# Patient Record
Sex: MALE | Race: BLACK OR AFRICAN AMERICAN | Employment: UNEMPLOYED | URBAN - METROPOLITAN AREA
[De-identification: names, ages, dates, MRNs, and addresses within clinical notes are randomized per-mention and may not be internally consistent; named-entity substitution may affect disease eponyms.]

---

## 2017-01-03 ENCOUNTER — HOSPITAL ENCOUNTER (OUTPATIENT)
Dept: DIABETES SERVICES | Age: 47
Discharge: HOME | End: 2017-01-03
Payer: SUBSIDIZED

## 2017-01-03 PROCEDURE — G0108 DIAB MANAGE TRN  PER INDIV: HCPCS | Performed by: DIETITIAN, REGISTERED

## 2017-01-06 ENCOUNTER — OFFICE VISIT (OUTPATIENT)
Dept: CARDIOLOGY CLINIC | Age: 47
End: 2017-01-06

## 2017-01-06 VITALS
OXYGEN SATURATION: 98 % | DIASTOLIC BLOOD PRESSURE: 76 MMHG | WEIGHT: 153 LBS | RESPIRATION RATE: 18 BRPM | HEIGHT: 69 IN | BODY MASS INDEX: 22.66 KG/M2 | SYSTOLIC BLOOD PRESSURE: 126 MMHG | HEART RATE: 96 BPM

## 2017-01-06 DIAGNOSIS — E10.8 TYPE 1 DIABETES MELLITUS WITH COMPLICATION (HCC): ICD-10-CM

## 2017-01-06 DIAGNOSIS — E10.42 DIABETIC POLYNEUROPATHY ASSOCIATED WITH TYPE 1 DIABETES MELLITUS (HCC): ICD-10-CM

## 2017-01-06 DIAGNOSIS — R60.9 EDEMA, UNSPECIFIED TYPE: Primary | ICD-10-CM

## 2017-01-06 PROBLEM — I25.10 ASHD (ARTERIOSCLEROTIC HEART DISEASE): Status: ACTIVE | Noted: 2017-01-06

## 2017-01-06 PROBLEM — I25.10 ASHD (ARTERIOSCLEROTIC HEART DISEASE): Status: RESOLVED | Noted: 2017-01-06 | Resolved: 2017-01-06

## 2017-01-06 NOTE — PROGRESS NOTES
Monae Hamilton NP  Subjective/HPI:     Liisa Paget is a 55 y.o. male is here for transitional care from admission for chest discomfort and edema. Hospitalization included diagnostic cardiac cath normal coronary arteries echocardiogram with normal valvular structure and normal ejection fraction. Patient now has access to insulin and reports now that his blood sugars are coming under better control his edema and overall general state of health he reports is improving. The patient denies exertional chest pain/resting shortness of breath, orthopnea, PND, persistent LE edema, palpitations, syncope, presyncope or fatigue. PCP Provider  Natacha Manzanares MD  Past Medical History   Diagnosis Date    Asthma     Type 2 diabetes mellitus with peripheral neuropathy ([facility] [address]       Past Surgical History   Procedure Laterality Date    Hx heent       ear surgery(both)     No Known Allergies   Family History   Problem Relation Age of Onset    Diabetes Father     Cancer Father      prostate    Hypertension Mother     Other Mother      Crohn's disease    Hypertension Maternal Grandmother       Current Outpatient Prescriptions   Medication Sig    pantoprazole (PROTONIX) 40 mg tablet Take 1 Tab by mouth Daily (before breakfast).  insulin detemir (LEVEMIR FLEXTOUCH) 100 unit/mL (3 mL) inpn Inject 30 units twice daily--Dose change 12/22/16--updated med list--did not send prescription to the pharmacy    amitriptyline (ELAVIL) 10 mg tablet Take 1 Tab by mouth nightly. To help diarrhea.  insulin lispro (HUMALOG) 100 unit/mL injection Take 14 units after each meal.  If you skip a meal, do not take.  gabapentin (NEURONTIN) 600 mg tablet Take  by mouth four (4) times daily.  triamcinolone acetonide (KENALOG) 0.1 % topical cream Apply  to affected area two (2) times a day. Use thin layer on neck rash. No current facility-administered medications for this visit.        Vitals:    01/06/17 1011 01/06/17 1017   BP: 130/80 126/76   Pulse: 96    Resp: 18    SpO2: 98%    Weight: 153 lb (69.4 kg)    Height: 5' 9\" (1.753 m)      Social History     Social History    Marital status:      Spouse name: N/A    Number of children: N/A    Years of education: N/A     Occupational History    Not on file. Social History Main Topics    Smoking status: Never Smoker    Smokeless tobacco: Never Used    Alcohol use No    Drug use: No    Sexual activity: Not on file     Other Topics Concern    Not on file     Social History Narrative       I have reviewed the nurses notes, vitals, problem list, allergy list, medical history, family, social history and medications. Review of Symptoms:    General: Pt denies excessive weight gain or loss. Pt is able to conduct ADL's  HEENT: Denies blurred vision, headaches, epistaxis and difficulty swallowing. Respiratory: Denies shortness of breath, PHILLIPS, wheezing or stridor. Cardiovascular: Denies precordial pain, palpitations, edema or PND  Gastrointestinal: Denies poor appetite, indigestion, abdominal pain or blood in stool  Musculoskeletal: Denies pain or swelling from muscles or joints  Neurologic: Denies tremor, paresthesias, or sensory motor disturbance  Skin: Denies rash, itching or texture change. Physical Exam:      General: Well developed, in no acute distress, cooperative and alert  HEENT: No carotid bruits, no JVD, trach is midline. Neck Supple, PEERL, EOM intact. Heart:  Normal S1/S2 negative S3 or S4. Regular, no murmur, gallop or rub.   Respiratory: Clear bilaterally x 4, no wheezing or rales  Abdomen:   Soft, non-tender, no masses, bowel sounds are active.   Extremities:  No edema, normal cap refill, no cyanosis, atraumatic. Neuro: A&Ox3, speech clear, gait stable. Skin: Skin color is normal. No rashes or lesions.  Non diaphoretic  Vascular: 2+ pulses symmetric in all extremities    Cardiographics    ECG: sinus rythm  Results for orders placed or performed during the hospital encounter of 12/20/16   EKG, 12 LEAD, INITIAL   Result Value Ref Range    Ventricular Rate 84 BPM    Atrial Rate 84 BPM    P-R Interval 160 ms    QRS Duration 66 ms    Q-T Interval 358 ms    QTC Calculation (Bezet) 423 ms    Calculated P Axis 76 degrees    Calculated R Axis 70 degrees    Calculated T Axis 74 degrees    Diagnosis       Normal sinus rhythm  Low voltage in the standard leads  Loss of anteroseptal forces  When compared with ECG of 21-DEC-2016 05:07,  No significant change was found  Confirmed by Kristie Dow ([id number]) on 12/22/2016 8:47:03 AM           Cardiology Labs:  No results found for: CHOL, CHOLX, CHLST, CHOLV, [id number], HDL, LDL, DLDL, LDLC, DLDLP, TGL, Aneita Leap, [id number], TRIGP, CHHD, CHHDX    Lab Results   Component Value Date/Time    Sodium 146 12/21/2016 05:01 AM    Potassium 3.3 12/21/2016 05:01 AM    Chloride 106 12/21/2016 05:01 AM    CO2 34 12/21/2016 05:01 AM    Anion gap 6 12/21/2016 05:01 AM    Glucose 248 12/21/2016 05:01 AM    BUN 7 12/21/2016 05:01 AM    Creatinine 0.61 12/21/2016 05:01 AM    BUN/Creatinine ratio 11 12/21/2016 05:01 AM    GFR est AA >60 12/21/2016 05:01 AM    GFR est non-AA >60 12/21/2016 05:01 AM    Calcium 8.2 12/21/2016 05:01 AM    Bilirubin, total 0.4 12/20/2016 11:18 AM     12/20/2016 11:18 AM     12/20/2016 11:18 AM    Alk. phosphatase 102 12/20/2016 11:18 AM    Protein, total 6.6 12/20/2016 11:18 AM    Albumin 3.0 12/20/2016 11:18 AM    Globulin 3.6 12/20/2016 11:18 AM    A-G Ratio 0.8 12/20/2016 11:18 AM           Assessment:     Assessment:     Jenna was seen today for heart problem.     Diagnoses and all orders for this visit:    Edema, unspecified type  -     AMB POC EKG ROUTINE W/ 12 LEADS, INTER & REP    Type 1 diabetes mellitus with complication ([address]    Diabetic polyneuropathy associated with type 1 diabetes mellitus ([facility] [address]    Normal coronary arteries        ICD-10-CM ICD-9-CM    1. Edema, unspecified type R60.9 782.3 AMB POC EKG ROUTINE W/ 12 LEADS, INTER & REP   2. Type 1 diabetes mellitus with complication (HCC) E40.8 250.91    3. Diabetic polyneuropathy associated with type 1 diabetes mellitus (HCC) E10.42 250.61      357.2    4. Normal coronary arteries Z03.89 V71.7      Orders Placed This Encounter    AMB POC EKG ROUTINE W/ 12 LEADS, INTER & REP     Order Specific Question:   Reason for Exam:     Answer:   routine        Plan:     Patient presents with normal coronary arteries, normal ejection fraction, normal 2D echocardiogram.  Blood pressure control main challenge for patient is improving control of type 1 diabetes, has access to insulin now and is taking medications as prescribed. Follow-up with cardiology as needed    Arnol MARIAA Pineda      [facility]    1/6/2017         Agree with note as outlined by  NP. I confirm findings in history and physical exam. No additional findings noted. Agree with plan as outlined above.      Danny Bailon MD

## 2017-01-16 ENCOUNTER — OFFICE VISIT (OUTPATIENT)
Dept: FAMILY MEDICINE CLINIC | Age: 47
End: 2017-01-16

## 2017-01-16 ENCOUNTER — HOSPITAL ENCOUNTER (OUTPATIENT)
Dept: LAB | Age: 47
Discharge: HOME | End: 2017-01-16

## 2017-01-16 VITALS
SYSTOLIC BLOOD PRESSURE: 102 MMHG | WEIGHT: 141.6 LBS | TEMPERATURE: 97.6 F | DIASTOLIC BLOOD PRESSURE: 76 MMHG | BODY MASS INDEX: 20.91 KG/M2 | HEART RATE: 115 BPM

## 2017-01-16 DIAGNOSIS — F32.89 OTHER DEPRESSION: Primary | ICD-10-CM

## 2017-01-16 DIAGNOSIS — Z13.9 SCREENING: ICD-10-CM

## 2017-01-16 LAB — GLUCOSE POC: NORMAL MG/DL

## 2017-01-16 PROCEDURE — 83516 IMMUNOASSAY NONANTIBODY: CPT | Performed by: FAMILY MEDICINE

## 2017-01-16 RX ORDER — INSULIN LISPRO 100 [IU]/ML
INJECTION, SOLUTION INTRAVENOUS; SUBCUTANEOUS
Qty: 1 VIAL | Refills: 0
Start: 2017-01-16 | End: 2019-05-24

## 2017-01-16 RX ORDER — CITALOPRAM 20 MG/1
TABLET, FILM COATED ORAL
Qty: 30 TAB | Refills: 1 | Status: SHIPPED | OUTPATIENT
Start: 2017-01-16 | End: 2017-05-22

## 2017-01-16 NOTE — PROGRESS NOTES
Coordination of Care  1. Have you been to the ER, urgent care clinic since your last visit? Hospitalized since your last visit? Yes Reason for visit: [facility] [facility] ER, 12/2016, Chest pain and elevated glucose    2. Have you seen or consulted any other health care providers outside of the [address] [place] [address] since your last visit? Include any pap smears or colon screening.  No    Medications  Medication Reconciliation Performed: yes  Patient does need refills     Learning Assessment Complete? yes    Results for orders placed or performed in visit on 01/16/17   AMB POC GLUCOSE BLOOD, BY GLUCOSE MONITORING DEVICE   Result Value Ref Range    Glucose  Fasting mg/dL

## 2017-01-16 NOTE — PROGRESS NOTES
Subjective:     Genia Estrada is a 55 y.o. male seen for follow up of diabetes. Feels like he is doing better. Is tapped into the DTT and Dr. Az Salazar now, and has appt with dietitian there tomorrow which he plans to keep. Has been having some lows, usually about 4am, where he feels too shaky to check glucose, but eats applesauce at bedside. Glucoses are also still 200s-300s at other times. Taking levemir 30u 8a and 8p, and humalog 16u with meals. Eating 2-3 times a day, poor appetite. Still having diarrhea but not as often as in the past.  Avoiding milk products and bread. Didn't take insulin this morning. I asked him about sx related to depression today. Along with no appetite, he thinks he is depressed, is isolating himself, says his family has noticed him withdrawing, and says his sex drive is 'almost gone'.     Lab Results  Component Value Date/Time   WBC 7.8 12/21/2016 05:01 AM   Hemoglobin (POC) 15.4 06/22/2016 01:27 PM   Hemoglobin (POC) 16.7 07/19/2010 01:48 PM   HGB 11.4 12/21/2016 05:01 AM   Hematocrit (POC) 49 07/19/2010 01:48 PM   HCT 33.3 12/21/2016 05:01 AM   PLATELET 117 03/20/6100 05:01 AM   MCV 85.8 12/21/2016 05:01 AM       Lab Results  Component Value Date/Time   Hemoglobin A1c 15.5 12/04/2015 10:56 AM   Glucose 248 12/21/2016 05:01 AM   Glucose (POC) 167 12/22/2016 06:46 PM   Glucose (POC) 393 07/19/2010 01:48 PM   Glucose  Fasting 01/16/2017 09:26 AM   Creatinine (POC) 1.0 07/19/2010 01:48 PM   Creatinine 0.61 12/21/2016 05:01 AM      No results found for: CHOL, CHOLX, CHLST, CHOLV, HDL, LDL, DLDL, LDLC, DLDLP, TGL, TGLX, TRIGL, [id number], TRIGP, CHHD, CHHDX    Lab Results  Component Value Date/Time   GFR est AA >60 12/21/2016 05:01 AM   GFR est non-AA >60 12/21/2016 05:01 AM   Creatinine (POC) 1.0 07/19/2010 01:48 PM   Creatinine 0.61 12/21/2016 05:01 AM   BUN 7 12/21/2016 05:01 AM   BUN (POC) 8 07/19/2010 01:48 PM   Sodium (POC) 141 07/19/2010 01:48 PM   Sodium 146 12/21/2016 05:01 AM   Potassium 3.3 12/21/2016 05:01 AM   Potassium (POC) 3.7 07/19/2010 01:48 PM   Chloride (POC) 99 07/19/2010 01:48 PM   Chloride 106 12/21/2016 05:01 AM   CO2 34 12/21/2016 05:01 AM                Vitals 1/16/2017 1/6/2017 1/6/2017 12/29/2016 12/28/2016 12/22/2016 12/22/2016   Blood Pressure 102/76 126/76 130/80 119/81 127/83 117/69 111/75   Pulse 115 - 96 87 83 123 116   Temp 97.6 - - - 97.3 97.5 97.5   Resp - - 18 - - 16 18   Height - - 5' 9\" 5' 9\" 5' 9.016\" - -   Weight 141 lb 9.6 oz - 153 lb 145 lb 9.6 oz 156 lb - -   SpO2 - - 98 - - 99 100   BSA - - 1.84 m2 1.79 m2 1.86 m2 - -   BMI - - 22.59 kg/m2 21.5 kg/m2 23.03 kg/m2 - -     Appearance: although weight has dropped since last visit, he generally looks better. , lungs clear, abd soft with some mild right mid-abd tenderness, no masses. Assessment/Plan:     1. Type 2 diabetes (or type 1.5?). Discussed what a good team Dr. Mikala Barrios has and strongly recommended he follow up with them. His experience so far has been very +. Should discuss the 4am lows with the dietitian-- may not have enough protein in suppers. May also neeed levemir adjusted and he has f/u with Dr. Mikala Barrios in the next few weeks. We are giving him humalog and levemir today and will continue to provide PAP insulin. 2.  Depression. He is interested in counseling and med. To see our LCSW asap. I am starting low dose celexa, discussed that it may exaccerbate GI sx at first.  F/u 3-4 weeks. 3.  Diarrhea-- check celiac panal.  Could be enteropathy, he is on elevil now. Continue to avoid milk products or can use lactaid pills/milk for now. 4.  Noticed anemia after he left, while in the ED with dehydration. Check hb next visit.

## 2017-01-16 NOTE — PROGRESS NOTES
Per provider request, the patient was given the rest of his PAP insulin order, 3 vials of Humalog and 10 vials of Levemir. Dr. Iker Virgen wrote the dose and signed each medication sticker. The patient signed the log book and expressed understanding of the doses. His referral to [facility] forms were completed and faxed to [facility] at [phone]. A copy of the referral and financial screening forms were put in [employer] [facility] and the originals were email scanned to Umm Gan and then placed in the grey folder in the nurse drawer.   Keyona Watt RN

## 2017-01-17 ENCOUNTER — HOSPITAL ENCOUNTER (OUTPATIENT)
Dept: DIABETES SERVICES | Age: 47
Discharge: HOME | End: 2017-01-17
Payer: SUBSIDIZED

## 2017-01-17 PROCEDURE — G0108 DIAB MANAGE TRN  PER INDIV: HCPCS | Performed by: DIETITIAN, REGISTERED

## 2017-01-19 ENCOUNTER — PATIENT OUTREACH (OUTPATIENT)
Dept: FAMILY MEDICINE CLINIC | Age: 47
End: 2017-01-19

## 2017-01-19 LAB
GLIADIN PEPTIDE IGA SER-ACNC: 10 UNITS (ref 0–19)
GLIADIN PEPTIDE IGG SER-ACNC: 5 UNITS (ref 0–19)
IGA SERPL-MCNC: 429 MG/DL (ref 90–386)
TTG IGA SER-ACNC: <2 U/ML (ref 0–3)
TTG IGG SER-ACNC: 3 U/ML (ref 0–5)

## 2017-01-30 ENCOUNTER — OFFICE VISIT (OUTPATIENT)
Dept: ENDOCRINOLOGY | Age: 47
End: 2017-01-30

## 2017-01-30 VITALS
HEIGHT: 69 IN | SYSTOLIC BLOOD PRESSURE: 128 MMHG | HEART RATE: 109 BPM | DIASTOLIC BLOOD PRESSURE: 83 MMHG | WEIGHT: 153.6 LBS | BODY MASS INDEX: 22.75 KG/M2

## 2017-01-30 NOTE — PROGRESS NOTES
Chief Complaint   Patient presents with    Diabetes       HPI  This gentleman returns for follow-up of his type 2 diabetes mellitus. He has unexplained weight loss from a maximum weight of 290 pounds to a current weight of 153 pounds. He remains on Levemir insulin 30 units twice daily and Humalog insulin 14 units with each of his 2 meals. Unfortunately his blood sugars in the morning and at bedtime are still consistently at around 300. He eats 2 meals a day. Breakfast is typically oatmeal and lunch is 2 packs of Ramen noodles. He had an episode of diarrhea yesterday occurring after breakfast but before supper. He recently underwent testing for celiac disease but these tests were negative. He denies any episodes of hypoglycemia with 1 exception. He had one episode where he ate a third meal in the evening and experienced a hypoglycemic episode at midnight. Otherwise he has not had hypoglycemia. But again he only checks in the morning and at bedtime. He is not aware of what his blood sugars during the day. ROS  He denies chest pain, shortness of breath, constipation or diarrhea with the exception of yesterday. He describes that episode as explosive watery diarrhea. Visit Vitals    /83 (BP 1 Location: Right arm, BP Patient Position: Sitting)    Pulse (!) 109    Ht 5' 9\" (1.753 m)    Wt 153 lb 9.6 oz (69.7 kg)    BMI 22.68 kg/m2       Physical Examination: General appearance - alert, well appearing, and in no distress  Mental status - alert, oriented to person, place, and time  Chest - clear to auscultation, no wheezes, rales or rhonchi, symmetric air entry  Heart - normal rate, regular rhythm, normal S1, S2, no murmurs, rubs, clicks or gallops    ASSESSMENT & PLAN  I am concerned that we are not able to bring this gentleman's blood sugars down more than 300.   Admittedly this is better than it was but by no means at goal.  So my plan is to have him check blood sugars in the morning and before his 2:00 meal or before his 2:00 meal and at bedtime to determine whether the 14 units is adequate for his meals. I suspect that we are better off attacking the mealtime insulin in the basal insulin. I did tell him that because of cost, we would try to accomplish much of his management over the phone and if necessary, he could certainly be seen at [address] [place] [address] in the future.

## 2017-01-31 ENCOUNTER — PATIENT OUTREACH (OUTPATIENT)
Dept: ENDOCRINOLOGY | Age: 47
End: 2017-01-31

## 2017-01-31 ENCOUNTER — HOSPITAL ENCOUNTER (OUTPATIENT)
Dept: DIABETES SERVICES | Age: 47
Discharge: HOME | End: 2017-01-31
Payer: SUBSIDIZED

## 2017-01-31 ENCOUNTER — TELEPHONE (OUTPATIENT)
Dept: FAMILY MEDICINE CLINIC | Age: 47
End: 2017-01-31

## 2017-01-31 PROCEDURE — G0108 DIAB MANAGE TRN  PER INDIV: HCPCS | Performed by: DIETITIAN, REGISTERED

## 2017-01-31 NOTE — PROGRESS NOTES
11:18 am  Attempted to contact patient, follow up for office visit yesterday, 1/30/17. No answer, left voicemail message to return telephone call, sending NN letter.

## 2017-01-31 NOTE — TELEPHONE ENCOUNTER
I made two attempts to contact the patient in order to reschedule the patient specialist appointment with Dr Yun Bailey I was  unsuccessful in speaking with the patient directly  however I was able to leave a v/m for a return call as of today date.     Scott Gu

## 2017-02-07 ENCOUNTER — TELEPHONE (OUTPATIENT)
Dept: FAMILY MEDICINE CLINIC | Age: 47
End: 2017-02-07

## 2017-02-07 NOTE — TELEPHONE ENCOUNTER
The patient called regarding overnight hypoglycemia and a possible need for glucagon as recommended by the diabetes treatment Center people. He tells me that he was taking 18 units of NovoLog insulin with each of his meals even though he thought he was taking 14. He has been instructed in proper technique for drawing up his insulin. So now he is taking 14 units of short acting insulin with his meals. He continues to take Levemir 30 units twice daily. His morning blood sugars are running 250-300 but his blood sugars fall throughout the day. Because of the lower blood sugars in the evening, he is now eating a snack at bedtime to prevent nocturnal hypoglycemia. For example yesterday his blood sugar was 260 5 in the morning, 180 at lunch but 70 after supper. I advised him to decrease his short acting insulin to 12 units with each of his meals and continue the Levemir 30 units twice daily. He will call me with the results of ball blood sugars.

## 2017-02-09 ENCOUNTER — TELEPHONE (OUTPATIENT)
Dept: FAMILY MEDICINE CLINIC | Age: 47
End: 2017-02-09

## 2017-02-09 NOTE — TELEPHONE ENCOUNTER
Cancellation notice letter was mailed out to the patient today. Patient was contacted on a few occasions via telephone calls and voice messages left to return call concerning appointment with Dr. Doug Major as of today date patient has not return any of the  Calls made to his home telephone number.      Torin Rey

## 2017-02-17 ENCOUNTER — PATIENT OUTREACH (OUTPATIENT)
Dept: FAMILY MEDICINE CLINIC | Age: 47
End: 2017-02-17

## 2017-02-17 ENCOUNTER — PATIENT OUTREACH (OUTPATIENT)
Dept: ENDOCRINOLOGY | Age: 47
End: 2017-02-17

## 2017-02-17 NOTE — PROGRESS NOTES
[address]    Closing MARY episode of care. Pt is being followed by Craig HOBSON at this time.  Karen Morelos RN

## 2017-02-17 NOTE — PROGRESS NOTES
Attempted to contact patient, follow up for chronic condition of diabetes with blood sugar readings and insulin regimen, no answer, left voicemail message to return telephone call.

## 2017-03-03 ENCOUNTER — TELEPHONE (OUTPATIENT)
Dept: DIABETES SERVICES | Age: 47
End: 2017-03-03

## 2017-03-28 ENCOUNTER — HOSPITAL ENCOUNTER (OUTPATIENT)
Dept: DIABETES SERVICES | Age: 47
Discharge: HOME | End: 2017-03-28
Payer: SUBSIDIZED

## 2017-03-28 PROCEDURE — G0108 DIAB MANAGE TRN  PER INDIV: HCPCS | Performed by: DIETITIAN, REGISTERED

## 2017-03-29 NOTE — DIABETES MGMT
[facility] Outpatient Program Progress Note      Patient has made significant progress since his admission at [facility] in December. His A1C today is 8% down from 15.5% on 12/29/16. He has also gained weight- 166.8# today up from 152.4# on 1/31/17. Patient reports that he has not been checking his blood sugars in the last 2 weeks. Reiterated the importance of checking before giving his insulin as this is a safety issue. Provided patient with a insulin syringe magnifier at last visit. States that this has \"been a lifesaver\" as he is not blindly dosing his insulin. Eyes-patient continues to report his vision is poor- patient to set up appointment ASAP as it has been 2 years since his last exam.    Diet- patient tried The FODMAP diet with no relief of symptoms. States that since he has started taking probiotics, his is having very little diarrhea. Exercise- patient is not exercising. Encouraged 30 minutes daily. States he plans to walk more. Depression- states he is seeing a psychiatrist and is not taking any medications at this time. Voiced that he feels that he will need medication as he has no desire in activities that he once enjoyed. Weight today:166.8#    Next appointment is scheduled for May 10th 2017. Thank you.     Magnolia Nicole, [address], [address]  Office:  [phone]

## 2017-04-04 ENCOUNTER — PATIENT OUTREACH (OUTPATIENT)
Dept: FAMILY MEDICINE CLINIC | Age: 47
End: 2017-04-04

## 2017-04-04 NOTE — PROGRESS NOTES
Disease Specific Case Management Note. NN called Jhonathan Monday as requested by JAZLYN Ying left message.

## 2017-05-02 ENCOUNTER — DOCUMENTATION ONLY (OUTPATIENT)
Dept: FAMILY MEDICINE CLINIC | Age: 47
End: 2017-05-02

## 2017-05-02 NOTE — PROGRESS NOTES
[facility] PAP application for Humalog signed by Dr Glendy Burton then faxed to RACHEAL Mccarthy at [facility]

## 2017-05-10 ENCOUNTER — HOSPITAL ENCOUNTER (OUTPATIENT)
Dept: DIABETES SERVICES | Age: 47
Discharge: HOME | End: 2017-05-10
Payer: SUBSIDIZED

## 2017-05-10 PROCEDURE — G0108 DIAB MANAGE TRN  PER INDIV: HCPCS | Performed by: DIETITIAN, REGISTERED

## 2017-05-16 NOTE — DIABETES MGMT
[facility] Outpatient Visit Note    Patient seen today for follow up. Patient very straight forward with questions. He has not been testing- last time he checked his BG was 1 week ago. Patient takes his insulin but typically only takes his Levemir once daily. States he will fall asleep and frequently miss his evening dose. Patient may benefit from taking his Levemir once daily at an increased dose. Patient continues to have sharp pains come and go in his eye. Encouraged patient to have eye exam ASAP. Patient also is due for a dental exam.  Standards of care and foot care caught today. Encouraged the followin- do not skip insulin  2- test BG daily  3-  Do not skip meals  4- set up eye and dental exam- to contact [facility]    Weight: down 7.1# to 159.7#    A1c: today: 8.2%    Patient states he does best with accountability- he has scheduled an appointment in 2 months. Thank you.   Augusto Ross, [address] [place] [address], [address]  Office:  [phone]

## 2017-05-22 ENCOUNTER — OFFICE VISIT (OUTPATIENT)
Dept: FAMILY MEDICINE CLINIC | Age: 47
End: 2017-05-22

## 2017-05-22 VITALS
SYSTOLIC BLOOD PRESSURE: 141 MMHG | TEMPERATURE: 98.3 F | BODY MASS INDEX: 23.92 KG/M2 | HEART RATE: 90 BPM | DIASTOLIC BLOOD PRESSURE: 84 MMHG | WEIGHT: 162 LBS

## 2017-05-22 DIAGNOSIS — E10.65 HYPERGLYCEMIA DUE TO TYPE 1 DIABETES MELLITUS (HCC): Primary | ICD-10-CM

## 2017-05-22 DIAGNOSIS — E10.42 DIABETIC POLYNEUROPATHY ASSOCIATED WITH TYPE 1 DIABETES MELLITUS (HCC): ICD-10-CM

## 2017-05-22 LAB — GLUCOSE POC: NORMAL MG/DL

## 2017-05-22 RX ORDER — AMITRIPTYLINE HYDROCHLORIDE 25 MG/1
25 TABLET, FILM COATED ORAL
Qty: 30 TAB | Refills: 2 | Status: SHIPPED | OUTPATIENT
Start: 2017-05-22 | End: 2018-03-08 | Stop reason: ALTCHOICE

## 2017-05-22 RX ORDER — GABAPENTIN 600 MG/1
600 TABLET ORAL 4 TIMES DAILY
Qty: 120 TAB | Refills: 1 | Status: SHIPPED | OUTPATIENT
Start: 2017-05-22 | End: 2017-07-07 | Stop reason: SDUPTHER

## 2017-05-22 NOTE — PROGRESS NOTES
Coordination of Care  1. Have you been to the ER, urgent care clinic since your last visit? Hospitalized since your last visit? No    2. Have you seen or consulted any other health care providers outside of the [address] [place] [address] since your last visit? Include any pap smears or colon screening.  No    Medications  Medication Reconciliation Performed: yes  Patient does need refills     Learning Assessment Complete? yes  Results for orders placed or performed in visit on 05/22/17   AMB POC GLUCOSE BLOOD, BY GLUCOSE MONITORING DEVICE   Result Value Ref Range    Glucose  nonfasting mg/dL

## 2017-05-22 NOTE — PROGRESS NOTES
[facility] appt given for July appt. Explained process that pt would need to have a  due to eye dilation, the eye exam fee of $15. Pt given copy of eye appt slip. Patient given pap insulin, just enough for a month's worth of levemir and humalog per Dr Classie Cushing and Dr Juan Mclean as pt tends to be non-compliant with appointments.

## 2017-05-23 NOTE — PROGRESS NOTES
Subjective:     Jose Ross is a 55 y.o. male seen for follow up of diabetes. Diabetic Review of Systems - medication compliance: noncompliant some of the time, diabetic diet compliance: probably noncompliant though I cannot elicit that specific history, last eye exam approximately ? Kitty Bazan Mentioned to the RD at [address] that he has been having some pain in his eyes but didn't mention to me today. C/o pain in extrem, a feeling of electric shock that goes up to his right shoulder and into legs from feet. Reports the gabapentin just 'takes the edge off'. Taking the gabapentin tid-qid and needs refill. Wants to see a neurologist.  Reina Oslaureano to take second dose of levemir sometimes, isn't checking glucoses very often but does have readings in the 100s lately, and A1C poc at [facility] was 8.2. Last week. Had one glucose of 32 when he took humalog and then didn't eat. Other symptoms and concerns: the rash behind his right ear persists.     Patient Active Problem List   Diagnosis Code    Hyperglycemia due to type 1 diabetes mellitus (HCC) E10.65    Diabetic polyneuropathy associated with type 1 diabetes mellitus (HCC) E10.42    Normal coronary arteries Z03.89     Past Medical History:   Diagnosis Date    Asthma     Type 2 diabetes mellitus with peripheral neuropathy ([facility]      Family History   Problem Relation Age of Onset    Diabetes Father     Cancer Father      prostate    Hypertension Mother     Other Mother      Crohn's disease    Hypertension Maternal Grandmother      Social History   Substance Use Topics    Smoking status: Never Smoker    Smokeless tobacco: Never Used    Alcohol use No        No results found for: CHOL, CHOLX, CHLST, CHOLV, HDL, LDL, DLDL, LDLC, DLDLP, TGL, TGLX, TRIGL, [id number], TRIGP, CHHD, CHHDX    Lab Results  Component Value Date/Time   GFR est AA >60 12/21/2016 05:01 AM   GFR est non-AA >60 12/21/2016 05:01 AM   Creatinine (POC) 1.0 07/19/2010 01:48 PM   Creatinine 0.61 12/21/2016 05:01 AM   BUN 7 12/21/2016 05:01 AM   BUN (POC) 8 07/19/2010 01:48 PM   Sodium (POC) 141 07/19/2010 01:48 PM   Sodium 146 12/21/2016 05:01 AM   Potassium 3.3 12/21/2016 05:01 AM   Potassium (POC) 3.7 07/19/2010 01:48 PM   Chloride (POC) 99 07/19/2010 01:48 PM   Chloride 106 12/21/2016 05:01 AM   CO2 34 12/21/2016 05:01 AM         Lab Results   Component Value Date/Time    Hemoglobin A1c 15.5 12/04/2015 10:56 AM         Objective:     Vitals 5/22/2017 1/30/2017 1/16/2017 1/6/2017 1/6/2017 12/29/2016 12/28/2016   Blood Pressure 141/84 128/83 102/76 126/76 130/80 119/81 127/83   Pulse 90 109 115 - 96 87 83   Temp 98.3 - 97.6 - - - 97.3   Resp - - - - 18 - -   Height - 5' 9\" - - 5' 9\" 5' 9\" 5' 9.016\"   Weight 162 lb 153 lb 9.6 oz 141 lb 9.6 oz - 153 lb 145 lb 9.6 oz 156 lb   SpO2 - - - - 98 - -   BSA - 1.84 m2 - - 1.84 m2 1.79 m2 1.86 m2   BMI - 22.68 kg/m2 - - 22.59 kg/m2 21.5 kg/m2 23.03 kg/m2     Appearance: alert, well appearing, and in no distress. At appropriate BMI now and appears much healthier and better hydrated than the last time I saw him. Exam: heart sounds normal rate, regular rhythm, normal S1, S2, no murmurs, rubs, clicks or gallops, chest clear, no hepatosplenomegaly. Has difficulty getting up from lying down off exam table. Skin-- cluster of circinate scaly excoriated lesions at mastoid area with small area on pinna. Lab review:    Assessment/Plan:     Diabetes with peripheral neuropathy, improved. He wants to f/u with Dr. Too Canela and DTT. We have the insulin and are only giving him 1-2 months at a time so that he will f/u. It is fine if he sees Dr. Too Canela and then calls us for the med. Call for appt with him, has f/u with RD 6/23. Add elevil at hs. Gave him phone number for neurology clinic. Has not ever had lipids here, and tends to come in late in the day. If he is not showing for diabetes clinic team, will check NF lipids at next visit.    Probable neuroderm behind right ear,but basal cell/SCC should be ruled out. He didn't show for our volunteer derm, will try AN derm. Eye pain and high risk for significant retinopathy-- Xover for opth. .       ICD-10-CM ICD-9-CM    1.  Hyperglycemia due to type 1 diabetes mellitus (HCC) E10.65 250.01 AMB POC GLUCOSE BLOOD, BY GLUCOSE MONITORING DEVICE

## 2017-06-02 ENCOUNTER — TELEPHONE (OUTPATIENT)
Dept: FAMILY MEDICINE CLINIC | Age: 47
End: 2017-06-02

## 2017-06-02 ENCOUNTER — OFFICE VISIT (OUTPATIENT)
Dept: ENDOCRINOLOGY | Age: 47
End: 2017-06-02

## 2017-06-02 VITALS
HEIGHT: 69 IN | BODY MASS INDEX: 23.31 KG/M2 | SYSTOLIC BLOOD PRESSURE: 137 MMHG | HEART RATE: 103 BPM | WEIGHT: 157.4 LBS | DIASTOLIC BLOOD PRESSURE: 89 MMHG

## 2017-06-02 DIAGNOSIS — E10.65 HYPERGLYCEMIA DUE TO TYPE 1 DIABETES MELLITUS (HCC): Primary | ICD-10-CM

## 2017-06-02 LAB — HBA1C MFR BLD HPLC: 7.9 %

## 2017-06-02 NOTE — PROGRESS NOTES
This gentleman returns for follow-up of what is likely type 1 diabetes mellitus currently on Levemir insulin 30 units twice daily and NovoLog insulin 14 units twice daily. Since I saw him last, he has been seen at [address] [place] [address] and is receiving his insulin there. He also recently got a care card so is able to finally get coverage for his office visits. He tells me that over the last several months he has developed frequent diarrhea following meals. He has not been evaluated for this with the exception of laboratory tests which reportedly ruled out celiac disease. Nevertheless, he continues to complain of postprandial diarrhea which limits his lifestyle significantly. He is trying to gain weight but this is a struggle because of the diarrhea. He is eating 2 meals. The lunchtime meal is typically oatmeal.  He will take 14 units of Humalog for that meal.  The only blood sugar he is able to check because of cost is a prelunch blood sugar. That is generally running between 180 and 185. The lunchtime meal was 2 packs of Mccarty noodles and hot dog. He is able to tolerate that. He takes 14 units of Humalog for that meal.  He then takes Levemir 30 units at bedtime. Notably, his A1c has dropped from 14.2% to 7.9%. Examination  Blood pressure 137/89  Pulse 103  Weight 157 (he has gained 12 pounds)  Lungs clear  Heart reveals a regular rate and rhythm    Impression type 1 diabetes mellitus with significantly improved blood sugar on Levemir plus Humalog insulin. Plan: We will request referral to GI to definitively determine the cause of his postprandial diarrhea. We are not going to get this gentleman to gain weight and continued with better blood sugar control without being able to treat the diarrhea appropriately. We spent more than 25 mintutes face to face, more than 50% was in counseling regarding diet, exercise and carbohydrate intake.

## 2017-06-08 ENCOUNTER — OFFICE VISIT (OUTPATIENT)
Dept: NEUROLOGY | Age: 47
End: 2017-06-08

## 2017-06-08 VITALS
WEIGHT: 168 LBS | HEIGHT: 69 IN | OXYGEN SATURATION: 98 % | DIASTOLIC BLOOD PRESSURE: 100 MMHG | HEART RATE: 97 BPM | SYSTOLIC BLOOD PRESSURE: 140 MMHG | BODY MASS INDEX: 24.88 KG/M2

## 2017-06-08 DIAGNOSIS — G37.9 DEMYELINATING DISEASE (HCC): ICD-10-CM

## 2017-06-08 DIAGNOSIS — G37.9 DEMYELINATING DISEASE (HCC): Primary | ICD-10-CM

## 2017-06-08 RX ORDER — TOPIRAMATE 25 MG/1
TABLET ORAL
Qty: 60 TAB | Refills: 5 | Status: SHIPPED | OUTPATIENT
Start: 2017-06-08 | End: 2019-05-24

## 2017-06-08 NOTE — MR AVS SNAPSHOT
Visit Information Date & Time Provider Department Dept. Phone Encounter #  
 6/8/2017 10:00 AM Joe Perez MD Neurology Clinic at [facility] [phone] [id number] Follow-up Instructions Return in about 1 month (around 7/8/2017). Your Appointments 7/7/2017  9:40 AM  
Follow Up with Joe Perez MD  
Neurology Clinic at [facility]) Appt Note: follow up Test results $ 0 CP jll 6/8/17  
 [address], 
[address], [address] [address]  
[address], [address], [address] [address]  
  
    
 7/7/2017 10:50 AM  
Follow Up with Jacobo Puente MD  
[facility]) Appt Note: 1 month f/u   Diabetes [address] [id number] [address] [place] [address] Upcoming Health Maintenance Date Due  
 LIPID PANEL Q1 1970 MICROALBUMIN Q1 10/15/1980 EYE EXAM RETINAL OR DILATED Q1 10/15/1980 Pneumococcal 19-64 Medium Risk (1 of 1 - PPSV23) 10/15/1989 DTaP/Tdap/Td series (1 - Tdap) 10/15/1991 INFLUENZA AGE 9 TO ADULT 8/1/2017 HEMOGLOBIN A1C Q6M 12/2/2017 FOOT EXAM Q1 6/2/2018 Allergies as of 6/8/2017  Review Complete On: 6/8/2017 By: Joe Perez MD  
 No Known Allergies Current Immunizations  Reviewed on 4/26/2011 No immunizations on file. Not reviewed this visit You Were Diagnosed With   
  
 Codes Comments Demyelinating disease ([facility]    -  Primary ICD-10-CM: G37.9 ICD-9-CM: 341. 9 Vitals BP Pulse Height(growth percentile) Weight(growth percentile) SpO2 BMI  
 (!) 140/100 97 5' 9\" (1.753 m) 168 lb (76.2 kg) 98% 24.81 kg/m2 Smoking Status Never Smoker Vitals History BMI and BSA Data Body Mass Index Body Surface Area 24.81 kg/m 2 1.93 m 2 Preferred Pharmacy Pharmacy Name Phone [facility] [address], [place] - [address] [place] [address] [place] [phone] Your Updated Medication List  
  
   
This list is accurate as of: 6/8/17 11:25 AM.  Always use your most recent med list.  
  
  
  
  
 amitriptyline 25 mg tablet Commonly known as:  ELAVIL Take 1 Tab by mouth nightly. For nerve pain.  
  
 gabapentin 600 mg tablet Commonly known as:  NEURONTIN Take 1 Tab by mouth four (4) times daily. insulin detemir 100 unit/mL (3 mL) Inpn Commonly known as:  Brandon Dy Inject 30 units twice daily--Dose change 12/22/16--updated med list--did not send prescription to the pharmacy  
  
 insulin lispro 100 unit/mL injection Commonly known as:  HUMALOG Take 14 units after each meal.  If you skip a meal, do not take. 1/16/17 pt reports taking 16 units with each meal.  
  
 topiramate 25 mg tablet Commonly known as:  TOPAMAX Take 1 tab Qhs for 2 weeks then 1 tab BID  
  
 triamcinolone acetonide 0.1 % topical cream  
Commonly known as:  KENALOG Apply  to affected area two (2) times a day. Use thin layer on neck rash. Prescriptions Sent to Pharmacy Refills  
 topiramate (TOPAMAX) 25 mg tablet 5 Sig: Take 1 tab Qhs for 2 weeks then 1 tab BID Class: Normal  
 Pharmacy: [id number], [address] [place] [address] Ph #: [phone] Follow-up Instructions Return in about 1 month (around 7/8/2017). To-Do List   
 06/08/2017 Lab:  PROTEIN, CSF   
  
 06/09/2017 Lab:  CELL COUNT, CSF   
  
 06/09/2017 Lab:  GLUCOSE, CSF   
  
 06/09/2017 Imaging:  MRI BRAIN W WO CONT   
  
 06/09/2017 Imaging:  MRI CERV SPINE WO CONT   
  
 06/09/2017 Imaging:  XR SPINAL PUNC LUMB DX   
  
 06/23/2017 1:00 PM  
  Appointment with Waldo Castro RD at [facility] ([phone]) Patient Instructions PRESCRIPTION REFILL POLICY [facility] Neurology [facility] Statement to Patients April 1, 2014 In an effort to ensure the large volume of patient prescription refills is processed in the most efficient and expeditious manner, we are asking our patients to assist us by calling your Pharmacy for all prescription refills, this will include also your  Mail Order Pharmacy. The pharmacy will contact our office electronically to continue the refill process. Please do not wait until the last minute to call your pharmacy. We need at least 48 hours (2days) to fill prescriptions. We also encourage you to call your pharmacy before going to  your prescription to make sure it is ready. With regard to controlled substance prescription refill requests (narcotic refills) that need to be picked up at our office, we ask your cooperation by providing us with at least 72 hours (3days) notice that you will need a refill. We will not refill narcotic prescription refill requests after 4:00pm on any weekday, Monday through Thursday, or after 2:00pm on Fridays, or on the weekends. We encourage everyone to explore another way of getting your prescription refill request processed using SurDoc, our patient web portal through our electronic medical record system. SurDoc is an efficient and effective way to communicate your medication request directly to the office and  downloadable as an karen on your smart phone . SurDoc also features a review functionality that allows you to view your medication list as well as leave messages for your physician. Are you ready to get connected? If so please review the attatched instructions or speak to any of our staff to get you set up right away! Thank you so much for your cooperation. Should you have any questions please contact our Practice Administrator. The Physicians and Staff,  [facility] Neurology [facility]! Dear Leeanne Feeler: Thank you for requesting a KeenSk[employer] account. Our records indicate that you already have an active KeenSkim account. You can access your account anytime at [email or web address] Did you know that you can access your hospital and ER discharge instructions at any time in KeenSkim? You can also review all of your test results from your hospital stay or ER visit. Additional Information If you have questions, please visit the Frequently Asked Questions section of the KeenSkim website at [email or web address] Remember, KeenSkim is NOT to be used for urgent needs. For medical emergencies, dial 911. Now available from your iPhone and Android! Please provide this summary of care documentation to your next provider. Your primary care clinician is listed as Radhika Thomas. If you have any questions after today's visit, please call [phone].

## 2017-06-08 NOTE — PATIENT INSTRUCTIONS
[facility] Neurology Clinic   Statement to Patients  April 1, 2014      In an effort to ensure the large volume of patient prescription refills is processed in the most efficient and expeditious manner, we are asking our patients to assist us by calling your Pharmacy for all prescription refills, this will include also your  Mail Order Pharmacy. The pharmacy will contact our office electronically to continue the refill process. Please do not wait until the last minute to call your pharmacy. We need at least 48 hours (2days) to fill prescriptions. We also encourage you to call your pharmacy before going to  your prescription to make sure it is ready. With regard to controlled substance prescription refill requests (narcotic refills) that need to be picked up at our office, we ask your cooperation by providing us with at least 72 hours (3days) notice that you will need a refill. We will not refill narcotic prescription refill requests after 4:00pm on any weekday, Monday through Thursday, or after 2:00pm on Fridays, or on the weekends. We encourage everyone to explore another way of getting your prescription refill request processed using [employer], our patient web portal through our electronic medical record system. [employer] is an efficient and effective way to communicate your medication request directly to the office and  downloadable as an karen on your smart phone . [employer] also features a review functionality that allows you to view your medication list as well as leave messages for your physician. Are you ready to get connected? If so please review the attatched instructions or speak to any of our staff to get you set up right away! Thank you so much for your cooperation. Should you have any questions please contact our Practice Administrator.     The Physicians and Staff,  [address] [place] [address] Neurology Clinic

## 2017-06-08 NOTE — PROGRESS NOTES
HISTORY OF PRESENT ILLNESS  Shirley Swanson is a 55 y.o. male. Neurologic Problem   The history is provided by the patient. This is a chronic (Patient reports that he had an episode of numbness in his feet about 6 years ago that spread up his legs and was associated with bladder incontinance to the extent that he had to wear depends. ) problem. Affected Side: Recently he developed numbness in his hands associated with painfull tingling with some in his right shoulder. There is a complaint of electric shooting sensations that sound a bit like Lhermite's phenomenon. There has been no change in bladder or bowel function. He has Diabetes Mellitus and has been told he has diabetic neuropathy. He has significant burning pain in his feet, worse at night. Review of Systems   Constitutional: Negative. Eyes: Positive for blurred vision. Respiratory: Negative. Cardiovascular: Negative. Gastrointestinal: Negative. Genitourinary: Negative. Musculoskeletal: Negative. Skin: Negative. Neurological: Negative for dizziness, tremors, speech change, seizures and loss of consciousness. Endo/Heme/Allergies: Negative. Psychiatric/Behavioral: Negative. Current Outpatient Prescriptions on File Prior to Visit   Medication Sig Dispense Refill    amitriptyline (ELAVIL) 25 mg tablet Take 1 Tab by mouth nightly. For nerve pain. 30 Tab 2    insulin lispro (HUMALOG) 100 unit/mL injection Take 14 units after each meal.  If you skip a meal, do not take. 1/16/17 pt reports taking 16 units with each meal. 1 Vial 0    insulin detemir (LEVEMIR FLEXTOUCH) 100 unit/mL (3 mL) inpn Inject 30 units twice daily--Dose change 12/22/16--updated med list--did not send prescription to the pharmacy 1 mL 0    triamcinolone acetonide (KENALOG) 0.1 % topical cream Apply  to affected area two (2) times a day. Use thin layer on neck rash. 15 g 1     No current facility-administered medications on file prior to visit.       Past Medical History:   Diagnosis Date    Asthma     Type 2 diabetes mellitus with peripheral neuropathy ([facility]      Family History   Problem Relation Age of Onset    Diabetes Father     Cancer Father      prostate    Hypertension Mother     Other Mother      Crohn's disease    Hypertension Maternal Grandmother      Social History     Social History    Marital status:      Spouse name: N/A    Number of children: N/A    Years of education: N/A     Social History Main Topics    Smoking status: Never Smoker    Smokeless tobacco: Never Used    Alcohol use No    Drug use: No    Sexual activity: Not Asked     Other Topics Concern    None     Social History Narrative     Visit Vitals    BP (!) 140/100    Pulse 97    Ht 5' 9\" (1.753 m)    Wt 168 lb (76.2 kg)    SpO2 98%    BMI 24.81 kg/m2         Physical Exam   Constitutional: He is oriented to person, place, and time. He appears well-developed and well-nourished. HENT:   Head: Normocephalic and atraumatic. Eyes: Conjunctivae and EOM are normal. Pupils are equal, round, and reactive to light. Neck: Normal range of motion. Neck supple. Cardiovascular: Normal rate, regular rhythm and normal heart sounds. Pulmonary/Chest: Effort normal and breath sounds normal.   Musculoskeletal: Normal range of motion. Neurological: He is alert and oriented to person, place, and time. A sensory deficit is present. No cranial nerve deficit. He displays no Babinski's sign on the right side. He displays no Babinski's sign on the left side. Reflex Scores:       Tricep reflexes are 3+ on the right side and 3+ on the left side. Bicep reflexes are 3+ on the right side and 3+ on the left side. Brachioradialis reflexes are 3+ on the right side and 3+ on the left side. Patellar reflexes are 3+ on the right side and 3+ on the left side. Achilles reflexes are 3+ on the right side and 3+ on the left side.   Vibratory sensation significantly decreased in toes. LE with mild knee flexor weakness  UE normal strength  Fundi normal  Carotids w/o bruit     Skin: Skin is warm and dry. Psychiatric: He has a normal mood and affect. His behavior is normal. Judgment and thought content normal.   Vitals reviewed. ASSESSMENT and PLAN   I suspect this patient has Multiple Sclerosis, there is a smaller chance he has Neuromyelitis Optica . He has, by history, 2 CNS lesions, both probably spinal,   by space and time. Either of these diagnoses will be a significant long term threat to his health. I pagel check a serum Aquaporin Antibody titer, I will start him on Topimax 25mg qhs, increasing to 25 bid after 2 weeks for his neuropayhic pain. I will order an MRI C Spine and Brain with contrast to start as well as arrange LP with Cell Count, differential and Oligoclonal bands. RTC 1 month.

## 2017-06-09 DIAGNOSIS — G37.9 DEMYELINATING DISEASE (HCC): ICD-10-CM

## 2017-06-12 ENCOUNTER — TELEPHONE (OUTPATIENT)
Dept: FAMILY MEDICINE CLINIC | Age: 47
End: 2017-06-12

## 2017-06-12 NOTE — TELEPHONE ENCOUNTER
Spoke to María Elena and scheduled AN screening appointment on 6/20/17 @ 200 pm    [address]

## 2017-06-19 ENCOUNTER — HOSPITAL ENCOUNTER (OUTPATIENT)
Dept: GENERAL RADIOLOGY | Age: 47
Discharge: HOME | End: 2017-06-19
Attending: PSYCHIATRY & NEUROLOGY
Payer: SUBSIDIZED

## 2017-06-19 ENCOUNTER — TELEPHONE (OUTPATIENT)
Dept: NEUROLOGY | Age: 47
End: 2017-06-19

## 2017-06-19 VITALS
OXYGEN SATURATION: 98 % | TEMPERATURE: 97.6 F | RESPIRATION RATE: 16 BRPM | DIASTOLIC BLOOD PRESSURE: 85 MMHG | SYSTOLIC BLOOD PRESSURE: 137 MMHG | HEART RATE: 92 BPM

## 2017-06-19 DIAGNOSIS — G37.9 DEMYELINATING DISEASE (HCC): ICD-10-CM

## 2017-06-19 LAB
CHARACTER SMN: CLEAR
COLOR SPUN CSF: COLORLESS
GLUCOSE CSF-MCNC: 86 MG/DL (ref 40–70)
PROT CSF-MCNC: 113 MG/DL (ref 15–45)
RBC # CSF: 52 /CU MM
TUBE # CSF: 1
TUBE # CSF: 2
TUBE # CSF: 2
WBC # CSF: 3 /CU MM (ref 0–5)

## 2017-06-19 PROCEDURE — 82945 GLUCOSE OTHER FLUID: CPT | Performed by: PSYCHIATRY & NEUROLOGY

## 2017-06-19 PROCEDURE — 89050 BODY FLUID CELL COUNT: CPT | Performed by: PSYCHIATRY & NEUROLOGY

## 2017-06-19 PROCEDURE — 36415 COLL VENOUS BLD VENIPUNCTURE: CPT | Performed by: PSYCHIATRY & NEUROLOGY

## 2017-06-19 PROCEDURE — 82042 OTHER SOURCE ALBUMIN QUAN EA: CPT | Performed by: PSYCHIATRY & NEUROLOGY

## 2017-06-19 PROCEDURE — 62270 DX LMBR SPI PNXR: CPT

## 2017-06-19 PROCEDURE — 84157 ASSAY OF PROTEIN OTHER: CPT | Performed by: PSYCHIATRY & NEUROLOGY

## 2017-06-19 NOTE — IP AVS SNAPSHOT
[address] [facility] 
[phone] Patient: Jovita Estimable MRN: [id number] WEP:[id number] You are allergic to the following No active allergies Recent Documentation Smoking Status Never Smoker Emergency Contacts Name Discharge Info Relation Home Work Mobile Karyn Boone  Other Relative [6] [phone] Ramakrishna Ngo  Other Relative [6] [phone] About your hospitalization You were admitted on:  June 19, 2017 You last received care in the:  [facility] RADIOLOGY You were discharged on:  June 19, 2017 Unit phone number:  [phone] Why you were hospitalized Your primary diagnosis was:  Not on File Providers Seen During Your Hospitalizations Provider Role Specialty Primary office phone Pastor Elizabeth MD Attending Provider Neurology [phone] Your Primary Care Physician (PCP) Primary Care Physician Office Phone Office Fax Gerri Black [phone] Follow-up Information None Your Appointments Tuesday June 20, 2017  2:00 PM EDT  
SOCIAL WORKERS with Molly Romberg [address] ([facility] [address] [phone] Wednesday June 21, 2017  4:00 PM EDT  
MRI BRAIN W WO CONT with [address] MRI [address] MRI [address]) 200 [facility]  
[phone] 1. Please bring a list or a bag of your current medications to your appointment 2. Please be sure to remove ALL hair clips, pins, extensions, etc., prior to arriving for your MRI procedure. 3. Bring any non [facility] films or CDs pertaining to the area being imaged with you on the day of appointment.  4. A written order with a valid diagnosis and [phone]  signature is required for all scheduled tests. 5. Check in at registration 30min before your appointment time unless you were instructed to do otherwise. Patient should report to outpatient registration ([facility]) 30 minutes prior to the appointment time unless instructed otherwise. Wednesday June 21, 2017  5:00 PM EDT  
MRI CERV SPINE WO CONT with ED [facility] MRI 2 [facility] MRI [address]) 200 [facility]  
[phone] 1. Please bring a list or a bag of your current medications to your appointment 2. Please be sure to remove ALL hair clips, pins, extensions, etc., prior to arriving for your MRI procedure. 3. Bring any non [facility] films or CDs pertaining to the area being imaged with you on the day of appointment. 4. A written order with a valid diagnosis and Physicians  signature is required for all scheduled tests. 5. Check in at registration 30min before your appointment time unless you were instructed to do otherwise. Patient should report to outpatient registration ([facility]) 30 minutes prior to the appointment time unless instructed otherwise. Friday June 23, 2017  1:00 PM EDT  
DIABETES INDIVIDUAL 1HR with Monica Thompson RD  
MRM DIABETIC TREATMENT ([address]) [address] [facility] 81. [facility]  
[phone] Friday July 07, 2017  9:40 AM EDT Follow Up with Nelda Bear MD  
Neurology Clinic at [address]) [address] [place] [address], 
[address] [place] [address], [address] [facility]  
[phone] Friday July 07, 2017 10:50 AM EDT Follow Up with Uri Montez MD  
[facility] [address] [place] [address]) [facility] [address] [address] [phone] Current Discharge Medication List  
  
ASK your doctor about these medications Dose & Instructions Dispensing Information Comments Morning Noon Evening Bedtime  
 amitriptyline 25 mg tablet Commonly known as:  ELAVIL Your last dose was: Your next dose is:    
   
   
 Dose:  25 mg Take 1 Tab by mouth nightly. For nerve pain. Quantity:  30 Tab Refills:  2  
     
   
   
   
  
 gabapentin 600 mg tablet Commonly known as:  NEURONTIN Your last dose was: Your next dose is:    
   
   
 Dose:  600 mg Take 1 Tab by mouth four (4) times daily. Quantity:  120 Tab Refills:  1  
     
   
   
   
  
 insulin detemir 100 unit/mL (3 mL) Inpn Commonly known as:  Jaja Iglesias Your last dose was: Your next dose is:    
   
   
 Inject 30 units twice daily--Dose change 12/22/16--updated med list--did not send prescription to the pharmacy Quantity:  1 mL Refills:  0  
     
   
   
   
  
 insulin lispro 100 unit/mL injection Commonly known as:  HUMALOG Your last dose was: Your next dose is: Take 14 units after each meal.  If you skip a meal, do not take. 1/16/17 pt reports taking 16 units with each meal.  
 Quantity:  1 Vial  
Refills:  0  
     
   
   
   
  
 topiramate 25 mg tablet Commonly known as:  TOPAMAX Your last dose was: Your next dose is: Take 1 tab Qhs for 2 weeks then 1 tab BID Quantity:  60 Tab Refills:  5  
     
   
   
   
  
 triamcinolone acetonide 0.1 % topical cream  
Commonly known as:  KENALOG Your last dose was: Your next dose is:    
   
   
 Apply  to affected area two (2) times a day. Use thin layer on neck rash. Quantity:  15 g Refills:  1 Discharge Instructions [facility] Special Procedures/Radiology Department Rj Edwards Date:6/19/17 Lumbar Puncture Discharge Instructions Remain flat in bed or on the sofa for the next 24 hours. Drink plenty of water over the next 24 to 48 hours. Avoid caffeine products. Resume your previous diet and follow the medication reconciliation form. You make take Tylenol, as directed on the label, for pain or headache. Do not take aspirin or ibuprofen (Motrin or Advil) for the next 48 hours as it may cause you to bleed. Restrict your activity for the next 24 to 48 hours. No strenuous work or activities for the next 24 to 48 hours. Follow up with your referring physician for test results. If you have any questions or concerns, call [phone] and ask to speak to the nurse on-call. Other: 
 
 
Discharge Orders None Introducing [facility]! Dear Savanna Larios: 
Thank you for requesting a [facility] account. Our records indicate that you already have an active [facility] account. You can access your account anytime at [email or web address] Did you know that you can access your hospital and ER discharge instructions at any time in [facility]? You can also review all of your test results from your hospital stay or ER visit. Additional Information If you have questions, please visit the Frequently Asked Questions section of the [facility] website at [email or web address] Remember, [facility] is NOT to be used for urgent needs. For medical emergencies, dial 911. Now available from your iPhone and Android! General Information Please provide this summary of care documentation to your next provider. Patient Signature:  ____________________________________________________________ Date:  ____________________________________________________________  
  
Courtney Burn Provider Signature:  ____________________________________________________________ Date:  ____________________________________________________________

## 2017-06-19 NOTE — DISCHARGE INSTRUCTIONS
[address]  Special Procedures/Radiology Department    Radiologist:George Dwyer    Date:6/19/17    Lumbar Puncture Discharge Instructions    Remain flat in bed or on the sofa for the next 24 hours. Drink plenty of water over the next 24 to 48 hours. Avoid caffeine products. Resume your previous diet and follow the medication reconciliation form. You make take Tylenol, as directed on the label, for pain or headache. Do not take aspirin or ibuprofen (Motrin or Advil) for the next 48 hours as it may cause you to bleed. Restrict your activity for the next 24 to 48 hours. No strenuous work or activities for the next 24 to 48 hours. Follow up with your referring physician for test results. If you have any questions or concerns, call [phone] and ask to speak to the nurse on-call.     Other:

## 2017-06-19 NOTE — ROUTINE PROCESS
1015- Pt into recovery post lumbar puncture. Assessment completed. Discharge instructions reviewed with pt. Pt verbalized understanding. Blood drawn and sent to lab for MS panel. 1115- Discharged to home via w/c.

## 2017-06-19 NOTE — IP AVS SNAPSHOT
Current Discharge Medication List  
  
ASK your doctor about these medications Dose & Instructions Dispensing Information Comments Morning Noon Evening Bedtime  
 amitriptyline 25 mg tablet Commonly known as:  ELAVIL Your last dose was: Your next dose is:    
   
   
 Dose:  25 mg Take 1 Tab by mouth nightly. For nerve pain. Quantity:  30 Tab Refills:  2  
     
   
   
   
  
 gabapentin 600 mg tablet Commonly known as:  NEURONTIN Your last dose was: Your next dose is:    
   
   
 Dose:  600 mg Take 1 Tab by mouth four (4) times daily. Quantity:  120 Tab Refills:  1  
     
   
   
   
  
 insulin detemir 100 unit/mL (3 mL) Inpn Commonly known as:  Artelia Cypher Your last dose was: Your next dose is:    
   
   
 Inject 30 units twice daily--Dose change 12/22/16--updated med list--did not send prescription to the pharmacy Quantity:  1 mL Refills:  0  
     
   
   
   
  
 insulin lispro 100 unit/mL injection Commonly known as:  HUMALOG Your last dose was: Your next dose is: Take 14 units after each meal.  If you skip a meal, do not take. 1/16/17 pt reports taking 16 units with each meal.  
 Quantity:  1 Vial  
Refills:  0  
     
   
   
   
  
 topiramate 25 mg tablet Commonly known as:  TOPAMAX Your last dose was: Your next dose is: Take 1 tab Qhs for 2 weeks then 1 tab BID Quantity:  60 Tab Refills:  5  
     
   
   
   
  
 triamcinolone acetonide 0.1 % topical cream  
Commonly known as:  KENALOG Your last dose was: Your next dose is:    
   
   
 Apply  to affected area two (2) times a day. Use thin layer on neck rash. Quantity:  15 g Refills:  1

## 2017-06-21 ENCOUNTER — HOSPITAL ENCOUNTER (OUTPATIENT)
Dept: MRI IMAGING | Age: 47
Discharge: HOME | End: 2017-06-21
Attending: PSYCHIATRY & NEUROLOGY
Payer: SUBSIDIZED

## 2017-06-21 ENCOUNTER — TELEPHONE (OUTPATIENT)
Dept: FAMILY MEDICINE CLINIC | Age: 47
End: 2017-06-21

## 2017-06-21 DIAGNOSIS — G37.9 DEMYELINATING DISEASE (HCC): ICD-10-CM

## 2017-06-21 LAB
ALB CSF/SERPL: 15 {RATIO} (ref 0–8)
ALBUMIN CSF-MCNC: 54 MG/DL (ref 11–48)
ALBUMIN SERPL-MCNC: 3.7 G/DL (ref 3.5–5.5)
CREAT BLD-MCNC: 0.5 MG/DL (ref 0.6–1.3)
IGG CSF-MCNC: 8.7 MG/DL (ref 0–8.6)
IGG SERPL-MCNC: 916 MG/DL (ref 700–1600)
IGG SYNTH RATE SER+CSF CALC-MRATE: 10.9 MG/DAY
IGG/ALB CLEAR SER+CSF-RTO: 0.7 (ref 0–0.7)
IGG/ALB CSF: 0.16 {RATIO} (ref 0–0.25)
OLIGOCLONAL BANDS.IT SER+CSF QL: ABNORMAL

## 2017-06-21 PROCEDURE — 74011250636 HC RX REV CODE- 250/636: Performed by: PSYCHIATRY & NEUROLOGY

## 2017-06-21 PROCEDURE — 82565 ASSAY OF CREATININE: CPT

## 2017-06-21 PROCEDURE — 72141 MRI NECK SPINE W/O DYE: CPT

## 2017-06-21 PROCEDURE — A9577 INJ MULTIHANCE: HCPCS | Performed by: PSYCHIATRY & NEUROLOGY

## 2017-06-21 PROCEDURE — 70553 MRI BRAIN STEM W/O & W/DYE: CPT

## 2017-06-21 RX ADMIN — GADOBENATE DIMEGLUMINE 15 ML: 529 INJECTION, SOLUTION INTRAVENOUS at 17:56

## 2017-06-21 NOTE — TELEPHONE ENCOUNTER
Called listed number and left voice message for patient to call me back. I explained to him that I can only keep this referral open for 30 days.    Jah Hearn

## 2017-06-23 ENCOUNTER — HOSPITAL ENCOUNTER (OUTPATIENT)
Dept: DIABETES SERVICES | Age: 47
Discharge: HOME | End: 2017-06-23
Payer: SUBSIDIZED

## 2017-06-23 DIAGNOSIS — E10.9 TYPE 1 DIABETES MELLITUS WITHOUT COMPLICATION (HCC): ICD-10-CM

## 2017-06-23 PROCEDURE — G0108 DIAB MANAGE TRN  PER INDIV: HCPCS | Performed by: DIETITIAN, REGISTERED

## 2017-06-27 NOTE — DIABETES MGMT
6/23/2017    Dear Leopoldo Hurst MD,    Your patient, Maximiliano Teague, attended an individual meal planning education session at [place] [address] where the following topics were covered today:  *Incorporating nutritional management into lifestyle  *Incorporating physical activity into lifestyle  *Developing personal strategies to address psychosocial issues and concerns  *Developing personal strategies to promote health and behavior change  *Monitoring blood glucose and other parametners and interpreting and using the results   for self-management decision making  *Preventing, dectecting and treating acute complications  *Using medication(s) safely and for maximum therapeutic effectiveness  Comments:   Patient seen today to complete education classes. He reports severe headache 2' spinal tap a few days ago. Difficult to stay on task, closed his eyes the entire time. Concerned about his recent test- suggested patient call his doctor for the results. He states that he continues to not check his sugars regularly. Also states that he frequently misses his last Levemir dose in the evening. Stressed the importance of testing and not skipping his insulin. Denies hypoglycemia. Patient does not have any appointments scheduled at this time. He has completed his education at this time. We look forward to assisting your patient in meeting their self-management goals. If you have any questions, please do not hesitate to call the [facility] at [phone].   Sincerely, Gilson Torres RD, [place] [address]  [address] [facility] [address] [place] [address], [address]  Phone: [phone] Fax: [phone]

## 2017-07-07 ENCOUNTER — OFFICE VISIT (OUTPATIENT)
Dept: ENDOCRINOLOGY | Age: 47
End: 2017-07-07

## 2017-07-07 ENCOUNTER — OFFICE VISIT (OUTPATIENT)
Dept: NEUROLOGY | Age: 47
End: 2017-07-07

## 2017-07-07 VITALS
DIASTOLIC BLOOD PRESSURE: 100 MMHG | BODY MASS INDEX: 24.17 KG/M2 | WEIGHT: 163.2 LBS | HEIGHT: 69 IN | HEART RATE: 97 BPM | SYSTOLIC BLOOD PRESSURE: 137 MMHG

## 2017-07-07 VITALS
HEIGHT: 69 IN | SYSTOLIC BLOOD PRESSURE: 132 MMHG | OXYGEN SATURATION: 98 % | WEIGHT: 163 LBS | HEART RATE: 100 BPM | BODY MASS INDEX: 24.14 KG/M2 | DIASTOLIC BLOOD PRESSURE: 70 MMHG

## 2017-07-07 DIAGNOSIS — M79.2 NEUROPATHIC PAIN: ICD-10-CM

## 2017-07-07 DIAGNOSIS — G37.9 DEMYELINATING DISEASE OF CENTRAL NERVOUS SYSTEM (HCC): Primary | ICD-10-CM

## 2017-07-07 DIAGNOSIS — E11.9 CONTROLLED TYPE 2 DIABETES MELLITUS WITHOUT COMPLICATION, WITH LONG-TERM CURRENT USE OF INSULIN (HCC): ICD-10-CM

## 2017-07-07 DIAGNOSIS — E10.65 HYPERGLYCEMIA DUE TO TYPE 1 DIABETES MELLITUS (HCC): ICD-10-CM

## 2017-07-07 DIAGNOSIS — E10.42 DIABETIC POLYNEUROPATHY ASSOCIATED WITH TYPE 1 DIABETES MELLITUS (HCC): ICD-10-CM

## 2017-07-07 DIAGNOSIS — Z79.4 CONTROLLED TYPE 2 DIABETES MELLITUS WITHOUT COMPLICATION, WITH LONG-TERM CURRENT USE OF INSULIN (HCC): ICD-10-CM

## 2017-07-07 RX ORDER — PREDNISONE 10 MG/1
TABLET ORAL
Qty: 6 TAB | Refills: 0 | Status: SHIPPED | OUTPATIENT
Start: 2017-07-07 | End: 2018-04-03

## 2017-07-07 RX ORDER — GABAPENTIN 600 MG/1
600 TABLET ORAL 4 TIMES DAILY
Qty: 360 TAB | Refills: 3 | Status: SHIPPED | OUTPATIENT
Start: 2017-07-07 | End: 2018-01-24 | Stop reason: SDUPTHER

## 2017-07-07 RX ORDER — BLOOD-GLUCOSE METER
EACH MISCELLANEOUS
Qty: 1 EACH | Refills: 0 | Status: SHIPPED | OUTPATIENT
Start: 2017-07-07 | End: 2019-05-24

## 2017-07-07 NOTE — MR AVS SNAPSHOT
Visit Information Date & Time Provider Department Dept. Phone Encounter #  
 7/7/2017  9:40 AM Amelia Waite MD Neurology Clinic at [facility] [phone] [id number] Your Appointments 7/7/2017 10:50 AM  
Follow Up with Sheyla Monet MD  
[facility] [address] [facility]) Appt Note: 1 month f/u   Diabetes One [facility] [address] [address] [address] [place] [address] Road 8/31/2017  9:40 AM  
Follow Up with Amelia Waite MD  
Neurology Clinic at [facility] [address]) Appt Note: follow up test results $ 0 CP jll 7/7/17  
 [address] [place] [address], 
[address] [place] [address], [address] [address]  
[address] [facility], 300 [facility], [address] [address] Upcoming Health Maintenance Date Due  
 LIPID PANEL Q1 1970 MICROALBUMIN Q1 10/15/1980 EYE EXAM RETINAL OR DILATED Q1 10/15/1980 Pneumococcal 19-64 Medium Risk (1 of 1 - PPSV23) 10/15/1989 DTaP/Tdap/Td series (1 - Tdap) 10/15/1991 INFLUENZA AGE 9 TO ADULT 8/1/2017 HEMOGLOBIN A1C Q6M 12/2/2017 FOOT EXAM Q1 6/2/2018 Allergies as of 7/7/2017  Review Complete On: 7/7/2017 By: Amelia Waite MD  
 No Known Allergies Current Immunizations  Reviewed on 4/26/2011 No immunizations on file. Not reviewed this visit You Were Diagnosed With   
  
 Codes Comments Demyelinating disease of central nervous system ([facility]    -  Primary ICD-10-CM: G37.9 ICD-9-CM: 341. 9 Vitals BP Pulse Height(growth percentile) Weight(growth percentile) SpO2 BMI  
 132/70 100 5' 9\" (1.753 m) 163 lb (73.9 kg) 98% 24.07 kg/m2 Smoking Status Never Smoker Vitals History BMI and BSA Data Body Mass Index Body Surface Area 24.07 kg/m 2 1.9 m 2 Preferred Pharmacy Pharmacy Name Phone [facility] [address] [facility], [address] [facility] - [address] [facility] Spine [phone] Your Updated Medication List  
  
   
This list is accurate as of: 7/7/17 10:43 AM.  Always use your most recent med list.  
  
  
  
  
 amitriptyline 25 mg tablet Commonly known as:  ELAVIL Take 1 Tab by mouth nightly. For nerve pain.  
  
 gabapentin 600 mg tablet Commonly known as:  NEURONTIN Take 1 Tab by mouth four (4) times daily. insulin detemir 100 unit/mL (3 mL) Inpn Commonly known as:  Monserrat Lott Inject 30 units twice daily--Dose change 12/22/16--updated med list--did not send prescription to the pharmacy  
  
 insulin lispro 100 unit/mL injection Commonly known as:  HUMALOG Take 14 units after each meal.  If you skip a meal, do not take. 1/16/17 pt reports taking 16 units with each meal.  
  
 predniSONE 10 mg tablet Commonly known as:  DELTASONE  
3 tabs po night before test and 3 tabs 2 hours before test  Indications: hypersensitivity drug reaction  
  
 topiramate 25 mg tablet Commonly known as:  TOPAMAX Take 1 tab Qhs for 2 weeks then 1 tab BID  
  
 triamcinolone acetonide 0.1 % topical cream  
Commonly known as:  KENALOG Apply  to affected area two (2) times a day. Use thin layer on neck rash. Prescriptions Sent to Pharmacy Refills  
 predniSONE (DELTASONE) 10 mg tablet 0 Sig: 3 tabs po night before test and 3 tabs 2 hours before test  Indications: hypersensitivity drug reaction Class: Normal  
 Pharmacy: [facility] [address] [facility], [address] [place] [address] Ph #: [phone] We Performed the Following AQUAPORIN-4 RECEPTOR AB [id number] CPT(R)] To-Do List   
 07/12/2017 11:30 AM  
  Appointment with [address] at [facility] MRI ([phone]) 1. Please bring a list or a bag of your current medications to your appointment 2.  Please be sure to remove ALL hair clips, pins, extensions, etc., prior to arriving for your MRI procedure. 3. If you have any medical implants or devices, please bring associated medical card with you. 4. Bring any non [facility] films or CDs pertaining to the area being imaged with you on the day of appointment. 5. A written order with a valid diagnosis and Physicians  signature is required for all scheduled tests. 6. Check in at registration 30min before your appointment time unless you were instructed to do otherwise.  
  
 07/14/2017 Imaging:  MRI BRAIN W WO CONT Patient Instructions PRESCRIPTION REFILL POLICY [facility] Statement to Patients April 1, 2014 In an effort to ensure the large volume of patient prescription refills is processed in the most efficient and expeditious manner, we are asking our patients to assist us by calling your Pharmacy for all prescription refills, this will include also your  Mail Order Pharmacy. The pharmacy will contact our office electronically to continue the refill process. Please do not wait until the last minute to call your pharmacy. We need at least 48 hours (2days) to fill prescriptions. We also encourage you to call your pharmacy before going to  your prescription to make sure it is ready. With regard to controlled substance prescription refill requests (narcotic refills) that need to be picked up at our office, we ask your cooperation by providing us with at least 72 hours (3days) notice that you will need a refill. We will not refill narcotic prescription refill requests after 4:00pm on any weekday, Monday through Thursday, or after 2:00pm on Fridays, or on the weekends.   
  
Take 1 benedryl 2 hours before test and a second tablet right before test 
 
 
 
 
 
 
We encourage everyone to explore another way of getting your prescription refill request processed using [employer] our patient web portal through our electronic medical record system. [employer] is an efficient and effective way to communicate your medication request directly to the office and  downloadable as an karen on your smart phone . [employer] also features a review functionality that allows you to view your medication list as well as leave messages for your physician. Are you ready to get connected? If so please review the attatched instructions or speak to any of our staff to get you set up right away! Thank you so much for your cooperation. Should you have any questions please contact our Practice Administrator. The Physicians and Staff,  [facility]! Dear Mohini Thomas: 
Thank you for requesting a [employer] account. Our records indicate that you already have an active [employer] account. You can access your account anytime at [email or web address] Did you know that you can access your hospital and ER discharge instructions at any time in [employer]? You can also review all of your test results from your hospital stay or ER visit. Additional Information If you have questions, please visit the Frequently Asked Questions section of the [employer] website at [email or web address] Remember, [employer] is NOT to be used for urgent needs. For medical emergencies, dial 911. Now available from your iPhone and Android! Please provide this summary of care documentation to your next provider. Your primary care clinician is listed as Milan Booth. If you have any questions after today's visit, please call [phone].
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
## 2017-07-07 NOTE — PROGRESS NOTES
HISTORY OF PRESENT ILLNESS  Oliver Pitt is a 55 y.o. male. HPI Comments: Oliver Pitt is a 77-year-old man who had been caring a diagnosis of diabetic neuropathy. Things have not changed much since I last saw him a month ago. He continues to have pain in his feet. His bowel and bladder function are controlled at this time. He continues to have numbness in both hands. MRI scan of his brain shows 2 small 3-4 mm white matter lesions. MRI scan of his cervical spine reveals a 1.6 cm x 0.6 and lesion at approximately C1. The thoracic MRI was ordered but was not done. CSF results reveal a pleocytosis and elevated protein but no oligoclonal bands. He does state that his vision has slowly gotten worse and now is near vision is getting worse as well, this is over several years. Results     Neurologic Problem   The history is provided by the patient. Primary symptoms include focal weakness. Review of Systems   Constitutional: Positive for malaise/fatigue. Negative for chills, fever and weight loss. HENT: Negative. Eyes: Positive for blurred vision. Negative for double vision, photophobia and pain. Respiratory: Negative. Cardiovascular: Negative. Gastrointestinal: Negative. Genitourinary: Positive for urgency. Musculoskeletal: Negative. Skin: Negative. Neurological: Positive for tingling, sensory change, focal weakness and weakness. Negative for tremors, speech change, seizures and loss of consciousness. Endo/Heme/Allergies: Negative. Psychiatric/Behavioral: Negative for depression, substance abuse and suicidal ideas. Current Outpatient Prescriptions on File Prior to Visit   Medication Sig Dispense Refill    topiramate (TOPAMAX) 25 mg tablet Take 1 tab Qhs for 2 weeks then 1 tab BID 60 Tab 5    amitriptyline (ELAVIL) 25 mg tablet Take 1 Tab by mouth nightly. For nerve pain.  30 Tab 2    insulin lispro (HUMALOG) 100 unit/mL injection Take 14 units after each meal.  If you skip a meal, do not take. 1/16/17 pt reports taking 16 units with each meal. 1 Vial 0    insulin detemir (LEVEMIR FLEXTOUCH) 100 unit/mL (3 mL) inpn Inject 30 units twice daily--Dose change 12/22/16--updated med list--did not send prescription to the pharmacy 1 mL 0    gabapentin (NEURONTIN) 600 mg tablet Take 1 Tab by mouth four (4) times daily. 360 Tab 3    Blood-Glucose Meter (ACCU-CHEK DANISHA PLUS METER) misc Test blood sugar twice daily 1 Each 0    glucose blood VI test strips (ACCU-CHEK DANISHA PLUS TEST STRP) strip Test blood sugar twice daily 200 Strip 3    triamcinolone acetonide (KENALOG) 0.1 % topical cream Apply  to affected area two (2) times a day. Use thin layer on neck rash. 15 g 1     No current facility-administered medications on file prior to visit. Past Medical History:   Diagnosis Date    Asthma     Type 2 diabetes mellitus with peripheral neuropathy ([facility] [address]        MRI BRAIN  Final result (Exam End: 6/21/2017  6:10 PM) Reviewed    Study Result   INDICATION:  demyelinating Dz, ? MS      COMPARISON:  6/9/2015     TECHNIQUE:    MR imaging of the brain was performed including precontrast sagittal FLAIR,  sagittal T1, axial T1,  DWI/ADC, gradient echo; multiplanar T1-weighted imaging  following the IV injection of 15 cc MultiHance, axial flair and T2 weighted  images postcontrast. This study was performed on the 3 T MRI unit.     FINDINGS:     Diffusion: No areas of restricted diffusion. No evidence of acute infarction. Ventricles:  Midline, no hydrocephalus. Brain Parenchyma/Brainstem:  Small white matter lesion in the left inferior  frontal perisylvian white matter, which appears new since the previous study,  best seen on sagittal FLAIR images.  Tiny lesions in the left periatrial white  matter, in the left posterior periventricular white matter and in left  subcortical white matter, also best demonstrated on the sagittal FLAIR images,  probably present on the previous study but more clearly demonstrated on the  current examination because of technical differences. Unchanged right  perisylvian and small subcortical white matter lesions. White matter findings  remain nonspecific for demyelination despite the presence of periventricular  lesions and one new lesion. No enhancing lesions.     Intracranial Hemorrhage:  None. Basal Cisterns:  Patent. Flow Voids:  Normal.  Post Contrast:  No abnormal parenchymal or meningeal enhancement. Paranasal sinuses: Clear  Craniocervical junction: Abnormality in the cervical spinal cord at the level of  C1, reported in the cervical spine MRI which was also obtained today.     Additional Comments:  N/A.     IMPRESSION  IMPRESSION:  Mild white matter abnormalities, with probable development of one new lesion  since 6/9/2015. Multiple sclerosis remains in the differential diagnosis,  although findings are nonspecific in a patient of this age, as noted previously. No acute findings. MRI C SPINE  Final result (Exam End: 6/21/2017  6:10 PM) Reviewed    Scans on Order [id number]   Radiology/ImagingRadiology/Imaging      Study Result   EXAM:  MRI CERV SPINE WO CONT     INDICATION:  demyelinating disease, R/O MS. Demyelinating disease of central  nervous system, unspecified     COMPARISON: None     TECHNIQUE: MR imaging of the cervical spine was performed using the following  sequences: sagittal T1, T2, STIR;  axial T2, T1.      CONTRAST:  None.     FINDINGS:     There is normal alignment of the cervical spine. Vertebral body heights are  maintained. Marrow signal is normal. The craniocervical junction is intact.       The large area of focal T2 hyperintensity in the dorsal aspect of the spinal  cord at the C1 level is again noted. Koki Loose Unfortunately, this area was not included  on the axial images. The lesion measures approximately 1.9 cm craniocaudad and  0.6 cm AP.  There is now slight volume loss along the dorsal aspect of the cord  at the location of this lesion. No new cord lesions are demonstrated.       The paraspinal soft tissues are within normal limits. The spinal canal and  neural foramina are widely patent throughout. There is no localizing disc  herniation or spondylosis.     IMPRESSION  IMPRESSION:  Localized cord lesion at the level of C1 now associated with slight volume loss  in the dorsal aspect of the cord, consistent with a focus of chronic  demyelination. No new cord lesions demonstrated.          Physical Exam   Constitutional: He is oriented to person, place, and time. He appears well-developed and well-nourished. HENT:   Head: Atraumatic. Eyes: Conjunctivae and EOM are normal. Pupils are equal, round, and reactive to light. Right eye exhibits no discharge. Left eye exhibits no discharge. Neck: Normal range of motion. Neck supple. No thyromegaly present. Cardiovascular: Normal rate and regular rhythm. Exam reveals no gallop and no friction rub. No murmur heard. Pulmonary/Chest: Effort normal and breath sounds normal.   Musculoskeletal: Normal range of motion. He exhibits no edema, tenderness or deformity. Lymphadenopathy:     He has no cervical adenopathy. Neurological: He is alert and oriented to person, place, and time. He displays no atrophy and no tremor. No cranial nerve deficit. He displays no Babinski's sign on the right side. He displays no Babinski's sign on the left side. Reflex Scores:       Tricep reflexes are 3+ on the right side and 3+ on the left side. Bicep reflexes are 3+ on the right side and 3+ on the left side. Brachioradialis reflexes are 3+ on the right side and 3+ on the left side. Patellar reflexes are 3+ on the right side and 3+ on the left side. Achilles reflexes are 3+ on the right side and 3+ on the left side. His vision is 20/400 at 10 feet and 20/40 near vision  Fundi normal  Carotids w/o bruit  Speech, language and mentation normal     Skin: Skin is warm and dry. Psychiatric: He has a normal mood and affect. His behavior is normal. Judgment and thought content normal.   Vitals reviewed. 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 I am going to proceed with the MRI scan of his thoracic spine and check a serum Aquaporin 4 antibody. He complained of having a severe headache after his MRI scan last time but not after his lumbar puncture, I suspect he does have intolerance to the gadolinium contrast.  I will pretreat him with prednisone and diphenhydramine. 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 His neuropathic pain in his feet is under control on topiramate gabapentin and amitriptyline, I will not change his doses at this time. after this workup is complete I will probably request a consultation with Dr. Bruno Cabrera or Dr. George Lowry at [facility] neurology to determine whether they believe any immune modifying treatment is worthwhile. I discussed all of this including the prognosis of neuromyelitis optica versus multiple sclerosis . I will see him back in 4 weeks.

## 2017-07-07 NOTE — PATIENT INSTRUCTIONS
[facility]   Statement to Patients  April 1, 2014      In an effort to ensure the large volume of patient prescription refills is processed in the most efficient and expeditious manner, we are asking our patients to assist us by calling your Pharmacy for all prescription refills, this will include also your  Mail Order Pharmacy. The pharmacy will contact our office electronically to continue the refill process. Please do not wait until the last minute to call your pharmacy. We need at least 48 hours (2days) to fill prescriptions. We also encourage you to call your pharmacy before going to  your prescription to make sure it is ready. With regard to controlled substance prescription refill requests (narcotic refills) that need to be picked up at our office, we ask your cooperation by providing us with at least 72 hours (3days) notice that you will need a refill. We will not refill narcotic prescription refill requests after 4:00pm on any weekday, Monday through Thursday, or after 2:00pm on Fridays, or on the weekends. Take 1 benedryl 2 hours before test and a second tablet right before test              We encourage everyone to explore another way of getting your prescription refill request processed using [employer], our patient web portal through our electronic medical record system. [employer] is an efficient and effective way to communicate your medication request directly to the office and  downloadable as an karen on your smart phone . [employer] also features a review functionality that allows you to view your medication list as well as leave messages for your physician. Are you ready to get connected? If so please review the attatched instructions or speak to any of our staff to get you set up right away! Thank you so much for your cooperation. Should you have any questions please contact our Practice Administrator.     The Physicians and Staff,  [facility]

## 2017-07-08 LAB
ALBUMIN/CREAT UR: 27.1 MG/G CREAT (ref 0–30)
CREAT UR-MCNC: 148.5 MG/DL
MICROALBUMIN UR-MCNC: 40.2 UG/ML

## 2017-07-12 ENCOUNTER — HOSPITAL ENCOUNTER (OUTPATIENT)
Dept: MRI IMAGING | Age: 47
Discharge: HOME | End: 2017-07-12
Attending: PSYCHIATRY & NEUROLOGY
Payer: SUBSIDIZED

## 2017-07-12 DIAGNOSIS — G37.9 DEMYELINATING DISEASE (HCC): ICD-10-CM

## 2017-07-12 PROCEDURE — 72146 MRI CHEST SPINE W/O DYE: CPT

## 2017-07-17 ENCOUNTER — DOCUMENTATION ONLY (OUTPATIENT)
Dept: NEUROLOGY | Age: 47
End: 2017-07-17

## 2017-07-17 NOTE — PROGRESS NOTES
Received request for progress notes, lab results and radiology results  from [employer]  These were faxed back to them

## 2017-07-18 ENCOUNTER — HOSPITAL ENCOUNTER (OUTPATIENT)
Dept: MRI IMAGING | Age: 47
Discharge: HOME | End: 2017-07-18
Attending: PSYCHIATRY & NEUROLOGY
Payer: SUBSIDIZED

## 2017-07-18 DIAGNOSIS — G37.9 DEMYELINATING DISEASE OF CENTRAL NERVOUS SYSTEM (HCC): ICD-10-CM

## 2017-07-18 PROCEDURE — 74011250636 HC RX REV CODE- 250/636: Performed by: PSYCHIATRY & NEUROLOGY

## 2017-07-18 PROCEDURE — A9576 INJ PROHANCE MULTIPACK: HCPCS | Performed by: PSYCHIATRY & NEUROLOGY

## 2017-07-18 PROCEDURE — 70553 MRI BRAIN STEM W/O & W/DYE: CPT

## 2017-07-18 RX ADMIN — GADOTERIDOL 15 ML: 279.3 INJECTION, SOLUTION INTRAVENOUS at 14:50

## 2017-07-21 DIAGNOSIS — E11.65 TYPE 2 DIABETES MELLITUS WITH HYPERGLYCEMIA, WITH LONG-TERM CURRENT USE OF INSULIN (HCC): Primary | ICD-10-CM

## 2017-07-21 DIAGNOSIS — Z79.4 TYPE 2 DIABETES MELLITUS WITH HYPERGLYCEMIA, WITH LONG-TERM CURRENT USE OF INSULIN (HCC): Primary | ICD-10-CM

## 2017-07-24 ENCOUNTER — TELEPHONE (OUTPATIENT)
Dept: FAMILY MEDICINE CLINIC | Age: 47
End: 2017-07-24

## 2017-07-24 NOTE — TELEPHONE ENCOUNTER
Per Dr Daniela Schmid called the patient to let him know that we still have PAP insulin in the fridge. OK to give the patient one month's worth as he is being followed by Dr Eliu Vail at [facility] (per Dr Daniela Schmid). Asked pt to call the clinic to see if he still needs the insulin. Advised that if we do not hear back from him within the next 30 days will use insulin as floor stock med.

## 2017-07-25 ENCOUNTER — CLINICAL SUPPORT (OUTPATIENT)
Dept: FAMILY MEDICINE CLINIC | Age: 47
End: 2017-07-25

## 2017-07-25 DIAGNOSIS — E10.65 HYPERGLYCEMIA DUE TO TYPE 1 DIABETES MELLITUS (HCC): Primary | ICD-10-CM

## 2017-07-25 NOTE — PROGRESS NOTES
Pt here to  PAP insulin. Given 2 vials of Humalog and 5 vials of Levemir. Per Dr Sangita Purvis ok to give pt one month's worth of insulin as he is going to see Endo regularly. Explained to the patient we have more PAP insulin here for him. Asked that he call the nurse at [facility] and let her know when he needs more insulin.

## 2017-08-07 ENCOUNTER — OFFICE VISIT (OUTPATIENT)
Dept: ENDOCRINOLOGY | Age: 47
End: 2017-08-07

## 2017-08-07 VITALS
WEIGHT: 169.6 LBS | HEIGHT: 69 IN | SYSTOLIC BLOOD PRESSURE: 93 MMHG | HEART RATE: 101 BPM | DIASTOLIC BLOOD PRESSURE: 76 MMHG | BODY MASS INDEX: 25.12 KG/M2

## 2017-08-07 NOTE — PROGRESS NOTES
The patient was seen by myself and Maral Tomas DNP, CNS  This gentleman returns for follow-up of his type 1 diabetes mellitus and developing neuromyelitis optica followed by Dr. Audrey Stephen. Although steroids are likely treatment they have not yet been started. The patient does say that his vision is getting progressively worse however. Regarding his diabetes, he continues on Levemir insulin 30 units in the morning and evening Humalog insulin 15 units with his 2 meals. He sent us blood sugars via my chart and he has some outstanding blood sugars running between 130 and 170. He is paying attention to the amount of carbohydrate that he eats and he is taking his insulin consistently. He is very pleased with his blood sugars on this regimen. Unfortunately he is having some episodes of hypoglycemia at about 3 or 4:00 in the morning. Examination  Blood pressure 93/76  Pulse 101  Weight 169    Impression  1. Type 1 diabetes mellitus with improving control on Levemir and Humalog  2. Advancing neurologic deficit thought to be neuromyelitis optica versus MS. Plan:  We did not change the morning dose of insulin. We did however decrease the Levemir from 30 units at night down to 26 units to try to eliminate the nighttime hypoglycemic symptoms. We will see him back in 3 months. We spent more than 25 mintutes face to face, more than 50% was in counseling regarding diet, exercise and carbohydrate intake.

## 2017-09-20 ENCOUNTER — TELEPHONE (OUTPATIENT)
Dept: FAMILY MEDICINE CLINIC | Age: 47
End: 2017-09-20

## 2017-09-20 NOTE — TELEPHONE ENCOUNTER
Pt called and LM asking if he could come in to  some of his PAP insulin. Called him back get recording 'this subscriber has chosen not to get incoming calls\". Unable to leave a message.

## 2017-09-20 NOTE — TELEPHONE ENCOUNTER
Attempted the call the patient again, received the same message about the subscriber choosing not to have incoming calls on this phone.

## 2017-10-02 ENCOUNTER — CLINICAL SUPPORT (OUTPATIENT)
Dept: FAMILY MEDICINE CLINIC | Age: 47
End: 2017-10-02

## 2017-10-02 DIAGNOSIS — Z71.9 COUNSELED BY NURSE: Primary | ICD-10-CM

## 2017-11-06 ENCOUNTER — OFFICE VISIT (OUTPATIENT)
Dept: ENDOCRINOLOGY | Age: 47
End: 2017-11-06

## 2017-11-06 VITALS
HEART RATE: 102 BPM | BODY MASS INDEX: 25.98 KG/M2 | DIASTOLIC BLOOD PRESSURE: 88 MMHG | HEIGHT: 69 IN | SYSTOLIC BLOOD PRESSURE: 122 MMHG | WEIGHT: 175.4 LBS

## 2017-11-06 DIAGNOSIS — Z79.4 CONTROLLED TYPE 2 DIABETES MELLITUS WITHOUT COMPLICATION, WITH LONG-TERM CURRENT USE OF INSULIN (HCC): Primary | ICD-10-CM

## 2017-11-06 DIAGNOSIS — E11.9 CONTROLLED TYPE 2 DIABETES MELLITUS WITHOUT COMPLICATION, WITH LONG-TERM CURRENT USE OF INSULIN (HCC): Primary | ICD-10-CM

## 2017-11-06 LAB — HBA1C MFR BLD HPLC: 9.6 %

## 2017-11-06 RX ORDER — INSULIN PUMP SYRINGE, 3 ML
EACH MISCELLANEOUS
Qty: 1 KIT | Refills: 0 | Status: SHIPPED | OUTPATIENT
Start: 2017-11-06 | End: 2019-07-29

## 2017-11-06 NOTE — PROGRESS NOTES
The patient was seen by myself and Keagan Yi DNP, CNS    This gentleman returns for follow-up of his type 1 diabetes mellitus and neuromyelitis optica. Regarding his vision, he has had significant reductions in visual acuity. He has not yet been started on steroids and does not have an appointment with Dr. Kortney Dorado until January. He has somewhat minimize the loss of vision and in fact did not tell his daughter that he was losing vision but then fell down the stairs. As will be detailed below, it is unlikely that he has been drawing up the right amount of insulin because of visual acuity issues. He continues to take Levemir insulin 30 units twice daily using a vial and syringe and Humalog insulin 15 units with 2 meals as well. When we saw him last his hemoglobin A1c was less than 8%. His A1c today is 9.6%. He eats 2 meals a day. Breakfast is usually oatmeal and sausage. Lunch is most often Ramen noodles and sausage or occasionally Chinese rice and ribs or chicken nuggets and Western [place] fries which he fixes at home. He says he is much less active likely due to the decrease in visual acuity. He has been placed on gabapentin because of diffuse pain in his hands his arms and his legs. He has been told by his neurologist that this may be related to MS versus the neuromyelitis optica. However firm diagnosis has not been made. Examination  Blood pressure 122/88  Pulse 102  Weight 175  HEENT reveals several superficial excoriations on the right ear and temple areas. Examination of the feet failed to demonstrate any ulcerations. Impression:  1 neuromyelitis optica with decreasing visual acuity  2. Type 1 diabetes mellitus deteriorating glucose control    Plan: We obtained a magnifier for a U1 100 insulin syringe and watched him deliver the insulin.   Apparently fired that he has been using so far is not reliable in terms of the amounts of insulin and so likely the increase in A1c is due to erroneous amounts of insulin. Vanessa Benitez provided for him a new PD magnifier which he demonstrated. We will write for a talking meter so that he can hear the blood sugar readings. He admits now that he has very great difficulty in seeing the readings. I will call Dr. Laith Negrete today and try to set up a sooner appointment. Clearly the decrease in visual acuity are complicating all aspects of his health care. We spent more than 25 mintutes face to face, more than 50% was in counseling regarding diet, exercise and carbohydrate intake.

## 2017-11-27 ENCOUNTER — OFFICE VISIT (OUTPATIENT)
Dept: NEUROLOGY | Age: 47
End: 2017-11-27

## 2017-11-27 VITALS
HEIGHT: 69 IN | WEIGHT: 182 LBS | OXYGEN SATURATION: 98 % | DIASTOLIC BLOOD PRESSURE: 70 MMHG | HEART RATE: 89 BPM | SYSTOLIC BLOOD PRESSURE: 138 MMHG | BODY MASS INDEX: 26.96 KG/M2

## 2017-11-27 DIAGNOSIS — G36.0 NEUROMYELITIS OPTICA (HCC): Primary | ICD-10-CM

## 2017-11-27 RX ORDER — HYDROCODONE BITARTRATE AND ACETAMINOPHEN 7.5; 325 MG/1; MG/1
1 TABLET ORAL
Qty: 60 TAB | Refills: 0 | Status: SHIPPED | OUTPATIENT
Start: 2017-11-27 | End: 2018-01-26 | Stop reason: SDUPTHER

## 2017-11-27 NOTE — LETTER
11/27/2017 10:29 AM 
 
Patient:  Ankita Lane YOB: 1970 Date of Visit: 11/27/2017 Dear Kenny Moreira MD 
[address] [facility] [id number] V[address] In Basket 
 : Thank you for referring Mr. Ankita Lane to me for evaluation/treatment. Below are the relevant portions of my assessment and plan of care. HISTORY OF PRESENT ILLNESS Ankita Lane is a 52 y.o. male. HPI Comments: Ankita Lane is a 72-year-old man who had been caring a diagnosis of neuromyelitis optica. He has lesions in his brain and his spinal cord. He is having a lot of trouble with pain. He takes amitriptyline 25 mg 2 p.o. nightly. He is also taking gabapentin 600 mg 4 times daily. ,  He is in a wheelchair. Denies significant bowel or bladder complaints. Does not have any insurance at this time. He has significant neuropathic pain. Review of Systems Constitutional: Positive for malaise/fatigue. Negative for chills, fever and weight loss. Neurological: Positive for tingling, sensory change, focal weakness and weakness. Negative for tremors, speech change, seizures and loss of consciousness. Psychiatric/Behavioral: Negative for depression, substance abuse and suicidal ideas. Current Outpatient Prescriptions on File Prior to Visit Medication Sig Dispense Refill  Blood-Glucose Meter (PRODIGY VOICE GLUCOSE METER) monitoring kit Monitor BS twice daily 1 Kit 0  
 glucose blood VI test strips (PRODIGY NO CODING) strip Monitor BS twice daily 200 Strip 3  predniSONE (DELTASONE) 10 mg tablet 3 tabs po night before test and 3 tabs 2 hours before test  Indications: hypersensitivity drug reaction 6 Tab 0  
 gabapentin (NEURONTIN) 600 mg tablet Take 1 Tab by mouth four (4) times daily. 360 Tab 3  Blood-Glucose Meter (ACCU-CHEK DANISHA PLUS METER) misc Test blood sugar twice daily 1 Each 0  
 glucose blood VI test strips (ACCU-CHEK DANISHA PLUS TEST STRP) strip Test blood sugar twice daily 200 Strip 3  
 topiramate (TOPAMAX) 25 mg tablet Take 1 tab Qhs for 2 weeks then 1 tab BID 60 Tab 5  
 amitriptyline (ELAVIL) 25 mg tablet Take 1 Tab by mouth nightly. For nerve pain. 30 Tab 2  
 insulin lispro (HUMALOG) 100 unit/mL injection Take 14 units after each meal.  If you skip a meal, do not take. 1/16/17 pt reports taking 16 units with each meal. 1 Vial 0  
 insulin detemir (LEVEMIR FLEXTOUCH) 100 unit/mL (3 mL) inpn Inject 30 units twice daily--Dose change 12/22/16--updated med list--did not send prescription to the pharmacy 1 mL 0  
 triamcinolone acetonide (KENALOG) 0.1 % topical cream Apply  to affected area two (2) times a day. Use thin layer on neck rash. 15 g 1 No current facility-administered medications on file prior to visit. Past Medical History:  
Diagnosis Date  Asthma  Type 2 diabetes mellitus with peripheral neuropathy (HCC) Physical Exam  
Constitutional: He is oriented to person, place, and time. He appears well-developed and well-nourished. Neurological: He is alert and oriented to person, place, and time. He displays no atrophy and no tremor. No cranial nerve deficit. He displays no Babinski's sign on the right side. He displays no Babinski's sign on the left side. Reflex Scores: 
     Tricep reflexes are 3+ on the right side and 3+ on the left side. Bicep reflexes are 3+ on the right side and 3+ on the left side. Brachioradialis reflexes are 3+ on the right side and 3+ on the left side. Patellar reflexes are 3+ on the right side and 3+ on the left side. Achilles reflexes are 3+ on the right side and 3+ on the left side. His vision is 20/400 at 10 feet and 20/40 near vision Fundi normal 
Speech, language and mentation normal 
  
Skin: Skin is warm and dry. Psychiatric: He has a normal mood and affect. His behavior is normal. Judgment and thought content normal.  
Vitals reviewed. 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 His MRI scans were all consistent with it and he had enhancement of both optic nerves, this cements the diagnosis of neuromyelitis optica. I see no reason to alter his amitriptyline or gabapentin at this time. I am going to give him some hydrocodone 7.5 mg, he is to take 1 p.o. nightly with his amitriptyline and gabapentin. His disability application is in progress. I do not think they will be any problem with him getting it. I will see him back in  4 months This note will not be viewable in [address] If you have questions, please do not hesitate to call me. I look forward to following Mr. Orozco along with you. Sincerely, Padmini Davis MD

## 2017-11-27 NOTE — PROGRESS NOTES
HISTORY OF PRESENT ILLNESS  Chelsea Dunbar is a 52 y.o. male. HPI Comments: Chelsea Dunbar is a 80-year-old man who had been caring a diagnosis of neuromyelitis optica. He has lesions in his brain and his spinal cord. He is having a lot of trouble with pain. He takes amitriptyline 25 mg 2 p.o. nightly. He is also taking gabapentin 600 mg 4 times daily. ,  He is in a wheelchair. Denies significant bowel or bladder complaints. Does not have any insurance at this time. He has significant neuropathic pain. Review of Systems   Constitutional: Positive for malaise/fatigue. Negative for chills, fever and weight loss. Neurological: Positive for tingling, sensory change, focal weakness and weakness. Negative for tremors, speech change, seizures and loss of consciousness. Psychiatric/Behavioral: Negative for depression, substance abuse and suicidal ideas. Current Outpatient Prescriptions on File Prior to Visit   Medication Sig Dispense Refill    Blood-Glucose Meter (PRODIGY VOICE GLUCOSE METER) monitoring kit Monitor BS twice daily 1 Kit 0    glucose blood VI test strips (PRODIGY NO CODING) strip Monitor BS twice daily 200 Strip 3    predniSONE (DELTASONE) 10 mg tablet 3 tabs po night before test and 3 tabs 2 hours before test  Indications: hypersensitivity drug reaction 6 Tab 0    gabapentin (NEURONTIN) 600 mg tablet Take 1 Tab by mouth four (4) times daily. 360 Tab 3    Blood-Glucose Meter (ACCU-CHEK DANISHA PLUS METER) misc Test blood sugar twice daily 1 Each 0    glucose blood VI test strips (ACCU-CHEK DANISHA PLUS TEST STRP) strip Test blood sugar twice daily 200 Strip 3    topiramate (TOPAMAX) 25 mg tablet Take 1 tab Qhs for 2 weeks then 1 tab BID 60 Tab 5    amitriptyline (ELAVIL) 25 mg tablet Take 1 Tab by mouth nightly. For nerve pain. 30 Tab 2    insulin lispro (HUMALOG) 100 unit/mL injection Take 14 units after each meal.  If you skip a meal, do not take.   1/16/17 pt reports taking 16 units with each meal. 1 Vial 0    insulin detemir (LEVEMIR FLEXTOUCH) 100 unit/mL (3 mL) inpn Inject 30 units twice daily--Dose change 12/22/16--updated med list--did not send prescription to the pharmacy 1 mL 0    triamcinolone acetonide (KENALOG) 0.1 % topical cream Apply  to affected area two (2) times a day. Use thin layer on neck rash. 15 g 1     No current facility-administered medications on file prior to visit. Past Medical History:   Diagnosis Date    Asthma     Type 2 diabetes mellitus with peripheral neuropathy ([facility] [address]        Physical Exam   Constitutional: He is oriented to person, place, and time. He appears well-developed and well-nourished. Neurological: He is alert and oriented to person, place, and time. He displays no atrophy and no tremor. No cranial nerve deficit. He displays no Babinski's sign on the right side. He displays no Babinski's sign on the left side. Reflex Scores:       Tricep reflexes are 3+ on the right side and 3+ on the left side. Bicep reflexes are 3+ on the right side and 3+ on the left side. Brachioradialis reflexes are 3+ on the right side and 3+ on the left side. Patellar reflexes are 3+ on the right side and 3+ on the left side. Achilles reflexes are 3+ on the right side and 3+ on the left side. His vision is 20/400 at 10 feet and 20/40 near vision  Fundi normal  Speech, language and mentation normal     Skin: Skin is warm and dry. Psychiatric: He has a normal mood and affect. His behavior is normal. Judgment and thought content normal.   Vitals reviewed. 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 His MRI scans were all consistent with it and he had enhancement of both optic nerves, this cements the diagnosis of neuromyelitis optica. I see no reason to alter his amitriptyline or gabapentin at this time.   I am going to give him some hydrocodone 7.5 mg, he is to take 1 p.o. nightly with his amitriptyline and gabapentin. His disability application is in progress. I do not think they will be any problem with him getting it. I will see him back in  4 months    This note will not be viewable in [address]

## 2017-11-27 NOTE — PATIENT INSTRUCTIONS
[facility]   Statement to Patients  April 1, 2014      In an effort to ensure the large volume of patient prescription refills is processed in the most efficient and expeditious manner, we are asking our patients to assist us by calling your Pharmacy for all prescription refills, this will include also your  Mail Order Pharmacy. The pharmacy will contact our office electronically to continue the refill process. Please do not wait until the last minute to call your pharmacy. We need at least 48 hours (2days) to fill prescriptions. We also encourage you to call your pharmacy before going to  your prescription to make sure it is ready. With regard to controlled substance prescription refill requests (narcotic refills) that need to be picked up at our office, we ask your cooperation by providing us with at least 72 hours (3days) notice that you will need a refill. We will not refill narcotic prescription refill requests after 4:00pm on any weekday, Monday through Thursday, or after 2:00pm on Fridays, or on the weekends. We encourage everyone to explore another way of getting your prescription refill request processed using [employer], our patient web portal through our electronic medical record system. [employer] is an efficient and effective way to communicate your medication request directly to the office and  downloadable as an karen on your smart phone . [employer] also features a review functionality that allows you to view your medication list as well as leave messages for your physician. Are you ready to get connected? If so please review the attatched instructions or speak to any of our staff to get you set up right away! Thank you so much for your cooperation. Should you have any questions please contact our Practice Administrator.     The Physicians and Staff,  [facility]

## 2017-11-27 NOTE — MR AVS SNAPSHOT
Visit Information Date & Time Provider Department Dept. Phone Encounter #  
 11/27/2017  8:40 AM Magalie Bautista MD [facility] [phone] [id number] Your Appointments 12/4/2017  3:30 PM  
Follow Up with MD Kavin Patelton [facility] Appt Note: 1 month diabetes [address] [address] [facility] Upcoming Health Maintenance Date Due  
 LIPID PANEL Q1 1970 EYE EXAM RETINAL OR DILATED Q1 10/15/1980 Pneumococcal 19-64 Medium Risk (1 of 1 - PPSV23) 10/15/1989 DTaP/Tdap/Td series (1 - Tdap) 10/15/1991 Influenza Age 5 to Adult 8/1/2017 HEMOGLOBIN A1C Q6M 5/6/2018 FOOT EXAM Q1 7/7/2018 MICROALBUMIN Q1 7/7/2018 Allergies as of 11/27/2017  Review Complete On: 11/27/2017 By: Magalie Bautista MD  
 No Known Allergies Current Immunizations  Reviewed on 4/26/2011 No immunizations on file. Not reviewed this visit Vitals BP Pulse Height(growth percentile) Weight(growth percentile) SpO2 BMI  
 138/70 89 5' 9\" (1.753 m) 182 lb (82.6 kg) 98% 26.88 kg/m2 Smoking Status Never Smoker Vitals History BMI and BSA Data Body Mass Index Body Surface Area  
 26.88 kg/m 2 2.01 m 2 Preferred Pharmacy Pharmacy Name Phone [facility] [address] [place], [place] - [address] [place] [address] Melissa Willian [phone] Your Updated Medication List  
  
   
This list is accurate as of: 11/27/17  9:54 AM.  Always use your most recent med list.  
  
  
  
  
 amitriptyline 25 mg tablet Commonly known as:  ELAVIL Take 1 Tab by mouth nightly. For nerve pain. * Blood-Glucose Meter Misc Commonly known as:  ACCU-CHEK DANISHA PLUS METER Test blood sugar twice daily * Blood-Glucose Meter monitoring kit Commonly known as:  PRODIGY VOICE GLUCOSE METER Monitor BS twice daily  
  
 gabapentin 600 mg tablet Commonly known as:  NEURONTIN Take 1 Tab by mouth four (4) times daily. * glucose blood VI test strips strip Commonly known as:  ACCU-CHEK DANISHA PLUS TEST STRP Test blood sugar twice daily * glucose blood VI test strips strip Commonly known as:  PRODIGY NO CODING Monitor BS twice daily HYDROcodone-acetaminophen 7.5-325 mg per tablet Commonly known as:  March Castles Take 1 Tab by mouth nightly. Max Daily Amount: 1 Tab. insulin detemir 100 unit/mL (3 mL) Inpn Commonly known as:  Floy Dace Inject 30 units twice daily--Dose change 12/22/16--updated med list--did not send prescription to the pharmacy  
  
 insulin lispro 100 unit/mL injection Commonly known as:  HUMALOG Take 14 units after each meal.  If you skip a meal, do not take. 1/16/17 pt reports taking 16 units with each meal.  
  
 predniSONE 10 mg tablet Commonly known as:  DELTASONE  
3 tabs po night before test and 3 tabs 2 hours before test  Indications: hypersensitivity drug reaction  
  
 topiramate 25 mg tablet Commonly known as:  TOPAMAX Take 1 tab Qhs for 2 weeks then 1 tab BID  
  
 triamcinolone acetonide 0.1 % topical cream  
Commonly known as:  KENALOG Apply  to affected area two (2) times a day. Use thin layer on neck rash. * Notice: This list has 4 medication(s) that are the same as other medications prescribed for you. Read the directions carefully, and ask your doctor or other care provider to review them with you. Prescriptions Printed Refills HYDROcodone-acetaminophen (NORCO) 7.5-325 mg per tablet 0 Sig: Take 1 Tab by mouth nightly. Max Daily Amount: 1 Tab. Class: Print Route: Oral  
  
Patient Instructions PRESCRIPTION REFILL POLICY [facility] Statement to Patients April 1, 2014 In an effort to ensure the large volume of patient prescription refills is processed in the most efficient and expeditious manner, we are asking our patients to assist us by calling your Pharmacy for all prescription refills, this will include also your  Mail Order Pharmacy. The pharmacy will contact our office electronically to continue the refill process. Please do not wait until the last minute to call your pharmacy. We need at least 48 hours (2days) to fill prescriptions. We also encourage you to call your pharmacy before going to  your prescription to make sure it is ready. With regard to controlled substance prescription refill requests (narcotic refills) that need to be picked up at our office, we ask your cooperation by providing us with at least 72 hours (3days) notice that you will need a refill. We will not refill narcotic prescription refill requests after 4:00pm on any weekday, Monday through Thursday, or after 2:00pm on Fridays, or on the weekends. We encourage everyone to explore another way of getting your prescription refill request processed using [employer], our patient web portal through our electronic medical record system. [employer] is an efficient and effective way to communicate your medication request directly to the office and  downloadable as an karen on your smart phone . [employer] also features a review functionality that allows you to view your medication list as well as leave messages for your physician. Are you ready to get connected? If so please review the attatched instructions or speak to any of our staff to get you set up right away! Thank you so much for your cooperation. Should you have any questions please contact our Practice Administrator. The Physicians and Staff,  [facility] Introducing [facility] SERVICES! Dear Celia Mireles: 
Thank you for requesting a [employer] account.   Our records indicate that you already have an active [employer] account. You can access your account anytime at [email or web address] Did you know that you can access your hospital and ER discharge instructions at any time in [employer]? You can also review all of your test results from your hospital stay or ER visit. Additional Information If you have questions, please visit the Frequently Asked Questions section of the [employer] website at [email or web address] Weekly [email or web address]/. Remember, [employer] is NOT to be used for urgent needs. For medical emergencies, dial 911. Now available from your iPhone and Android! Please provide this summary of care documentation to your next provider. Your primary care clinician is listed as Ana Maria Suresh. If you have any questions after today's visit, please call [phone].

## 2017-12-04 ENCOUNTER — OFFICE VISIT (OUTPATIENT)
Dept: ENDOCRINOLOGY | Age: 47
End: 2017-12-04

## 2017-12-04 VITALS
WEIGHT: 180.2 LBS | HEART RATE: 99 BPM | SYSTOLIC BLOOD PRESSURE: 134 MMHG | HEIGHT: 69 IN | BODY MASS INDEX: 26.69 KG/M2 | DIASTOLIC BLOOD PRESSURE: 80 MMHG

## 2017-12-04 NOTE — PROGRESS NOTES
This gentleman returns for follow-up of her type 2 diabetes mellitus with significant polyneuropathy and neuromyelitis optica followed by  Dr. Bishop Turner. This gentleman's vision is continuing to deteriorate. He has a very small window where his vision is even acceptable with magnifiers. Typically this is between noon and 6 PM.  He describes his vision is mostly foggy but he is able to see fingers. He is also able to see to drop his insulin with the BD magnifier which he was given last time. He has a talking meter and he sent us blood sugars this morning. In general his fasting blood sugars which for him are at 12 noon are ranging between 150 and 180. This is certainly better than those when his A1c had been 9.6%. He is only eating 2 meals a day. He takes 30 units of Levemir in the morning and in the evening and 14 units of NovoLog in the morning and in the evening. His breakfast meal is 4 packs of instant oatmeal.  Dinner meal is usually Ramen noodles. He has not had any hypoglycemia. He does tell us that because of his visual problems, he has burned his hands several times making sure that the stove his leg. Examination  Blood pressure 134/80  Pulse 99  Weight 180 pounds    Impression type 2 diabetes mellitus with polyneuropathy and neuromyelitis optica with progressively loss of vision. Plan: Because of the difficulty in monitoring his blood sugar, I have asked him to check a blood sugar at noon, before his oatmeal, and then 4 hours later for 1 week. Her goal is to determine whether it is the Levemir or the NovoLog but needs to go up to get a slightly better overall blood sugar profile. We will discuss by phone the results of the blood sugars. We spent more than 25 mintutes face to face, more than 50% was in counseling regarding diet, exercise and carbohydrate intake.

## 2018-01-03 ENCOUNTER — DOCUMENTATION ONLY (OUTPATIENT)
Dept: FAMILY MEDICINE CLINIC | Age: 48
End: 2018-01-03

## 2018-01-24 DIAGNOSIS — E11.9 CONTROLLED TYPE 2 DIABETES MELLITUS WITHOUT COMPLICATION, WITH LONG-TERM CURRENT USE OF INSULIN (HCC): ICD-10-CM

## 2018-01-24 DIAGNOSIS — Z79.4 CONTROLLED TYPE 2 DIABETES MELLITUS WITHOUT COMPLICATION, WITH LONG-TERM CURRENT USE OF INSULIN (HCC): ICD-10-CM

## 2018-01-24 DIAGNOSIS — R21 RASH: ICD-10-CM

## 2018-01-24 RX ORDER — HYDROCODONE BITARTRATE AND ACETAMINOPHEN 7.5; 325 MG/1; MG/1
1 TABLET ORAL
Qty: 60 TAB | Refills: 0 | OUTPATIENT
Start: 2018-01-24

## 2018-01-24 NOTE — TELEPHONE ENCOUNTER
----- Message from Luke Perry sent at 1/24/2018 10:05 AM EST -----  Regarding: /Rx  Pt needs written prescription for Hydrocodone. Pt will . Best contact number is [phone].

## 2018-01-25 RX ORDER — GABAPENTIN 600 MG/1
600 TABLET ORAL 4 TIMES DAILY
Qty: 360 TAB | Refills: 3 | Status: SHIPPED | OUTPATIENT
Start: 2018-01-25 | End: 2018-07-25 | Stop reason: SDUPTHER

## 2018-01-25 NOTE — TELEPHONE ENCOUNTER
From: Deandra Dempsey  To: Sharon Pham MD  Sent: 1/24/2018 9:54 AM EST  Subject: Medication Renewal Request    Original authorizing provider: MD Deandra Kerr would like a refill of the following medications:  gabapentin (NEURONTIN) 600 mg tablet Sharon Pham MD]  glucose blood VI test strips (ACCU-CHEK DANISHA PLUS TEST STRP) strip Sharon Pham MD]  glucose blood VI test strips (PRODIGY NO CODING) strip Sharon Pham MD]    Preferred pharmacy: [address] [place] [address] Lacretia Jeans[address]    Comment:      Medication renewals requested in this message routed to other providers:  insulin detemir (LEVEMIR FLEXTOUCH) 100 unit/mL (3 mL) inpn [Alex Thacker MD]  triamcinolone acetonide (KENALOG) 0.1 % topical cream Barbie Nihcols MD]  insulin lispro (HUMALOG) 100 unit/mL injection Lorelei Arvizu MD]  amitriptyline (ELAVIL) 25 mg tablet Lorelei Arvizu MD]

## 2018-01-26 ENCOUNTER — TELEPHONE (OUTPATIENT)
Dept: NEUROLOGY | Age: 48
End: 2018-01-26

## 2018-01-26 DIAGNOSIS — G36.0 DEVIC'S DISEASE (HCC): Primary | ICD-10-CM

## 2018-01-26 RX ORDER — HYDROCODONE BITARTRATE AND ACETAMINOPHEN 7.5; 325 MG/1; MG/1
TABLET ORAL
Qty: 60 TAB | Refills: 0 | Status: SHIPPED | OUTPATIENT
Start: 2018-01-26 | End: 2018-09-20 | Stop reason: SDUPTHER

## 2018-01-26 NOTE — TELEPHONE ENCOUNTER
Dr. Dianne Reyes, this patient was last in our office on 11/27/17 and has follow up appt on  3/29/18 per office visit. He would like refill for hydrocodone  Please advise if you want to refill? Eugenie Bai

## 2018-01-30 ENCOUNTER — TELEPHONE (OUTPATIENT)
Dept: NEUROLOGY | Age: 48
End: 2018-01-30

## 2018-01-30 NOTE — TELEPHONE ENCOUNTER
Spoke to patient and let him know he needs to  script for hydrocodone at our  in this office. Appt.  Made for 3/29/18

## 2018-02-02 NOTE — TELEPHONE ENCOUNTER
From: Ramin Son  To:  Roshni Lowe MD  Sent: 1/24/2018 9:54 AM EST  Subject: Medication Renewal Request    Original authorizing provider: Roshni Lowe MD    Jenna Orozco would like a refill of the following medications:  triamcinolone acetonide (KENALOG) 0.1 % topical cream Roshni Lowe MD]    Preferred pharmacy: [address] [place] [address]    Comment:      Medication renewals requested in this message routed to other providers:  insulin detemir (LEVEMIR FLEXTOUCH) 100 unit/mL (3 mL) inpn [Alex Thacker MD]  insulin lispro (HUMALOG) 100 unit/mL injection Leticia Michelle MD]  amitriptyline (ELAVIL) 25 mg tablet Leticia Michelle MD]  gabapentin (NEURONTIN) 600 mg tablet Mo Trevizo MD]  glucose blood VI test strips (ACCU-CHEK DANISHA PLUS TEST STRP) strip Mo Trevizo MD]  glucose blood VI test strips (PRODIGY NO CODING) strip Mo Trevizo MD]

## 2018-02-05 RX ORDER — TRIAMCINOLONE ACETONIDE 1 MG/G
CREAM TOPICAL 2 TIMES DAILY
Qty: 15 G | Refills: 1 | OUTPATIENT
Start: 2018-02-05

## 2018-02-05 NOTE — TELEPHONE ENCOUNTER
Pt is using it on a rash he has on the side of his face. He said it isn't getting any worse but it isn't improving either. I asked him to make an appointment so someone could look at the rash.

## 2018-02-16 ENCOUNTER — OFFICE VISIT (OUTPATIENT)
Dept: ENDOCRINOLOGY | Age: 48
End: 2018-02-16

## 2018-02-16 VITALS
RESPIRATION RATE: 14 BRPM | WEIGHT: 184 LBS | BODY MASS INDEX: 27.25 KG/M2 | SYSTOLIC BLOOD PRESSURE: 130 MMHG | DIASTOLIC BLOOD PRESSURE: 85 MMHG | HEART RATE: 100 BPM | HEIGHT: 69 IN

## 2018-02-16 PROBLEM — E11.40 TYPE 2 DIABETES MELLITUS WITH DIABETIC NEUROPATHY (HCC): Status: ACTIVE | Noted: 2018-02-16

## 2018-02-16 NOTE — PROGRESS NOTES
This gentleman returns for follow-up of his type 1 diabetes mellitus and neuromyelitis with significant decreasing in vision. He is followed by Dr. Pee Mcgovern for this. Regarding his diabetes, he is on 30 units of Levemir. He takes the first dose at noon and the second dose at midnight. He takes Humalog insulin 15 units for each of his 2 meals which occur at noon and 4 PM.  Reviewed his meals. Currently he is eating 4 packets of oatmeal plus an English muffin for a total carbohydrate intake of about 160 g. The second meal can be some tater tots and sausage for about 20 g of carbohydrate. He has been checking blood sugars using his talking meter but unfortunately he did not bring it and he did not upload the data so we do not know what his blood sugars are. He feels like his blood sugars are all ranging between 180 and 210. Examination  Blood pressure 130/85  Pulse 100  Weight 184    Impression type 1 diabetes mellitus and progressive visual loss. Plan: We have continued the Levemir at 30 units every 12 hours. We have adjusted the Humalog to 20 units to cover his first meal of the day and 10 units to cover the second meal of the day. We may well need to increase the first dose of Humalog even further to cover the high carbohydrate diet. We will see him back in follow-up. We spent more than 25 mintutes face to face, more than 50% was in counseling regarding diet, exercise and carbohydrate intake.

## 2018-02-16 NOTE — PROGRESS NOTES
Miladys Patterson is a 52 y.o. male      Chief Complaint   Patient presents with    Diabetes      Last A1c 11/6/17  9.6 %       1. Have you been to the ER, urgent care clinic since your last visit? Hospitalized since your last visit? No    2. Have you seen or consulted any other health care providers outside of the [address] [place] [address] since your last visit? Include any pap smears or colon screening.  No

## 2018-03-08 ENCOUNTER — TELEPHONE (OUTPATIENT)
Dept: FAMILY MEDICINE CLINIC | Age: 48
End: 2018-03-08

## 2018-03-08 ENCOUNTER — OFFICE VISIT (OUTPATIENT)
Dept: FAMILY MEDICINE CLINIC | Age: 48
End: 2018-03-08

## 2018-03-08 VITALS
DIASTOLIC BLOOD PRESSURE: 81 MMHG | TEMPERATURE: 97.5 F | SYSTOLIC BLOOD PRESSURE: 137 MMHG | WEIGHT: 188.4 LBS | HEART RATE: 97 BPM | BODY MASS INDEX: 27.82 KG/M2

## 2018-03-08 DIAGNOSIS — E10.42 DIABETIC POLYNEUROPATHY ASSOCIATED WITH TYPE 1 DIABETES MELLITUS (HCC): Primary | ICD-10-CM

## 2018-03-08 DIAGNOSIS — L21.9 SEBORRHEA: ICD-10-CM

## 2018-03-08 DIAGNOSIS — R21 RASH: ICD-10-CM

## 2018-03-08 DIAGNOSIS — F32.89 OTHER DEPRESSION: ICD-10-CM

## 2018-03-08 LAB — GLUCOSE POC: NORMAL MG/DL

## 2018-03-08 RX ORDER — DULOXETIN HYDROCHLORIDE 30 MG/1
30 CAPSULE, DELAYED RELEASE ORAL
Qty: 30 CAP | Refills: 1 | Status: SHIPPED | OUTPATIENT
Start: 2018-03-08 | End: 2018-04-03 | Stop reason: SDUPTHER

## 2018-03-08 RX ORDER — HYDROXYZINE PAMOATE 25 MG/1
25 CAPSULE ORAL
Qty: 60 CAP | Refills: 1 | Status: SHIPPED | OUTPATIENT
Start: 2018-03-08 | End: 2018-09-24 | Stop reason: SDUPTHER

## 2018-03-08 RX ORDER — DULOXETIN HYDROCHLORIDE 30 MG/1
30 CAPSULE, DELAYED RELEASE ORAL 2 TIMES DAILY
Qty: 180 CAP | Refills: 3
Start: 2018-03-08 | End: 2018-11-13 | Stop reason: SDUPTHER

## 2018-03-08 RX ORDER — TRIAMCINOLONE ACETONIDE 1 MG/G
CREAM TOPICAL 2 TIMES DAILY
Qty: 15 G | Refills: 1 | Status: SHIPPED | OUTPATIENT
Start: 2018-03-08 | End: 2018-11-05 | Stop reason: SDUPTHER

## 2018-03-08 NOTE — PROGRESS NOTES
Coordination of Care  1. Have you been to the ER, urgent care clinic since your last visit? Hospitalized since your last visit? No    2. Have you seen or consulted any other health care providers outside of the [address] [place] [address] since your last visit? Include any pap smears or colon screening. No    Does the patient need refills?  YES    Learning Assessment Complete? yes  Results for orders placed or performed in visit on 03/08/18   AMB POC GLUCOSE BLOOD, BY GLUCOSE MONITORING DEVICE   Result Value Ref Range    Glucose POC nf 395 mg/dL

## 2018-03-08 NOTE — MR AVS SNAPSHOT
[address] [address] 
[phone] Patient: Clifton Aguilar MRN: [id number] Visit Information Date & Time Provider Department Dept. Phone Encounter #  
 3/8/2018 10:30 AM Finesse Nunes [phone] [id number] Your Appointments 3/29/2018  9:40 AM  
Follow Up with Rahul Kidd MD  
Neurology Clinic at [address]) Appt Note: follow up $ 0 CP jll 11/27/17  
 [address] [facility], 
[address] [place] [address], [address] [address]  
[address], [address] [place] [address], [address] [facility] [address] [address] 5/14/2018 12:10 PM  
Follow Up with Brittany Jean Baptiste MD  
[facility] [address] [place] [address]) Appt Note: 3 month f/u  Diabetes [facility] [address] [address] [facility] Upcoming Health Maintenance Date Due  
 LIPID PANEL Q1 1970 EYE EXAM RETINAL OR DILATED Q1 10/15/1980 Pneumococcal 19-64 Medium Risk (1 of 1 - PPSV23) 10/15/1989 DTaP/Tdap/Td series (1 - Tdap) 10/15/1991 Influenza Age 5 to Adult 8/1/2017 HEMOGLOBIN A1C Q6M 5/6/2018 FOOT EXAM Q1 7/7/2018 MICROALBUMIN Q1 7/7/2018 Allergies as of 3/8/2018  Review Complete On: 2/16/2018 By: Brittany Jean Baptiste MD  
 No Known Allergies Current Immunizations  Reviewed on 4/26/2011 No immunizations on file. Not reviewed this visit You Were Diagnosed With   
  
 Codes Comments Diabetic polyneuropathy associated with type 1 diabetes mellitus ([facility] [address]    -  Primary ICD-10-CM: E10.42 
ICD-9-CM: 250.61, 357.2 Rash     ICD-10-CM: R21 
ICD-9-CM: 782.1 Vitals BP Pulse Temp Weight(growth percentile) BMI Smoking Status  137/81 (BP 1 Location: Left arm) 97 97.5 °F (36.4 °C) (Oral) 188 lb 6.4 oz (85.5 kg) 27.82 kg/m2 Never Smoker Vitals History BMI and BSA Data Body Mass Index Body Surface Area  
 27.82 kg/m 2 2.04 m 2 Preferred Pharmacy Pharmacy Name Phone [facility] [phone] Your Updated Medication List  
  
   
This list is accurate as of 3/8/18 11:36 AM.  Always use your most recent med list.  
  
  
  
  
 amitriptyline 25 mg tablet Commonly known as:  ELAVIL Take 1 Tab by mouth nightly. For nerve pain. * Blood-Glucose Meter Misc Commonly known as:  ACCU-CHEK DANISHA PLUS METER Test blood sugar twice daily * Blood-Glucose Meter monitoring kit Commonly known as:  Incentive Targeting[employer] VOICE GLUCOSE METER Monitor BS twice daily  
  
 gabapentin 600 mg tablet Commonly known as:  NEURONTIN Take 1 Tab by mouth four (4) times daily. * glucose blood VI test strips strip Commonly known as:  ACCU-CHEK DANISHA PLUS TEST STRP Test blood sugar twice daily * glucose blood VI test strips strip Commonly known as:  PRODIGY NO CODING Monitor BS twice daily HYDROcodone-acetaminophen 7.5-325 mg per tablet Commonly known as:  NORCO  
1 bid prn pain  
  
 hydrOXYzine pamoate 25 mg capsule Commonly known as:  VISTARIL Take 1 Cap by mouth three (3) times daily as needed for Itching. For itching or to help sleep. insulin detemir U-100 100 unit/mL (3 mL) Inpn Commonly known as:  Lidia Kramer Inject 30 units twice daily--Dose change 12/22/16--updated med list--did not send prescription to the pharmacy  
  
 insulin lispro 100 unit/mL injection Commonly known as:  HUMALOG Take 14 units after each meal.  If you skip a meal, do not take. 1/16/17 pt reports taking 16 units with each meal.  
  
 predniSONE 10 mg tablet Commonly known as:  DELTASONE  
3 tabs po night before test and 3 tabs 2 hours before test  Indications: hypersensitivity drug reaction selenium sulfide 1 % shampoo Commonly known as:  [address] Apply to head, mustache area and left ear at least several times daily. topiramate 25 mg tablet Commonly known as:  TOPAMAX Take 1 tab Qhs for 2 weeks then 1 tab BID  
  
 triamcinolone acetonide 0.1 % topical cream  
Commonly known as:  KENALOG Apply  to affected area two (2) times a day. Use thin layer on neck rash and external ear. .  
  
 * Notice: This list has 4 medication(s) that are the same as other medications prescribed for you. Read the directions carefully, and ask your doctor or other care provider to review them with you. Prescriptions Sent to Pharmacy Refills  
 selenium sulfide (SELSUN BLUE) 1 % shampoo 11 Sig: Apply to head, mustache area and left ear at least several times daily. Class: Normal  
 Pharmacy: [address] [place] [address] Ph #: [phone]  
 triamcinolone acetonide (KENALOG) 0.1 % topical cream 1 Sig: Apply  to affected area two (2) times a day. Use thin layer on neck rash and external ear. Zandra Luke Class: Normal  
 Pharmacy: [facility] DR BALJIT DODSON [address] [place] [address] Ph #: [phone] Route: Topical  
 hydrOXYzine pamoate (VISTARIL) 25 mg capsule 1 Sig: Take 1 Cap by mouth three (3) times daily as needed for Itching. For itching or to help sleep. Class: Normal  
 Pharmacy: [facility] DR BALJIT DODSON [address] [place] [address] Ph #: [phone] Route: Oral  
  
We Performed the Following AMB POC GLUCOSE BLOOD, BY GLUCOSE MONITORING DEVICE [81879 CPT(R)] Introducing [facility]! Dear Edmundo Woods: 
Thank you for requesting a [employer] account. Our records indicate that you already have an active [employer] account. You can access your account anytime at [email or web address] Did you know that you can access your hospital and ER discharge instructions at any time in [employer]? You can also review all of your test results from your hospital stay or ER visit. Additional Information If you have questions, please visit the Frequently Asked Questions section of the [employer] website at [email or web address] Remember, [employer] is NOT to be used for urgent needs. For medical emergencies, dial 911. Now available from your iPhone and Android! Please provide this summary of care documentation to your next provider. Your primary care clinician is listed as Karyle Snell. If you have any questions after today's visit, please call [phone].

## 2018-03-08 NOTE — PROGRESS NOTES
Assisted pt in filling out info page for [facility] referral, reviewed appt process, fee and instructed pt to call clinic back if they have not heard back from Eloy's office soon. [facility]. This has been fully explained to the patient, who indicates understanding. Patient given pap insulin.

## 2018-03-08 NOTE — PROGRESS NOTES
HISTORY OF PRESENT ILLNESS  Ismael Bronson is a 52 y.o. male. HPI Comments: 1. Depressed-- new diagnosis of neurodegen dx that is affecting his strength and vision, has constant pretty severe pain of hands/arma and feet despite gabapentin and elevil. He is isolating himself from friends and family. Stays in uncle's house but they are out of the house now so has no contact with anyone unless he has to go to a clinic appt. , which he finds difficult to do. Poor sleep, does not use substances, is not suicidal.  Has worked with our LCSW for a very short time in the past and is agreeable to meet with her again. 2.  Continues to have the same rash behind and in ear, itchy. He picks at it. Seeing Dr. Germaine Wray for diabetes. Ate and didn't take meds this am.    Follow-up         ROS    Physical Exam   Constitutional: He appears well-developed and well-nourished. No distress. Skin:   Hyperpigmented papules behind right ear, right ear canal has mild erythema, no scale. Scaly around right ear and mustache area, dandruff and scale onn scalp. Psychiatric:   Somewhat tearful as we talked. ASSESSMENT and PLAN  Depression with significant difficult chronic medical problems and uncontrolled pain. I sent email to Dr. Ervin Garcia to see if we could stop elevil so I can start duloxatine. Start counseling. I will call when I get the ok and if I don't, then we will start celexa. Seborrhea with neuroderm. Selsun on face and ear every day, selsun blue shampoo several times a week on scalp, steroid cream, hydroxyzine for itching, try to avoid picking.

## 2018-03-09 ENCOUNTER — DOCUMENTATION ONLY (OUTPATIENT)
Dept: FAMILY MEDICINE CLINIC | Age: 48
End: 2018-03-09

## 2018-03-09 NOTE — PROGRESS NOTES
Pt called in response to Dr. Gregorio Angel message. I read her message to him about med change. I sent an order to RACHEAL Mccarthy with [facility] for the Duloxetine 30 mg one capsule po BID. This is not the starting dose. He is to start with one capsule at bedtime.  John Jarrell RN

## 2018-03-09 NOTE — TELEPHONE ENCOUNTER
LM to pt that Dr. Kaveh Garcia had emailed me back and to call to speak with me or the nurse tomorrow to get the rest of the message. Dr. Kaveh Garcia said that it is ok to stop the amitryptaline and use duloxitine instead. I will put in as PAP order, but for now I want him to drop the amitryptaline to 1 every other day for a week and then stop it. At that point he should start the duloxitine 30mg once daily at bedtime. He might not get much effect for a few weeks, but continue taking it until I see him again unless he has side effects. If he has side effects that are unpleasant, he needs to call us. I am sending the rx to [facility] [address] by j[address]. If he'd rather it go to another [facility], he should tell us.

## 2018-03-09 NOTE — TELEPHONE ENCOUNTER
Pt called in response to Dr. Yoli Wells message. I read her message to him about med change. I sent an order to RACHEAL Mccarthy with [facility] for the Duloxetine 30 mg one capsule po BID. This is not the starting dose. He is to start with one capsule at bedtime.  John Steiner RN

## 2018-03-29 ENCOUNTER — OFFICE VISIT (OUTPATIENT)
Dept: NEUROLOGY | Age: 48
End: 2018-03-29

## 2018-03-29 VITALS
SYSTOLIC BLOOD PRESSURE: 120 MMHG | HEART RATE: 80 BPM | DIASTOLIC BLOOD PRESSURE: 70 MMHG | WEIGHT: 190 LBS | HEIGHT: 69 IN | OXYGEN SATURATION: 98 % | BODY MASS INDEX: 28.14 KG/M2

## 2018-03-29 DIAGNOSIS — G47.01 INSOMNIA DUE TO MEDICAL CONDITION: ICD-10-CM

## 2018-03-29 DIAGNOSIS — Z79.4 TYPE 2 DIABETES MELLITUS WITHOUT COMPLICATION, WITH LONG-TERM CURRENT USE OF INSULIN (HCC): ICD-10-CM

## 2018-03-29 DIAGNOSIS — E11.9 TYPE 2 DIABETES MELLITUS WITHOUT COMPLICATION, WITH LONG-TERM CURRENT USE OF INSULIN (HCC): ICD-10-CM

## 2018-03-29 DIAGNOSIS — G36.0 NEUROMYELITIS OPTICA (HCC): Primary | ICD-10-CM

## 2018-03-29 RX ORDER — LORAZEPAM 1 MG/1
TABLET ORAL
Qty: 30 TAB | Refills: 5 | Status: SHIPPED | OUTPATIENT
Start: 2018-03-29 | End: 2019-05-24

## 2018-03-29 NOTE — MR AVS SNAPSHOT
[address] [facility], 
[address], [address] [facility] [address]. 
[phone] Patient: Lena Herman MRN: [id number] Visit Information Date & Time Provider Department Dept. Phone Encounter #  
 3/29/2018  9:40 AM Benjamin Caballero MD Neurology Clinic at [facility] [phone] [id number] Follow-up Instructions Return in about 6 months (around 9/29/2018). Follow-up and Disposition History Your Appointments 4/3/2018  1:15 PM  
ROUTINE CARE with Bard Alexander MD  
[facility]) Appt Note: fu  3  weeks [address] [place] [address]  
  
   
 [address] [facility] 5/14/2018 12:10 PM  
Follow Up with Karlos Jasso MD  
[facility] Appt Note: 3 month f/u  Diabetes [facility] [address] [address] [address] [place] [address]  
  
    
 9/20/2018 10:20 AM  
Follow Up with Benjamin Caballero MD  
Neurology Clinic at [facility] Appt Note: follow up $ 0 CP jll 3/29/18  
 [address] [place] [address], 
[address] [place] [address], [address] [address]  
[address] [facility], [address] [place] [address], [address] [place] [address] [address] [address] Upcoming Health Maintenance Date Due  
 LIPID PANEL Q1 1970 EYE EXAM RETINAL OR DILATED Q1 10/15/1980 Pneumococcal 19-64 Medium Risk (1 of 1 - PPSV23) 10/15/1989 DTaP/Tdap/Td series (1 - Tdap) 10/15/1991 Influenza Age 5 to Adult 8/1/2017 HEMOGLOBIN A1C Q6M 5/6/2018 FOOT EXAM Q1 7/7/2018 MICROALBUMIN Q1 7/7/2018 Allergies as of 3/29/2018  Review Complete On: 3/29/2018 By: Benjamin Caballero MD  
 No Known Allergies Current Immunizations  Reviewed on 4/26/2011 No immunizations on file. Not reviewed this visit You Were Diagnosed With   
  
 Codes Comments [facility]    -  Primary ICD-10-CM: G36.0 ICD-9-CM: 341.0 Insomnia due to medical condition     ICD-10-CM: G47.01 
ICD-9-CM: 327.01 Type 2 diabetes mellitus without complication, with long-term current use of insulin (HCC)     ICD-10-CM: E11.9, Z79.4 ICD-9-CM: 250.00, V58.67 Vitals BP Pulse Height(growth percentile) Weight(growth percentile) SpO2 BMI  
 120/70 80 5' 9\" (1.753 m) 190 lb (86.2 kg) 98% 28.06 kg/m2 Smoking Status Never Smoker Vitals History BMI and BSA Data Body Mass Index Body Surface Area 28.06 kg/m 2 2.05 m 2 Preferred Pharmacy Pharmacy Name Phone [facility] [address] [phone] Your Updated Medication List  
  
   
This list is accurate as of 3/29/18 10:28 AM.  Always use your most recent med list.  
  
  
  
  
 * Blood-Glucose Meter Misc Commonly known as:  ACCU-CHEK DANISHA PLUS METER Test blood sugar twice daily * Blood-Glucose Meter monitoring kit Commonly known as:  [employer] VOICE GLUCOSE METER Monitor BS twice daily * DULoxetine 30 mg capsule Commonly known as:  CYMBALTA Take 1 Cap by mouth nightly. * DULoxetine 30 mg capsule Commonly known as:  CYMBALTA Take 1 Cap by mouth two (2) times a day. Pap program.  I anticipate this being his final dose, although not his initial dose. gabapentin 600 mg tablet Commonly known as:  NEURONTIN Take 1 Tab by mouth four (4) times daily. * glucose blood VI test strips strip Commonly known as:  ACCU-CHEK DANISHA PLUS TEST STRP Test blood sugar twice daily * glucose blood VI test strips strip Commonly known as:  PRODIGY NO CODING Monitor BS twice daily HYDROcodone-acetaminophen 7.5-325 mg per tablet Commonly known as:  NORCO  
1 bid prn pain hydrOXYzine pamoate 25 mg capsule Commonly known as:  VISTARIL Take 1 Cap by mouth three (3) times daily as needed for Itching. For itching or to help sleep. insulin detemir U-100 100 unit/mL (3 mL) Inpn Commonly known as:  Yessica Winn Inject 30 units twice daily--Dose change 12/22/16--updated med list--did not send prescription to the pharmacy  
  
 insulin lispro 100 unit/mL injection Commonly known as:  HUMALOG Take 14 units after each meal.  If you skip a meal, do not take. 1/16/17 pt reports taking 16 units with each meal.  
  
 LORazepam 1 mg tablet Commonly known as:  ATIVAN One half or 1 p.o. nightly as needed insomnia, may refill monthly  
  
 predniSONE 10 mg tablet Commonly known as:  DELTASONE  
3 tabs po night before test and 3 tabs 2 hours before test  Indications: hypersensitivity drug reaction  
  
 selenium sulfide 1 % shampoo Commonly known as:  [address] Apply to head, mustache area and left ear at least several times daily. topiramate 25 mg tablet Commonly known as:  TOPAMAX Take 1 tab Qhs for 2 weeks then 1 tab BID  
  
 triamcinolone acetonide 0.1 % topical cream  
Commonly known as:  KENALOG Apply  to affected area two (2) times a day. Use thin layer on neck rash and external ear. .  
  
 * Notice: This list has 6 medication(s) that are the same as other medications prescribed for you. Read the directions carefully, and ask your doctor or other care provider to review them with you. Prescriptions Printed Refills LORazepam (ATIVAN) 1 mg tablet 5 Sig: One half or 1 p.o. nightly as needed insomnia, may refill monthly Class: Print Follow-up Instructions Return in about 6 months (around 9/29/2018). Patient Instructions PRESCRIPTION REFILL POLICY [facility] Statement to Patients April 1, 2014 In an effort to ensure the large volume of patient prescription refills is processed in the most efficient and expeditious manner, we are asking our patients to assist us by calling your Pharmacy for all prescription refills, this will include also your  Mail Order Pharmacy. The pharmacy will contact our office electronically to continue the refill process. Please do not wait until the last minute to call your pharmacy. We need at least 48 hours (2days) to fill prescriptions. We also encourage you to call your pharmacy before going to  your prescription to make sure it is ready. With regard to controlled substance prescription refill requests (narcotic refills) that need to be picked up at our office, we ask your cooperation by providing us with at least 72 hours (3days) notice that you will need a refill. We will not refill narcotic prescription refill requests after 4:00pm on any weekday, Monday through Thursday, or after 2:00pm on Fridays, or on the weekends. We encourage everyone to explore another way of getting your prescription refill request processed using [employer], our patient web portal through our electronic medical record system. [employer] is an efficient and effective way to communicate your medication request directly to the office and  downloadable as an karen on your smart phone . [employer] also features a review functionality that allows you to view your medication list as well as leave messages for your physician. Are you ready to get connected? If so please review the attatched instructions or speak to any of our staff to get you set up right away! Thank you so much for your cooperation. Should you have any questions please contact our Practice Administrator. The Physicians and Staff,  [facility] Introducing [facility]! Dear Sacha Haji: 
Thank you for requesting a [employer] account. Our records indicate that you already have an active [employer] account.   You can access your account anytime at [email or web address] Did you know that you can access your hospital and ER discharge instructions at any time in [employer] ([employer])? You can also review all of your test results from your hospital stay or ER visit. Additional Information If you have questions, please visit the Frequently Asked Questions section of the [employer]) website at [email or web address] Remember, [employer]) is NOT to be used for urgent needs. For medical emergencies, dial 911. Now available from your iPhone and Android! Please provide this summary of care documentation to your next provider. Your primary care clinician is listed as Syeda Juan. If you have any questions after today's visit, please call [phone].

## 2018-03-29 NOTE — PATIENT INSTRUCTIONS
[facility]   Statement to Patients  April 1, 2014      In an effort to ensure the large volume of patient prescription refills is processed in the most efficient and expeditious manner, we are asking our patients to assist us by calling your Pharmacy for all prescription refills, this will include also your  Mail Order Pharmacy. The pharmacy will contact our office electronically to continue the refill process. Please do not wait until the last minute to call your pharmacy. We need at least 48 hours (2days) to fill prescriptions. We also encourage you to call your pharmacy before going to  your prescription to make sure it is ready. With regard to controlled substance prescription refill requests (narcotic refills) that need to be picked up at our office, we ask your cooperation by providing us with at least 72 hours (3days) notice that you will need a refill. We will not refill narcotic prescription refill requests after 4:00pm on any weekday, Monday through Thursday, or after 2:00pm on Fridays, or on the weekends. We encourage everyone to explore another way of getting your prescription refill request processed using [employer], our patient web portal through our electronic medical record system. [employer] is an efficient and effective way to communicate your medication request directly to the office and  downloadable as an karen on your smart phone . [employer] also features a review functionality that allows you to view your medication list as well as leave messages for your physician. Are you ready to get connected? If so please review the attatched instructions or speak to any of our staff to get you set up right away! Thank you so much for your cooperation. Should you have any questions please contact our Practice Administrator.     The Physicians and Staff,  [facility]

## 2018-03-29 NOTE — LETTER
3/29/2018 10:22 AM 
 
Patient:  Usha Johnson YOB: 1970 Date of Visit: 3/29/2018 Dear Martha Donahue MD 
[address] [facility] [address] [id number] VIA In Basket 
 : Thank you for referring Mr. Usha Johnson to me for evaluation/treatment. Below are the relevant portions of my assessment and plan of care. HISTORY OF PRESENT ILLNESS Usha Johnson is a 52 y.o. male. HPI Comments: Usha Johnson is a 49-year-old man who had been caring a diagnosis of neuromyelitis optica. He has lesions in his brain and his spinal cord. He has a non-spastic paraparesis, he has lesions in his spinal cord producing the myelopathy that produces leg weakness. His leg weakness is now bad enough that he cannot get up the stairs to sleep upstairs, he has to sleep on the couch. 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 His vision continues to deteriorate it is now 20/500 at 10 feet. He still can read some if he holds it quite close to his face. Review of Systems Constitutional: Positive for malaise/fatigue. Negative for chills, fever and weight loss. Neurological: Positive for tingling, sensory change, focal weakness and weakness. Negative for tremors, speech change, seizures and loss of consciousness. Psychiatric/Behavioral: Negative for depression, substance abuse and suicidal ideas. Current Outpatient Prescriptions on File Prior to Visit Medication Sig Dispense Refill  selenium sulfide (SELSUN BLUE) 1 % shampoo Apply to head, mustache area and left ear at least several times daily. 1 Bottle 11  
 triamcinolone acetonide (KENALOG) 0.1 % topical cream Apply  to affected area two (2) times a day. Use thin layer on neck rash and external ear. . 15 g 1  
 hydrOXYzine pamoate (VISTARIL) 25 mg capsule Take 1 Cap by mouth three (3) times daily as needed for Itching. For itching or to help sleep.  60 Cap 1  
  DULoxetine (CYMBALTA) 30 mg capsule Take 1 Cap by mouth nightly. 30 Cap 1  
 DULoxetine (CYMBALTA) 30 mg capsule Take 1 Cap by mouth two (2) times a day. Pap program.  I anticipate this being his final dose, although not his initial dose. 180 Cap 3  
 HYDROcodone-acetaminophen (NORCO) 7.5-325 mg per tablet 1 bid prn pain 60 Tab 0  
 insulin detemir (LEVEMIR FLEXTOUCH) 100 unit/mL (3 mL) inpn Inject 30 units twice daily--Dose change 12/22/16--updated med list--did not send prescription to the pharmacy 1 mL 0  
 gabapentin (NEURONTIN) 600 mg tablet Take 1 Tab by mouth four (4) times daily. 360 Tab 3  
 glucose blood VI test strips (ACCU-CHEK DANISHA PLUS TEST STRP) strip Test blood sugar twice daily 200 Strip 3  
 glucose blood VI test strips (PRODIGY NO CODING) strip Monitor BS twice daily 200 Strip 3  Blood-Glucose Meter (PRODIGY VOICE GLUCOSE METER) monitoring kit Monitor BS twice daily 1 Kit 0  
 predniSONE (DELTASONE) 10 mg tablet 3 tabs po night before test and 3 tabs 2 hours before test  Indications: hypersensitivity drug reaction 6 Tab 0  Blood-Glucose Meter (ACCU-CHEK DANISHA PLUS METER) misc Test blood sugar twice daily 1 Each 0  
 topiramate (TOPAMAX) 25 mg tablet Take 1 tab Qhs for 2 weeks then 1 tab BID 60 Tab 5  
 insulin lispro (HUMALOG) 100 unit/mL injection Take 14 units after each meal.  If you skip a meal, do not take. 1/16/17 pt reports taking 16 units with each meal. 1 Vial 0 No current facility-administered medications on file prior to visit. Past Medical History:  
Diagnosis Date  Asthma  Type 2 diabetes mellitus with peripheral neuropathy (HCC) Physical Exam  
Constitutional: He is oriented to person, place, and time. He appears well-developed and well-nourished. Neurological: He is alert and oriented to person, place, and time. He displays no atrophy and no tremor. No cranial nerve deficit.  He displays no Babinski's sign on the right side. He displays no Babinski's sign on the left side. Reflex Scores: 
     Tricep reflexes are 3+ on the right side and 3+ on the left side. Bicep reflexes are 3+ on the right side and 3+ on the left side. Brachioradialis reflexes are 3+ on the right side and 3+ on the left side. Patellar reflexes are 3+ on the right side and 3+ on the left side. Achilles reflexes are 3+ on the right side and 3+ on the left side. His gait is quite unsteady and wide-based. He has 3+/5 weakness of proximal hip muscles and proximal upper extremity muscles. He is quite unsteady on his feet due to this proximal weakness. His vision is 20/400 at 10 feet and 20/40 near vision Fundi normal 
Speech, language and mentation normal 
  
Skin: Skin is warm and dry. Psychiatric: He has a normal mood and affect. His behavior is normal. Judgment and thought content normal.  
Vitals reviewed. ASSESSMENT and PLAN This man has been ravaged by neuromyelitis optica. He has demyelination in the spinal cord producing marked lower extremity weakness and pain. He has moderate upper extremity weakness. He is not strong enough to climb steps. He certainly is not strong enough to do any kind of a reasonable job. Unfortunately this is going to continue to worsen as neuromyelitis optica does not respond to treatment as multiple sclerosis does. He is in my medical opinion completely disabled for any occupation. Unfortunately I can only continue with symptomatic treatments as we have no treatment available for normal septic at this time. I will see him back in 6 months. I am going to add Ativan one half or 1 mg at night to help with sleep. DIABETES MELLITUS Stroke Risk, stable, managed by primary care This note will not be viewable in [address] This note was created using voice recognition software. Despite editing, there may be syntax errors. If you have questions, please do not hesitate to call me. I look forward to following Mr. Orozco along with you. Sincerely, Liz Monroy MD

## 2018-03-29 NOTE — PROGRESS NOTES
HISTORY OF PRESENT ILLNESS  Memo Pisano is a 52 y.o. male. HPI Comments: Memo Pisano is a 80-year-old man who had been caring a diagnosis of neuromyelitis optica. He has lesions in his brain and his spinal cord. He has a non-spastic paraparesis, he has lesions in his spinal cord producing the myelopathy that produces leg weakness. His leg weakness is now bad enough that he cannot get up the stairs to sleep upstairs, he has to sleep on the couch. 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 His vision continues to deteriorate it is now 20/500 at 10 feet. He still can read some if he holds it quite close to his face. Review of Systems   Constitutional: Positive for malaise/fatigue. Negative for chills, fever and weight loss. Neurological: Positive for tingling, sensory change, focal weakness and weakness. Negative for tremors, speech change, seizures and loss of consciousness. Psychiatric/Behavioral: Negative for depression, substance abuse and suicidal ideas. Current Outpatient Prescriptions on File Prior to Visit   Medication Sig Dispense Refill    selenium sulfide (SELSUN BLUE) 1 % shampoo Apply to head, mustache area and left ear at least several times daily. 1 Bottle 11    triamcinolone acetonide (KENALOG) 0.1 % topical cream Apply  to affected area two (2) times a day. Use thin layer on neck rash and external ear. . 15 g 1    hydrOXYzine pamoate (VISTARIL) 25 mg capsule Take 1 Cap by mouth three (3) times daily as needed for Itching. For itching or to help sleep. 60 Cap 1    DULoxetine (CYMBALTA) 30 mg capsule Take 1 Cap by mouth nightly. 30 Cap 1    DULoxetine (CYMBALTA) 30 mg capsule Take 1 Cap by mouth two (2) times a day. Pap program.  I anticipate this being his final dose, although not his initial dose.  180 Cap 3    HYDROcodone-acetaminophen (NORCO) 7.5-325 mg per tablet 1 bid prn pain 60 Tab 0    insulin detemir (LEVEMIR FLEXTOUCH) 100 unit/mL (3 mL) inpn Inject 30 units twice daily--Dose change 12/22/16--updated med list--did not send prescription to the pharmacy 1 mL 0    gabapentin (NEURONTIN) 600 mg tablet Take 1 Tab by mouth four (4) times daily. 360 Tab 3    glucose blood VI test strips (ACCU-CHEK DANISHA PLUS TEST STRP) strip Test blood sugar twice daily 200 Strip 3    glucose blood VI test strips (PRODIGY NO CODING) strip Monitor BS twice daily 200 Strip 3    Blood-Glucose Meter (PRODIGY VOICE GLUCOSE METER) monitoring kit Monitor BS twice daily 1 Kit 0    predniSONE (DELTASONE) 10 mg tablet 3 tabs po night before test and 3 tabs 2 hours before test  Indications: hypersensitivity drug reaction 6 Tab 0    Blood-Glucose Meter (ACCU-CHEK DANISHA PLUS METER) misc Test blood sugar twice daily 1 Each 0    topiramate (TOPAMAX) 25 mg tablet Take 1 tab Qhs for 2 weeks then 1 tab BID 60 Tab 5    insulin lispro (HUMALOG) 100 unit/mL injection Take 14 units after each meal.  If you skip a meal, do not take. 1/16/17 pt reports taking 16 units with each meal. 1 Vial 0     No current facility-administered medications on file prior to visit. Past Medical History:   Diagnosis Date    Asthma     Type 2 diabetes mellitus with peripheral neuropathy ([facility] [address]        Physical Exam   Constitutional: He is oriented to person, place, and time. He appears well-developed and well-nourished. Neurological: He is alert and oriented to person, place, and time. He displays no atrophy and no tremor. No cranial nerve deficit. He displays no Babinski's sign on the right side. He displays no Babinski's sign on the left side. Reflex Scores:       Tricep reflexes are 3+ on the right side and 3+ on the left side. Bicep reflexes are 3+ on the right side and 3+ on the left side. Brachioradialis reflexes are 3+ on the right side and 3+ on the left side. Patellar reflexes are 3+ on the right side and 3+ on the left side.        Achilles reflexes are 3+ on the right side and 3+ on the left side. His gait is quite unsteady and wide-based. He has 3+/5 weakness of proximal hip muscles and proximal upper extremity muscles. He is quite unsteady on his feet due to this proximal weakness. His vision is 20/400 at 10 feet and 20/40 near vision  Fundi normal  Speech, language and mentation normal     Skin: Skin is warm and dry. Psychiatric: He has a normal mood and affect. His behavior is normal. Judgment and thought content normal.   Vitals reviewed. ASSESSMENT and PLAN  This man has been ravaged by neuromyelitis optica. He has demyelination in the spinal cord producing marked lower extremity weakness and pain. He has moderate upper extremity weakness. He is not strong enough to climb steps. He certainly is not strong enough to do any kind of a reasonable job. Unfortunately this is going to continue to worsen as neuromyelitis optica does not respond to treatment as multiple sclerosis does. He is in my medical opinion completely disabled for any occupation. Unfortunately I can only continue with symptomatic treatments as we have no treatment available for normal septic at this time. I will see him back in 6 months. I am going to add Ativan one half or 1 mg at night to help with sleep. DIABETES MELLITUS  Stroke Risk, stable, managed by primary care  This note will not be viewable in [place]. This note was created using voice recognition software. Despite editing, there may be syntax errors.

## 2018-04-03 ENCOUNTER — OFFICE VISIT (OUTPATIENT)
Dept: FAMILY MEDICINE CLINIC | Age: 48
End: 2018-04-03

## 2018-04-03 VITALS
WEIGHT: 190 LBS | HEART RATE: 98 BPM | BODY MASS INDEX: 28.06 KG/M2 | SYSTOLIC BLOOD PRESSURE: 120 MMHG | TEMPERATURE: 98.6 F | DIASTOLIC BLOOD PRESSURE: 81 MMHG

## 2018-04-03 DIAGNOSIS — F32.89 OTHER DEPRESSION: Primary | ICD-10-CM

## 2018-04-03 DIAGNOSIS — Z79.4 CONTROLLED TYPE 2 DIABETES MELLITUS WITHOUT COMPLICATION, WITH LONG-TERM CURRENT USE OF INSULIN (HCC): ICD-10-CM

## 2018-04-03 DIAGNOSIS — E11.9 CONTROLLED TYPE 2 DIABETES MELLITUS WITHOUT COMPLICATION, WITH LONG-TERM CURRENT USE OF INSULIN (HCC): ICD-10-CM

## 2018-04-03 DIAGNOSIS — R19.7 DIARRHEA, UNSPECIFIED TYPE: ICD-10-CM

## 2018-04-03 DIAGNOSIS — E10.65 HYPERGLYCEMIA DUE TO TYPE 1 DIABETES MELLITUS (HCC): ICD-10-CM

## 2018-04-03 LAB — GLUCOSE POC: NORMAL MG/DL

## 2018-04-03 RX ORDER — DULOXETIN HYDROCHLORIDE 30 MG/1
30 CAPSULE, DELAYED RELEASE ORAL
Qty: 30 CAP | Refills: 1 | Status: SHIPPED | OUTPATIENT
Start: 2018-04-03 | End: 2018-09-20 | Stop reason: SDUPTHER

## 2018-04-03 NOTE — PROGRESS NOTES
Sent an e-mail to RACHEAL Mccarthy to check on status of PAP Cymbalta  Emergency Fund paperwork faxed to [facility], called and spoke to the pharmacist.  They have received the paperwork and will process the Rx. Advised Mr Luke Ayala he can go to  the meds in about an hour. Wrote down the med name Amitryptaline, pt is to stop this medication today and to start the Cymbalta today. Medicaid denial letter faxed to RACHEAL Mccarthy at [facility].

## 2018-04-03 NOTE — PROGRESS NOTES
Got Dr. Cheyanne Aponte ok to send his note to pt so he can take to [facility].

## 2018-04-03 NOTE — PROGRESS NOTES
HISTORY OF PRESENT ILLNESS  Zayra Lema is a 52 y.o. male. HPI Comments: F/u depression-- hasn't filled the cymbalta because of lack of $, although he had ativan and hydrocodone from Dr. Dar Day filled and states they aren't helping sleep/pain. Has first appt scheduled with counselor next week and plans to keep it. Thinks he is taking elevil, but last rx for it was for 30/2 in 5/17, taking gabapentin. Has a lot of pain in legs and arms, day and night, is losing vision. Neuro note states he needs to have disability, has applied and been denied several times, seems the last was over a year ago. .. Now also describes cramping in lower left abd with diarrhea all day about 3 days a week, all in a row, and doesn't eat anything those days although he takes levemir those days. Also has been having severe noncrampy sharp pain in abd lasting ~1/2 hour throughout the day. No n/v/constipation. Diabetes         ROS    Physical Exam   Constitutional: He appears well-developed and well-nourished. No distress. Wt is trending upward probably asst with improved diabetes control. Neck: No thyromegaly present. Cardiovascular: Normal rate, regular rhythm and normal heart sounds. Pulmonary/Chest: Breath sounds normal.   Abdominal: Soft. Bowel sounds are normal. He exhibits no distension and no mass. There is no tenderness. There is no rebound and no guarding. No organomegaly. Musculoskeletal: He exhibits no edema. Lymphadenopathy:     He has no cervical adenopathy. Psychiatric:   Sad affect unchanged. ASSESSMENT and PLAN  Depression--  cymbalta came in today after he left. We sent 1 month to his pharmacy and covered the cost.  Severe neurologic problems due to type 1 diabetes. Will get exam findings or letter to him to take to [facility]. Has good f/u with Dr. Yulissa Bello and Dr. Dar Day. GI sx-- probably diabetic neuropathy at least in part.   I asked him to try immodium for now and will refer to GI.

## 2018-04-03 NOTE — PROGRESS NOTES
Coordination of Care  1. Have you been to the ER, urgent care clinic since your last visit? Hospitalized since your last visit? No    2. Have you seen or consulted any other health care providers outside of the [address] [place] [address] since your last visit? Include any pap smears or colon screening. No    Does the patient need refills?  N/A    Learning Assessment Complete? yes  Results for orders placed or performed in visit on 04/03/18   AMB POC GLUCOSE BLOOD, BY GLUCOSE MONITORING DEVICE   Result Value Ref Range    Glucose  fasting mg/dL

## 2018-04-11 ENCOUNTER — DOCUMENTATION ONLY (OUTPATIENT)
Dept: FAMILY MEDICINE CLINIC | Age: 48
End: 2018-04-11

## 2018-04-11 NOTE — PROGRESS NOTES
4/11/2018    Levemir insulin from PAP program received. The sig is written correctly.     Current Outpatient Prescriptions   Medication Sig Dispense Refill    insulin detemir (LEVEMIR FLEXTOUCH) 100 unit/mL (3 mL) inpn Inject 30 units twice daily--Dose change 12/22/16--updated med list--did not send prescription to the pharmacy 1 mL 0     Nazia Ulrich DNP, FNP-BC, BC-ADM

## 2018-04-12 ENCOUNTER — PATIENT OUTREACH (OUTPATIENT)
Dept: FAMILY MEDICINE CLINIC | Age: 48
End: 2018-04-12

## 2018-04-12 NOTE — PROGRESS NOTES
Received call from Park Laurent LCSW, who saw patient today. This is only the second time she has seen pt due to reports some h/o noncompliance with appts. She learned today of his neurological disease:   Neuromyelitis optica. Reports she completed [facility] Medicaid application for pt today  Also referred patient to [employer] program - a partnership with Sue Esparza and [facility]. He will be assigned a  to help him with getting disability. NN shared above information with pcp, Dr Robert Lew.

## 2018-05-15 ENCOUNTER — OFFICE VISIT (OUTPATIENT)
Dept: ENDOCRINOLOGY | Age: 48
End: 2018-05-15

## 2018-05-15 VITALS
HEART RATE: 91 BPM | SYSTOLIC BLOOD PRESSURE: 139 MMHG | HEIGHT: 69 IN | WEIGHT: 188.4 LBS | BODY MASS INDEX: 27.91 KG/M2 | DIASTOLIC BLOOD PRESSURE: 91 MMHG

## 2018-05-15 DIAGNOSIS — E10.65 HYPERGLYCEMIA DUE TO TYPE 1 DIABETES MELLITUS (HCC): Primary | ICD-10-CM

## 2018-05-15 LAB — HBA1C MFR BLD HPLC: 10.9 %

## 2018-05-15 NOTE — PROGRESS NOTES
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 In terms of his diabetes, he has been taking Levemir insulin 30 units twice daily and Humalog insulin 15 units when he eats. Unfortunately his hemoglobin A1c is increased today. He admits that he may be missing doses of Levemir. He also admits that he is currently eating and afternoon or evening meal and not taking Humalog for that. Part of the problem is that the insulin that he uses is in syringes and he sometimes breaks the syringes. He also is unwilling to carry this ranges with him when he goes out to eat and so he does not take insulin for those meals. He complains of nausea and vomiting and diarrhea after he eats and the only food that he can eat that does not cause this is oatmeal.  So he generally has oatmeal in the morning. He often does not eat in the evening. He was seen by Dr. Maddie Mitchell in late March and Dr. Juan David Green note documents that he has severe and progressive muscle weakness as well as progressive blindness. He concludes in his note that he is unable to work in any capacity. Examination  Blood pressure 139/91  Pulse 91  Weight 188    Impression type 1 diabetes mellitus with poor blood sugar control and a neuro degenerative disorder with progressive muscle and visual loss. Plan: We have contacted the [place] service for the blind to enlist their help. I have also asked him to have his  contact me regarding getting disability insurance for him. In part this would be able to pay for pens for his insulin so that he could receive his insulin appropriately. This gentleman needs all the help that we can provide him and we are committed to doing as much as we can for him. I am copying Dr. Maddie Mitchell on this note to enlist his support as well.     We spent more than 25 mintutes face to face, more than 50% was in counseling regarding diet, exercise and carbohydrate intake.

## 2018-05-15 NOTE — PROGRESS NOTES
Cindy Chavarria is a 52 y.o. male    Chief Complaint   Patient presents with    Follow-up    Diabetes       1. Have you been to the ER, urgent care clinic since your last visit? Hospitalized since your last visit? No    2. Have you seen or consulted any other health care providers outside of the [address] [place] [address] since your last visit? Include any pap smears or colon screening.  No

## 2018-05-16 ENCOUNTER — TELEPHONE (OUTPATIENT)
Dept: NEUROLOGY | Age: 48
End: 2018-05-16

## 2018-05-16 ENCOUNTER — DOCUMENTATION ONLY (OUTPATIENT)
Dept: FAMILY MEDICINE CLINIC | Age: 48
End: 2018-05-16

## 2018-05-16 NOTE — TELEPHONE ENCOUNTER
----- Message from Aguilar Monsivais MD sent at 5/15/2018  3:25 PM EDT -----  Regarding: FW: Vision  Before ZI speak with Dr Henri Link can you find out what kind of insurance he has.  ----- Message -----     From: Nadja Thapa MD     Sent: 5/15/2018   1:05 PM       To: Aguilar Monsivais MD  Subject: Vision                                           Dear Allan  I saw this gentleman today and am concerned about his vision. Could we chat by phone. [phone].   Thanks Alvarado Wood

## 2018-05-16 NOTE — PROGRESS NOTES
See not below,  Per Dr Shepherd Place request, last ov with Dr Eleonora Stubbs mailed to the patient.      FW: disability-- can you send dr. Dwayne Dorado note to him? Received: 1 month ago       Serafin Hatch MD  [facility] Nurse [place]                     This is to help him with disability application            Previous Messages       ----- Message -----      From: Joseph Lesches, MD      Sent: 4/3/2018  12:22 PM        To: Serafin Hatch MD   Subject: RE: disability                                   You can let him see it   ----- Message -----      From: Serafin Hatch MD      Sent: 4/3/2018  11:53 AM        To: Joseph Lesches, MD   Subject: disability                                       Hi Dr. Eleonora Stubbs,   I am trying to help with getting him disability and appreciate your note. [place] wants a copy of it to take to social security and this would help a lot. Hugo Srinivasan, I notice that you didn't put the note in [employer], so you probably don't want the pt to see it.  Do you want to send me a letter to give the pt instead?    Joseph Marc M.D.

## 2018-05-23 ENCOUNTER — DOCUMENTATION ONLY (OUTPATIENT)
Dept: NEUROLOGY | Age: 48
End: 2018-05-23

## 2018-07-09 NOTE — TELEPHONE ENCOUNTER
Wants lispro refill. Does he have insurance now, or does he want the Lispro from PAP program.  If he now has insurance, he should call his endocrine doctor. If not, do we have insulin in from the PAP program for him? If so, he can come to get it.

## 2018-07-16 NOTE — TELEPHONE ENCOUNTER
Call went straight to . LM asking pt to call the clinic as we have received refill request for insulin. Need to know if pt has insurance and if he has a new PCP.

## 2018-07-23 NOTE — TELEPHONE ENCOUNTER
Called the patient a second time. Again went directly to ,  Left a second message.   Sent the patient a letter asking him to call [address] nurse to let us know if he needs this medication refilled, and if he has insurance or a new PCP

## 2018-07-25 DIAGNOSIS — R21 RASH: ICD-10-CM

## 2018-07-25 RX ORDER — GABAPENTIN 600 MG/1
600 TABLET ORAL 4 TIMES DAILY
Qty: 360 TAB | Refills: 3 | Status: SHIPPED | OUTPATIENT
Start: 2018-07-25 | End: 2018-11-05 | Stop reason: SDUPTHER

## 2018-07-25 NOTE — TELEPHONE ENCOUNTER
Requested Prescriptions     Pending Prescriptions Disp Refills    insulin detemir U-100 (LEVEMIR FLEXTOUCH) 100 unit/mL (3 mL) inpn 1 mL 0     Sig: Inject 30 units twice daily--Dose change 12/22/16--updated med list--did not send prescription to the pharmacy    gabapentin (NEURONTIN) 600 mg tablet 360 Tab 3     Sig: Take 1 Tab by mouth.

## 2018-07-25 NOTE — TELEPHONE ENCOUNTER
From: Fausto Ward  To: Lizeth Day MD  Sent: 7/25/2018 11:46 AM EDT  Subject: Medication Renewal Request    Original authorizing provider: MD Fausto Alejandra would like a refill of the following medications:  insulin detemir (LEVEMIR FLEXTOUCH) 100 unit/mL (3 mL) inpn Lizeth Day MD]  gabapentin (NEURONTIN) 600 mg tablet Lizeth Day MD]    Preferred pharmacy: [facility] - [address]    Comment:      Medication renewals requested in this message routed to other providers:  insulin lispro (HUMALOG) 100 unit/mL injection Seth Bhakta MD]  selenium sulfide (SELSUN BLUE) 1 % shampoo Seth Bhakta MD]  triamcinolone acetonide (KENALOG) 0.1 % topical cream Seth Bhakta MD]  hydrOXYzine pamoate (VISTARIL) 25 mg capsule Seth Bhakta MD]  DULoxetine (CYMBALTA) 30 mg capsule Seth Bhakta MD]

## 2018-08-07 RX ORDER — DULOXETIN HYDROCHLORIDE 30 MG/1
CAPSULE, DELAYED RELEASE ORAL
Qty: 30 CAP | Refills: 1 | OUTPATIENT
Start: 2018-08-07

## 2018-08-07 NOTE — TELEPHONE ENCOUNTER
We had gotten this through pap for him. Is he taking it? He keeps missing appts with us, can he come in? I need to see how he is doing on this if he is taking it.

## 2018-08-08 NOTE — TELEPHONE ENCOUNTER
Called pt, went straight to .   Left him a message and will send letter today letting him know he needs to be seen in order to refill meds

## 2018-08-16 ENCOUNTER — OFFICE VISIT (OUTPATIENT)
Dept: FAMILY MEDICINE CLINIC | Age: 48
End: 2018-08-16

## 2018-08-16 ENCOUNTER — HOSPITAL ENCOUNTER (OUTPATIENT)
Dept: LAB | Age: 48
Discharge: HOME | End: 2018-08-16

## 2018-08-16 VITALS
TEMPERATURE: 98.1 F | SYSTOLIC BLOOD PRESSURE: 141 MMHG | WEIGHT: 186 LBS | HEART RATE: 99 BPM | OXYGEN SATURATION: 96 % | BODY MASS INDEX: 27.47 KG/M2 | DIASTOLIC BLOOD PRESSURE: 82 MMHG

## 2018-08-16 DIAGNOSIS — E10.42 DIABETIC POLYNEUROPATHY ASSOCIATED WITH TYPE 1 DIABETES MELLITUS (HCC): Primary | ICD-10-CM

## 2018-08-16 DIAGNOSIS — E10.42 DIABETIC POLYNEUROPATHY ASSOCIATED WITH TYPE 1 DIABETES MELLITUS (HCC): ICD-10-CM

## 2018-08-16 DIAGNOSIS — G37.9 DEMYELINATING DISEASE OF CENTRAL NERVOUS SYSTEM (HCC): ICD-10-CM

## 2018-08-16 LAB
ALBUMIN SERPL-MCNC: 3.5 G/DL (ref 3.5–5)
ALBUMIN/GLOB SERPL: 1 {RATIO} (ref 1.1–2.2)
ALP SERPL-CCNC: 147 U/L (ref 45–117)
ALT SERPL-CCNC: 34 U/L (ref 12–78)
ANION GAP SERPL CALC-SCNC: 4 MMOL/L (ref 5–15)
AST SERPL-CCNC: 20 U/L (ref 15–37)
BILIRUB SERPL-MCNC: 0.3 MG/DL (ref 0.2–1)
BUN SERPL-MCNC: 8 MG/DL (ref 6–20)
BUN/CREAT SERPL: 9 (ref 12–20)
CALCIUM SERPL-MCNC: 9 MG/DL (ref 8.5–10.1)
CHLORIDE SERPL-SCNC: 105 MMOL/L (ref 97–108)
CO2 SERPL-SCNC: 34 MMOL/L (ref 21–32)
CREAT SERPL-MCNC: 0.93 MG/DL (ref 0.7–1.3)
EST. AVERAGE GLUCOSE BLD GHB EST-MCNC: 280 MG/DL
GLOBULIN SER CALC-MCNC: 3.6 G/DL (ref 2–4)
GLUCOSE POC: NORMAL MG/DL
GLUCOSE SERPL-MCNC: 432 MG/DL (ref 65–100)
HBA1C MFR BLD: 11.4 % (ref 4.2–6.3)
POTASSIUM SERPL-SCNC: 5.3 MMOL/L (ref 3.5–5.1)
PROT SERPL-MCNC: 7.1 G/DL (ref 6.4–8.2)
SODIUM SERPL-SCNC: 143 MMOL/L (ref 136–145)

## 2018-08-16 PROCEDURE — 83036 HEMOGLOBIN GLYCOSYLATED A1C: CPT | Performed by: NURSE PRACTITIONER

## 2018-08-16 PROCEDURE — 80053 COMPREHEN METABOLIC PANEL: CPT | Performed by: NURSE PRACTITIONER

## 2018-08-16 NOTE — PROGRESS NOTES
Coordination of Care  1. Have you been to the ER, urgent care clinic since your last visit? Hospitalized since your last visit? No    2. Have you seen or consulted any other health care providers outside of the [facility] since your last visit? Include any pap smears or colon screening. No    Does the patient need refills? YES    Learning Assessment Complete?  yes    See depression screening    Results for orders placed or performed in visit on 08/16/18   AMB POC GLUCOSE BLOOD, BY GLUCOSE MONITORING DEVICE   Result Value Ref Range    Glucose  non fasting mg/dL

## 2018-08-16 NOTE — PROGRESS NOTES
Scheduled pt for appt at [address] clinic. Explained that there will be a $15 copay for this clinic. Provided pt with address and phone #. Advised pt to call [facility] to cancel/reschedule. Pt scheduled for 8/30/18 at 10:00.  Reny Schaefer RN

## 2018-08-16 NOTE — PROGRESS NOTES
Assessment/Plan:       ICD-10-CM ICD-9-CM    1. Diabetic polyneuropathy associated with type 1 diabetes mellitus (HCC) E10.42 250.61 AMB POC GLUCOSE BLOOD, BY GLUCOSE MONITORING DEVICE     357.2 HEMOGLOBIN A1C WITH EAG      METABOLIC PANEL, COMPREHENSIVE   2. Demyelinating disease of central nervous system [facility]) G37.9 341.9       Follow-up Disposition: Not on [address] [place] [address]  Phone: [phone] Fax: [phone]  [facility]  Subjective:     Chief Complaint   Patient presents with    Diabetes     follow up     Emily Torres is a 52 y.o. BLACK OR  male who speaks [place].  used? no   HPI: out of humalog for 2-3 months. He wasn't able to get here, must be driven here. 10 am took 30 units. Dx 2010. Myelitis optica  Will need an eye doctor  Has magnifier from endocrinologist.    Has MS. Was supposed to see Constantin Islas. He may have a steady ride now. His uncle and aunt are home now. Medications:    Current Outpatient Prescriptions:     insulin glulisine U-100 (APIDRA SOLOSTAR U-100 INSULIN) 100 unit/mL pen, 10-20 units with meals, Disp: 2 Pen, Rfl: 0    insulin lispro (HUMALOG KWIKPEN INSULIN) 100 unit/mL kwikpen, 15-20 units with meals, Disp: 15 Pen, Rfl: 3    insulin aspart U-100 (NOVOLOG) 100 unit/mL inpn, Inject 15-20 units subcutaneously with meals. , Disp: 15 Pen, Rfl: 3    insulin detemir U-100 (LEVEMIR FLEXTOUCH) 100 unit/mL (3 mL) inpn, Inject 30 units twice daily  Indications: type 1 diabetes mellitus, Disp: 18 Pen, Rfl: 3    gabapentin (NEURONTIN) 600 mg tablet, Take 1 Tab by mouth four (4) times daily. , Disp: 360 Tab, Rfl: 3    DULoxetine (CYMBALTA) 30 mg capsule, Take 1 Cap by mouth nightly.  For 1 week and then 1 twice a day, Disp: 30 Cap, Rfl: 1    LORazepam (ATIVAN) 1 mg tablet, One half or 1 p.o. nightly as needed insomnia, may refill monthly, Disp: 30 Tab, Rfl: 5    selenium sulfide (SELSUN BLUE) 1 % shampoo, Apply to head, mustache area and left ear at least several times daily. , Disp: 1 Bottle, Rfl: 11    triamcinolone acetonide (KENALOG) 0.1 % topical cream, Apply  to affected area two (2) times a day. Use thin layer on neck rash and external ear. ., Disp: 15 g, Rfl: 1    hydrOXYzine pamoate (VISTARIL) 25 mg capsule, Take 1 Cap by mouth three (3) times daily as needed for Itching. For itching or to help sleep., Disp: 60 Cap, Rfl: 1    DULoxetine (CYMBALTA) 30 mg capsule, Take 1 Cap by mouth two (2) times a day. Pap program.  I anticipate this being his final dose, although not his initial dose., Disp: 180 Cap, Rfl: 3    HYDROcodone-acetaminophen (NORCO) 7.5-325 mg per tablet, 1 bid prn pain, Disp: 60 Tab, Rfl: 0    glucose blood VI test strips (ACCU-CHEK DANISHA PLUS TEST STRP) strip, Test blood sugar twice daily, Disp: 200 Strip, Rfl: 3    glucose blood VI test strips (PRODIGY NO CODING) strip, Monitor BS twice daily, Disp: 200 Strip, Rfl: 3    Blood-Glucose Meter (PRODIGY VOICE GLUCOSE METER) monitoring kit, Monitor BS twice daily, Disp: 1 Kit, Rfl: 0    Blood-Glucose Meter (ACCU-CHEK DANISHA PLUS METER) misc, Test blood sugar twice daily, Disp: 1 Each, Rfl: 0    topiramate (TOPAMAX) 25 mg tablet, Take 1 tab Qhs for 2 weeks then 1 tab BID, Disp: 60 Tab, Rfl: 5    insulin lispro (HUMALOG) 100 unit/mL injection, Take 14 units after each meal.  If you skip a meal, do not take. 1/16/17 pt reports taking 16 units with each meal. (Patient taking differently: Take 20 units before each meal.), Disp: 1 Vial, Rfl: 0  Social History: He reports that he has never smoked. He has never used smokeless tobacco. He reports that he does not drink alcohol or use illicit drugs. Family History: His family history includes Cancer in his father; Diabetes in his father; Hypertension in his maternal grandmother and mother; Other in his mother.   ROS:   No increased frequency of urination, no increased thirst; no chest pain, dyspnea or TIA's; no reports of hypoglycemia. Objective:     Vitals:    08/16/18 1314   BP: 141/82   Pulse: 99   Temp: 98.1 °F (36.7 °C)   TempSrc: Oral   SpO2: 96%   Weight: 186 lb (84.4 kg)    No LMP for male patient. Results for orders placed or performed in visit on 08/16/18   AMB POC GLUCOSE BLOOD, BY GLUCOSE MONITORING DEVICE   Result Value Ref Range    Glucose  non fasting mg/dL      Wt Readings from Last 2 Encounters:   08/17/18 185 lb 6.4 oz (84.1 kg)   08/16/18 186 lb (84.4 kg)    Weight decreased; Hemoglobin A1c   Date Value Ref Range Status   12/04/2015 15.5 (H) 4.2 - 6.3 % Final     Lab Results   Component Value Date/Time    Microalb/Creat ratio (ug/mg creat.) 27.1 07/07/2017 04:22 PM    Creatinine (POC) 0.5 (L) 06/21/2017 05:44 PM    Creatinine 0.61 (L) 12/21/2016 05:01 AM      Lab Results   Component Value Date/Time    GFR est AA >60 12/21/2016 05:01 AM    GFR est non-AA >60 12/21/2016 05:01 AM        Lab Results   Component Value Date/Time    ALT (SGPT) 102 (H) 12/20/2016 11:18 AM    AST (SGOT) 100 (H) 12/20/2016 11:18 AM    Alk. phosphatase 102 12/20/2016 11:18 AM    Bilirubin, total 0.4 12/20/2016 11:18 AM     Constitutional: He appears well-developed. Eyes: EOM are normal. Pupils are equal, round, and reactive to light. Neck: Neck supple. No thyromegaly present. Cardiovascular: Normal rate, regular rhythm, normal heart sounds and intact distal pulses. No murmur heard. Pulmonary/Chest: Effort normal and breath sounds normal.   Musculoskeletal: He exhibits no edema. No ulcers  Assessment/Plan:   Diagnoses and all orders for this visit:    1. Diabetic polyneuropathy associated with type 1 diabetes mellitus (HCC)  -     AMB POC GLUCOSE BLOOD, BY GLUCOSE MONITORING DEVICE  -     HEMOGLOBIN A1C WITH EAG; Future  -     METABOLIC PANEL, COMPREHENSIVE; Future    2.  Demyelinating disease of central nervous system ([facility]    Would need Humalog Kwikpens. Diabetes Mellitus type 2, under Poor control. Blood pressure under Fair control. Greater than 50% of this 25 minute visit was spent in face-to-face counseling/coordination of care regarding diabetes management. Follow-up Disposition: Not on File   ophthalmology - Crossover  Iker Bland, SHANTHI, FNP-BC, BC-ADM  Inderdipti Pam expressed understanding of this plan.

## 2018-08-16 NOTE — Clinical Note
Az Bradley, I saw Mr. Orozco today. He has been out of [facility] for several months related to not having reliable transportation to get to appointments. I discussed an inexpensive substitute (Carmenln Regular), but he would be best served with pens.

## 2018-08-16 NOTE — MR AVS SNAPSHOT
[address] [address] 
[phone] Patient: Fausto Ward MRN: [id number] VGA:[id number]/73/[id number] Visit Information Date & Time Provider Department Dept. Phone Encounter #  
 8/16/2018  1:15 PM [phone] [id number] Your Appointments 8/17/2018  8:50 AM  
Follow Up with Lizeth Day MD  
[facility] [address] [place] [address]) Appt Note: 3 month f/u  Diabetes [facility] [address] [phone] [address]  
  
    
 9/20/2018 10:20 AM  
Follow Up with Burt uMnoz MD  
Neurology Clinic at [facility]) Appt Note: follow up $ 0 CP jll 3/29/18  
 [facility], 
[address] [place] [address], [address] [address]  
[address], [address] [place] [address], [address] [place] [address] [address] [address] Upcoming Health Maintenance Date Due  
 LIPID PANEL Q1 1970 EYE EXAM RETINAL OR DILATED Q1 10/15/1980 Pneumococcal 19-64 Medium Risk (1 of 1 - PPSV23) 10/15/1989 DTaP/Tdap/Td series (1 - Tdap) 10/15/1991 FOOT EXAM Q1 7/7/2018 MICROALBUMIN Q1 7/7/2018 Influenza Age 5 to Adult 8/1/2018 HEMOGLOBIN A1C Q6M 11/15/2018 Allergies as of 8/16/2018  Review Complete On: 8/16/2018 By: Jose Enrique Kent NP No Known Allergies Current Immunizations  Reviewed on 4/26/2011 No immunizations on file. Not reviewed this visit You Were Diagnosed With   
  
 Codes Comments Diabetic polyneuropathy associated with type 1 diabetes mellitus ([facility]    -  Primary ICD-10-CM: E10.42 
ICD-9-CM: 250.61, 357.2 Demyelinating disease of central nervous system ([facility]     ICD-10-CM: G37.9 ICD-9-CM: 341. 9 Vitals BP Pulse Temp Weight(growth percentile) SpO2 BMI 141/82 (BP 1 Location: Left arm, BP Patient Position: Sitting) 99 98.1 °F (36.7 °C) (Oral) 186 lb (84.4 kg) 96% 27.47 kg/m2 Smoking Status Never Smoker Vitals History BMI and BSA Data Body Mass Index Body Surface Area  
 27.47 kg/m 2 2.03 m 2 Preferred Pharmacy Pharmacy Name Phone [facility] [address] [facility], [place] [phone] Your Updated Medication List  
  
   
This list is accurate as of 8/16/18  2:11 PM.  Always use your most recent med list.  
  
  
  
  
 * Blood-Glucose Meter Misc Commonly known as:  ACCU-CHEK DANISHA PLUS METER Test blood sugar twice daily * Blood-Glucose Meter monitoring kit Commonly known as:  PRODIGY VOICE GLUCOSE METER Monitor BS twice daily * DULoxetine 30 mg capsule Commonly known as:  CYMBALTA Take 1 Cap by mouth two (2) times a day. Pap program.  I anticipate this being his final dose, although not his initial dose. * DULoxetine 30 mg capsule Commonly known as:  CYMBALTA Take 1 Cap by mouth nightly. For 1 week and then 1 twice a day  
  
 gabapentin 600 mg tablet Commonly known as:  NEURONTIN Take 1 Tab by mouth four (4) times daily. * glucose blood VI test strips strip Commonly known as:  ACCU-CHEK DANISHA PLUS TEST STRP Test blood sugar twice daily * glucose blood VI test strips strip Commonly known as:  PRODIGY NO CODING Monitor BS twice daily HYDROcodone-acetaminophen 7.5-325 mg per tablet Commonly known as:  NORCO  
1 bid prn pain  
  
 hydrOXYzine pamoate 25 mg capsule Commonly known as:  VISTARIL Take 1 Cap by mouth three (3) times daily as needed for Itching. For itching or to help sleep. insulin detemir U-100 100 unit/mL (3 mL) Inpn Commonly known as:  Floy Dace Inject 30 units twice daily  Indications: type 1 diabetes mellitus  
  
 insulin lispro 100 unit/mL injection Commonly known as:  HUMALOG Take 14 units after each meal.  If you skip a meal, do not take. 1/16/17 pt reports taking 16 units with each meal.  
  
 LORazepam 1 mg tablet Commonly known as:  ATIVAN One half or 1 p.o. nightly as needed insomnia, may refill monthly  
  
 selenium sulfide 1 % shampoo Commonly known as:  [address] Apply to head, mustache area and left ear at least several times daily. topiramate 25 mg tablet Commonly known as:  TOPAMAX Take 1 tab Qhs for 2 weeks then 1 tab BID  
  
 triamcinolone acetonide 0.1 % topical cream  
Commonly known as:  KENALOG Apply  to affected area two (2) times a day. Use thin layer on neck rash and external ear. .  
  
 * Notice: This list has 6 medication(s) that are the same as other medications prescribed for you. Read the directions carefully, and ask your doctor or other care provider to review them with you. We Performed the Following AMB POC GLUCOSE BLOOD, BY GLUCOSE MONITORING DEVICE [38081 CPT(R)] Introducing [facility]! Dear Elida Mae: 
Thank you for requesting a [employer] account. Our records indicate that you already have an active [employer] account. You can access your account anytime at [email or web address] Did you know that you can access your hospital and ER discharge instructions at any time in [employer]? You can also review all of your test results from your hospital stay or ER visit. Additional Information If you have questions, please visit the Frequently Asked Questions section of the [employer] website at [email or web address] Remember, [employer] is NOT to be used for urgent needs. For medical emergencies, dial 911. Now available from your iPhone and Android! Please provide this summary of care documentation to your next provider. Your primary care clinician is listed as lEi Vazquez. If you have any questions after today's visit, please call [phone].

## 2018-08-17 ENCOUNTER — OFFICE VISIT (OUTPATIENT)
Dept: ENDOCRINOLOGY | Age: 48
End: 2018-08-17

## 2018-08-17 VITALS
BODY MASS INDEX: 27.46 KG/M2 | DIASTOLIC BLOOD PRESSURE: 88 MMHG | SYSTOLIC BLOOD PRESSURE: 130 MMHG | WEIGHT: 185.4 LBS | HEART RATE: 96 BPM | HEIGHT: 69 IN

## 2018-08-17 RX ORDER — INSULIN ASPART 100 [IU]/ML
INJECTION, SOLUTION INTRAVENOUS; SUBCUTANEOUS
Qty: 15 PEN | Refills: 3 | Status: SHIPPED | OUTPATIENT
Start: 2018-08-17 | End: 2018-11-05 | Stop reason: SDUPTHER

## 2018-08-17 RX ORDER — INSULIN LISPRO 100 [IU]/ML
INJECTION, SOLUTION INTRAVENOUS; SUBCUTANEOUS
Qty: 15 PEN | Refills: 3 | Status: SHIPPED | OUTPATIENT
Start: 2018-08-17 | End: 2018-12-21 | Stop reason: SDUPTHER

## 2018-08-17 NOTE — PROGRESS NOTES
Presentation:   Rajesh Ca is a 52 y.o. male with Type 1 diabetes who returns for ongoing management of diabetes and self-management training support. Treated with insulin injections: Levemir twice & Humalog with meals. A1c drawn yesterday at [facility], and was markedly elevated. Last seen May 2018. Patient ran out of mealtime insulin two months ago. Subjective:   Diabetes Self-care Practices  Healthy Eating - Now consuming Moderate carbohydrate meals 2 times on most days. Not using Healthy Plate principlescarbohydrate counting principles. Twenty-four hour/usual dietary practices include:   (B)  Oatmeal  (Takes 15units of mealtime insulin)    (L)  PBJ sandwich  (Takes 20 units of mealtime insulin )   (D)  Skips   (BT)  None   (Nely)  Water. HERMILA Martell  Being Active - Reports no regular exercise due to blindness. Can not therefore see the impact of physical activity on blood glucose control  Monitoring - He is testing blood glucoses using a talking meter   (B)  200s   (BT)  200s  Taking Medications - He is not taking prescribed diabetes medications. Ran out of mealtime insulin two months ago    Objective:   Alert, oriented and in no acute distress     Visit Vitals    /88 (BP 1 Location: Left arm, BP Patient Position: Sitting)    Pulse 96    Ht 5' 9\" (1.753 m)    Wt 185 lb 6.4 oz (84.1 kg)    BMI 27.38 kg/m2      Diabetes Self-care Practices  Problemsolving - Does use blood glucose readings to make self-management decisions. Participated in constructing diabetes strategy using shared decision making  Healthy Coping - He is depressed. Feels stable. Patient keeps \"everything inside. \" Is now going to [employer]. Kodi Gar provides his transportation. Aunt & uncle are encouraging him to reengage with others per patient report  Reducing Risks - A1c is not in range. Not on ASA, BP medications or statin therapy.  Not up-to-date re: recommended vaccines   Barriers to diabetes self-management - High cost of medications (insulin pens) and refills. Doesn;t want to bother anyone with his life issues     Nursing Care:     Nursing Diagnosis  31096 Ineffective Health Management   Stage of Change  Action   Nursing Intervention Domain 0725 Decision-making Support   Nursing Interventions Examined usual diabetes self-management practices related to meals, physical activity, BG monitoring and medication dosing  Explored strategies patient is willing to incorporate into their self-care plan     Evaluation:   Patient is ready, willing, and agrees to reincorporate mealtime insulin. Given (2) Apidra samples today. Plan:   1. Diabetes medication reconciliation per Melvin Cantrell MD  2. No change in long-acting insulin dosing  3. Given (2) sample pens of Apidra insulin per Melvin Cantrell MD to restart mealtime insulin dosing  4. BGM as previously  5.  RTC 3 months    Addi Friend DNP RN, ACNS-BC, BC-ADM, CDE  Adult Health Clinical Nurse Specialist-Diabetes & Endocrine  Phone: [phone]  Fax:  [phone]

## 2018-08-17 NOTE — PROGRESS NOTES
The patient was seen by myself and Haydee Sloan DNP, CNS    This unfortunate gentleman returns for follow-up of his type 1 diabetes mellitus and multiple sclerosis with advancing neuropathy and blindness. He continues to be followed by Dr. Edwin Watson for his MS. We follow him for his diabetes. He had been on Levemir insulin 30 units twice daily and Humalog insulin 15-20 units with his meals. Apparently he ran out of the Humalog 2 months ago and was unable to get transportation to go back to get refills so he has not been taking any Humalog. His A1c yesterday was 11.4%. Laboratory data yesterday indicate a sodium 143, potassium 5.3, chloride 105, bicarb 34, glucose 432, BUN 9, creatinine 0.9. Albumin 3.5 with a calcium of 9.0. He continues to eat 2 meals a day but is discovered that the Ramen noodles with for staple of his diet have exacerbated his diarrhea. So he now has oatmeal for breakfast and butter and jelly sandwiches for lunch. If he had the Humalog insulin this would help cover those meals but again he has not had it for quite some time. He denies any episodes of hypoglycemia. Regarding his blindness, things seem to be stable. He is no longer driving and he has a follow-up appointment with Dr. Edwin Watson. Examination blood pressure 130/88  Pulse 96  Weight 185    Diabetic foot exam:     Left Foot:   Visual Exam: normal    Pulse DP: 2+ (normal)   Filament test: reduced sensation    Vibratory sensation: absent      Right Foot:   Visual Exam: normal    Pulse DP: 2+ (normal)   Filament test: reduced sensation    Vibratory sensation: absent     This gentleman has type 1 diabetes mellitus and ophthalmopathy and neuropathy. Plan: I gave him 2 pens of Apidra insulin and wrote a prescription for Humalog to his [facility]. During the conversation today apparently he can get the Humalog at [facility] or at least he hopes he can.   We will of course do whatever we can to accomplish this for him.  So I regimen will be the same. Levemir insulin 30 units twice daily and short acting insulin (what ever is preferred) 15-20 units with each of his meals. He is no longer eating dinner so we presume it will be breakfast and lunch. We will see him back in 3 months at his request.    We spent more than 25 mintutes face to face, more than 50% was in counseling regarding diet, exercise and carbohydrate intake.

## 2018-08-21 RX ORDER — INSULIN LISPRO 100 [IU]/ML
INJECTION, SOLUTION INTRAVENOUS; SUBCUTANEOUS
Qty: 1 VIAL | Refills: 0
Start: 2018-08-21

## 2018-09-18 ENCOUNTER — TELEPHONE (OUTPATIENT)
Dept: ENDOCRINOLOGY | Age: 48
End: 2018-09-18

## 2018-09-18 DIAGNOSIS — R21 RASH: ICD-10-CM

## 2018-09-19 NOTE — TELEPHONE ENCOUNTER
Informed Mr. Orozco that I spoke with his preferred pharmacy and was told that he still had refills left for the medications that he was requesting a refill on. Mr. Elin Hatchet understood with no further questions.

## 2018-09-20 ENCOUNTER — OFFICE VISIT (OUTPATIENT)
Dept: NEUROLOGY | Age: 48
End: 2018-09-20

## 2018-09-20 VITALS
HEART RATE: 88 BPM | DIASTOLIC BLOOD PRESSURE: 80 MMHG | OXYGEN SATURATION: 98 % | HEIGHT: 69 IN | WEIGHT: 194 LBS | SYSTOLIC BLOOD PRESSURE: 140 MMHG | BODY MASS INDEX: 28.73 KG/M2

## 2018-09-20 DIAGNOSIS — G36.0 DEVIC'S DISEASE (HCC): ICD-10-CM

## 2018-09-20 DIAGNOSIS — E11.42 TYPE 2 DIABETES MELLITUS WITH DIABETIC POLYNEUROPATHY, WITH LONG-TERM CURRENT USE OF INSULIN (HCC): ICD-10-CM

## 2018-09-20 DIAGNOSIS — G36.0 NEUROMYELITIS OPTICA (DEVIC) (HCC): Primary | ICD-10-CM

## 2018-09-20 DIAGNOSIS — Z79.4 TYPE 2 DIABETES MELLITUS WITH DIABETIC POLYNEUROPATHY, WITH LONG-TERM CURRENT USE OF INSULIN (HCC): ICD-10-CM

## 2018-09-20 RX ORDER — LOPERAMIDE HCL 2 MG
TABLET ORAL
Qty: 90 TAB | Refills: 5 | Status: SHIPPED | OUTPATIENT
Start: 2018-09-20 | End: 2019-07-29 | Stop reason: ALTCHOICE

## 2018-09-20 RX ORDER — HYDROCODONE BITARTRATE AND ACETAMINOPHEN 7.5; 325 MG/1; MG/1
TABLET ORAL
Qty: 60 TAB | Refills: 0 | Status: SHIPPED | OUTPATIENT
Start: 2018-09-20 | End: 2018-10-10 | Stop reason: SDUPTHER

## 2018-09-20 NOTE — MR AVS SNAPSHOT
[address], 
[address], [address] [facility] 
[phone] Patient: Keon Roman MRN: [id number] IST:79/59/[id number] Visit Information Date & Time Provider Department Dept. Phone Encounter #  
 9/20/2018 10:20 AM You Meng MD Neurology Clinic at [facility] [phone] [id number] Your Appointments 11/23/2018  8:50 AM  
Follow Up with Curly Maciel MD  
[facility] [address] [place] [address]) Appt Note: 3 month f/u diabetes [address] [phone] [address] [place] [address] Road 1/18/2019  3:00 PM  
Follow Up with You Meng MD  
Neurology Clinic at [address]) Appt Note: follow up  $ 0 CP jll 9/20/18  
 [facility], 
[address] [place] [address], [address] [address]  
[address] [facility], [address] [place] [address], [address] [place] [address] [address] [address] Upcoming Health Maintenance Date Due  
 LIPID PANEL Q1 1970 Pneumococcal 19-64 Medium Risk (1 of 1 - PPSV23) 10/15/1989 DTaP/Tdap/Td series (1 - Tdap) 10/15/1991 MICROALBUMIN Q1 7/7/2018 Influenza Age 5 to Adult 8/1/2018 HEMOGLOBIN A1C Q6M 2/16/2019 EYE EXAM RETINAL OR DILATED Q1 8/16/2019 FOOT EXAM Q1 8/17/2019 Allergies as of 9/20/2018  Review Complete On: 9/20/2018 By: Janet Isaac LPN No Known Allergies Current Immunizations  Reviewed on 4/26/2011 No immunizations on file. Not reviewed this visit You Were Diagnosed With   
  
 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 0 Vitals BP Pulse Height(growth percentile) Weight(growth percentile) SpO2 BMI 140/80 88 5' 9\" (1.753 m) 194 lb (88 kg) 98% 28.65 kg/m2 Smoking Status Never Smoker Vitals History BMI and BSA Data Body Mass Index Body Surface Area  
 28.65 kg/m 2 2.07 m 2 Preferred Pharmacy Pharm[facility] Name Phone [phone] Your Updated Medication List  
  
   
This list is accurate as of 9/20/18 11:07 AM.  Always use your most recent med list.  
  
  
  
  
 * Blood-Glucose Meter Misc Commonly known as:  ACCU-CHEK DANISHA PLUS METER Test blood sugar twice daily * Blood-Glucose Meter monitoring kit Commonly known as:  LeadspaceY VOICE GLUCOSE METER Monitor BS twice daily DULoxetine 30 mg capsule Commonly known as:  CYMBALTA Take 1 Cap by mouth two (2) times a day. Pap program.  I anticipate this being his final dose, although not his initial dose. gabapentin 600 mg tablet Commonly known as:  NEURONTIN Take 1 Tab by mouth four (4) times daily. * glucose blood VI test strips strip Commonly known as:  ACCU-CHEK DANISHA PLUS TEST STRP Test blood sugar twice daily * glucose blood VI test strips strip Commonly known as:  PRODIGY NO CODING Monitor BS twice daily HYDROcodone-acetaminophen 7.5-325 mg per tablet Commonly known as:  NORCO  
1 bid prn pain  
  
 hydrOXYzine pamoate 25 mg capsule Commonly known as:  VISTARIL Take 1 Cap by mouth three (3) times daily as needed for Itching. For itching or to help sleep. insulin aspart U-100 100 unit/mL Inpn Commonly known as:  Po Jasso Inject 15-20 units subcutaneously with meals. insulin detemir U-100 100 unit/mL (3 mL) Inpn Commonly known as:  Gurwinder Mcbrideet Inject 30 units twice daily  Indications: type 1 diabetes mellitus  
  
 insulin glulisine U-100 100 unit/mL pen Commonly known as:  APIDRA SOLOSTAR U-100 INSULIN  
10-20 units with meals * insulin lispro 100 unit/mL injection Commonly known as:  HUMALOG Take 14 units after each meal.  If you skip a meal, do not take. 17 pt reports taking 16 units with each meal.  
  
 * insulin lispro 100 unit/mL kwikpen Commonly known as:  HumaLOG KwikPen Insulin 15-20 units with meals  
  
 loperamide 2 mg tablet Commonly known as:  IMMODIUM  
1 p.o. 3 times daily as needed loose stool  Indications: diarrhea LORazepam 1 mg tablet Commonly known as:  ATIVAN One half or 1 p.o. nightly as needed insomnia, may refill monthly  
  
 selenium sulfide 1 % shampoo Commonly known as:  [address] Apply to head, mustache area and left ear at least several times daily. topiramate 25 mg tablet Commonly known as:  TOPAMAX Take 1 tab Qhs for 2 weeks then 1 tab BID  
  
 triamcinolone acetonide 0.1 % topical cream  
Commonly known as:  KENALOG Apply  to affected area two (2) times a day. Use thin layer on neck rash and external ear. .  
  
 * Notice: This list has 6 medication(s) that are the same as other medications prescribed for you. Read the directions carefully, and ask your doctor or other care provider to review them with you. Prescriptions Printed Refills HYDROcodone-acetaminophen (NORCO) 7.5-325 mg per tablet 0 Si bid prn pain  
 Class: Print Prescriptions Sent to Pharmacy Refills  
 loperamide (IMMODIUM) 2 mg tablet 5 Si p.o. 3 times daily as needed loose stool  Indications: diarrhea Class: Normal  
 Pharmacy: [address], [address] [place] [address] Ph #: [phone] [facility]! Dear Dennie Radish: 
Thank you for requesting a [employer] account. Our records indicate that you already have an active [employer] account. You can access your account anytime at [email or web address] Did you know that you can access your hospital and ER discharge instructions at any time in [employer]? You can also review all of your test results from your hospital stay or ER visit. Additional Information If you have questions, please visit the Frequently Asked Questions section of the [employer] website at [email or web address] Remember, [employer] is NOT to be used for urgent needs. For medical emergencies, dial 911. Now available from your iPhone and Android! Please provide this summary of care documentation to your next provider. Your primary care clinician is listed as Magda Dupree. If you have any questions after today's visit, please call [phone].

## 2018-09-20 NOTE — PROGRESS NOTES
HISTORY OF PRESENT ILLNESS  Adriana Hobbs is a 52 y.o. male. HPI Comments: Adriana Hobbs is a 70-year-old man who had been caring a diagnosis of neuromyelitis optica. He has lesions in his brain and his spinal cord. He has a non-spastic paraparesis, he has lesions in his spinal cord producing the myelopathy that produces leg weakness. His leg weakness is now bad enough that he cannot get up the stairs to sleep upstairs, he has to sleep on the couch. 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 His vision continues to deteriorate it is now 20/500 at 10 feet. He still can read some if he holds it quite close to his face. He has been bumping along without significant new symptoms. He functions relatively poorly he has myelitis and significant visual loss. He does not have any new complaints. Review of Systems   Constitutional: Positive for malaise/fatigue. Negative for chills, fever and weight loss. Neurological: Positive for tingling, sensory change, focal weakness and weakness. Negative for tremors, speech change, seizures and loss of consciousness. Psychiatric/Behavioral: Negative for depression, substance abuse and suicidal ideas. Current Outpatient Prescriptions on File Prior to Visit   Medication Sig Dispense Refill    insulin glulisine U-100 (APIDRA SOLOSTAR U-100 INSULIN) 100 unit/mL pen 10-20 units with meals 2 Pen 0    insulin lispro (HUMALOG KWIKPEN INSULIN) 100 unit/mL kwikpen 15-20 units with meals 15 Pen 3    insulin aspart U-100 (NOVOLOG) 100 unit/mL inpn Inject 15-20 units subcutaneously with meals. 15 Pen 3    insulin detemir U-100 (LEVEMIR FLEXTOUCH) 100 unit/mL (3 mL) inpn Inject 30 units twice daily  Indications: type 1 diabetes mellitus 18 Pen 3    gabapentin (NEURONTIN) 600 mg tablet Take 1 Tab by mouth four (4) times daily.  360 Tab 3    LORazepam (ATIVAN) 1 mg tablet One half or 1 p.o. nightly as needed insomnia, may refill monthly 30 Tab 5    selenium sulfide (SELSUN BLUE) 1 % shampoo Apply to head, mustache area and left ear at least several times daily. 1 Bottle 11    triamcinolone acetonide (KENALOG) 0.1 % topical cream Apply  to affected area two (2) times a day. Use thin layer on neck rash and external ear. . 15 g 1    hydrOXYzine pamoate (VISTARIL) 25 mg capsule Take 1 Cap by mouth three (3) times daily as needed for Itching. For itching or to help sleep. 60 Cap 1    DULoxetine (CYMBALTA) 30 mg capsule Take 1 Cap by mouth two (2) times a day. Pap program.  I anticipate this being his final dose, although not his initial dose. 180 Cap 3    glucose blood VI test strips (ACCU-CHEK DANISHA PLUS TEST STRP) strip Test blood sugar twice daily 200 Strip 3    glucose blood VI test strips (PRODIGY NO CODING) strip Monitor BS twice daily 200 Strip 3    Blood-Glucose Meter (PRODIGY VOICE GLUCOSE METER) monitoring kit Monitor BS twice daily 1 Kit 0    Blood-Glucose Meter (ACCU-CHEK DANISHA PLUS METER) misc Test blood sugar twice daily 1 Each 0    topiramate (TOPAMAX) 25 mg tablet Take 1 tab Qhs for 2 weeks then 1 tab BID 60 Tab 5    insulin lispro (HUMALOG) 100 unit/mL injection Take 14 units after each meal.  If you skip a meal, do not take. 1/16/17 pt reports taking 16 units with each meal. (Patient taking differently: Take 20 units before each meal.) 1 Vial 0     No current facility-administered medications on file prior to visit. Past Medical History:   Diagnosis Date    Asthma     Type 2 diabetes mellitus with peripheral neuropathy ([facility] [address]        Physical Exam   Constitutional: He is oriented to person, place, and time. He appears well-developed and well-nourished. Neurological: He is alert and oriented to person, place, and time. He displays no atrophy and no tremor. No cranial nerve deficit. He displays no Babinski's sign on the right side. He displays no Babinski's sign on the left side.    Reflex Scores:       Tricep reflexes are 3+ on the right side and 3+ on the left side. Bicep reflexes are 3+ on the right side and 3+ on the left side. Brachioradialis reflexes are 3+ on the right side and 3+ on the left side. Patellar reflexes are 3+ on the right side and 3+ on the left side. Achilles reflexes are 3+ on the right side and 3+ on the left side. His gait is quite unsteady and wide-based. He has 3+/5 weakness of proximal hip muscles and proximal upper extremity muscles. He is quite unsteady on his feet due to this proximal weakness. His vision is 20/400 at 10 feet and 20/40 near vision  Fundi normal  Speech, language and mentation normal     Skin: Skin is warm and dry. Psychiatric: He has a normal mood and affect. His behavior is normal. Judgment and thought content normal.   Vitals reviewed. ASSESSMENT and PLAN  This man has been ravaged by neuromyelitis optica. He has demyelination in the spinal cord producing marked lower extremity weakness and pain. He has moderate upper extremity weakness. He is not strong enough to climb steps. He certainly is not strong enough to do any kind of a reasonable job. Unfortunately this is going to continue to worsen as neuromyelitis optica does not respond to treatment as multiple sclerosis does. He is in my medical opinion completely disabled for any occupation. I am going to see if I can get him approved through [facility] for rituxan, that is the only drug FDA approved for NMO    DIABETES MELLITUS  Stroke Risk, stable, managed by primary care    DIABETIC POLYNEUROPATHY  At this point it is just causing problems with gait and sleep. I am not going to put him on any medication for that at this time.

## 2018-09-21 NOTE — PROGRESS NOTES
I am going to try to get Luis Felipe Cynthia Liz certified for right toxin through [facility]

## 2018-09-26 RX ORDER — HYDROXYZINE PAMOATE 25 MG/1
CAPSULE ORAL
Qty: 60 CAP | Refills: 1 | Status: SHIPPED | OUTPATIENT
Start: 2018-09-26 | End: 2018-11-05 | Stop reason: SDUPTHER

## 2018-09-26 RX ORDER — DULOXETIN HYDROCHLORIDE 30 MG/1
CAPSULE, DELAYED RELEASE ORAL
Qty: 30 CAP | Refills: 1 | Status: SHIPPED | OUTPATIENT
Start: 2018-09-26 | End: 2018-11-05 | Stop reason: SDUPTHER

## 2018-10-10 DIAGNOSIS — M79.2 NEUROPATHIC PAIN: ICD-10-CM

## 2018-10-10 DIAGNOSIS — G36.0 DEVIC'S DISEASE (HCC): ICD-10-CM

## 2018-10-10 DIAGNOSIS — G36.0 NEUROMYELITIS OPTICA (DEVIC) (HCC): Primary | ICD-10-CM

## 2018-10-10 RX ORDER — HYDROCODONE BITARTRATE AND ACETAMINOPHEN 7.5; 325 MG/1; MG/1
TABLET ORAL
Qty: 60 TAB | Refills: 0 | Status: SHIPPED | OUTPATIENT
Start: 2018-10-10 | End: 2018-10-11 | Stop reason: SDUPTHER

## 2018-10-11 ENCOUNTER — DOCUMENTATION ONLY (OUTPATIENT)
Dept: NEUROLOGY | Age: 48
End: 2018-10-11

## 2018-10-11 DIAGNOSIS — G36.0 NEUROMYELITIS OPTICA (DEVIC) (HCC): ICD-10-CM

## 2018-10-11 DIAGNOSIS — M79.2 NEUROPATHIC PAIN: ICD-10-CM

## 2018-10-11 DIAGNOSIS — G36.0 DEVIC'S DISEASE (HCC): ICD-10-CM

## 2018-10-11 RX ORDER — HYDROCODONE BITARTRATE AND ACETAMINOPHEN 7.5; 325 MG/1; MG/1
TABLET ORAL
Qty: 21 TAB | Refills: 0 | Status: SHIPPED | OUTPATIENT
Start: 2018-10-11 | End: 2019-05-24

## 2018-11-05 ENCOUNTER — TELEPHONE (OUTPATIENT)
Dept: ENDOCRINOLOGY | Age: 48
End: 2018-11-05

## 2018-11-05 DIAGNOSIS — Z79.4 CONTROLLED TYPE 2 DIABETES MELLITUS WITHOUT COMPLICATION, WITH LONG-TERM CURRENT USE OF INSULIN (HCC): ICD-10-CM

## 2018-11-05 DIAGNOSIS — R21 RASH: ICD-10-CM

## 2018-11-05 DIAGNOSIS — E11.9 CONTROLLED TYPE 2 DIABETES MELLITUS WITHOUT COMPLICATION, WITH LONG-TERM CURRENT USE OF INSULIN (HCC): ICD-10-CM

## 2018-11-06 RX ORDER — INSULIN ASPART 100 [IU]/ML
INJECTION, SOLUTION INTRAVENOUS; SUBCUTANEOUS
Qty: 15 PEN | Refills: 3 | Status: SHIPPED | OUTPATIENT
Start: 2018-11-06 | End: 2020-01-23

## 2018-11-06 RX ORDER — INSULIN LISPRO 100 [IU]/ML
INJECTION, SOLUTION INTRAVENOUS; SUBCUTANEOUS
Qty: 15 PEN | Refills: 3 | OUTPATIENT
Start: 2018-11-06

## 2018-11-06 RX ORDER — GABAPENTIN 600 MG/1
600 TABLET ORAL 4 TIMES DAILY
Qty: 360 TAB | Refills: 3 | Status: SHIPPED | OUTPATIENT
Start: 2018-11-06 | End: 2019-07-30 | Stop reason: SDUPTHER

## 2018-11-06 NOTE — TELEPHONE ENCOUNTER
Informed Mr. Orozco that Dr. Yulissa Joy refilled his prescriptions. Patient understood with no further questions.

## 2018-11-12 NOTE — TELEPHONE ENCOUNTER
Routing my chart refill requests to provider for review. Pt was last seen in August for OV.  Becky Almanza RN

## 2018-11-13 RX ORDER — DULOXETIN HYDROCHLORIDE 30 MG/1
30 CAPSULE, DELAYED RELEASE ORAL 2 TIMES DAILY
Qty: 180 CAP | Refills: 3 | Status: SHIPPED | OUTPATIENT
Start: 2018-11-13 | End: 2019-02-20 | Stop reason: SDUPTHER

## 2018-11-13 RX ORDER — DULOXETIN HYDROCHLORIDE 30 MG/1
30 CAPSULE, DELAYED RELEASE ORAL DAILY
Qty: 30 CAP | Refills: 1 | Status: SHIPPED | OUTPATIENT
Start: 2018-11-13 | End: 2019-02-20 | Stop reason: SDUPTHER

## 2018-11-13 RX ORDER — HYDROXYZINE PAMOATE 25 MG/1
25 CAPSULE ORAL
Qty: 60 CAP | Refills: 1 | Status: SHIPPED | OUTPATIENT
Start: 2018-11-13 | End: 2019-02-20 | Stop reason: SDUPTHER

## 2018-11-13 RX ORDER — TRIAMCINOLONE ACETONIDE 1 MG/G
CREAM TOPICAL 2 TIMES DAILY
Qty: 15 G | Refills: 1 | Status: SHIPPED | OUTPATIENT
Start: 2018-11-13 | End: 2019-02-20 | Stop reason: SDUPTHER

## 2018-11-16 ENCOUNTER — TELEPHONE (OUTPATIENT)
Dept: ENDOCRINOLOGY | Age: 48
End: 2018-11-16

## 2018-11-16 NOTE — TELEPHONE ENCOUNTER
Prior Authorization initiated for [employer] No Coding Test Strips on 11/16/18 and sent to Dr. Kurt Milian for completion.

## 2019-01-10 ENCOUNTER — OFFICE VISIT (OUTPATIENT)
Dept: ENDOCRINOLOGY | Age: 49
End: 2019-01-10

## 2019-01-10 VITALS
SYSTOLIC BLOOD PRESSURE: 142 MMHG | WEIGHT: 202.6 LBS | BODY MASS INDEX: 30.01 KG/M2 | HEART RATE: 94 BPM | HEIGHT: 69 IN | DIASTOLIC BLOOD PRESSURE: 81 MMHG

## 2019-01-10 DIAGNOSIS — E10.65 HYPERGLYCEMIA DUE TO TYPE 1 DIABETES MELLITUS (HCC): Primary | ICD-10-CM

## 2019-01-10 LAB — HBA1C MFR BLD HPLC: 10.2 %

## 2019-01-10 NOTE — PROGRESS NOTES
Mr. Tremayne Flores  returns for follow-up of his type 1 diabetes mellitus and a neuro ophthalmologic condition with progressive blindness. He continues on Levemir 30 units twice daily and Humalog insulin 14 units with his 2 meals a day. His vision is stable at this time. His A1c has improved to 10.4% which is down 1% from his last measurement. He checks his blood sugar in the morning and generally they are about 300. Predinner his blood sugars 200. Breakfast is usually oatmeal which is a standard. Jeppie Bone is a meat and a vegetable. He has found that this diet prevents the diarrhea which is problematic for him. He says that he occasionally cheats and has some bread during the day. He pays for it in terms of diarrhea but he just has a taste for it periodically. He does not do this very often. Examination  Blood pressure 142/81  Pulse 94  Weight 202    Impression type 1 diabetes mellitus with a slow improvement in glucose in someone with visual impairment. Plan: I increase the Levemir to 36 units at night and continue the 30 units in the morning. It is interesting that he tells us that when his blood sugar gets down into the 200s his vision gets worse whereas if his blood sugars in the 3-400 range his vision is better. Probably this is related to his being used to elevated blood sugars and improvement in blood sugars is causing fluctuating vision but I do also wonder whether it is possible that the neural ophthalmologic condition is being affected by his glucose. We will see him back in 3 months. We spent more than 25 mintutes face to face, more than 50% was in counseling regarding diet, exercise and carbohydrate intake.

## 2019-01-29 ENCOUNTER — OFFICE VISIT (OUTPATIENT)
Dept: NEUROLOGY | Age: 49
End: 2019-01-29

## 2019-01-29 VITALS
WEIGHT: 205 LBS | SYSTOLIC BLOOD PRESSURE: 122 MMHG | DIASTOLIC BLOOD PRESSURE: 78 MMHG | OXYGEN SATURATION: 98 % | BODY MASS INDEX: 30.36 KG/M2 | HEART RATE: 87 BPM | HEIGHT: 69 IN

## 2019-01-29 DIAGNOSIS — G36.0 NEUROMYELITIS OPTICA (DEVIC) (HCC): Primary | ICD-10-CM

## 2019-01-29 NOTE — PROGRESS NOTES
HISTORY OF PRESENT ILLNESS Hudson Ferrer is a 50 y.o. male. HPI Comments: Hudson Ferrer is a 55-year-old man who had been caring a diagnosis of neuromyelitis optica. He has lesions in his brain and his spinal cord. He has a non-spastic paraparesis, he has lesions in his spinal cord producing the myelopathy that produces leg weakness. His leg weakness is now bad enough that he cannot get up the stairs to sleep upstairs, he has to sleep on the couch. 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 His vision continues to deteriorate it is now 20/500 at 10 feet. He still can read some if he holds it quite close to his face. His vision and his gait is getting slowly worse. He is no longer driving. He denies any new symptoms, anxious that his old symptoms are slowly getting worse. Review of Systems Constitutional: Positive for malaise/fatigue. Negative for chills, fever and weight loss. Neurological: Positive for tingling, sensory change, focal weakness and weakness. Negative for tremors, speech change, seizures and loss of consciousness. Psychiatric/Behavioral: Negative for depression, substance abuse and suicidal ideas. Current Outpatient Medications on File Prior to Visit Medication Sig Dispense Refill  insulin detemir U-100 (LEVEMIR FLEXTOUCH) 100 unit/mL (3 mL) inpn Inject 30 units am and 36 units HS 7 Pen 10  
 insulin lispro (HUMALOG KWIKPEN INSULIN) 100 unit/mL kwikpen INJECT 15 TO 20 UNITS SUBCUTANEOUSLY WITH MEALS 20 Adjustable Dose Pre-filled Pen Syringe 3  
 hydrOXYzine pamoate (VISTARIL) 25 mg capsule Take 1 Cap by mouth four (4) times daily as needed for Itching. 60 Cap 1  
 DULoxetine (CYMBALTA) 30 mg capsule Take 1 Cap by mouth daily. 30 Cap 1  
 selenium sulfide (SELSUN BLUE) 1 % shampoo Apply to head, mustache area and left ear at least several times daily.  1 Bottle 11  
  glucose blood VI test strips (PRODIGY NO CODING) strip Monitor BS twice daily 200 Strip 3  
 gabapentin (NEURONTIN) 600 mg tablet Take 1 Tab by mouth four (4) times daily. 360 Tab 3  
 insulin aspart U-100 (NOVOLOG) 100 unit/mL inpn Inject 15-20 units subcutaneously with meals. 15 Pen 3  
 loperamide (IMMODIUM) 2 mg tablet 1 p.o. 3 times daily as needed loose stool  Indications: diarrhea 90 Tab 5  
 insulin glulisine U-100 (APIDRA SOLOSTAR U-100 INSULIN) 100 unit/mL pen 10-20 units with meals 2 Pen 0  
 glucose blood VI test strips (ACCU-CHEK DANISHA PLUS TEST STRP) strip Test blood sugar twice daily 200 Strip 3  Blood-Glucose Meter (PRODIGY VOICE GLUCOSE METER) monitoring kit Monitor BS twice daily 1 Kit 0  Blood-Glucose Meter (ACCU-CHEK DANISHA PLUS METER) misc Test blood sugar twice daily 1 Each 0  
 insulin lispro (HUMALOG) 100 unit/mL injection Take 14 units after each meal.  If you skip a meal, do not take. 1/16/17 pt reports taking 16 units with each meal. (Patient taking differently: Take 20 units before each meal.) 1 Vial 0  
 triamcinolone acetonide (KENALOG) 0.1 % topical cream Apply  to affected area two (2) times a day. Use thin layer on neck rash and external ear. . 15 g 1  
 DULoxetine (CYMBALTA) 30 mg capsule Take 1 Cap by mouth two (2) times a day. Pap program.  I anticipate this being his final dose, although not his initial dose. 180 Cap 3  
 HYDROcodone-acetaminophen (NORCO) 7.5-325 mg per tablet 1po tid prn pain  Indications: Pain 21 Tab 0  
 LORazepam (ATIVAN) 1 mg tablet One half or 1 p.o. nightly as needed insomnia, may refill monthly 30 Tab 5  
 topiramate (TOPAMAX) 25 mg tablet Take 1 tab Qhs for 2 weeks then 1 tab BID 60 Tab 5 No current facility-administered medications on file prior to visit. Past Medical History:  
Diagnosis Date  Asthma  Type 2 diabetes mellitus with peripheral neuropathy (HCC) Physical Exam  
 Constitutional: He is oriented to person, place, and time. He appears well-developed and well-nourished. Neurological: He is alert and oriented to person, place, and time. He displays no atrophy and no tremor. No cranial nerve deficit. He displays no Babinski's sign on the right side. He displays no Babinski's sign on the left side. Reflex Scores: 
     Tricep reflexes are 3+ on the right side and 3+ on the left side. Bicep reflexes are 3+ on the right side and 3+ on the left side. Brachioradialis reflexes are 3+ on the right side and 3+ on the left side. Patellar reflexes are 3+ on the right side and 3+ on the left side. Achilles reflexes are 3+ on the right side and 3+ on the left side. His gait is quite unsteady and wide-based. He has 3+/5 weakness of proximal hip muscles and proximal upper extremity muscles. He is quite unsteady on his feet due to this proximal weakness. His vision is 20/400 at 10 feet and 20/40 near vision Fundi normal 
Speech, language and mentation normal 
  
Skin: Skin is warm and dry. Psychiatric: He has a normal mood and affect. His behavior is normal. Judgment and thought content normal.  
Vitals reviewed. ASSESSMENT and PLAN This man has been ravaged by neuromyelitis optica. He has demyelination in the spinal cord producing marked lower extremity weakness and pain. He has moderate upper extremity weakness. He is not strong enough to climb steps. He certainly is not strong enough to do any kind of a reasonable job. Unfortunately this is going to continue to worsen as neuromyelitis optica does not respond to treatment as multiple sclerosis does. He is in my medical opinion completely disabled for any occupation. I am going to see if I can get him approved through [employer] for rituxan, that is the only drug FDA approved for NMO. DIABETES MELLITUS Stroke Risk, stable, managed by primary care DIABETIC POLYNEUROPATHY At this point it is just causing problems with gait and sleep. I am not going to put him on any medication for that at this time.

## 2019-01-29 NOTE — PATIENT INSTRUCTIONS
Office Policieso Phone calls/patient messages: Please allow up to 24 hours for someone in the office to contact you about your call or message. Be mindful your provider may be out of the office or your message may require further review. We encourage you to use [employer] for your messages as this is a faster, more efficient way to communicate with our office 
o Medication Refills: 
Prescription medications require up to 48 business hours to process. We encourage you to use [facility]o for your refills. For controlled medications: Please allow up to 72 business hours to process. Certain medications may require you to  a written prescription at our office. NO narcotic/controlled medications will be prescribed after 4pm Monday through Friday or on weekends 
o Form/Paperwork Completion: We ask that you allow 7-14 business days. You may also download your forms to [employer]o to have your doctor print off. A Healthy Lifestyle: Care Instructions Your Care Instructions A healthy lifestyle can help you feel good, stay at a healthy weight, and have plenty of energy for both work and play. A healthy lifestyle is something you can share with your whole family. A healthy lifestyle also can lower your risk for serious health problems, such as high blood pressure, heart disease, and diabetes. You can follow a few steps listed below to improve your health and the health of your family. Follow-up care is a key part of your treatment and safety. Be sure to make and go to all appointments, and call your doctor if you are having problems. It's also a good idea to know your test results and keep a list of the medicines you take. How can you care for yourself at home? · Do not eat too much sugar, fat, or fast foods. You can still have dessert and treats now and then. The goal is moderation. · Start small to improve your eating habits.  Pay attention to portion sizes, drink less juice and soda pop, and eat more fruits and vegetables. ? Eat a healthy amount of food. A 3-ounce serving of meat, for example, is about the size of a deck of cards. Fill the rest of your plate with vegetables and whole grains. ? Limit the amount of soda and sports drinks you have every day. Drink more water when you are thirsty. ? Eat at least 5 servings of fruits and vegetables every day. It may seem like a lot, but it is not hard to reach this goal. A serving or helping is 1 piece of fruit, 1 cup of vegetables, or 2 cups of leafy, raw vegetables. Have an apple or some carrot sticks as an afternoon snack instead of a candy bar. Try to have fruits and/or vegetables at every meal. 
· Make exercise part of your daily routine. You may want to start with simple activities, such as walking, bicycling, or slow swimming. Try to be active 30 to 60 minutes every day. You do not need to do all 30 to 60 minutes all at once. For example, you can exercise 3 times a day for 10 or 20 minutes. Moderate exercise is safe for most people, but it is always a good idea to talk to your doctor before starting an exercise program. 
· Keep moving. [employer] the lawn, work in the garden, or [employer]. Take the stairs instead of the elevator at work. · If you smoke, quit. People who smoke have an increased risk for heart attack, stroke, cancer, and other lung illnesses. Quitting is hard, but there are ways to boost your chance of quitting tobacco for good. ? Use nicotine gum, patches, or lozenges. ? Ask your doctor about stop-smoking programs and medicines. ? Keep trying. In addition to reducing your risk of diseases in the future, you will notice some benefits soon after you stop using tobacco. If you have shortness of breath or asthma symptoms, they will likely get better within a few weeks after you quit. · Limit how much alcohol you drink.  Moderate amounts of alcohol (up to 2 drinks a day for men, 1 drink a day for women) are okay. But drinking too much can lead to liver problems, high blood pressure, and other health problems. Family health If you have a family, there are many things you can do together to improve your health. · Eat meals together as a family as often as possible. · Eat healthy foods. This includes fruits, vegetables, lean meats and dairy, and whole grains. · Include your family in your fitness plan. Most people think of activities such as jogging or tennis as the way to fitness, but there are many ways you and your family can be more active. Anything that makes you breathe hard and gets your heart pumping is exercise. Here are some tips: 
? Walk to do errands or to take your child to school or the bus. 
? Go for a family bike ride after dinner instead of watching TV. Where can you learn more? Go to http://eb-james.info/. Enter X945 in the search box to learn more about \"A Healthy Lifestyle: Care Instructions. \" Current as of: September 11, 2018 Content Version: 11.9 © 7293-5482 [employer]. Care instructions adapted under license by [employer] (which disclaims liability or warranty for this information). If you have questions about a medical condition or this instruction, always ask your healthcare professional. [address] any warranty or liability for your use of this information.

## 2019-02-11 ENCOUNTER — DOCUMENTATION ONLY (OUTPATIENT)
Dept: FAMILY MEDICINE CLINIC | Age: 49
End: 2019-02-11

## 2019-02-11 NOTE — PROGRESS NOTES
We have had PAP Cymbalta for this [facility] patient since 04-03-18. Per chart review, he now has Medicaid. Routing to Germán Julien for review since this is not a customary [facility] stock medication.  Murali Posada RN

## 2019-02-12 ENCOUNTER — HOSPITAL ENCOUNTER (OUTPATIENT)
Dept: MRI IMAGING | Age: 49
Discharge: HOME | End: 2019-02-12
Attending: PSYCHIATRY & NEUROLOGY
Payer: MEDICAID

## 2019-02-12 DIAGNOSIS — G36.0 NEUROMYELITIS OPTICA (DEVIC) (HCC): ICD-10-CM

## 2019-02-12 PROCEDURE — A9575 INJ GADOTERATE MEGLUMI 0.1ML: HCPCS | Performed by: RADIOLOGY

## 2019-02-12 PROCEDURE — 72156 MRI NECK SPINE W/O & W/DYE: CPT

## 2019-02-12 PROCEDURE — 74011250636 HC RX REV CODE- 250/636: Performed by: RADIOLOGY

## 2019-02-12 PROCEDURE — 70553 MRI BRAIN STEM W/O & W/DYE: CPT

## 2019-02-12 RX ORDER — GADOTERATE MEGLUMINE 376.9 MG/ML
20 INJECTION INTRAVENOUS
Status: COMPLETED | OUTPATIENT
Start: 2019-02-12 | End: 2019-02-12

## 2019-02-12 RX ADMIN — GADOTERATE MEGLUMINE 20 ML: 376.9 INJECTION INTRAVENOUS at 18:22

## 2019-02-16 DIAGNOSIS — R21 RASH: ICD-10-CM

## 2019-02-19 RX ORDER — DULOXETIN HYDROCHLORIDE 30 MG/1
CAPSULE, DELAYED RELEASE ORAL
Qty: 30 CAP | Refills: 1 | OUTPATIENT
Start: 2019-02-19

## 2019-02-19 RX ORDER — TRIAMCINOLONE ACETONIDE 1 MG/G
CREAM TOPICAL
Refills: 1 | OUTPATIENT
Start: 2019-02-19

## 2019-02-19 RX ORDER — HYDROXYZINE PAMOATE 25 MG/1
CAPSULE ORAL
Qty: 60 CAP | Refills: 1 | OUTPATIENT
Start: 2019-02-19

## 2019-02-19 NOTE — TELEPHONE ENCOUNTER
Has medicaid, no recent clinic visit with us. Needs new pcp, might be able to get meds through endocrine/neurology.

## 2019-02-19 NOTE — TELEPHONE ENCOUNTER
General phone message left to call [facility] office nurse to speak about follow up medical plans and medicines.

## 2019-02-19 NOTE — TELEPHONE ENCOUNTER
Pt returned call and he has upcoming speciality appt w/ Endo and neuro but has not established care for PCP as yet. He has recently found a proctice near him in [place] and also discussed [facility] as an option. Resubmitting to Dr El Pi for 3 months of refills. Until he can establish care w/ new PCP.

## 2019-02-20 DIAGNOSIS — R21 RASH: ICD-10-CM

## 2019-02-20 RX ORDER — TRIAMCINOLONE ACETONIDE 1 MG/G
CREAM TOPICAL
Qty: 15 G | Refills: 0 | Status: SHIPPED | OUTPATIENT
Start: 2019-02-20 | End: 2020-04-23 | Stop reason: SDUPTHER

## 2019-02-20 RX ORDER — HYDROXYZINE PAMOATE 25 MG/1
25 CAPSULE ORAL
Qty: 60 CAP | Refills: 0 | Status: SHIPPED | OUTPATIENT
Start: 2019-02-20 | End: 2019-03-11

## 2019-02-20 RX ORDER — DULOXETIN HYDROCHLORIDE 30 MG/1
30 CAPSULE, DELAYED RELEASE ORAL DAILY
Qty: 30 CAP | Refills: 2 | Status: SHIPPED | OUTPATIENT
Start: 2019-02-20 | End: 2019-07-29 | Stop reason: ALTCHOICE

## 2019-03-05 NOTE — TELEPHONE ENCOUNTER
Pharmacy asking \"can we user Hydrox HCL ? Pamate is on back order? \"  Routing to Dr Mcgraw Given for review.

## 2019-03-11 RX ORDER — HYDROXYZINE PAMOATE 25 MG/1
CAPSULE ORAL
Qty: 60 CAP | Refills: 0 | Status: SHIPPED | OUTPATIENT
Start: 2019-03-11 | End: 2019-10-24 | Stop reason: SDUPTHER

## 2019-03-11 NOTE — TELEPHONE ENCOUNTER
Message faxed again from pharmacy asking if med can be changed to Hydroxyzine HCL tabs since Hydroxyzine pamoate is on back order. Routing to provider in clinic today for review and to have new RX sent to [facility].  Margie Lehman RN

## 2019-03-29 NOTE — PROGRESS NOTES
With permission from Nelda Rockwell and Dr. Shannon Read, this patient's unclaimed PAP Cymbalta was given to a [facility] patient on 03-27-19 who has applied for Cymbalta through [facility] and is waiting for his order to arrive.  Josue Wu RN

## 2019-04-12 ENCOUNTER — OFFICE VISIT (OUTPATIENT)
Dept: ENDOCRINOLOGY | Age: 49
End: 2019-04-12

## 2019-04-12 VITALS
WEIGHT: 204 LBS | SYSTOLIC BLOOD PRESSURE: 137 MMHG | HEART RATE: 96 BPM | HEIGHT: 69 IN | DIASTOLIC BLOOD PRESSURE: 85 MMHG | BODY MASS INDEX: 30.21 KG/M2

## 2019-04-12 DIAGNOSIS — E10.65 HYPERGLYCEMIA DUE TO TYPE 1 DIABETES MELLITUS (HCC): Primary | ICD-10-CM

## 2019-04-12 NOTE — PROGRESS NOTES
This gentleman returns for follow-up of his type 1 diabetes mellitus with poor blood sugar control and neuromyelitis optica with progressive vision loss. He saw Dr. Whitney Hernandez recently and all of his imaging studies are stable at this time. He does continue to lose vision and there appears to be nothing possible to reverse this or at least mitigate this decrease unless he can be on rituximab which is being explored. Regarding his diabetes, when we saw him last his A1c was 10.2. His A1c today is 10.1. He continues on Levemir 30 units twice daily and Humalog 15 units with his 2 meals. He comes in today with his girlfriend/fiancé. .  This is the first time we have met her. He checks blood sugars in the morning and are generally in the 250-300 range. He says he has no blood sugars less than 200 although one day he did not eat much for breakfast and he did get a blood sugar 72. His diet is unchanged. 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 .  This may be an option for him as a personal option going forward but for now at least we can find out whether the Levemir needs to be increased or the mealtime insulin needs to be increased for both. We will see him back in 2 weeks. We spent more than 25 mintutes face to face, more than 50% was in counseling regarding diet, exercise and carbohydrate intake.

## 2019-04-13 LAB
BASOPHILS # BLD AUTO: 0 X10E3/UL (ref 0–0.2)
BASOPHILS NFR BLD AUTO: 0 %
EOSINOPHIL # BLD AUTO: 0.1 X10E3/UL (ref 0–0.4)
EOSINOPHIL NFR BLD AUTO: 1 %
ERYTHROCYTE [DISTWIDTH] IN BLOOD BY AUTOMATED COUNT: 14.6 % (ref 12.3–15.4)
HCT VFR BLD AUTO: 40.4 % (ref 37.5–51)
HGB BLD-MCNC: 13.7 G/DL (ref 13–17.7)
IMM GRANULOCYTES # BLD AUTO: 0 X10E3/UL (ref 0–0.1)
IMM GRANULOCYTES NFR BLD AUTO: 0 %
LYMPHOCYTES # BLD AUTO: 2.2 X10E3/UL (ref 0.7–3.1)
LYMPHOCYTES NFR BLD AUTO: 32 %
MCH RBC QN AUTO: 29.7 PG (ref 26.6–33)
MCHC RBC AUTO-ENTMCNC: 33.9 G/DL (ref 31.5–35.7)
MCV RBC AUTO: 88 FL (ref 79–97)
MONOCYTES # BLD AUTO: 0.7 X10E3/UL (ref 0.1–0.9)
MONOCYTES NFR BLD AUTO: 10 %
NEUTROPHILS # BLD AUTO: 3.9 X10E3/UL (ref 1.4–7)
NEUTROPHILS NFR BLD AUTO: 57 %
PLATELET # BLD AUTO: 249 X10E3/UL (ref 150–379)
RBC # BLD AUTO: 4.61 X10E6/UL (ref 4.14–5.8)
WBC # BLD AUTO: 6.9 X10E3/UL (ref 3.4–10.8)

## 2019-04-17 LAB — HBA1C MFR BLD HPLC: 10.1 %

## 2019-04-26 ENCOUNTER — OFFICE VISIT (OUTPATIENT)
Dept: ENDOCRINOLOGY | Age: 49
End: 2019-04-26

## 2019-04-26 VITALS
HEIGHT: 69 IN | BODY MASS INDEX: 30.18 KG/M2 | HEART RATE: 92 BPM | DIASTOLIC BLOOD PRESSURE: 88 MMHG | WEIGHT: 203.8 LBS | SYSTOLIC BLOOD PRESSURE: 152 MMHG

## 2019-04-26 DIAGNOSIS — E10.65 HYPERGLYCEMIA DUE TO TYPE 1 DIABETES MELLITUS (HCC): Primary | ICD-10-CM

## 2019-04-26 NOTE — PROGRESS NOTES
This gentleman returns for follow-up of his type 1 diabetes mellitus with poor blood sugar control and neuromyelitis optica with progressive vision loss. He saw Dr. Zack Muller recently and all of his imaging studies are stable at this time. He does continue to lose vision and there appears to be nothing possible to reverse this or at least mitigate this decrease unless he can be on rituximab which is being explored. At his last visit, we inserted a pro freestyle neal system and he returns today to download data. Download demonstrates consistently elevated blood sugars averaging 300 overnight and 300 in the evening. He does have a modest decrease in blood sugar between noon and 6 PM but then the blood sugar goes right back up. He is taking 30 units of Levemir twice daily and he is taking Humalog 15 units with his 2 meals. Breakfast is oatmeal or a bagel. Dinner can be oatmeal or can be sausage sweet potato fries or bagel and sausage or pizza. Most of the meals occur at noon and 6 PM but following that second meal, the blood sugars rise consistently. Examination  Blood pressure 152/88  Pulse 92  Weight 203    Impression type 1 diabetes mellitus with poor blood sugar control. He does appear to be taking the insulin but the response is minimal.  Plan: I have increased the Levemir to 40 units twice daily. We will continue the 15 units with his meals although this likely will also have to change. He is interested in getting a personal freestyle neal system so I have asked him to check with his insurance and let me know what I need to fill out for him to get it. We will see him back in 1 month. We spent more than 25 mintutes face to face, more than 50% was in counseling regarding diet, exercise and carbohydrate intake.

## 2019-05-23 PROBLEM — E11.9 DIABETES MELLITUS TYPE 2, CONTROLLED (HCC): Status: RESOLVED | Noted: 2017-07-07 | Resolved: 2019-05-23

## 2019-05-23 PROBLEM — G37.9 DEMYELINATING DISEASE OF CENTRAL NERVOUS SYSTEM (HCC): Status: RESOLVED | Noted: 2017-07-07 | Resolved: 2019-05-23

## 2019-05-23 PROBLEM — E11.40 TYPE 2 DIABETES MELLITUS WITH DIABETIC NEUROPATHY (HCC): Status: RESOLVED | Noted: 2018-02-16 | Resolved: 2019-05-23

## 2019-05-24 ENCOUNTER — OFFICE VISIT (OUTPATIENT)
Dept: INTERNAL MEDICINE CLINIC | Facility: CLINIC | Age: 49
End: 2019-05-24

## 2019-05-24 VITALS
HEART RATE: 93 BPM | HEIGHT: 69 IN | OXYGEN SATURATION: 97 % | SYSTOLIC BLOOD PRESSURE: 132 MMHG | BODY MASS INDEX: 29.77 KG/M2 | WEIGHT: 201 LBS | DIASTOLIC BLOOD PRESSURE: 90 MMHG | RESPIRATION RATE: 12 BRPM | TEMPERATURE: 98 F

## 2019-05-24 DIAGNOSIS — G36.0 NEUROMYELITIS OPTICA (HCC): ICD-10-CM

## 2019-05-24 DIAGNOSIS — I10 ESSENTIAL HYPERTENSION: ICD-10-CM

## 2019-05-24 DIAGNOSIS — F32.4 MAJOR DEPRESSIVE DISORDER WITH SINGLE EPISODE, IN PARTIAL REMISSION (HCC): ICD-10-CM

## 2019-05-24 DIAGNOSIS — E10.42 DIABETIC POLYNEUROPATHY ASSOCIATED WITH TYPE 1 DIABETES MELLITUS (HCC): ICD-10-CM

## 2019-05-24 DIAGNOSIS — E66.3 OVERWEIGHT (BMI 25.0-29.9): ICD-10-CM

## 2019-05-24 RX ORDER — LANOLIN ALCOHOL/MO/W.PET/CERES
1000 CREAM (GRAM) TOPICAL DAILY
COMMUNITY

## 2019-05-24 RX ORDER — LOSARTAN POTASSIUM 50 MG/1
50 TABLET ORAL DAILY
Qty: 30 TAB | Refills: 11 | Status: SHIPPED | OUTPATIENT
Start: 2019-05-24 | End: 2021-01-17 | Stop reason: SDUPTHER

## 2019-05-24 NOTE — PROGRESS NOTES
HISTORY OF PRESENT ILLNESS  Charlotte Vidal is a 50 y.o. male. HPI  He presents to establish care   He presents for follow up of diabetes mellitus (diagnosed in ) with neuropathy,. He is seen by Dr Mikala Barrios who recently increased insulin considerably(? due to insulin resistance). Diet and Lifestyle: not attempting to follow a low fat, low cholesterol diet, not attempting to follow a low sodium diet, does not rigorously follow a diabetic diet, sedentary, nonsmoker, alcohol intake none  Diabetic ROS - medication compliance: compliant all of the time,      home glucose monitorin-250 One sugar of 32 (he thinks he took short acting an hour after eating). home BP Monitoring: not done. further diabetic ROS: no polyuria or polydipsia, no unusual visual symptoms, no medication side effects noted, has dysesthesias in the feet. Cardiovascular ROS:  He complains of (?)claudication abut 75 feet \"electric feeling in feet and legs\") , dizziness. He denies palpitations, orthopnea, exertional chest pressure/discomfort, lower extremity edema, dyspnea on exertion, dizziness     He has neuromyelitis optic and demyelinating disease. He receives no therapy, but is followed by Dr Alex Sifuentes. He has a haze over visionn, numnbness hands legs and feet, profound fatigue, Gabapentin and Topamamx do not help much. He is seen for followup of depression. Current therapy is with duloxetine. With therapy symptoms are improved somewhat. Ongoing symptoms include fatigue, difficulty concentrating, hopelessness and impaired memory. He denies depressed mood, anhedonia, insomnia, feelings of worthlessness/guilt and recurrent thoughts of death. He experiences the following side effects from the treatment: none.       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    Diabetes mellitus type 1, uncontrolled, with complications ([facility] Q22.9, E10.65     Past Medical History:   Diagnosis Date    Asthma     Type 2 diabetes mellitus with peripheral neuropathy (HCC)      Past Surgical History:   Procedure Laterality Date    HX HEENT      ear surgery(both)     Social History     Socioeconomic History    Marital status: SINGLE     Spouse name: Not on file    Number of children: Not on file    Years of education: Not on file    Highest education level: Not on file   Tobacco Use    Smoking status: Never Smoker    Smokeless tobacco: Never Used   Substance and Sexual Activity    Alcohol use: No     Alcohol/week: 0.0 oz    Drug use: No    Sexual activity: Not Currently     Family History   Problem Relation Age of Onset    Diabetes Father     Cancer Father         prostate    Hypertension Mother     Other Mother         Crohn's disease    Heart Disease Mother     Hypertension Maternal Grandmother     Thyroid Disease Sister     No Known Problems Brother      No Known Allergies  Current Outpatient Medications   Medication Sig Dispense Refill    multivitamin, tx-iron-ca-min (THERA-M W/ IRON) 9 mg iron-400 mcg tab tablet Take 1 Tab by mouth daily.  cyanocobalamin 1,000 mcg tablet Take 1,000 mcg by mouth daily.  insulin detemir U-100 (LEVEMIR FLEXTOUCH) 100 unit/mL (3 mL) inpn Inject 40 units am and 40 units HS  Indications: type 1 diabetes mellitus 7 Pen 10    flash glucose scanning reader (FREESTYLE ARCELIA 14 DAY READER) misc Use to monitor blood sugars before and after meals, at bedtime and as needed for symptoms. 1 Each 0    flash glucose sensor (FREESTYLE ARCELIA 14 DAY SENSOR) kit Use to check blood sugar before and after meals, at bedtime and prn for symptoms. 2 Kit 12    hydrOXYzine pamoate (VISTARIL) 25 mg capsule Si po qid prn itching. May switch to hydroxyzine hcl for availability, if needed 60 Cap 0    DULoxetine (CYMBALTA) 30 mg capsule Take 1 Cap by mouth daily.  30 Cap 2    triamcinolone acetonide (KENALOG) 0.1 % topical cream Apply  to affected area two (2) times daily as needed for Skin Irritation. Use thin layer on neck rash and external ear. . 15 g 0    selenium sulfide (SELSUN BLUE) 1 % shampoo Apply to head, mustache area and left ear at least several times daily. 1 Bottle 11    glucose blood VI test strips (PRODIGY NO CODING) strip Monitor BS twice daily 200 Strip 3    gabapentin (NEURONTIN) 600 mg tablet Take 1 Tab by mouth four (4) times daily. 360 Tab 3    insulin aspart U-100 (NOVOLOG) 100 unit/mL inpn Inject 15-20 units subcutaneously with meals. 15 Pen 3    loperamide (IMMODIUM) 2 mg tablet 1 p.o. 3 times daily as needed loose stool  Indications: diarrhea 90 Tab 5    Blood-Glucose Meter (PRODIGY VOICE GLUCOSE METER) monitoring kit Monitor BS twice daily 1 Kit 0         Review of Systems   Constitutional: Positive for malaise/fatigue. Negative for weight loss. HENT: Negative for congestion. Eyes: Positive for blurred vision. Negative for double vision. Respiratory: Positive for cough (at night). Negative for shortness of breath. Cardiovascular: Negative for chest pain, palpitations, orthopnea, claudication and leg swelling. Gastrointestinal: Positive for diarrhea and heartburn (takes Pepcid AC and Prilosec prn). Genitourinary: Negative for frequency and urgency. Musculoskeletal: Positive for joint pain (knees and elbows). Negative for back pain. Skin: Negative for itching and rash. Neurological: Positive for dizziness (lightheaded with nausea), tingling and focal weakness (arms and legs). Negative for headaches. Endo/Heme/Allergies: Negative for environmental allergies (recently). Psychiatric/Behavioral: Negative for depression. The patient is nervous/anxious.          Visit Vitals  /90 (BP 1 Location: Left arm, BP Patient Position: Sitting)   Pulse 93   Temp 98 °F (36.7 °C) (Oral)   Resp 12   Ht 5' 9\" (1.753 m)   Wt 201 lb (91.2 kg)   SpO2 97%   BMI 29.68 kg/m²     Physical Exam   Constitutional: He is oriented to person, place, and time. He appears well-developed and well-nourished. HENT:   Head: Normocephalic and atraumatic. Eyes: Pupils are equal, round, and reactive to light. Conjunctivae are normal.   Neck: Neck supple. Carotid bruit is not present. No thyromegaly present. Cardiovascular: Normal rate, regular rhythm and normal heart sounds. PMI is not displaced. Exam reveals no gallop. No murmur heard. Pulses:       Dorsalis pedis pulses are 2+ on the right side, and 2+ on the left side. Posterior tibial pulses are 2+ on the right side, and 2+ on the left side. Pulmonary/Chest: Effort normal. He has no wheezes. He has no rhonchi. He has no rales. Abdominal: Soft. Normal appearance. He exhibits no abdominal bruit and no mass. There is no hepatosplenomegaly. There is no tenderness. Musculoskeletal: He exhibits no edema. Lymphadenopathy:     He has no cervical adenopathy. Right: No supraclavicular adenopathy present. Left: No supraclavicular adenopathy present. Neurological: He is alert and oriented to person, place, and time. No sensory deficit. Skin: Skin is warm, dry and intact. No rash noted. Psychiatric: He has a normal mood and affect. His behavior is normal.   Nursing note and vitals reviewed.     Lab Results   Component Value Date/Time    Hemoglobin A1c 11.4 (H) 08/16/2018 02:02 PM    Hemoglobin A1c (POC) 10.1 04/12/2019 02:27 PM     Lab Results   Component Value Date/Time    Microalb/Creat ratio (ug/mg creat.) 27.1 07/07/2017 04:22 PM     Lab Results   Component Value Date/Time    Sodium 143 08/16/2018 02:02 PM    Potassium 5.3 (H) 08/16/2018 02:02 PM    Chloride 105 08/16/2018 02:02 PM    CO2 34 (H) 08/16/2018 02:02 PM    Anion gap 4 (L) 08/16/2018 02:02 PM    Glucose 432 (H) 08/16/2018 02:02 PM    BUN 8 08/16/2018 02:02 PM    Creatinine 0.93 08/16/2018 02:02 PM    BUN/Creatinine ratio 9 (L) 08/16/2018 02:02 PM    GFR est AA >60 08/16/2018 02:02 PM    GFR est non-AA >60 08/16/2018 02:02 PM    Calcium 9.0 08/16/2018 02:02 PM    Bilirubin, total 0.3 08/16/2018 02:02 PM    AST (SGOT) 20 08/16/2018 02:02 PM    Alk. phosphatase 147 (H) 08/16/2018 02:02 PM    Protein, total 7.1 08/16/2018 02:02 PM    Albumin 3.5 08/16/2018 02:02 PM    Globulin 3.6 08/16/2018 02:02 PM    A-G Ratio 1.0 (L) 08/16/2018 02:02 PM    ALT (SGPT) 34 08/16/2018 02:02 PM       ASSESSMENT and PLAN    ICD-10-CM ICD-9-CM    1. Diabetes mellitus type 1, uncontrolled, with complications (HCC) B43.2 250.93 LIPID PANEL    E10.65  MICROALBUMIN, UR, RAND W/ MICROALB/CREAT RATIO      insulin detemir U-100 (LEVEMIR FLEXTOUCH) 100 unit/mL (3 mL) in      flash glucose scanning reader (FREESTYLE ARCELIA 14 DAY READER) misc      flash glucose sensor (FREESTYLE ARCELIA 14 DAY SENSOR) kit   2. Diabetic polyneuropathy associated with type 1 diabetes mellitus (HCC) E10.42 250.61      357.2    3. Essential hypertension I10 401.9 losartan (COZAAR) 50 mg tablet   4. 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 Overweight (BMI 25.0-29. 9) E66.3 278.02    6. Major depressive disorder with single episode, in partial remission ([facility] F32.4 296.25      Diagnoses and all orders for this visit:    1. Diabetes mellitus type 1, uncontrolled, with complications ([facility]  Diabetes Mellitus: poorly controlled. Issues reviewed with him: low cholesterol diet, weight control and daily exercise discussed and glycohemoglobin and other lab monitoring discussed. Is not taking statin. Lipids have not been checked. Starting ACE/ARB. He called insurance company and they will likely approve [facility]. Prescription written . If he can get it, should check sugar per and post meals and at bedtime. Take results to visit with Dr Makayla Mclaughlin next month.    -     LIPID PANEL  -     MICROALBUMIN, UR, RAND W/ MICROALB/CREAT RATIO  -     insulin detemir U-100 (LEVEMIR FLEXTOUCH) 100 unit/mL (3 mL) inpn; Inject 40 units am and 40 units HS  Indications: type 1 diabetes mellitus  -     flash glucose scanning reader (FREESTYLE ARCELIA 14 DAY READER) misc; Use to monitor blood sugars before and after meals, at bedtime and as needed for symptoms. -     flash glucose sensor (FREESTYLE ARCELIA 14 DAY SENSOR) kit; Use to check blood sugar before and after meals, at bedtime and prn for symptoms. 2. Diabetic polyneuropathy associated with type 1 diabetes mellitus ([address]    3. Essential hypertension  Hypertension is not controlled. -    Start losartan (COZAAR) 50 mg tablet; Take 1 Tab by mouth daily. 4. Neuromyelitis optica ([address]  Follow up with neurologist.     5. Overweight (BMI 25.0-29. 9)  Discussed using smaller plate for portion control, keeping a food diary for awareness of food consumed, checking weight often, and increasing physical activity. Will re-evaluate next visit. 6. Major depressive disorder with single episode, in partial remission ([address]  Some symptoms, but fairly well controlled. Follow-up and Dispositions    · Return in about 2 months (around 7/24/2019) for HTN, DM.       lab results and schedule of future lab studies reviewed with patient  reviewed diet, exercise and weight control  I have discussed the diagnosis, evaluation and treatment options and the intended plan with the patient. Patient understands and is in agreement. The patient has received an after-visit summary and questions were answered concerning future plans. I have discussed side effects and warnings of any new medications with the patient as well.

## 2019-05-24 NOTE — PATIENT INSTRUCTIONS
Add losartan 50 mg daily for blood pressure. Return in 2 months. Diabetes and Preventing Falls: Care Instructions Your Care Instructions If you are an older adult who has diabetes, you may have a higher risk of falling. Complications of diabetessuch as nerve damage, foot problems, and reduced visionmay increase your risk of a fall. Some of your medicines also may add to your risk. By making your home safer, you can lower your risk of falling. Doing things to prevent diabetes complications may also help to lower your risk. You can make your home safer with a few simple measures. Follow-up care is a key part of your treatment and safety. Be sure to make and go to all appointments, and call your doctor if you are having problems. It's also a good idea to know your test results and keep a list of the medicines you take. How can you care for yourself at home? Taking care of yourself · Keep your blood sugar at a target level (which you set with your doctor). · Exercise regularly to improve your strength, muscle tone, and balance. Walk if you can. Swimming may be a good choice if you cannot walk easily. · Have your vision checked as often as your doctor recommends. It is usually once a year or more often if you have eye problems. · Know the side effects of the medicines you take. Ask your doctor or pharmacist whether the medicines you take can affect your balance. Sleeping pills or sedatives can affect your balance. · Limit the amount of alcohol you drink. Alcohol can impair your balance and other senses. · Have your doctor check your feet during each visit. If you have a foot problem, see your doctor. Preventing falls at home · Remove raised doorway thresholds, throw rugs, and clutter. Repair loose carpet or raised areas in the floor. · Move furniture and electrical cords to keep them out of walking paths.  
· Use nonskid floor wax, and wipe up spills right away, especially on ceramic tile floors. · If you use a walker or cane, put rubber tips on it. If you use crutches, clean the bottoms of them regularly with an abrasive pad, such as steel wool. · Keep your house well lit, especially [place], and outside walkways. Use night-lights in areas such as hallways and bathrooms. Add extra light switches or use remote switches (such as switches that go on or off when you clap your hands) to make it easier to turn lights on if you have to get up during the night. · Install sturdy handrails on stairways. Put grab bars near your shower, bathtub, and toilet. · Store household items on low shelves so that you do not have to climb or reach high. Or use a reaching device that you can get at a medical supply store. If you have to climb for something, use a step stool with handrails, or ask someone to get it for you. · Keep a cordless phone and a flashlight with new batteries by your bed. If possible, put a phone in each of the main rooms of your house, or carry a cell phone in case you fall and cannot reach a phone. Or you can wear a device around your neck or wrist. You push a button that sends a signal for help. · Wear low-heeled shoes that fit well and give your feet good support. Use footwear with nonskid soles. Check the heels and soles of your shoes for wear. Repair or replace worn heels or soles. · Do not wear socks without shoes on wood floors. · Walk on the grass when the sidewalks are slippery. If you live in an area that gets snow and ice in the winter, sprinkle salt on slippery steps and sidewalks. Where can you learn more? Go to http://eb-james.info/. Enter Z283 in the search box to learn more about \"Diabetes and Preventing Falls: Care Instructions. \" Current as of: March 15, 2018 Content Version: 11.9 © 3565-4028 [employer].  Care instructions adapted under license by "[employer] (which disclaims liability or warranty for this information). If you have questions about a medical condition or this instruction, always ask your healthcare professional. [place] [address] any warranty or liability for your use of this information.

## 2019-05-24 NOTE — PROGRESS NOTES
Jenna Orozco  Identified pt with two pt identifiers(name and ). Chief Complaint   Patient presents with   [facility]       Reviewed record In preparation for visit and have obtained necessary documentation. Given info on advanced directives. 1. Have you been to the ER, urgent care clinic or hospitalized since your last visit? 1st visit     2. Have you seen or consulted any other health care providers outside of the [address] [place] [address] since your last visit? Include any pap smears or colon screening. 1st visit    Vitals reviewed with provider. Health Maintenance reviewed:     Health Maintenance Due   Topic    LIPID PANEL Q1     Pneumococcal 0-64 years (1 of 1 - PPSV23)    DTaP/Tdap/Td series (1 - Tdap)    MICROALBUMIN Q1         Wt Readings from Last 3 Encounters:   19 203 lb 12.8 oz (92.4 kg)   19 204 lb (92.5 kg)   19 205 lb (93 kg)      Temp Readings from Last 3 Encounters:   18 98.1 °F (36.7 °C) (Oral)   18 98.6 °F (37 °C) (Oral)   18 97.5 °F (36.4 °C) (Oral)      BP Readings from Last 3 Encounters:   19 152/88   19 137/85   19 122/78      Pulse Readings from Last 3 Encounters:   19 92   19 96   19 87      There were no vitals filed for this visit.        Learning Assessment:   :     Learning Assessment 3/29/2018 2017 2017 2017 2016 2015   PRIMARY LEARNER Patient Patient Patient Patient Patient Patient   HIGHEST LEVEL OF EDUCATION - PRIMARY LEARNER  - - - - 4 YEARS OF COLLEGE 4 YEARS OF COLLEGE   BARRIERS PRIMARY LEARNER - - - - NONE NONE   CO-LEARNER CAREGIVER - - - - No No   PRIMARY LANGUAGE ENGLISH ENGLISH ENGLISH ENGLISH ENGLISH ENGLISH   LEARNER PREFERENCE PRIMARY DEMONSTRATION DEMONSTRATION DEMONSTRATION DEMONSTRATION VIDEOS DEMONSTRATION   ANSWERED BY patient patient patient patient Patient Patient   RELATIONSHIP SELF SELF SELF SELF SELF SELF        Depression Screening:   : 3 most recent PHQ Screens 1/29/2019   Little interest or pleasure in doing things Several days   Feeling down, depressed, irritable, or hopeless Several days   Total Score PHQ 2 2   Trouble falling or staying asleep, or sleeping too much More than half the days   Feeling tired or having little energy More than half the days   Poor appetite, weight loss, or overeating More than half the days   Feeling bad about yourself - or that you are a failure or have let yourself or your family down Not at all   Trouble concentrating on things such as school, work, reading, or watching TV More than half the days   Moving or speaking so slowly that other people could have noticed; or the opposite being so fidgety that others notice More than half the days   Thoughts of being better off dead, or hurting yourself in some way Not at all   PHQ 9 Score 12   How difficult have these problems made it for you to do your work, take care of your home and get along with others Somewhat difficult        Fall Risk Assessment:   :     No flowsheet data found. Abuse Screening:   :     No flowsheet data found. ADL Screening:   :     No flowsheet data found.

## 2019-05-25 PROBLEM — I10 ESSENTIAL HYPERTENSION: Status: ACTIVE | Noted: 2019-05-25

## 2019-05-25 PROBLEM — F32.4 MAJOR DEPRESSIVE DISORDER WITH SINGLE EPISODE, IN PARTIAL REMISSION (HCC): Status: ACTIVE | Noted: 2019-05-25

## 2019-05-25 LAB
ALBUMIN/CREAT UR: 16.7 MG/G CREAT (ref 0–30)
CHOLEST SERPL-MCNC: 161 MG/DL (ref 100–199)
CREAT UR-MCNC: 138.1 MG/DL
HDLC SERPL-MCNC: 54 MG/DL
LDLC SERPL CALC-MCNC: 88 MG/DL (ref 0–99)
MICROALBUMIN UR-MCNC: 23.1 UG/ML
TRIGL SERPL-MCNC: 94 MG/DL (ref 0–149)
VLDLC SERPL CALC-MCNC: 19 MG/DL (ref 5–40)

## 2019-06-07 ENCOUNTER — TELEPHONE (OUTPATIENT)
Dept: INTERNAL MEDICINE CLINIC | Facility: CLINIC | Age: 49
End: 2019-06-07

## 2019-06-08 DIAGNOSIS — R21 RASH: ICD-10-CM

## 2019-06-10 RX ORDER — TRIAMCINOLONE ACETONIDE 1 MG/G
CREAM TOPICAL
Refills: 0 | OUTPATIENT
Start: 2019-06-10

## 2019-06-10 RX ORDER — HYDROXYZINE 25 MG/1
TABLET, FILM COATED ORAL
Qty: 60 TAB | Refills: 0 | OUTPATIENT
Start: 2019-06-10

## 2019-07-28 NOTE — PROGRESS NOTES
HISTORY OF PRESENT ILLNESS  Emmie Alicea is a 50 y.o. male. HPI   He presents for follow up of diabetes mellitus with neuropathy, hypertension, hyperlipidemia and overweight. He does not have follow up with endocrinologist until October. Using 15 units of Novolog at each meal, but eats only one meal at about 3 PM. Just does not feel hungry. .He has lost 7 lbs since January. Has nausea if triess to force himself to eat. Shooting pains in arms legs, feet and hands. Has early satiety. .   Diet and Lifestyle: generally follows a low fat low cholesterol diet, generally follows a low sodium diet, follows a diabetic diet regularly, exercises sporadically, nonsmoker  Diabetic ROS - medication compliance: compliant all of the time,      home glucose monitoring: fasting 300's, non-fasting 180-200's     home BP Monitoring: not done. further diabetic ROS: no polyuria or polydipsia, has dysesthesias in the feet, hypoglycemia twice a week. Usually around noon and 2 PM. . Cardiovascular ROS:  He complains of dizziness. Upon standing has a spinning feeling with nausea that last 2-3 minutes. He denies palpitations, orthopnea, exertional chest pressure/discomfort, claudication, lower extremity edema, dyspnea on exertion        He is seen for followup of depression. Current therapy is with duloxetine. With therapy symptoms are [facility] IMPROVED [id number]. Ongoing symptoms include depressed mood, anhedonia, insomnia, fatigue, difficulty concentrating and hopelessness. He denies feelings of worthlessness/guilt, recurrent thoughts of death and suicidal thoughts without plan. He experiences the following side effects from the treatment: none. \"Rash\" on right face temporal area onto check and into postauricular area. Treated in past with (?)triamcinolone cream with no imrprovment.  Request dermatology referral.     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    Diabetes mellitus type 1, uncontrolled, with complications ([facility] [address] E97.6, E10.65    Major depressive disorder with single episode, in partial remission ([facility] [address] F32.4    Essential hypertension I10     Past Medical History:   Diagnosis Date    Asthma     Type 2 diabetes mellitus with peripheral neuropathy (HCC)      Past Surgical History:   Procedure Laterality Date    HX HEENT      ear surgery(both)     Social History     Socioeconomic History    Marital status: SINGLE     Spouse name: Not on file    Number of children: Not on file    Years of education: Not on file    Highest education level: Not on file   Tobacco Use    Smoking status: Never Smoker    Smokeless tobacco: Never Used   Substance and Sexual Activity    Alcohol use: No     Alcohol/week: 0.0 standard drinks    Drug use: No    Sexual activity: Not Currently     Family History   Problem Relation Age of Onset    Diabetes Father     Cancer Father         prostate    Hypertension Mother     Other Mother         Crohn's disease    Heart Disease Mother     Hypertension Maternal Grandmother     Thyroid Disease Sister     No Known Problems Brother      No Known Allergies  Current Outpatient Medications   Medication Sig Dispense Refill    multivitamin, tx-iron-ca-min (THERA-M W/ IRON) 9 mg iron-400 mcg tab tablet Take 1 Tab by mouth daily.  cyanocobalamin 1,000 mcg tablet Take 1,000 mcg by mouth daily.  insulin detemir U-100 (LEVEMIR FLEXTOUCH) 100 unit/mL (3 mL) inpn Inject 40 units am and 40 units HS  Indications: type 1 diabetes mellitus 7 Pen 10    losartan (COZAAR) 50 mg tablet Take 1 Tab by mouth daily. 30 Tab 11    hydrOXYzine pamoate (VISTARIL) 25 mg capsule Si po qid prn itching. May switch to hydroxyzine hcl for availability, if needed 60 Cap 0    DULoxetine (CYMBALTA) 30 mg capsule Take 1 Cap by mouth daily.  30 Cap 2    triamcinolone acetonide (KENALOG) 0.1 % topical cream Apply  to affected area two (2) times daily as needed for Skin Irritation. Use thin layer on neck rash and external ear. . 15 g 0    selenium sulfide (SELSUN BLUE) 1 % shampoo Apply to head, mustache area and left ear at least several times daily. 1 Bottle 11    glucose blood VI test strips (PRODIGY NO CODING) strip Monitor BS twice daily 200 Strip 3    gabapentin (NEURONTIN) 600 mg tablet Take 1 Tab by mouth four (4) times daily. 360 Tab 3    insulin aspart U-100 (NOVOLOG) 100 unit/mL inpn Inject 15-20 units subcutaneously with meals. 15 Pen 3    loperamide (IMMODIUM) 2 mg tablet 1 p.o. 3 times daily as needed loose stool  Indications: diarrhea 90 Tab 5       Review of Systems   Constitutional: Positive for malaise/fatigue (overwhelming for past month) and weight loss. Gastrointestinal: Positive for diarrhea (twice a day for past year or two off and on). Negative for constipation. Diarrhea after eating. Has urgency and has had some accidents not being able to get to toilet in time. Imodium 2 tablets slows it down. I takes 6 tablets to get any relief, but diarrhea is back the next day. Often has diarrhea overnight. Genitourinary:        Complains of erectile dysfunction. Musculoskeletal: Negative for back pain and joint pain. Neurological: Positive for focal weakness and headaches (every day near bedtime). Visit Vitals  /80 (BP 1 Location: Left arm, BP Patient Position: Sitting)   Pulse 80   Temp 97.6 °F (36.4 °C) (Oral)   Resp 12   Ht 5' 9\" (1.753 m)   Wt 198 lb (89.8 kg)   SpO2 98%   BMI 29.24 kg/m²     Physical Exam   Constitutional: He is oriented to person, place, and time. He appears well-developed and well-nourished. HENT:   Head: Normocephalic and atraumatic. Eyes: Pupils are equal, round, and reactive to light. Conjunctivae are normal.   Neck: Neck supple. Carotid bruit is not present. No thyromegaly present.    Cardiovascular: Normal rate, regular rhythm and normal heart sounds. PMI is not displaced. Exam reveals no gallop. No murmur heard. Pulses:       Dorsalis pedis pulses are 1+ on the right side, and 1+ on the left side. Posterior tibial pulses are 1+ on the right side, and 1+ on the left side. Pulmonary/Chest: Effort normal. He has no wheezes. He has no rhonchi. He has no rales. Abdominal: Soft. Normal appearance. He exhibits no abdominal bruit and no mass. There is no hepatosplenomegaly. There is no tenderness. Musculoskeletal: He exhibits no edema. Lymphadenopathy:     He has no cervical adenopathy. Right: No supraclavicular adenopathy present. Left: No supraclavicular adenopathy present. Neurological: He is alert and oriented to person, place, and time. No sensory deficit. Skin: Skin is warm, dry and intact. No rash noted. Psychiatric: He has a normal mood and affect. His behavior is normal.   Nursing note and vitals reviewed.   Diabetic foot exam:     Left Foot:   Visual Exam: normal    Pulse DP: 1+ (weak)   Filament test: 3/6   Vibratory sensation: diminished      Right Foot:   Visual Exam: normal    Pulse DP: 1+ (weak)   Filament test: 3/6   Vibratory sensation: absent        Results for orders placed or performed in visit on 07/29/19   AMB POC HEMOGLOBIN A1C   Result Value Ref Range    Hemoglobin A1c (POC) 10.9 %     Lab Results   Component Value Date/Time    Hemoglobin A1c 11.4 (H) 08/16/2018 02:02 PM    Hemoglobin A1c (POC) 10.1 04/12/2019 02:27 PM     Results for orders placed or performed in visit on 05/24/19   LIPID PANEL   Result Value Ref Range    Cholesterol, total 161 100 - 199 mg/dL    Triglyceride 94 0 - 149 mg/dL    HDL Cholesterol 54 >39 mg/dL    VLDL, calculated 19 5 - 40 mg/dL    LDL, calculated 88 0 - 99 mg/dL   MICROALBUMIN, UR, RAND W/ MICROALB/CREAT RATIO   Result Value Ref Range    Creatinine, urine 138.1 Not Estab. mg/dL    Microalbumin, urine 23.1 Not Estab. ug/mL    Microalb/Creat ratio (ug/mg creat.) 16.7 0.0 - 30.0 mg/g creat     Lab Results   Component Value Date/Time    Sodium 143 08/16/2018 02:02 PM    Potassium 5.3 (H) 08/16/2018 02:02 PM    Chloride 105 08/16/2018 02:02 PM    CO2 34 (H) 08/16/2018 02:02 PM    Anion gap 4 (L) 08/16/2018 02:02 PM    Glucose 432 (H) 08/16/2018 02:02 PM    BUN 8 08/16/2018 02:02 PM    Creatinine 0.93 08/16/2018 02:02 PM    BUN/Creatinine ratio 9 (L) 08/16/2018 02:02 PM    GFR est AA >60 08/16/2018 02:02 PM    GFR est non-AA >60 08/16/2018 02:02 PM    Calcium 9.0 08/16/2018 02:02 PM    Bilirubin, total 0.3 08/16/2018 02:02 PM    AST (SGOT) 20 08/16/2018 02:02 PM    Alk. phosphatase 147 (H) 08/16/2018 02:02 PM    Protein, total 7.1 08/16/2018 02:02 PM    Albumin 3.5 08/16/2018 02:02 PM    Globulin 3.6 08/16/2018 02:02 PM    A-G Ratio 1.0 (L) 08/16/2018 02:02 PM    ALT (SGPT) 34 08/16/2018 02:02 PM       ASSESSMENT and PLAN    ICD-10-CM ICD-9-CM    1. Diabetes mellitus type 1, uncontrolled, with complications (HCC) V60.7 250.93 AMB POC HEMOGLOBIN A1C    E10.65   DIABETES FOOT EXAM      pravastatin (PRAVACHOL) 20 mg tablet   2. Diabetic polyneuropathy associated with type 1 diabetes mellitus (HCC) E10.42 250.61      357.2    3. Essential hypertension I10 401.9    4. Major depressive disorder with single episode, in partial remission ([facility]) F32.4 296.25 FLUoxetine (PROZAC) 20 mg capsule   5. Diarrhea, unspecified type R19.7 787.91 diphenoxylate-atropine (LOMOTIL) 2.5-0.025 mg per tablet   6. Early satiety R68.81 780.94 NM GASTRIC EMPTY STDY   7. Dermatitis L30.9 692.9 REFERRAL TO DERMATOLOGY   8. Erectile dysfunction, unspecified erectile dysfunction type N52.9 607.84 REFERRAL TO UROLOGY     Diagnoses and all orders for this visit:    1. Diabetes mellitus type 1, uncontrolled, with complications ([facility] [address]  Diabetes Mellitus: poorly controlled. Is not taking statin, but adding today.  He is taking ACE/ARB  Issues reviewed with him: low cholesterol diet, weight control and daily exercise discussed and glycohemoglobin and other lab monitoring discussed. -     AMB POC HEMOGLOBIN A1C  -     HM DIABETES FOOT EXAM  -     pravastatin (PRAVACHOL) 20 mg tablet; Take 1 Tab by mouth nightly. 2. Diabetic polyneuropathy associated with type 1 diabetes mellitus ([address]  Concerned about autonomic dysfunction causing intestinal symptoms. 3. Essential hypertension  Hypertension is controlled. 4. Major depressive disorder with single episode, in partial remission ([facility] [address]  Partially depressed by physical infirmities and worsening symptoms. Are physical symptoms due to depression or vice versa or a mixed situation? He does not think duloxetine helps depression or neuropathy. Will discontinue  -     Start FLUoxetine (PROZAC) 20 mg capsule; Take 1 Cap by mouth daily. 5. Diarrhea, unspecified type  Suspect this is diabetes related. Not sure about prior evaluation.   -     diphenoxylate-atropine (LOMOTIL) 2.5-0.025 mg per tablet; Take 1 Tab by mouth four (4) times daily. Max Daily Amount: 4 Tabs. 6. Early satiety  Look for gastroparesis. If evaluation is negative, consider referral for EGD and for evaluation of longstanding diarrhea. -     NM GASTRIC EMPTY STDY; Future    7. Dermatitis  ?postinflammatory changes.   -     REFERRAL TO DERMATOLOGY    8. Erectile dysfunction, unspecified erectile dysfunction type  He request specialist referral.   -     REFERRAL TO UROLOGY      Follow-up and Dispositions    · Return in about 2 months (around 9/29/2019) for mood, diarrhea, stomach. .       lab results and schedule of future lab studies reviewed with patient  reviewed diet, exercise and weight control  I have discussed the diagnosis, evaluation and treatment options and the intended plan with the patient. Patient understands and is in agreement. The patient has received an after-visit summary and questions were answered concerning future plans.   I have discussed side effects and warnings of any new medications with the patient as well.

## 2019-07-29 ENCOUNTER — OFFICE VISIT (OUTPATIENT)
Dept: INTERNAL MEDICINE CLINIC | Facility: CLINIC | Age: 49
End: 2019-07-29

## 2019-07-29 VITALS
HEIGHT: 69 IN | RESPIRATION RATE: 12 BRPM | BODY MASS INDEX: 29.33 KG/M2 | TEMPERATURE: 97.6 F | SYSTOLIC BLOOD PRESSURE: 136 MMHG | OXYGEN SATURATION: 98 % | DIASTOLIC BLOOD PRESSURE: 80 MMHG | HEART RATE: 80 BPM | WEIGHT: 198 LBS

## 2019-07-29 DIAGNOSIS — R19.7 DIARRHEA, UNSPECIFIED TYPE: ICD-10-CM

## 2019-07-29 DIAGNOSIS — F32.4 MAJOR DEPRESSIVE DISORDER WITH SINGLE EPISODE, IN PARTIAL REMISSION (HCC): ICD-10-CM

## 2019-07-29 DIAGNOSIS — E10.42 DIABETIC POLYNEUROPATHY ASSOCIATED WITH TYPE 1 DIABETES MELLITUS (HCC): ICD-10-CM

## 2019-07-29 DIAGNOSIS — N52.9 ERECTILE DYSFUNCTION, UNSPECIFIED ERECTILE DYSFUNCTION TYPE: ICD-10-CM

## 2019-07-29 DIAGNOSIS — I10 ESSENTIAL HYPERTENSION: ICD-10-CM

## 2019-07-29 DIAGNOSIS — R68.81 EARLY SATIETY: ICD-10-CM

## 2019-07-29 DIAGNOSIS — L30.9 DERMATITIS: ICD-10-CM

## 2019-07-29 DIAGNOSIS — R21 RASH: ICD-10-CM

## 2019-07-29 LAB — HBA1C MFR BLD HPLC: 10.9 %

## 2019-07-29 RX ORDER — PRAVASTATIN SODIUM 20 MG/1
20 TABLET ORAL
Qty: 30 TAB | Refills: 11 | Status: SHIPPED | OUTPATIENT
Start: 2019-07-29 | End: 2020-10-29

## 2019-07-29 RX ORDER — FLUOXETINE HYDROCHLORIDE 20 MG/1
20 CAPSULE ORAL DAILY
Qty: 30 CAP | Refills: 11 | Status: SHIPPED | OUTPATIENT
Start: 2019-07-29 | End: 2020-10-13

## 2019-07-29 RX ORDER — DIPHENOXYLATE HYDROCHLORIDE AND ATROPINE SULFATE 2.5; .025 MG/1; MG/1
1 TABLET ORAL 4 TIMES DAILY
Qty: 120 TAB | Refills: 0 | Status: SHIPPED | OUTPATIENT
Start: 2019-07-29 | End: 2019-10-01 | Stop reason: SDUPTHER

## 2019-07-29 NOTE — PROGRESS NOTES
Jenna Orozco  Identified pt with two pt identifiers(name and ). Chief Complaint   Patient presents with    Diabetes    Hypertension       Reviewed record In preparation for visit and have obtained necessary documentation. Has info on advanced directive but has not filled them out. 1. Have you been to the ER, urgent care clinic or hospitalized since your last visit? No     2. Have you seen or consulted any other health care providers outside of the [address] [place] [address] since your last visit? Include any pap smears or colon screening. No    Vitals reviewed with provider.     Health Maintenance reviewed:     Health Maintenance Due   Topic    Pneumococcal 0-64 years (1 of 1 - PPSV23)    FOOT EXAM Q1           Wt Readings from Last 3 Encounters:   19 198 lb (89.8 kg)   19 201 lb (91.2 kg)   19 203 lb 12.8 oz (92.4 kg)        Temp Readings from Last 3 Encounters:   19 97.6 °F (36.4 °C) (Oral)   19 98 °F (36.7 °C) (Oral)   18 98.1 °F (36.7 °C) (Oral)        BP Readings from Last 3 Encounters:   19 (!) 136/91   19 132/90   19 152/88        Pulse Readings from Last 3 Encounters:   19 80   19 93   19 92        Vitals:    19 1317 19 1323   BP: (!) 155/92 (!) 136/91   Pulse: 80    Resp: 12    Temp: 97.6 °F (36.4 °C)    TempSrc: Oral    SpO2: 98%    Weight: 198 lb (89.8 kg)    Height: 5' 9\" (1.753 m)    PainSc:  10 - Worst pain ever    PainLoc: Generalized           Learning Assessment:   :       Learning Assessment 3/29/2018 2017 2017 2017 2016 2015   PRIMARY LEARNER Patient Patient Patient Patient Patient Patient   HIGHEST LEVEL OF EDUCATION - PRIMARY LEARNER  - - - - 4 YEARS OF COLLEGE 4 YEARS OF COLLEGE   BARRIERS PRIMARY LEARNER - - - - NONE NONE   CO-LEARNER CAREGIVER - - - - No No   PRIMARY LANGUAGE ENGLISH ENGLISH ENGLISH ENGLISH ENGLISH ENGLISH   LEARNER PREFERENCE PRIMARY DEMONSTRATION DEMONSTRATION DEMONSTRATION DEMONSTRATION VIDEOS DEMONSTRATION   ANSWERED BY patient patient patient patient Patient Patient   RELATIONSHIP SELF SELF SELF SELF SELF SELF        Depression Screening:   :       3 most recent PHQ Screens 1/29/2019   Little interest or pleasure in doing things Several days   Feeling down, depressed, irritable, or hopeless Several days   Total Score PHQ 2 2   Trouble falling or staying asleep, or sleeping too much More than half the days   Feeling tired or having little energy More than half the days   Poor appetite, weight loss, or overeating More than half the days   Feeling bad about yourself - or that you are a failure or have let yourself or your family down Not at all   Trouble concentrating on things such as school, work, reading, or watching TV More than half the days   Moving or speaking so slowly that other people could have noticed; or the opposite being so fidgety that others notice More than half the days   Thoughts of being better off dead, or hurting yourself in some way Not at all   PHQ 9 Score 12   How difficult have these problems made it for you to do your work, take care of your home and get along with others Somewhat difficult        Fall Risk Assessment:   :     No flowsheet data found. Abuse Screening:   :     No flowsheet data found.      ADL Screening:   :       ADL Assessment 5/24/2019   Feeding yourself No Help Needed   Getting from bed to chair No Help Needed   Getting dressed No Help Needed   Bathing or showering No Help Needed   Walk across the room (includes cane/walker) No Help Needed   Using the telphone No Help Needed   Taking your medications No Help Needed   Preparing meals No Help Needed   Managing money (expenses/bills) No Help Needed   Moderately strenuous housework (laundry) No Help Needed   Shopping for personal items (toiletries/medicines) No Help Needed   Shopping for groceries No Help Needed   Driving Help Needed   Climbing a flight of stairs No Help Needed   Getting to places beyond walking distances Help Needed

## 2019-07-29 NOTE — PATIENT INSTRUCTIONS
Increase levemir in the evening to 50 units; decrease morning to 35 units in the morning. Send me blood sugar readings once a week. Stop duloxetine and start fluoxetine 20 mg once a day  Start pravastatin 20 mg once a day for cholesterol. Try Lomotil one tablet 4 times a day for diarrhea. Return in 2 months. Gastroparesis: Care Instructions  Your Care Instructions    When you have gastroparesis, your stomach takes a lot longer to empty. This delay can cause belly pain, bloating, and belching. It also can cause hiccups, heartburn, nausea or vomiting. You may not feel like eating. These symptoms may come and go. They most often occur during and after meals. You may feel full after only a few bites of food. This condition occurs when the nerves to the stomach don't work properly. Diabetes is the most common cause of this nerve damage. Gastroparesis can make it harder to control your blood sugar levels. But keeping your blood sugar levels under control may help with your symptoms. Parkinson's disease, stroke, and some medicines can also cause this condition. Home treatment can often help. Follow-up care is a key part of your treatment and safety. Be sure to make and go to all appointments, and call your doctor if you are having problems. It's also a good idea to know your test results and keep a list of the medicines you take. How can you care for yourself at home? · Eat several small meals each day rather than three large meals. · Eat foods that are low in fiber and fat. · If your doctor suggests it, take medicines that help the stomach empty more quickly. These are called motility agents. When should you call for help?   Call your doctor now or seek immediate medical care if:    · You are vomiting.     · You have new or worse belly pain.     · You have a fever.     · You cannot pass stools or gas.    Watch closely for changes in your health, and be sure to contact your doctor if you have any problems. Where can you learn more? Go to http://eb-james.info/. Enter M106 in the search box to learn more about \"Gastroparesis: Care Instructions. \"  Current as of: November 7, 2018  Content Version: 12.1  © 5082-1658 [employer]. Care instructions adapted under license by [facility] (which disclaims liability or warranty for this information). If you have questions about a medical condition or this instruction, always ask your healthcare professional. [address] any warranty or liability for your use of this information.

## 2019-07-30 RX ORDER — TRIAMCINOLONE ACETONIDE 1 MG/G
CREAM TOPICAL
Refills: 0 | OUTPATIENT
Start: 2019-07-30

## 2019-07-30 RX ORDER — GABAPENTIN 600 MG/1
600 TABLET ORAL 4 TIMES DAILY
Qty: 360 TAB | Refills: 3 | Status: SHIPPED | OUTPATIENT
Start: 2019-07-30 | End: 2020-07-11 | Stop reason: SDUPTHER

## 2019-08-13 RX ORDER — PEN NEEDLE, DIABETIC 30 GX3/16"
NEEDLE, DISPOSABLE MISCELLANEOUS
Qty: 450 PEN NEEDLE | Refills: 3 | Status: SHIPPED | OUTPATIENT
Start: 2019-08-13 | End: 2021-01-17 | Stop reason: SDUPTHER

## 2019-10-01 DIAGNOSIS — R19.7 DIARRHEA, UNSPECIFIED TYPE: ICD-10-CM

## 2019-10-01 RX ORDER — DIPHENOXYLATE HYDROCHLORIDE AND ATROPINE SULFATE 2.5; .025 MG/1; MG/1
1 TABLET ORAL 4 TIMES DAILY
Qty: 120 TAB | Refills: 0 | OUTPATIENT
Start: 2019-10-01 | End: 2019-10-24 | Stop reason: SDUPTHER

## 2019-10-01 NOTE — TELEPHONE ENCOUNTER
PCP: Anthony Moore MD     Last appt: 7/29/2019   Future Appointments   Date Time Provider Neil Brown   10/11/2019  2:30 PM Guillermo Nunez MD [address]   10/24/2019 10:00 AM Anthony Moore  [facility]        Requested Prescriptions     Pending Prescriptions Disp Refills    diphenoxylate-atropine (LOMOTIL) 2.5-0.025 mg per tablet 120 Tab 0     Sig: Take 1 Tab by mouth four (4) times daily. Max Daily Amount: 4 Tabs.

## 2019-10-02 RX ORDER — HYDROXYZINE 25 MG/1
TABLET, FILM COATED ORAL
Qty: 60 TAB | Refills: 0 | OUTPATIENT
Start: 2019-10-02

## 2019-10-23 NOTE — PROGRESS NOTES
HISTORY OF PRESENT ILLNESS  Aime Nelson is a 52 y.o. male. HPI  He presents for follow up of diabetes mellitus with neuropathy, hypertension, hyperlipidemia and overweight  He did not keep appointment with endocrinologist on Oct 11. Had problem with insurance in July and August. This has been resolved, but he was without adequate insulin and gabapentin for a couple of months. Diet and Lifestyle: generally follows a low fat low cholesterol diet, generally follows a low sodium diet, does not rigorously follow a diabetic diet, sedentary, nonsmoker  Diabetic ROS - medication compliance: compliant all of the time,      home glucose monitoring: fasting 275 on average, non-fasting average 275     home BP Monitoring: not done. further diabetic ROS: no polyuria or polydipsia, no medication side effects noted, has dysesthesias in the feet, has had some low sugars, as low as 65, about once a month. Cardiovascular ROS:  He complains of dizziness. Lightheaded (he thinks it is anxiety. He denies palpitations, exertional chest pressure/discomfort, claudication, lower extremity edema, dyspnea on exertion    He is seen for followup of depression. Current therapy is with Prozac. With therapy symptoms are somewhat improved. Untreatable neurologic condition contributes greatly to depression. He complains of  depressed mood, anhedonia, difficulty concentrating and hopelessness. He feels nervous and anxious, was upset by insurance mix up. Sleeps 12 hours a day. He denies insomnia, recurrent thoughts of death and suicidal thoughts without plan. He experiences the following side effects from the treatment: none. Diarrhea has been occurring twice a day for over 2 years. it got orse with insurance crisis stress. It occurs after eating and at night. It has caused some fecal incontinence. . Imodium slowed it down, but only if he took 6 tablets. We ordered Lomotil.which helps. It now occurs 3 times a day 2 days a week. He has neuromyelitis optica a demyelinating disease. He receives no therapy, but is followed by Dr Vianney Carmichael, who cancelled his appointment on . It has not been reschedules. He has a haze over visionn, numnbness hands legs and feet, profound fatigue, Gabapentin and Topamamx do not help much. Patient Active Problem List   Diagnosis Code    Diabetic polyneuropathy associated with type 1 diabetes mellitus (HCC) E10.42    Neuromyelitis optica ([facility] G36.0    Diabetes mellitus type 1, uncontrolled, with complications ([address] B03.0, E10.65    Major depressive disorder with single episode, in partial remission ([facility] F32.4    Essential hypertension I10     Past Medical History:   Diagnosis Date    Asthma     Type 2 diabetes mellitus with peripheral neuropathy (HCC)      Past Surgical History:   Procedure Laterality Date    HX HEENT      ear surgery(both)     Social History     Socioeconomic History    Marital status: SINGLE     Spouse name: Not on file    Number of children: Not on file    Years of education: Not on file    Highest education level: Not on file   Tobacco Use    Smoking status: Never Smoker    Smokeless tobacco: Never Used   Substance and Sexual Activity    Alcohol use: No     Alcohol/week: 0.0 standard drinks    Drug use: No    Sexual activity: Not Currently     Family History   Problem Relation Age of Onset    Diabetes Father     Cancer Father         prostate    Hypertension Mother     Other Mother         Crohn's disease    Heart Disease Mother     Hypertension Maternal Grandmother     Thyroid Disease Sister     No Known Problems Brother      No Known Allergies  Current Outpatient Medications   Medication Sig Dispense Refill    diphenoxylate-atropine (LOMOTIL) 2.5-0.025 mg per tablet Take 1 Tab by mouth four (4) times daily. Max Daily Amount: 4 Tabs. 120 Tab 2    hydrOXYzine pamoate (VISTARIL) 25 mg capsule Si po qid prn itching.  May switch to hydroxyzine hcl for availability, if needed  Indications: itching 60 Cap 4    busPIRone (BUSPAR) 5 mg tablet Take 1 Tab by mouth three (3) times daily (with meals). 90 Tab 3    insulin detemir U-100 (LEVEMIR FLEXTOUCH) 100 unit/mL (3 mL) inpn Inject 44 units am and 44 units HS  Indications: type 1 diabetes mellitus 12 Pen 11    Insulin Needles, Disposable, 31 gauge x 5/16\" ndle Use with insulin 450 Pen Needle 3    gabapentin (NEURONTIN) 600 mg tablet Take 1 Tab by mouth four (4) times daily. Indications: Neuropathic Pain 360 Tab 3    pravastatin (PRAVACHOL) 20 mg tablet Take 1 Tab by mouth nightly. 30 Tab 11    FLUoxetine (PROZAC) 20 mg capsule Take 1 Cap by mouth daily. 30 Cap 11    multivitamin, tx-iron-ca-min (THERA-M W/ IRON) 9 mg iron-400 mcg tab tablet Take 1 Tab by mouth daily.  cyanocobalamin 1,000 mcg tablet Take 1,000 mcg by mouth daily.  losartan (COZAAR) 50 mg tablet Take 1 Tab by mouth daily. 30 Tab 11    triamcinolone acetonide (KENALOG) 0.1 % topical cream Apply  to affected area two (2) times daily as needed for Skin Irritation. Use thin layer on neck rash and external ear. . 15 g 0    selenium sulfide (SELSUN BLUE) 1 % shampoo Apply to head, mustache area and left ear at least several times daily. 1 Bottle 11    glucose blood VI test strips (PRODIGY NO CODING) strip Monitor BS twice daily 200 Strip 3    insulin aspart U-100 (NOVOLOG) 100 unit/mL inpn Inject 15-20 units subcutaneously with meals. 15 Pen 3     Review of Systems   Constitutional: Positive for malaise/fatigue. Negative for weight loss. Gastrointestinal: Positive for diarrhea. Negative for constipation. Musculoskeletal: Negative for back pain and joint pain. Neurological: Positive for dizziness and weakness (comes and goes. ). Negative for tingling.         Off balance     Visit Vitals  BP (!) 154/92 (BP 1 Location: Left arm, BP Patient Position: Sitting)   Pulse 82   Temp 97.6 °F (36.4 °C) (Oral)   Resp 12   Ht 5' 9\" (1.753 m)   Wt 199 lb (90.3 kg)   SpO2 98%   BMI 29.39 kg/m²     Physical Exam   Constitutional: He is oriented to person, place, and time. He appears well-developed and well-nourished. HENT:   Head: Normocephalic and atraumatic. Eyes: Pupils are equal, round, and reactive to light. Conjunctivae are normal.   Neck: Neck supple. Carotid bruit is not present. No thyromegaly present. Cardiovascular: Normal rate, regular rhythm and normal heart sounds. PMI is not displaced. Exam reveals no gallop. No murmur heard. Pulses:       Dorsalis pedis pulses are 2+ on the right side, and 2+ on the left side. Posterior tibial pulses are 2+ on the right side, and 2+ on the left side. Pulmonary/Chest: Effort normal. He has no wheezes. He has no rhonchi. He has no rales. Abdominal: Soft. Normal appearance. He exhibits no abdominal bruit and no mass. There is no hepatosplenomegaly. There is no tenderness. Musculoskeletal: He exhibits no edema. Lymphadenopathy:     He has no cervical adenopathy. Right: No supraclavicular adenopathy present. Left: No supraclavicular adenopathy present. Neurological: He is alert and oriented to person, place, and time. No sensory deficit. Skin: Skin is warm, dry and intact. No rash noted. Psychiatric: He has a normal mood and affect. His behavior is normal.   Nursing note and vitals reviewed.     Results for orders placed or performed in visit on 10/24/19   AMB POC HEMOGLOBIN A1C   Result Value Ref Range    Hemoglobin A1c (POC) 10.3 %     Lab Results   Component Value Date/Time    Microalb/Creat ratio (ug/mg creat.) 16.7 05/24/2019 12:34 PM     Lab Results   Component Value Date/Time    Cholesterol, total 161 05/24/2019 12:21 PM    HDL Cholesterol 54 05/24/2019 12:21 PM    LDL, calculated 88 05/24/2019 12:21 PM    VLDL, calculated 19 05/24/2019 12:21 PM    Triglyceride 94 05/24/2019 12:21 PM     Lab Results   Component Value Date/Time    Sodium 143 08/16/2018 02:02 PM Potassium 5.3 (H) 08/16/2018 02:02 PM    Chloride 105 08/16/2018 02:02 PM    CO2 34 (H) 08/16/2018 02:02 PM    Anion gap 4 (L) 08/16/2018 02:02 PM    Glucose 432 (H) 08/16/2018 02:02 PM    BUN 8 08/16/2018 02:02 PM    Creatinine 0.93 08/16/2018 02:02 PM    BUN/Creatinine ratio 9 (L) 08/16/2018 02:02 PM    GFR est AA >60 08/16/2018 02:02 PM    GFR est non-AA >60 08/16/2018 02:02 PM    Calcium 9.0 08/16/2018 02:02 PM    Bilirubin, total 0.3 08/16/2018 02:02 PM    AST (SGOT) 20 08/16/2018 02:02 PM    Alk. phosphatase 147 (H) 08/16/2018 02:02 PM    Protein, total 7.1 08/16/2018 02:02 PM    Albumin 3.5 08/16/2018 02:02 PM    Globulin 3.6 08/16/2018 02:02 PM    A-G Ratio 1.0 (L) 08/16/2018 02:02 PM    ALT (SGPT) 34 08/16/2018 02:02 PM         ASSESSMENT and PLAN    ICD-10-CM ICD-9-CM    1. Diabetes mellitus type 1, uncontrolled, with complications (HCC) D44.8 250.93 AMB POC HEMOGLOBIN A1C    Q60.18  METABOLIC PANEL, COMPREHENSIVE      insulin detemir U-100 (LEVEMIR FLEXTOUCH) 100 unit/mL (3 mL) inpn   2. Diabetic polyneuropathy associated with type 1 diabetes mellitus (HCC) E10.42 250.61      357.2    3. Essential hypertension I10 401.9    4. Major depressive disorder with single episode, in partial remission ([facility] F32.4 296.25    5. [facility]romyel[facility]ica ([facility] G36.0 341.0    6. Diarrhea, unspecified type R19.7 787.91 diphenoxylate-atropine (LOMOTIL) 2.5-0.025 mg per tablet   7. Anxiety F41.9 300.00 busPIRone (BUSPAR) 5 mg tablet     Diagnoses and all orders for this visit:    1. Diabetes mellitus type 1, uncontrolled, with complications ([address]  Poorly controlled, but was rationing insulin due to insurance problem Back on regular dose now. -     AMB POC HEMOGLOBIN J3K  -     METABOLIC PANEL, COMPREHENSIVE  -     Increase insulin detemir U-100 (LEVEMIR FLEXTOUCH) 100 unit/mL (3 mL) inpn; Inject 44 units am and 44 units HS  Indications: type 1 diabetes mellitus    2.  Diabetic polyneuropathy associated with type 1 diabetes mellitus ([facility]  No change. 3. Essential hypertension  Hypertension is not controlled and he is very anxious today. Will repeat reading in 3 weeks, but likely needs more medication. 4. Major depressive disorder with single episode, in partial remission ([facility]  Given uncertain and likely worsening future with neurologic disease, doubt we can achieve full remission. 5. Neuromyelitis optica ([facility] [address] [facility]  He will need to schedule with another neurologist, although since there is no treatment, this is not urgent. 6. Diarrhea, unspecified type  -     diphenoxylate-atropine (LOMOTIL) 2.5-0.025 mg per tablet; Take 1 Tab by mouth four (4) times daily. Max Daily Amount: 4 Tabs. 7. Anxiety  Has symptoms so will add  -     Start busPIRone (BUSPAR) 5 mg tablet; Take 1 Tab by mouth three (3) times daily (with meals). Other orders  -     hydrOXYzine pamoate (VISTARIL) 25 mg capsule; Si po qid prn itching. May switch to hydroxyzine hcl for availability, if needed  Indications: itching      Follow-up and Dispositions    · Return in about 3 weeks (around 2019) for Anxiety, blood pressure, DM.       lab results and schedule of future lab studies reviewed with patient  reviewed diet, exercise and weight control  I have discussed the diagnosis, evaluation and treatment options and the intended plan with the patient. Patient understands and is in agreement. The patient has received an after-visit summary and questions were answered concerning future plans. I have discussed side effects and warnings of any new medications with the patient as well.

## 2019-10-24 ENCOUNTER — OFFICE VISIT (OUTPATIENT)
Dept: INTERNAL MEDICINE CLINIC | Facility: CLINIC | Age: 49
End: 2019-10-24

## 2019-10-24 VITALS
BODY MASS INDEX: 29.47 KG/M2 | SYSTOLIC BLOOD PRESSURE: 154 MMHG | WEIGHT: 199 LBS | HEART RATE: 82 BPM | TEMPERATURE: 97.6 F | HEIGHT: 69 IN | RESPIRATION RATE: 12 BRPM | DIASTOLIC BLOOD PRESSURE: 92 MMHG | OXYGEN SATURATION: 98 %

## 2019-10-24 DIAGNOSIS — G36.0 NEUROMYELITIS OPTICA (HCC): ICD-10-CM

## 2019-10-24 DIAGNOSIS — I10 ESSENTIAL HYPERTENSION: ICD-10-CM

## 2019-10-24 DIAGNOSIS — F32.4 MAJOR DEPRESSIVE DISORDER WITH SINGLE EPISODE, IN PARTIAL REMISSION (HCC): ICD-10-CM

## 2019-10-24 DIAGNOSIS — R19.7 DIARRHEA, UNSPECIFIED TYPE: ICD-10-CM

## 2019-10-24 DIAGNOSIS — E10.42 DIABETIC POLYNEUROPATHY ASSOCIATED WITH TYPE 1 DIABETES MELLITUS (HCC): ICD-10-CM

## 2019-10-24 DIAGNOSIS — F41.9 ANXIETY: ICD-10-CM

## 2019-10-24 LAB — HBA1C MFR BLD HPLC: 10.3 %

## 2019-10-24 RX ORDER — HYDROXYZINE PAMOATE 25 MG/1
CAPSULE ORAL
Qty: 60 CAP | Refills: 4 | Status: SHIPPED | OUTPATIENT
Start: 2019-10-24 | End: 2020-09-02

## 2019-10-24 RX ORDER — BUSPIRONE HYDROCHLORIDE 5 MG/1
5 TABLET ORAL
Qty: 90 TAB | Refills: 3 | Status: SHIPPED | OUTPATIENT
Start: 2019-10-24 | End: 2019-11-18

## 2019-10-24 RX ORDER — DIPHENOXYLATE HYDROCHLORIDE AND ATROPINE SULFATE 2.5; .025 MG/1; MG/1
1 TABLET ORAL 4 TIMES DAILY
Qty: 120 TAB | Refills: 2 | Status: SHIPPED | OUTPATIENT
Start: 2019-10-24 | End: 2020-07-11 | Stop reason: SDUPTHER

## 2019-10-24 NOTE — PATIENT INSTRUCTIONS
Increase Levemir to 44 units twice a day  Add Buspar 5 mg three times a day for anxiety; if it is not helping, we can increase dose in 10-14 days. Learning About Low Blood Sugar (Hypoglycemia) in Diabetes  What is low blood sugar (hypoglycemia)? Hypoglycemia means that your blood sugar is low and your body (especially your brain) is not getting enough fuel. If you have diabetes, your blood sugar can go too low if you take too much of some diabetes medicines. It can also go too low if you miss a meal. And it can happen if you exercise too hard without eating enough food. Some medicines used to treat other health problems can cause low blood sugar too. What are the symptoms? Symptoms of low blood sugar can start quickly. It may take just 10 to 15 minutes. If you have had diabetes for many years, you may not realize that your blood sugar is low until it drops very low. · If your blood sugar level drops below 70 (mild low blood sugar), you may feel tired, anxious, dizzy, weak, shaky, or sweaty. You may have a fast heartbeat or blurry vision. · If your blood sugar level continues to drop (usually below 40), your behavior may change. You may feel more irritable. You may find it hard to concentrate or talk. And you may feel unsteady when you stand or walk. You may become too weak or confused to eat something with sugar to raise your blood sugar level. · If your blood sugar level drops very low (usually below 20), you may pass out (lose consciousness). Or you may have a seizure or stroke. If you have symptoms of severe low blood sugar, you need to get medical care right away. If you had a low blood sugar level during the night, you may wake up tired or with a headache. Or you may sweat so much during the night that your pajamas or sheets are damp when you wake up. How is low blood sugar treated? You can treat low blood sugar by eating or drinking something that has 15 grams of carbohydrate.  These should be quick-sugar foods. Check your blood sugar level again 15 minutes after having a quick-sugar food to make sure your level is getting back to your target range. Here are examples of quick-sugar foods that have 15 grams of carbohydrate:  · 3 to 4 glucose tablets  · 1 tube of glucose gel  · Hard candy (such as 3 Jolly Ranchers or 5 to 7 Life Savers)  · 1 tablespoon honey  · 2 tablespoons of raisins  · ½ cup to ¾ cup (4 to 6 ounces) of fruit juice or regular (not diet) soda  · 1 tablespoon of sugar  · 1 cup of fat-free milk  If you have problems with severe low blood sugar, someone else may have to give you a shot of glucagon. This is a hormone that raises blood sugar levels quickly. How can you prevent low blood sugar? You can take steps to prevent low blood sugar. · Follow your treatment plan. Take your insulin or other diabetes medicine exactly as your doctor prescribed it. Talk with your doctor if you're having low blood sugar often. Your medicine may need to be adjusted if it's causing your low blood sugar. · Check your blood sugar levels often. This helps you find early changes before an emergency happens. · Keep a quick-sugar food with you in case your blood sugar level drops low. · Eat small meals more often so that you don't get too hungry between meals. Don't skip meals. · Balance extra exercise with eating more. Check your blood sugar and learn how it changes after exercise. If your blood sugar stays at a normal level, you may not need to eat after you exercise. · Limit how much alcohol you drink. Alcohol can make low blood sugar go even lower. Don't drink alcohol if you have problems recognizing the early signs of low blood sugar. · Keep a diary of your symptoms. This helps you learn when changes in your body may signal low blood sugar. And keep track of how often you have low blood sugar, including when you last ate and what you ate.  This will help you learn what causes your blood sugar to drop.  · Learn about diabetes and low blood sugar. Support groups or a diabetes education center can help you understand how medicines, diet, and exercise affect your blood sugar levels. Since low blood sugar levels can quickly become an emergency, be sure to wear medical alert jewelry, such as a medical alert bracelet. This is to let people know you have diabetes so they can get help for you. You can buy this at most drugstores. And make sure your family, friends, and coworkers know the symptoms of low blood sugar. Teach them what to do to get your sugar level up. Follow-up care is a key part of your treatment and safety. Be sure to make and go to all appointments, and call your doctor if you are having problems. It's also a good idea to know your test results and keep a list of the medicines you take. Where can you learn more? Go to http://eb-james.info/. Enter R324 in the search box to learn more about \"Learning About Low Blood Sugar (Hypoglycemia) in Diabetes. \"  Current as of: April 16, 2019  Content Version: 12.2  © 3263-7207 [employer]. Care instructions adapted under license by [employer] (which disclaims liability or warranty for this information). If you have questions about a medical condition or this instruction, always ask your healthcare professional. [address] any warranty or liability for your use of this information.

## 2019-10-24 NOTE — PROGRESS NOTES
Jenna Orozco  Identified pt with two pt identifiers(name and ). Chief Complaint   Patient presents with    Diarrhea    Other     stomach issues       Reviewed record In preparation for visit and have obtained necessary documentation. Has info on advanced directive but has not filled them out. 1. Have you been to the ER, urgent care clinic or hospitalized since your last visit? No     2. Have you seen or consulted any other health care providers outside of the [address] [place] [address] since your last visit? Include any pap smears or colon screening. No    Vitals reviewed with provider. Health Maintenance reviewed:     Health Maintenance Due   Topic    Pneumococcal 0-64 years (1 of 1 - PPSV23)    Influenza Age 5 to Adult         Wt Readings from Last 3 Encounters:   19 198 lb (89.8 kg)   19 201 lb (91.2 kg)   19 203 lb 12.8 oz (92.4 kg)      Temp Readings from Last 3 Encounters:   19 97.6 °F (36.4 °C) (Oral)   19 98 °F (36.7 °C) (Oral)   18 98.1 °F (36.7 °C) (Oral)      BP Readings from Last 3 Encounters:   19 136/80   19 132/90   19 152/88      Pulse Readings from Last 3 Encounters:   19 80   19 93   19 92      There were no vitals filed for this visit.        Learning Assessment:   :     Learning Assessment 3/29/2018 2017 2017 2017 2016 2015   PRIMARY LEARNER Patient Patient Patient Patient Patient Patient   HIGHEST LEVEL OF EDUCATION - PRIMARY LEARNER  - - - - 4 YEARS OF COLLEGE 4 YEARS OF COLLEGE   BARRIERS PRIMARY LEARNER - - - - NONE NONE   CO-LEARNER CAREGIVER - - - - No No   PRIMARY LANGUAGE ENGLISH ENGLISH ENGLISH ENGLISH ENGLISH ENGLISH   LEARNER PREFERENCE PRIMARY DEMONSTRATION DEMONSTRATION DEMONSTRATION DEMONSTRATION VIDEOS DEMONSTRATION   ANSWERED BY patient patient patient patient Patient Patient   RELATIONSHIP SELF SELF SELF SELF SELF SELF        Depression Screening:   :     3 most recent PHQ Screens 1/29/2019   Little interest or pleasure in doing things Several days   Feeling down, depressed, irritable, or hopeless Several days   Total Score PHQ 2 2   Trouble falling or staying asleep, or sleeping too much More than half the days   Feeling tired or having little energy More than half the days   Poor appetite, weight loss, or overeating More than half the days   Feeling bad about yourself - or that you are a failure or have let yourself or your family down Not at all   Trouble concentrating on things such as school, work, reading, or watching TV More than half the days   Moving or speaking so slowly that other people could have noticed; or the opposite being so fidgety that others notice More than half the days   Thoughts of being better off dead, or hurting yourself in some way Not at all   PHQ 9 Score 12   How difficult have these problems made it for you to do your work, take care of your home and get along with others Somewhat difficult        Fall Risk Assessment:   :     No flowsheet data found. Abuse Screening:   :     No flowsheet data found.      ADL Screening:   :     ADL Assessment 5/24/2019   Feeding yourself No Help Needed   Getting from bed to chair No Help Needed   Getting dressed No Help Needed   Bathing or showering No Help Needed   Walk across the room (includes cane/walker) No Help Needed   Using the telphone No Help Needed   Taking your medications No Help Needed   Preparing meals No Help Needed   Managing money (expenses/bills) No Help Needed   Moderately strenuous housework (laundry) No Help Needed   Shopping for personal items (toiletries/medicines) No Help Needed   Shopping for groceries No Help Needed   Driving Help Needed   Climbing a flight of stairs No Help Needed   Getting to places beyond walking distances Help Needed

## 2019-10-25 LAB
ALBUMIN SERPL-MCNC: 4.5 G/DL (ref 3.5–5.5)
ALBUMIN/GLOB SERPL: 1.6 {RATIO} (ref 1.2–2.2)
ALP SERPL-CCNC: 118 IU/L (ref 39–117)
ALT SERPL-CCNC: 29 IU/L (ref 0–44)
AST SERPL-CCNC: 19 IU/L (ref 0–40)
BILIRUB SERPL-MCNC: 0.3 MG/DL (ref 0–1.2)
BUN SERPL-MCNC: 8 MG/DL (ref 6–24)
BUN/CREAT SERPL: 11 (ref 9–20)
CALCIUM SERPL-MCNC: 9.6 MG/DL (ref 8.7–10.2)
CHLORIDE SERPL-SCNC: 103 MMOL/L (ref 96–106)
CO2 SERPL-SCNC: 28 MMOL/L (ref 20–29)
CREAT SERPL-MCNC: 0.75 MG/DL (ref 0.76–1.27)
GLOBULIN SER CALC-MCNC: 2.9 G/DL (ref 1.5–4.5)
GLUCOSE SERPL-MCNC: 148 MG/DL (ref 65–99)
POTASSIUM SERPL-SCNC: 4 MMOL/L (ref 3.5–5.2)
PROT SERPL-MCNC: 7.4 G/DL (ref 6–8.5)
SODIUM SERPL-SCNC: 145 MMOL/L (ref 134–144)

## 2019-10-25 NOTE — PROGRESS NOTES
Results of metabolic panel are normal. This includes kidney and liver tests. Patient informed in My Chart.

## 2019-11-07 ENCOUNTER — TELEPHONE (OUTPATIENT)
Dept: INTERNAL MEDICINE CLINIC | Facility: CLINIC | Age: 49
End: 2019-11-07

## 2019-11-07 NOTE — TELEPHONE ENCOUNTER
----- Message from Jesus Gomez sent at 11/7/2019  9:54 AM EST -----  Regarding: Dr. Wallace Loser / Telephone   Contact: [phone]  Caller's first and last name: Aron Mcdonnell  Reason for call: [facility] is requesting a Certificate of Necessity for incontinence supplies.   Callback required yes/no and why: Yes  Best contact number(s):   [phone]  Fax Number:  [phone]  Details to clarify the request:

## 2019-11-17 NOTE — PROGRESS NOTES
HISTORY OF PRESENT ILLNESS  Olaf Pederson is a 52 y.o. male. HPI  He presents for follow up of diabetes mellitus and hypertension. Blood pressure on Oct 24 was 154/92, but he was very anxious. Hgb A1c was 10.3. Detemir was increased to 44 units twice daily. Diet and Lifestyle: generally follows a low fat low cholesterol diet, generally follows a low sodium diet, does not rigorously follow a diabetic diet, nonsmoker  Diabetic ROS - medication compliance: compliant all of the time,      home glucose monitorin-230,      home BP Monitoring: not done. further diabetic ROS: no polyuria or polydipsia, no hypoglycemia, has dysesthesias in the feet, blurry vision. Cardiovascular ROS:   He denies palpitations, orthopnea, exertional chest pressure/discomfort, claudication, lower extremity edema, dyspnea on exertion      He is seen for followup of depression and anxiety. Anxiety was worse at last visit so buspirone was started at 5 mg TID. Current therapy also includes: fluoxetine 20 mg. With therapy symptoms are a little improved  Ongoing symptoms include  depressed mood, insomnia, difficulty concentrating and hopelessness, fear of impending doom, feelings of apprehension/worry, loss of appetite and racing thoughts. He denies insomnia, feelings of worthlessness/guilt, impaired memory and recurrent thoughts of death,irrational fears and restlessness    He denies   He  experiences the following side effects from the treatment: none.       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    Diabetes mellitus type 1, uncontrolled, with complications ([address] G33.0, E10.65    Major depressive disorder with single episode, in partial remission ([address] F32.4    Essential hypertension I10     Past Medical History:   Diagnosis Date    Asthma     Type 2 diabetes mellitus with peripheral neuropathy ([address] Past Surgical History:   Procedure Laterality Date    HX HEENT      ear surgery(both)     Social History     Socioeconomic History    Marital status: SINGLE     Spouse name: Not on file    Number of children: Not on file    Years of education: Not on file    Highest education level: Not on file   Tobacco Use    Smoking status: Never Smoker    Smokeless tobacco: Never Used   Substance and Sexual Activity    Alcohol use: No     Alcohol/week: 0.0 standard drinks    Drug use: No    Sexual activity: Not Currently     Family History   Problem Relation Age of Onset    Diabetes Father     Cancer Father         prostate    Hypertension Mother     Other Mother         Crohn's disease    Heart Disease Mother     Hypertension Maternal Grandmother     Thyroid Disease Sister     No Known Problems Brother      No Known Allergies  Current Outpatient Medications   Medication Sig Dispense Refill    diphenoxylate-atropine (LOMOTIL) 2.5-0.025 mg per tablet Take 1 Tab by mouth four (4) times daily. Max Daily Amount: 4 Tabs. 120 Tab 2    hydrOXYzine pamoate (VISTARIL) 25 mg capsule Si po qid prn itching. May switch to hydroxyzine hcl for availability, if needed  Indications: itching 60 Cap 4    busPIRone (BUSPAR) 5 mg tablet Take 1 Tab by mouth three (3) times daily (with meals). 90 Tab 3    insulin detemir U-100 (LEVEMIR FLEXTOUCH) 100 unit/mL (3 mL) inpn Inject 44 units am and 44 units HS  Indications: type 1 diabetes mellitus 12 Pen 11    Insulin Needles, Disposable, 31 gauge x 5/16\" ndle Use with insulin 450 Pen Needle 3    gabapentin (NEURONTIN) 600 mg tablet Take 1 Tab by mouth four (4) times daily. Indications: Neuropathic Pain 360 Tab 3    pravastatin (PRAVACHOL) 20 mg tablet Take 1 Tab by mouth nightly. 30 Tab 11    FLUoxetine (PROZAC) 20 mg capsule Take 1 Cap by mouth daily. 30 Cap 11    multivitamin, tx-iron-ca-min (THERA-M W/ IRON) 9 mg iron-400 mcg tab tablet Take 1 Tab by mouth daily.  cyanocobalamin 1,000 mcg tablet Take 1,000 mcg by mouth daily.  losartan (COZAAR) 50 mg tablet Take 1 Tab by mouth daily. 30 Tab 11    triamcinolone acetonide (KENALOG) 0.1 % topical cream Apply  to affected area two (2) times daily as needed for Skin Irritation. Use thin layer on neck rash and external ear. . 15 g 0    selenium sulfide (SELSUN BLUE) 1 % shampoo Apply to head, mustache area and left ear at least several times daily. 1 Bottle 11    glucose blood VI test strips (PRODIGY NO CODING) strip Monitor BS twice daily 200 Strip 3    insulin aspart U-100 (NOVOLOG) 100 unit/mL inpn Inject 15-20 units subcutaneously with meals. 15 Pen 3       ROS    Physical Exam   Constitutional: He is oriented to person, place, and time. He appears well-developed and well-nourished. HENT:   Head: Normocephalic and atraumatic. Eyes: Pupils are equal, round, and reactive to light. Conjunctivae are normal.   Neck: Neck supple. Carotid bruit is not present. Cardiovascular: Normal rate, regular rhythm, S1 normal, S2 normal and normal heart sounds. Exam reveals no gallop. No murmur heard. Pulmonary/Chest: Effort normal and breath sounds normal. He has no wheezes. He has no rhonchi. He has no rales. Musculoskeletal: He exhibits no edema. Neurological: He is alert and oriented to person, place, and time. Skin: Skin is warm, dry and intact. Psychiatric: He has a normal mood and affect. His behavior is normal.   Nursing note and vitals reviewed.     Lab Results   Component Value Date/Time    Hemoglobin A1c 11.4 (H) 08/16/2018 02:02 PM    Hemoglobin A1c (POC) 10.3 10/24/2019 10:32 AM     Lab Results   Component Value Date/Time    Microalb/Creat ratio (ug/mg creat.) 16.7 05/24/2019 12:34 PM     Lab Results   Component Value Date/Time    Sodium 145 (H) 10/24/2019 11:21 AM    Potassium 4.0 10/24/2019 11:21 AM    Chloride 103 10/24/2019 11:21 AM    CO2 28 10/24/2019 11:21 AM    Anion gap 4 (L) 08/16/2018 02:02 PM Glucose 148 (H) 10/24/2019 11:21 AM    BUN 8 10/24/2019 11:21 AM    Creatinine 0.75 (L) 10/24/2019 11:21 AM    BUN/Creatinine ratio 11 10/24/2019 11:21 AM    GFR est  10/24/2019 11:21 AM    GFR est non- 10/24/2019 11:21 AM    Calcium 9.6 10/24/2019 11:21 AM    Bilirubin, total 0.3 10/24/2019 11:21 AM    AST (SGOT) 19 10/24/2019 11:21 AM    Alk. phosphatase 118 (H) 10/24/2019 11:21 AM    Protein, total 7.4 10/24/2019 11:21 AM    Albumin 4.5 10/24/2019 11:21 AM    Globulin 3.6 08/16/2018 02:02 PM    A-G Ratio 1.6 10/24/2019 11:21 AM    ALT (SGPT) 29 10/24/2019 11:21 AM     Lab Results   Component Value Date/Time    Cholesterol, total 161 05/24/2019 12:21 PM    HDL Cholesterol 54 05/24/2019 12:21 PM    LDL, calculated 88 05/24/2019 12:21 PM    VLDL, calculated 19 05/24/2019 12:21 PM    Triglyceride 94 05/24/2019 12:21 PM       ASSESSMENT and PLAN    ICD-10-CM ICD-9-CM    1. Diabetes mellitus type 1, uncontrolled, with complications (HCC) F14.4 250.93 insulin detemir U-100 (LEVEMIR FLEXTOUCH) 100 unit/mL (3 mL) inpn    E10.65     2. Anxiety F41.9 300.00 busPIRone (BUSPAR) 10 mg tablet   3. Essential hypertension I10 401.9 amLODIPine (NORVASC) 2.5 mg tablet     Diagnoses and all orders for this visit:    1. Diabetes mellitus type 1, uncontrolled, with complications ([address]  Diabetes Mellitus: poorly controlled. Is taking statin  Issues reviewed with him: low cholesterol diet, weight control and daily exercise discussed and glycohemoglobin and other lab monitoring discussed. -    Increase insulin detemir U-100 (LEVEMIR FLEXTOUCH) 100 unit/mL (3 mL) inpn; Inject 47 units am and 47 units HS  Indications: type 1 diabetes mellitus    2. Anxiety  Symptoms slightly improved. -     Increase busPIRone (BUSPAR) 10 mg tablet; Take 1 Tab by mouth three (3) times daily (with meals). 3. Essential hypertension  Hypertension is not controlled. -    Add amLODIPine (NORVASC) 2.5 mg tablet;  Take 1 Tab by mouth daily.      Follow-up and Dispositions    · Return in about 3 weeks (around 12/9/2019) for anxiety, HTN, DM.       lab results and schedule of future lab studies reviewed with patient  reviewed diet, exercise and weight control  I have discussed the diagnosis, evaluation and treatment options and the intended plan with the patient. Patient understands and is in agreement. The patient has received an after-visit summary and questions were answered concerning future plans. I have discussed side effects and warnings of any new medications with the patient as well.

## 2019-11-18 ENCOUNTER — OFFICE VISIT (OUTPATIENT)
Dept: INTERNAL MEDICINE CLINIC | Facility: CLINIC | Age: 49
End: 2019-11-18

## 2019-11-18 VITALS
BODY MASS INDEX: 28.81 KG/M2 | WEIGHT: 194.5 LBS | RESPIRATION RATE: 12 BRPM | SYSTOLIC BLOOD PRESSURE: 148 MMHG | HEART RATE: 92 BPM | OXYGEN SATURATION: 96 % | TEMPERATURE: 97.4 F | DIASTOLIC BLOOD PRESSURE: 86 MMHG | HEIGHT: 69 IN

## 2019-11-18 DIAGNOSIS — F41.9 ANXIETY: ICD-10-CM

## 2019-11-18 DIAGNOSIS — I10 ESSENTIAL HYPERTENSION: ICD-10-CM

## 2019-11-18 RX ORDER — AMLODIPINE BESYLATE 2.5 MG/1
2.5 TABLET ORAL DAILY
Qty: 30 TAB | Refills: 5 | Status: SHIPPED | OUTPATIENT
Start: 2019-11-18 | End: 2020-10-25 | Stop reason: SDUPTHER

## 2019-11-18 RX ORDER — BUSPIRONE HYDROCHLORIDE 10 MG/1
10 TABLET ORAL
Qty: 90 TAB | Refills: 3 | Status: SHIPPED | OUTPATIENT
Start: 2019-11-18 | End: 2021-10-21

## 2019-11-18 NOTE — PROGRESS NOTES
Jenna Orozco  Identified pt with two pt identifiers(name and ). Chief Complaint   Patient presents with    Diabetes    Blood Pressure Check    Anxiety       Reviewed record In preparation for visit and have obtained necessary documentation. Has info on advanced directive but has not filled them out. 1. Have you been to the ER, urgent care clinic or hospitalized since your last visit? No     2. Have you seen or consulted any other health care providers outside of the [address] [place] [address] since your last visit? Include any pap smears or colon screening. No    Vitals reviewed with provider. Health Maintenance reviewed: There are no preventive care reminders to display for this patient.        Wt Readings from Last 3 Encounters:   19 194 lb 8 oz (88.2 kg)   10/24/19 199 lb (90.3 kg)   19 198 lb (89.8 kg)        Temp Readings from Last 3 Encounters:   19 97.4 °F (36.3 °C) (Oral)   10/24/19 97.6 °F (36.4 °C) (Oral)   19 97.6 °F (36.4 °C) (Oral)        BP Readings from Last 3 Encounters:   19 (!) 163/101   10/24/19 (!) 154/92   19 136/80        Pulse Readings from Last 3 Encounters:   19 92   10/24/19 82   19 80        Vitals:    19 1306   BP: (!) 163/101   Pulse: 92   Resp: 12   Temp: 97.4 °F (36.3 °C)   TempSrc: Oral   SpO2: 96%   Weight: 194 lb 8 oz (88.2 kg)   Height: 5' 9\" (1.753 m)   PainSc:  10 - Worst pain ever   PainLoc: Generalized          Learning Assessment:   :       Learning Assessment 3/29/2018 2017 2017 2017 2016 2015   PRIMARY LEARNER Patient Patient Patient Patient Patient Patient   HIGHEST LEVEL OF EDUCATION - PRIMARY LEARNER  - - - - 4 YEARS OF COLLEGE 4 YEARS OF COLLEGE   BARRIERS PRIMARY LEARNER - - - - NONE NONE   CO-LEARNER CAREGIVER - - - - No No   PRIMARY LANGUAGE ENGLISH ENGLISH ENGLISH ENGLISH ENGLISH ENGLISH   LEARNER PREFERENCE PRIMARY DEMONSTRATION DEMONSTRATION DEMONSTRATION DEMONSTRATION VIDEOS DEMONSTRATION   ANSWERED BY patient patient patient patient Patient Patient   RELATIONSHIP SELF SELF SELF SELF SELF SELF        Depression Screening:   :       3 most recent PHQ Screens 1/29/2019   Little interest or pleasure in doing things Several days   Feeling down, depressed, irritable, or hopeless Several days   Total Score PHQ 2 2   Trouble falling or staying asleep, or sleeping too much More than half the days   Feeling tired or having little energy More than half the days   Poor appetite, weight loss, or overeating More than half the days   Feeling bad about yourself - or that you are a failure or have let yourself or your family down Not at all   Trouble concentrating on things such as school, work, reading, or watching TV More than half the days   Moving or speaking so slowly that other people could have noticed; or the opposite being so fidgety that others notice More than half the days   Thoughts of being better off dead, or hurting yourself in some way Not at all   PHQ 9 Score 12   How difficult have these problems made it for you to do your work, take care of your home and get along with others Somewhat difficult        Fall Risk Assessment:   :     No flowsheet data found. Abuse Screening:   :     No flowsheet data found.      ADL Screening:   :       ADL Assessment 5/24/2019   Feeding yourself No Help Needed   Getting from bed to chair No Help Needed   Getting dressed No Help Needed   Bathing or showering No Help Needed   Walk across the room (includes cane/walker) No Help Needed   Using the telphone No Help Needed   Taking your medications No Help Needed   Preparing meals No Help Needed   Managing money (expenses/bills) No Help Needed   Moderately strenuous housework (laundry) No Help Needed   Shopping for personal items (toiletries/medicines) No Help Needed   Shopping for groceries No Help Needed   Driving Help Needed   Climbing a flight of stairs No Help Needed   Getting to places beyond walking distances Help Needed

## 2019-11-26 ENCOUNTER — TELEPHONE (OUTPATIENT)
Dept: INTERNAL MEDICINE CLINIC | Facility: CLINIC | Age: 49
End: 2019-11-26

## 2019-11-26 NOTE — TELEPHONE ENCOUNTER
----- Message from Mary Lou Alegria sent at 11/26/2019 10:02 AM EST -----  Regarding: Dr. Mily Rodriguez / Telephone  Caller: Karoline Herman for call: [facility] is requesting that the Certificate of Necessity for incontinence supplies for the pt be sent back with the following corrections : Include a description for any question marked yes in Section 2  Callback required yes/no and why: Yes  Best contact number(s):  [phone]  Details to clarify the request:

## 2019-12-02 ENCOUNTER — TELEPHONE (OUTPATIENT)
Dept: ENDOCRINOLOGY | Age: 49
End: 2019-12-02

## 2019-12-02 NOTE — TELEPHONE ENCOUNTER
----- Message from Annamaria Palmer sent at 12/2/2019 10:52 AM EST -----  Regarding: /telephone  General Message/Vendor Calls    Caller's first and last name: Sal with [employer]. Reason for call: [place] [facility] is calling to check on the status of medical records for sugar logs. Callback required yes/no and why: yes. Best contact number(s): [phone].       Details to clarify the request:

## 2019-12-02 NOTE — TELEPHONE ENCOUNTER
Called [facility] and spoke with Manuel Reynaga. Informed Manuel Reynaga that we do not have an updated 14 day glucose log for Mr. Orozco. Manuel Reynaga stated that she will contact Mr. Orozco so that he could send them his glucose readings. No further actions required.

## 2019-12-10 ENCOUNTER — OFFICE VISIT (OUTPATIENT)
Dept: INTERNAL MEDICINE CLINIC | Facility: CLINIC | Age: 49
End: 2019-12-10

## 2019-12-10 VITALS
SYSTOLIC BLOOD PRESSURE: 122 MMHG | BODY MASS INDEX: 28.66 KG/M2 | OXYGEN SATURATION: 97 % | WEIGHT: 193.5 LBS | HEIGHT: 69 IN | RESPIRATION RATE: 12 BRPM | DIASTOLIC BLOOD PRESSURE: 84 MMHG | HEART RATE: 94 BPM | TEMPERATURE: 97.9 F

## 2019-12-10 DIAGNOSIS — I10 ESSENTIAL HYPERTENSION: ICD-10-CM

## 2019-12-10 DIAGNOSIS — F41.8 ANXIETY ABOUT HEALTH: ICD-10-CM

## 2019-12-10 RX ORDER — ALBUTEROL SULFATE 90 UG/1
1 AEROSOL, METERED RESPIRATORY (INHALATION)
Qty: 1 INHALER | Refills: 0 | Status: SHIPPED | OUTPATIENT
Start: 2019-12-10 | End: 2020-03-19

## 2019-12-10 NOTE — PROGRESS NOTES
HISTORY OF PRESENT ILLNESS  Robert Garland is a 52 y.o. male. HPI   He presents for follow up of diabetes mellitus, hypertension and hyperlipidemia. On  detemir was increased to 47 units twice a day. Amlodipine was added for blood pressure 148/86. Diet and Lifestyle: generally follows a low fat low cholesterol diet, generally follows a low sodium diet, does not rigorously follow a diabetic diet, sedentary, nonsmoker  Diabetic ROS - medication compliance: compliant all of the time,      home glucose monitorin-225      home BP Monitoring: not done. further diabetic ROS: no hypoglycemia. Cardiovascular ROS:  He complains of paroxysmal nocturnal dyspnea, dyspnea on exertion. He denies palpitations, exertional chest pressure/discomfort, lower extremity edema    He presents for follow up of anxiety. Current therapy includes: buspirone. Does was increased from 5 to 10 mg three times a day on , but he did not make that change. He was concerned about making too many medication changes at one.      Patient Active Problem List   Diagnosis Code    Diabetic polyneuropathy associated with type 1 diabetes mellitus (HCC) E10.42    Neuromyelitis optica ([address] G36.0    Diabetes mellitus type 1, uncontrolled, with complications ([address] C26.0, E10.65    Major depressive disorder with single episode, in partial remission ([address] F32.4    Essential hypertension I10    Anxiety about health F41.8     Past Medical History:   Diagnosis Date    Asthma     Type 2 diabetes mellitus with peripheral neuropathy (HCC)      Past Surgical History:   Procedure Laterality Date    HX HEENT      ear surgery(both)     Social History     Socioeconomic History    Marital status: SINGLE     Spouse name: Not on file    Number of children: Not on file    Years of education: Not on file    Highest education level: Not on file   Tobacco Use    Smoking status: Never Smoker    Smokeless tobacco: Never Used   Substance and Sexual Activity    Alcohol use: No     Alcohol/week: 0.0 standard drinks    Drug use: No    Sexual activity: Not Currently     Family History   Problem Relation Age of Onset    Diabetes Father     Cancer Father         prostate    Hypertension Mother     Other Mother         Crohn's disease    Heart Disease Mother     Hypertension Maternal Grandmother     Thyroid Disease Sister     No Known Problems Brother      No Known Allergies  Current Outpatient Medications   Medication Sig Dispense Refill    busPIRone (BUSPAR) 10 mg tablet Take 1 Tab by mouth three (3) times daily (with meals). 90 Tab 3    amLODIPine (NORVASC) 2.5 mg tablet Take 1 Tab by mouth daily. 30 Tab 5    insulin detemir U-100 (LEVEMIR FLEXTOUCH) 100 unit/mL (3 mL) inpn Inject 47 units am and 47 units HS  Indications: type 1 diabetes mellitus 12 Pen 11    diphenoxylate-atropine (LOMOTIL) 2.5-0.025 mg per tablet Take 1 Tab by mouth four (4) times daily. Max Daily Amount: 4 Tabs. 120 Tab 2    hydrOXYzine pamoate (VISTARIL) 25 mg capsule Si po qid prn itching. May switch to hydroxyzine hcl for availability, if needed  Indications: itching 60 Cap 4    Insulin Needles, Disposable, 31 gauge x 5/16\" ndle Use with insulin 450 Pen Needle 3    gabapentin (NEURONTIN) 600 mg tablet Take 1 Tab by mouth four (4) times daily. Indications: Neuropathic Pain 360 Tab 3    pravastatin (PRAVACHOL) 20 mg tablet Take 1 Tab by mouth nightly. 30 Tab 11    FLUoxetine (PROZAC) 20 mg capsule Take 1 Cap by mouth daily. 30 Cap 11    multivitamin, tx-iron-ca-min (THERA-M W/ IRON) 9 mg iron-400 mcg tab tablet Take 1 Tab by mouth daily.  cyanocobalamin 1,000 mcg tablet Take 1,000 mcg by mouth daily.  losartan (COZAAR) 50 mg tablet Take 1 Tab by mouth daily. 30 Tab 11    triamcinolone acetonide (KENALOG) 0.1 % topical cream Apply  to affected area two (2) times daily as needed for Skin Irritation. Use thin layer on neck rash and external ear. . 15 g 0  selenium sulfide (SELSUN BLUE) 1 % shampoo Apply to head, mustache area and left ear at least several times daily. 1 Bottle 11    glucose blood VI test strips (PRODIGY NO CODING) strip Monitor BS twice daily 200 Strip 3    insulin aspart U-100 (NOVOLOG) 100 unit/mL inpn Inject 15-20 units subcutaneously with meals. 15 Pen 3             ROS  Visit Vitals  /88 (BP 1 Location: Left arm, BP Patient Position: Sitting)   Pulse 94   Temp 97.9 °F (36.6 °C) (Oral)   Resp 12   Ht 5' 9\" (1.753 m)   Wt 193 lb 8 oz (87.8 kg)   SpO2 97%   BMI 28.57 kg/m²     Physical Exam  Vitals signs and nursing note reviewed. Constitutional:       Appearance: Normal appearance. HENT:      Head: Normocephalic and atraumatic. Mouth/Throat:      Mouth: Mucous membranes are moist.   Eyes:      Conjunctiva/sclera: Conjunctivae normal.   Neck:      Musculoskeletal: Neck supple. Cardiovascular:      Rate and Rhythm: Normal rate and regular rhythm. Heart sounds: Normal heart sounds. Heart sounds not distant. No murmur. No gallop. Pulmonary:      Effort: Pulmonary effort is normal.      Breath sounds: Normal breath sounds and air entry. No wheezing, rhonchi or rales. Musculoskeletal:      Right lower leg: No edema. Left lower leg: No edema. Lymphadenopathy:      Cervical: No cervical adenopathy. Skin:     General: Skin is warm and dry. Findings: No rash. Neurological:      Mental Status: He is alert and oriented to person, place, and time. Psychiatric:         Mood and Affect: Mood normal.         Behavior: Behavior normal. Behavior is cooperative.        Lab Results   Component Value Date/Time    Hemoglobin A1c 11.4 (H) 08/16/2018 02:02 PM    Hemoglobin A1c (POC) 10.3 10/24/2019 10:32 AM     Lab Results   Component Value Date/Time    Cholesterol, total 161 05/24/2019 12:21 PM    HDL Cholesterol 54 05/24/2019 12:21 PM    LDL, calculated 88 05/24/2019 12:21 PM    VLDL, calculated 19 05/24/2019 12:21 PM Triglyceride 94 05/24/2019 12:21 PM     Lab Results   Component Value Date/Time    Microalb/Creat ratio (ug/mg creat.) 16.7 05/24/2019 12:34 PM     Lab Results   Component Value Date/Time    Sodium 145 (H) 10/24/2019 11:21 AM    Potassium 4.0 10/24/2019 11:21 AM    Chloride 103 10/24/2019 11:21 AM    CO2 28 10/24/2019 11:21 AM    Anion gap 4 (L) 08/16/2018 02:02 PM    Glucose 148 (H) 10/24/2019 11:21 AM    BUN 8 10/24/2019 11:21 AM    Creatinine 0.75 (L) 10/24/2019 11:21 AM    BUN/Creatinine ratio 11 10/24/2019 11:21 AM    GFR est  10/24/2019 11:21 AM    GFR est non- 10/24/2019 11:21 AM    Calcium 9.6 10/24/2019 11:21 AM    Bilirubin, total 0.3 10/24/2019 11:21 AM    AST (SGOT) 19 10/24/2019 11:21 AM    Alk. phosphatase 118 (H) 10/24/2019 11:21 AM    Protein, total 7.4 10/24/2019 11:21 AM    Albumin 4.5 10/24/2019 11:21 AM    Globulin 3.6 08/16/2018 02:02 PM    A-G Ratio 1.6 10/24/2019 11:21 AM    ALT (SGPT) 29 10/24/2019 11:21 AM       ASSESSMENT and PLAN    ICD-10-CM ICD-9-CM    1. Diabetes mellitus type 1, uncontrolled, with complications ([facility]) L80.9 250.93 insulin detemir U-100 (LEVEMIR FLEXTOUCH) 100 unit/mL (3 mL) inpn    E10.65     2. Essential hypertension I10 401.9    3. Anxiety about health F41.8 300.09      Diagnoses and all orders for this visit:    1. Diabetes mellitus type 1, uncontrolled, with complications ([facility]  Diabetes Mellitus: poorly controlled. Is taking statin and ACE/ARB  Issues reviewed with him: low cholesterol diet, weight control and daily exercise discussed and glycohemoglobin and other lab monitoring discussed. -    Increase insulin detemir U-100 (LEVEMIR FLEXTOUCH) 100 unit/mL (3 mL) inpn; Inject 50 units am and 50 units HS  Indications: type 1 diabetes mellitus    2. Essential hypertension  Hypertension is controlled. 3. Anxiety about health    Other orders  -     albuterol (PROVENTIL HFA, VENTOLIN HFA, PROAIR HFA) 90 mcg/actuation inhaler;  Take 1 Puff by inhalation every four (4) hours as needed for Wheezing. Follow-up and Dispositions    · Return in about 2 months (around 2/10/2020) for DM, HTN, chol. POC A1c.       lab results and schedule of future lab studies reviewed with patient  reviewed diet, exercise and weight control  I have discussed the diagnosis, evaluation and treatment options and the intended plan with the patient. Patient understands and is in agreement. The patient has received an after-visit summary and questions were answered concerning future plans. I have discussed side effects and warnings of any new medications with the patient as well.

## 2019-12-10 NOTE — PROGRESS NOTES
Jenna Orozco  Identified pt with two pt identifiers(name and ). Chief Complaint   Patient presents with    Diabetes    Hypertension       Reviewed record In preparation for visit and have obtained necessary documentation. Has info on advanced directive but has not filled them out. 1. Have you been to the ER, urgent care clinic or hospitalized since your last visit? No     2. Have you seen or consulted any other health care providers outside of the [address] [place] [address] since your last visit? Include any pap smears or colon screening. No    Vitals reviewed with provider. Health Maintenance reviewed: There are no preventive care reminders to display for this patient. Wt Readings from Last 3 Encounters:   19 194 lb 8 oz (88.2 kg)   10/24/19 199 lb (90.3 kg)   19 198 lb (89.8 kg)      Temp Readings from Last 3 Encounters:   19 97.4 °F (36.3 °C) (Oral)   10/24/19 97.6 °F (36.4 °C) (Oral)   19 97.6 °F (36.4 °C) (Oral)      BP Readings from Last 3 Encounters:   19 148/86   10/24/19 (!) 154/92   19 136/80      Pulse Readings from Last 3 Encounters:   19 92   10/24/19 82   19 80      There were no vitals filed for this visit.        Learning Assessment:   :     Learning Assessment 3/29/2018 2017 2017 2017 2016 2015   PRIMARY LEARNER Patient Patient Patient Patient Patient Patient   HIGHEST LEVEL OF EDUCATION - PRIMARY LEARNER  - - - - 4 YEARS OF COLLEGE 4 YEARS OF COLLEGE   BARRIERS PRIMARY LEARNER - - - - NONE NONE   CO-LEARNER CAREGIVER - - - - No No   PRIMARY LANGUAGE ENGLISH ENGLISH ENGLISH ENGLISH ENGLISH ENGLISH   LEARNER PREFERENCE PRIMARY DEMONSTRATION DEMONSTRATION DEMONSTRATION DEMONSTRATION VIDEOS DEMONSTRATION   ANSWERED BY patient patient patient patient Patient Patient   RELATIONSHIP SELF SELF SELF SELF SELF SELF        Depression Screening:   :     3 most recent PHQ Screens 2019   Little interest or pleasure in doing things Several days   Feeling down, depressed, irritable, or hopeless Several days   Total Score PHQ 2 2   Trouble falling or staying asleep, or sleeping too much More than half the days   Feeling tired or having little energy More than half the days   Poor appetite, weight loss, or overeating More than half the days   Feeling bad about yourself - or that you are a failure or have let yourself or your family down Not at all   Trouble concentrating on things such as school, work, reading, or watching TV More than half the days   Moving or speaking so slowly that other people could have noticed; or the opposite being so fidgety that others notice More than half the days   Thoughts of being better off dead, or hurting yourself in some way Not at all   PHQ 9 Score 12   How difficult have these problems made it for you to do your work, take care of your home and get along with others Somewhat difficult        Fall Risk Assessment:   :     No flowsheet data found. Abuse Screening:   :     No flowsheet data found.      ADL Screening:   :     ADL Assessment 5/24/2019   Feeding yourself No Help Needed   Getting from bed to chair No Help Needed   Getting dressed No Help Needed   Bathing or showering No Help Needed   Walk across the room (includes cane/walker) No Help Needed   Using the telphone No Help Needed   Taking your medications No Help Needed   Preparing meals No Help Needed   Managing money (expenses/bills) No Help Needed   Moderately strenuous housework (laundry) No Help Needed   Shopping for personal items (toiletries/medicines) No Help Needed   Shopping for groceries No Help Needed   Driving Help Needed   Climbing a flight of stairs No Help Needed   Getting to places beyond walking distances Help Needed

## 2019-12-11 ENCOUNTER — TELEPHONE (OUTPATIENT)
Dept: INTERNAL MEDICINE CLINIC | Facility: CLINIC | Age: 49
End: 2019-12-11

## 2019-12-11 NOTE — TELEPHONE ENCOUNTER
Regarding: Dr. Monserrat Steele / Ailyn Aguirre: Senaida Cheadle for call: [facility] is requesting that the Certificate of Necessity for incontinence supplies for the pt be sent back with the following corrections : Include a description for any question marked yes in Section 2  Callback required yes/no and why: Yes  Best contact number(s):  [phone]  Details to clarify the request:

## 2019-12-13 NOTE — TELEPHONE ENCOUNTER
Norberto Peralta MA called Chloe Flanagan at [facility] and asked to have form faxed over.

## 2019-12-20 ENCOUNTER — TELEPHONE (OUTPATIENT)
Dept: INTERNAL MEDICINE CLINIC | Facility: CLINIC | Age: 49
End: 2019-12-20

## 2019-12-20 NOTE — TELEPHONE ENCOUNTER
----- Message from Jordyn Uriostegui sent at 12/20/2019  9:42 AM EST -----  Regarding: Dr. Cecliia Madrid: [phone]  General Message/Vendor Calls    Caller's first and last name: [facility]. Reason for call:  She advised  she is checking one the Certificate of Medical Necessity form   please included a description for any questions in section 2. Callback required yes/no and why: Yes, to verify the requested information.       Best contact number(s):[phone]      Jordyn Uriostegui

## 2019-12-30 NOTE — TELEPHONE ENCOUNTER
Deepak at [address] [place] [address] called to check on the status of this Medical Necessity form. Call back # is [phone].

## 2020-01-09 NOTE — TELEPHONE ENCOUNTER
emily with [facility] called following up of the status of the medical necessity form.  She stated that number 3 was checked and they needed detail pertaining to that

## 2020-02-03 PROBLEM — E78.00 HYPERCHOLESTEROLEMIA: Status: ACTIVE | Noted: 2020-02-03

## 2020-02-11 ENCOUNTER — OFFICE VISIT (OUTPATIENT)
Dept: NEUROLOGY | Age: 50
End: 2020-02-11

## 2020-02-11 VITALS
BODY MASS INDEX: 27.49 KG/M2 | WEIGHT: 192 LBS | DIASTOLIC BLOOD PRESSURE: 80 MMHG | HEART RATE: 101 BPM | HEIGHT: 70 IN | SYSTOLIC BLOOD PRESSURE: 108 MMHG | OXYGEN SATURATION: 99 %

## 2020-02-11 DIAGNOSIS — G36.0 NEUROMYELITIS OPTICA (HCC): ICD-10-CM

## 2020-02-11 DIAGNOSIS — Z11.59 ENCOUNTER FOR SCREENING FOR OTHER VIRAL DISEASES: ICD-10-CM

## 2020-02-11 DIAGNOSIS — R26.0 ATAXIC GAIT: ICD-10-CM

## 2020-02-11 DIAGNOSIS — H53.9 VISUAL CHANGES: ICD-10-CM

## 2020-02-11 DIAGNOSIS — G82.20 PARAPARESIS OF BOTH LOWER LIMBS (HCC): ICD-10-CM

## 2020-02-11 DIAGNOSIS — G37.9 DEMYELINATING DISEASE OF CENTRAL NERVOUS SYSTEM (HCC): Primary | ICD-10-CM

## 2020-02-11 DIAGNOSIS — F32.4 MAJOR DEPRESSIVE DISORDER WITH SINGLE EPISODE, IN PARTIAL REMISSION (HCC): ICD-10-CM

## 2020-02-11 NOTE — PATIENT INSTRUCTIONS
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 We need to get some blood work before we can start you on some therapy for this. The med name and the new medication organ to put you on a Solaris. This may take a little bit to get started due to screening blood work that will need to be accomplished as well as baseline MRI scans etc. that we need to get done. But hopefully will get this started within the next 3 months. For also can refer you to neuro-ophthalmology over at [facility]. We need to get accurate baseline regarding where your vision is. And hopefully they can suggest some specialty prescriptions to help your vision presently. I am going to see you back in the office in 3 months. We will continue to follow you closely until we get the Solaris initiated and you had several treatments and we see how you respond. At that point once we have things stabilized we can move you to 6-month visits.   But that may take a while to were both satisfied you are in a good place regarding her treatment:)

## 2020-02-11 NOTE — PROGRESS NOTES
Vivi Borges is a 52 y.o. male who presents today for the following:  Chief Complaint   Patient presents with    Follow-up    Eye Problem         HPI  Historical Data  This is a patient previously known to the practice. He has a diagnosis of NMO. Spinal tap June 2017 did not reveal any oligoclonal banding. However he had an increased CSF/serum albumin index, elevated IgG synthesis elevated IgG quantitative and elevated albumin. He had AquaPorin-4 ordered July 2017 but it was never completed. However remainder of his blood work that was ordered to get completed so apparently lab never edenilson it [I had my nurse call at today's office visit February 11, 2020 and she did confirm with the labs that this was never done  ]    MRI of the cervical spine continues to show a significant lesion at the level C1 with slight volume loss in the dorsal aspect of the cord 18 mm craniocaudal dimension  MRI of the brain minimal T2 lesion activity  MRI of the thoracic cord [from 2017 which is the most current to date] did not show any lesion activity in the thoracic cord  MRI scans were personally reviewed at office visit of 2/11/2020  with Dr. Alta Solorzano And myself        Interim Data:   Patient is known to this practice. This is my first time seeing the patient. Chart and history reviewed in detail at today's office visit. Chart review x30 minutes to include personal review of MRI images of spinal and brain. The patient has never been on any significant treatment for NMO  In reviewing his medication history I do not see where he was placed on any treatment for MS regarding drug modifying therapies in general    He persist with ambulatory difficulties related to balance and weakness in his legs.   He is able to ambulate independently and there have been no recent falls    There are persistent problems with visual disturbance to include acuity, light sensitivity, blurred vision    Cognition: Patient states he has slow processing, some recall issues, some word finding difficulties    For now there are no complaints of bowel or bladder issues, spasticity, mood concerns    He does not drive    ROS  Other than what is stated above under HPI there is no new or changing issues related to the following:  Constitutional: No recent weight change, fever,fatigue, sleep difficulties, or loss of appetite. ENT/Mouth:  No hearing loss, ringing in the ears, chronic sinus problem, nose bleeds sore throat, voice change, hoarseness, swollen glands in neck, or difficulties with chewing and swallowing. Cardiovascular:  No chest pain/angina pectoris, palpitations,swelling of feet/ankles/hands, or calf pain while walking. Respiratory: No chronic or frequent coughs, spitting up blood, shortness of breath, asthma, or wheezing. Gastrointestinal: No abdominal pain, heartburn, nausea, vomiting, constipation, frequent diarrhea, rectal bleeding, or blood in stool. Genitourinary: No frequent urination, burning or painful urination, blood in urine, incontinence or dribbling. Musculoskeletal:   No joint pain, stiffness/swelling, weakness of muscles, muscle pain/cramp, or back pain. Integument:   No rash/itching, change in skin color, change in hair/nails, or change in color/size of moles. Neurological:  No dizziness/vertigo, loss of consciousness, numbness/tingling sensation, tremors, weakness in limbs, difficulty with balance, frequent or recurring headaches, memory loss or confusion. Psychiatric:   No nervousness, depression, hallucinations, paranoia or suspiciousness. Endocrine: No excessive thirst or urination, heat or cold intolerance. Hematologic/Lymphatic: No bleeding/bruising tendency, phlebitis, or past transfusion.        EXAMINATION  Visit Vitals  /80 (BP 1 Location: Left arm, BP Patient Position: Sitting)   Pulse (!) 101   Ht 5' 10\" (1.778 m)   Wt 192 lb (87.1 kg)   SpO2 99%   BMI 27.55 kg/m²        General appearance: Patient is well-developed and well-nourished in no apparent distress and well groomed. Psych/mental health:  Affect: Appropriate    PHQ  3 most recent PHQ Screens 1/29/2019   Little interest or pleasure in doing things Several days   Feeling down, depressed, irritable, or hopeless Several days   Total Score PHQ 2 2   Trouble falling or staying asleep, or sleeping too much More than half the days   Feeling tired or having little energy More than half the days   Poor appetite, weight loss, or overeating More than half the days   Feeling bad about yourself - or that you are a failure or have let yourself or your family down Not at all   Trouble concentrating on things such as school, work, reading, or watching TV More than half the days   Moving or speaking so slowly that other people could have noticed; or the opposite being so fidgety that others notice More than half the days   Thoughts of being better off dead, or hurting yourself in some way Not at all   PHQ 9 Score 12   How difficult have these problems made it for you to do your work, take care of your home and get along with others Somewhat difficult       HEENT: Normocephalic, without evidence of trauma  Full range of motion, no tenderness to palpation in the head or neck region     Cardiovascular: Extremities warm to touch, no swelling appreciated    Respiratory: No dyspnea on exertion noted, normal effort on casual observation    Musculoskeletal: No evidence of significant bony deformities or spinal curvature    Integumentary:  No obvious bruising, lacerations or discoloaration on casual observation. Neurological Examination:   Mental Status:        MMSE  No flowsheet data found. Formal testing was not completed    there was nothing concerning on general observation and discussion.    Alert oriented and appropriate to general conversation  Normal processing on general observation  Followed conversation and responded seemingly appropriate throughout the office visit  No word finding difficulties noted on casual observation  Able to follow directions without difficulty     Cranial Nerves:    Gaze is disconjugate  No nystagmus is appreciated  Facial motor and sensory intact bilaterally  Hearing intact to conversation  Voice with normal projection, no evidence of secretion pooling  Palate elevates symmetrically  Shoulder shrug intact bilaterally  No tongue deviation appreciated     Motor:   Normal bulk  Increased tone with extension in the lower extremities no clonus appreciated  Fine dexterity intact bilaterally  No tremor appreciated on today's exam  No abnormal movements appreciated on today's exam    Muscle strength testing:  Right side  Upper extremities  Deltoid 5/5  Bicep 5/5  Tricep 5/5  Hand grasp 5/5    Lower extremity  Hip flexor 4/5  Extensor 4-/5  Flexor 4/5  Plantar flexion 5/5  Dorsiflexion 5/5      Left side  Deltoid 5/5  Bicep 5/5  Tricep 5/5  Hand grasp 5/5    Lower extremity  Hip flexor 4/5  Extensor 4/5  Flexor 4/5  Plantar flexion 5/5  Dorsiflexion 5/5    Sensation: Intact to light touch bilaterally    Coordination/Cerebellar:   Mild terminal tremor with finger-to-nose bilaterally; and only grossly accurate    Gait: Ambulates independently but has a mildly ataxic gait    Fall risk assessment  No flowsheet data found. Reflexes: unremarkable      ASSESSMENT AND PLAN  Demyelinating disease of central nervous system most probably NMO by presentation, MRI, and CSF. Elva Ran is no oligoclonal banding but other abnormalities that would be consistent with a chronic inflammation of the central nervous system]    We need to get additional lab work to include AquaPorin-4  If this is positive we will get him started on [address] has been initiated for [employer] with these levels pending    He also needs to be seen by neuro-ophthalmology.   We need to get a baseline as to what his vision is doing and perhaps there are some recommendations regarding glasses to include shading and prisms that may help his vision    He did test positive on the PHQ 9 for depression. Patient does not feel he is depressed. We will continue to monitor this. We also need current baseline of his neuro axis therefore MRI of the brain, cervical spine, thoracic spine will be ordered in addition to blood work    ICD-10-CM ICD-9-CM    1. Demyelinating disease of central nervous system ([address] G37.9 341.9 MRI BRAIN C SPINE W WO CONT      DAPHNIE VIRUS DNA BY PCR, QL      VZV AB, IGG      HEPATITIS B EVALUATION      METABOLIC PANEL, COMPREHENSIVE      CBC WITH AUTOMATED DIFF      AQUAPORIN-4 RECEPTOR AB      MRI THORAC SPINE WO CONT      REFERRAL TO NEURO-OPHTHALMOLOGY      MRI BRAIN W WO CONT      MRI BRAIN W WO CONT   2. Encounter for screening for other viral diseases Z11.59 V73.89 QUANTIFERON-TB GOLD PLUS      VZV AB, IGG      HEPATITIS B EVALUATION   3. Paraparesis of both lower limbs (HCC) G82.20 344.1    4. Ataxic gait R26.0 781.2    5. Visual changes H53.9 368.9            Additional pertinent medical data reviewed at today's office visit:    Flowsheets    Extended / Orthostatic Vitals:            No Known Allergies    Current Outpatient Medications   Medication Sig    insulin aspart U-100 (NOVOLOG FLEXPEN U-100 INSULIN) 100 unit/mL (3 mL) inpn INJECT 15-20 UNITS SUBCUTANEOUSLY WITH MEALS    albuterol (PROVENTIL HFA, VENTOLIN HFA, PROAIR HFA) 90 mcg/actuation inhaler Take 1 Puff by inhalation every four (4) hours as needed for Wheezing.  insulin detemir U-100 (LEVEMIR FLEXTOUCH) 100 unit/mL (3 mL) inpn Inject 50 units am and 50 units HS  Indications: type 1 diabetes mellitus    busPIRone (BUSPAR) 10 mg tablet Take 1 Tab by mouth three (3) times daily (with meals).  amLODIPine (NORVASC) 2.5 mg tablet Take 1 Tab by mouth daily.  diphenoxylate-atropine (LOMOTIL) 2.5-0.025 mg per tablet Take 1 Tab by mouth four (4) times daily. Max Daily Amount: 4 Tabs.     hydrOXYzine pamoate (VISTARIL) 25 mg capsule Si po qid prn itching. May switch to hydroxyzine hcl for availability, if needed  Indications: itching    Insulin Needles, Disposable, 31 gauge x \" ndle Use with insulin    gabapentin (NEURONTIN) 600 mg tablet Take 1 Tab by mouth four (4) times daily. Indications: Neuropathic Pain    pravastatin (PRAVACHOL) 20 mg tablet Take 1 Tab by mouth nightly.  FLUoxetine (PROZAC) 20 mg capsule Take 1 Cap by mouth daily.  multivitamin, tx-iron-ca-min (THERA-M W/ IRON) 9 mg iron-400 mcg tab tablet Take 1 Tab by mouth daily.  cyanocobalamin 1,000 mcg tablet Take 1,000 mcg by mouth daily.  losartan (COZAAR) 50 mg tablet Take 1 Tab by mouth daily.  triamcinolone acetonide (KENALOG) 0.1 % topical cream Apply  to affected area two (2) times daily as needed for Skin Irritation. Use thin layer on neck rash and external ear. Brandon Prudent selenium sulfide (SELSUN BLUE) 1 % shampoo Apply to head, mustache area and left ear at least several times daily.  glucose blood VI test strips (PRODIGY NO CODING) strip Monitor BS twice daily     No current facility-administered medications for this visit.         Past Medical History:   Diagnosis Date    Asthma     Type 2 diabetes mellitus with peripheral neuropathy (HCC)        Past Surgical History:   Procedure Laterality Date    HX HEENT      ear surgery(both)       Social History     Socioeconomic History    Marital status: SINGLE     Spouse name: Not on file    Number of children: Not on file    Years of education: Not on file    Highest education level: Not on file   Tobacco Use    Smoking status: Never Smoker    Smokeless tobacco: Never Used   Substance and Sexual Activity    Alcohol use: No     Alcohol/week: 0.0 standard drinks    Drug use: No    Sexual activity: Not Currently       Family History   Problem Relation Age of Onset    Diabetes Father     Cancer Father         prostate    Hypertension Mother    Adam Flynn Other Mother Crohn's disease    Heart Disease Mother     Hypertension Maternal Grandmother     Thyroid Disease Sister     No Known Problems Brother           Office Visit on 02/11/2020   Component Date Value    DAPHNIE VIRUS DNA,PCR (WHOLE * 02/11/2020 WILL FOLLOW     QuantiFERON Incubation 02/11/2020 WILL FOLLOW     QuantiFERON Plus 02/11/2020 WILL FOLLOW     VARICELLA ZOSTER IGG 02/11/2020 >4000     Hep B surface Ag screen 02/11/2020 WILL FOLLOW     Hepatitis Be Antigen 02/11/2020 WILL FOLLOW     Hep B Core Ab, IgM 02/11/2020 WILL FOLLOW     Hep B Core Ab, total 02/11/2020 WILL FOLLOW     Hepatitis Be Antibody 02/11/2020 WILL FOLLOW     HEP B SURFACE AB, QUAL 02/11/2020 WILL FOLLOW     Glucose 02/11/2020 WILL FOLLOW     BUN 02/11/2020 WILL FOLLOW     Creatinine 02/11/2020 WILL FOLLOW     GFR est non-AA 02/11/2020 WILL FOLLOW     GFR est AA 02/11/2020 WILL FOLLOW     BUN/Creatinine ratio 02/11/2020 WILL FOLLOW     Sodium 02/11/2020 WILL FOLLOW     Potassium 02/11/2020 WILL FOLLOW     Chloride 02/11/2020 WILL FOLLOW     CO2 02/11/2020 WILL FOLLOW     Calcium 02/11/2020 WILL FOLLOW     Protein, total 02/11/2020 WILL FOLLOW     Albumin 02/11/2020 WILL FOLLOW     GLOBULIN, TOTAL 02/11/2020 WILL FOLLOW     A-G Ratio 02/11/2020 WILL FOLLOW     Bilirubin, total 02/11/2020 WILL FOLLOW     Alk. phosphatase 02/11/2020 WILL FOLLOW     AST (SGOT) 02/11/2020 WILL FOLLOW     ALT (SGPT) 02/11/2020 WILL FOLLOW     WBC 02/11/2020 6.7     RBC 02/11/2020 5.09     HGB 02/11/2020 14.7     HCT 02/11/2020 45.1     MCV 02/11/2020 89     MCH 02/11/2020 28.9     MCHC 02/11/2020 32.6     RDW 02/11/2020 13.4     PLATELET 01/43/2477 692     NEUTROPHILS 02/11/2020 51     Lymphocytes 02/11/2020 37     MONOCYTES 02/11/2020 10     EOSINOPHILS 02/11/2020 1     BASOPHILS 02/11/2020 1     ABS. NEUTROPHILS 02/11/2020 3.5     Abs Lymphocytes 02/11/2020 2.5     ABS. MONOCYTES 02/11/2020 0.6     ABS.  EOSINOPHILS 02/11/2020 0.1     ABS. BASOPHILS 02/11/2020 0.1     IMMATURE GRANULOCYTES 02/11/2020 0     ABS. IMM. GRANS. 02/11/2020 0.0     NMO IGG AUTOANTIBODIES 02/11/2020 WILL FOLLOW        XR Results (maximum last 3): Results from [place] encounter on 06/19/17   XR SPINAL PUNC LUMB DX    Impression  impression: Fluoroscopy assisted diagnostic lumbar puncture. Results from [place] encounter on 12/20/16   XR CHEST PA LAT    Impression IMPRESSION:    Normal chest views. No change given difference in technique. Results from [place] encounter on 04/26/11   XR CHEST PORTABLE       CT Results (maximum last 3): Results from [place] encounter on 12/20/16   CTA CHEST W WO CONT    Impression IMPRESSION: No evidence of pulmonary embolism. MRI Results (maximum last 3): Results from [place] encounter on 02/12/19   MRI CERV SPINE W WO CONT    Impression IMPRESSION:  Stable cord lesion at the level of C1, stable slight volume loss in the dorsal  aspect of the cord. No new cord lesions demonstrated. No evidence of canal or foraminal compromise. MRI BRAIN W WO CONT    Impression IMPRESSION:  Minimal abnormal foci of increased T2 signal intensity in the cerebral white  matter less conspicuous than on the 7/18/2017 examination. There is no evidence  of postcontrast enhancement or interval progression. No intracranial mass, hemorrhage or evidence of acute infarction. Results from [place] encounter on 07/18/17   MRI BRAIN W WO CONT    Impression IMPRESSION: Diffuse bilateral optic nerve enhancement. Stable tiny intracranial  findings. Findings in keeping with provided clinical diagnosis. Follow-up and Dispositions    · Return in about 3 months (around 5/11/2020).            Cassandra Cespedes, MS, ANP-BC, [facility]

## 2020-02-17 ENCOUNTER — TELEPHONE (OUTPATIENT)
Dept: NEUROLOGY | Age: 50
End: 2020-02-17

## 2020-02-17 DIAGNOSIS — G37.9 DEMYELINATING DISEASE OF CENTRAL NERVOUS SYSTEM (HCC): Primary | ICD-10-CM

## 2020-02-17 NOTE — TELEPHONE ENCOUNTER
Radiology requesting MRI Brain C Spine W WO CONT be broken up into two separate orders. Since MRI BRAIN is already on file they just need a C Spine order so they can schedule the patient. Please order.

## 2020-02-18 ENCOUNTER — TELEPHONE (OUTPATIENT)
Dept: NEUROLOGY | Age: 50
End: 2020-02-18

## 2020-02-18 LAB
ALBUMIN SERPL-MCNC: 4.4 G/DL (ref 4–5)
ALBUMIN/GLOB SERPL: 1.6 {RATIO} (ref 1.2–2.2)
ALP SERPL-CCNC: 109 IU/L (ref 39–117)
ALT SERPL-CCNC: 38 IU/L (ref 0–44)
AQP4 H2O CHANNEL AB SERPL IA-ACNC: <1.5 U/ML (ref 0–3)
AST SERPL-CCNC: 35 IU/L (ref 0–40)
BASOPHILS # BLD AUTO: 0.1 X10E3/UL (ref 0–0.2)
BASOPHILS NFR BLD AUTO: 1 %
BILIRUB SERPL-MCNC: 0.4 MG/DL (ref 0–1.2)
BUN SERPL-MCNC: 16 MG/DL (ref 6–24)
BUN/CREAT SERPL: 15 (ref 9–20)
CALCIUM SERPL-MCNC: 9.5 MG/DL (ref 8.7–10.2)
CHLORIDE SERPL-SCNC: 103 MMOL/L (ref 96–106)
CO2 SERPL-SCNC: 27 MMOL/L (ref 20–29)
CREAT SERPL-MCNC: 1.04 MG/DL (ref 0.76–1.27)
EOSINOPHIL # BLD AUTO: 0.1 X10E3/UL (ref 0–0.4)
EOSINOPHIL NFR BLD AUTO: 1 %
ERYTHROCYTE [DISTWIDTH] IN BLOOD BY AUTOMATED COUNT: 13.4 % (ref 11.6–15.4)
GAMMA INTERFERON BACKGROUND BLD IA-ACNC: 0.01 IU/ML
GLOBULIN SER CALC-MCNC: 2.7 G/DL (ref 1.5–4.5)
GLUCOSE SERPL-MCNC: 120 MG/DL (ref 65–99)
HBV CORE AB SERPL QL IA: NEGATIVE
HBV CORE IGM SERPL QL IA: NEGATIVE
HBV E AB SERPL QL IA: NEGATIVE
HBV E AG SERPL QL IA: NEGATIVE
HBV SURFACE AB SER QL: NON REACTIVE
HBV SURFACE AG SERPL QL IA: NEGATIVE
HCT VFR BLD AUTO: 45.1 % (ref 37.5–51)
HGB BLD-MCNC: 14.7 G/DL (ref 13–17.7)
IMM GRANULOCYTES # BLD AUTO: 0 X10E3/UL (ref 0–0.1)
IMM GRANULOCYTES NFR BLD AUTO: 0 %
JCPYV DNA SPEC QL NAA+PROBE: NEGATIVE
LYMPHOCYTES # BLD AUTO: 2.5 X10E3/UL (ref 0.7–3.1)
LYMPHOCYTES NFR BLD AUTO: 37 %
M TB IFN-G BLD-IMP: NEGATIVE
M TB IFN-G CD4+ BCKGRND COR BLD-ACNC: 0.01 IU/ML
MCH RBC QN AUTO: 28.9 PG (ref 26.6–33)
MCHC RBC AUTO-ENTMCNC: 32.6 G/DL (ref 31.5–35.7)
MCV RBC AUTO: 89 FL (ref 79–97)
MITOGEN IGNF BLD-ACNC: >10 IU/ML
MONOCYTES # BLD AUTO: 0.6 X10E3/UL (ref 0.1–0.9)
MONOCYTES NFR BLD AUTO: 10 %
NEUTROPHILS # BLD AUTO: 3.5 X10E3/UL (ref 1.4–7)
NEUTROPHILS NFR BLD AUTO: 51 %
PLATELET # BLD AUTO: 250 X10E3/UL (ref 150–450)
POTASSIUM SERPL-SCNC: 4.9 MMOL/L (ref 3.5–5.2)
PROT SERPL-MCNC: 7.1 G/DL (ref 6–8.5)
QUANTIFERON INCUBATION, QF1T: NORMAL
QUANTIFERON TB2 AG: 0.01 IU/ML
RBC # BLD AUTO: 5.09 X10E6/UL (ref 4.14–5.8)
SERVICE CMNT-IMP: NORMAL
SODIUM SERPL-SCNC: 150 MMOL/L (ref 134–144)
VZV IGG SER IA-ACNC: >4000 INDEX
WBC # BLD AUTO: 6.7 X10E3/UL (ref 3.4–10.8)

## 2020-02-18 NOTE — TELEPHONE ENCOUNTER
Unable to contact patient on number provided in system, no voicemail set up. Will mail message patient through [address]

## 2020-02-18 NOTE — PROGRESS NOTES
Needs to repeat lab for Aqua porin 4 and must be done at [facility].    Please call pt and let him know about repeating the lab

## 2020-02-18 NOTE — TELEPHONE ENCOUNTER
----- Message from Sharona Hamlin NP sent at 2/18/2020 11:12 AM EST -----  Needs to repeat lab for Aqua porin 4 and must be done at [facility].    Please call pt and let him know about repeating the lab

## 2020-02-25 ENCOUNTER — TELEPHONE (OUTPATIENT)
Dept: NEUROLOGY | Age: 50
End: 2020-02-25

## 2020-02-27 NOTE — TELEPHONE ENCOUNTER
Left message for patient to return call to see if he has received the lab order I mailed out to him and if he's had a chance to get the blood work done.

## 2020-03-02 DIAGNOSIS — G37.9 DEMYELINATING DISEASE OF CENTRAL NERVOUS SYSTEM (HCC): Primary | ICD-10-CM

## 2020-03-04 LAB — AQP4 H2O CHANNEL AB SERPL IA-ACNC: <1.5 U/ML (ref 0–3)

## 2020-03-06 ENCOUNTER — TELEPHONE (OUTPATIENT)
Dept: NEUROLOGY | Age: 50
End: 2020-03-06

## 2020-03-06 NOTE — TELEPHONE ENCOUNTER
----- Message from Breanna Roy sent at 3/6/2020  1:20 PM EST -----  Regarding: MARIAA Jay/Telephone  Alyse wanted to know if test results were received for pt.  Contact is [phone]

## 2020-03-06 NOTE — PROGRESS NOTES
I think the pt went to [facility] and NOT [facility]!!! He needs to do the test again.   I need him to go to [facility] for the NMO

## 2020-03-19 RX ORDER — ALBUTEROL SULFATE 90 UG/1
AEROSOL, METERED RESPIRATORY (INHALATION)
Qty: 9 G | Refills: 0 | Status: SHIPPED | OUTPATIENT
Start: 2020-03-19 | End: 2020-04-22

## 2020-04-03 RX ORDER — TRIAMCINOLONE ACETONIDE 1 MG/G
CREAM TOPICAL 2 TIMES DAILY
Qty: 15 G | Refills: 1 | OUTPATIENT
Start: 2020-04-03

## 2020-04-03 RX ORDER — INSULIN LISPRO 100 [IU]/ML
INJECTION, SOLUTION INTRAVENOUS; SUBCUTANEOUS
Qty: 1 VIAL | Refills: 0
Start: 2020-04-03

## 2020-04-03 RX ORDER — AMITRIPTYLINE HYDROCHLORIDE 25 MG/1
25 TABLET, FILM COATED ORAL
Qty: 30 TAB | Refills: 2 | OUTPATIENT
Start: 2020-04-03

## 2020-04-03 RX ORDER — DULOXETIN HYDROCHLORIDE 30 MG/1
30 CAPSULE, DELAYED RELEASE ORAL
Qty: 30 CAP | Refills: 1 | OUTPATIENT
Start: 2020-04-03

## 2020-04-03 RX ORDER — HYDROXYZINE PAMOATE 25 MG/1
25 CAPSULE ORAL
Qty: 60 CAP | Refills: 1 | OUTPATIENT
Start: 2020-04-03

## 2020-04-03 RX ORDER — HYDROXYZINE PAMOATE 25 MG/1
CAPSULE ORAL
Qty: 60 CAP | Refills: 1 | OUTPATIENT
Start: 2020-04-03

## 2020-04-08 NOTE — TELEPHONE ENCOUNTER
The [employer] company for incontience supplies called and stated that they can't except the form when the date the doctor signed it was before the date of last examination.  [employer] company asked for this to be fixed with corrected date and initial and dated then faxed back to company

## 2020-04-08 NOTE — TELEPHONE ENCOUNTER
Spoke with Via Eliel Barrientos at [facility] . The form in question is for incontinence supplies dated 11/15/19 under media section and scanned in 4/6/2020. Dr Abdi Melgoza needs to cross thru date she signed at bottom of form and initial and date using today's date and refax form. Date of patients last visit 12/10/19 is correct.

## 2020-04-22 RX ORDER — ALBUTEROL SULFATE 90 UG/1
AEROSOL, METERED RESPIRATORY (INHALATION)
Qty: 9 G | Refills: 0 | Status: SHIPPED | OUTPATIENT
Start: 2020-04-22 | End: 2020-07-11 | Stop reason: SDUPTHER

## 2020-04-23 ENCOUNTER — VIRTUAL VISIT (OUTPATIENT)
Dept: INTERNAL MEDICINE CLINIC | Facility: CLINIC | Age: 50
End: 2020-04-23

## 2020-04-23 DIAGNOSIS — I10 ESSENTIAL HYPERTENSION: ICD-10-CM

## 2020-04-23 DIAGNOSIS — E78.00 HYPERCHOLESTEROLEMIA: ICD-10-CM

## 2020-04-23 DIAGNOSIS — F32.4 MAJOR DEPRESSIVE DISORDER WITH SINGLE EPISODE, IN PARTIAL REMISSION (HCC): ICD-10-CM

## 2020-04-23 DIAGNOSIS — F41.8 ANXIETY ABOUT HEALTH: ICD-10-CM

## 2020-04-23 DIAGNOSIS — R21 RASH: ICD-10-CM

## 2020-04-23 DIAGNOSIS — E10.42 DIABETIC POLYNEUROPATHY ASSOCIATED WITH TYPE 1 DIABETES MELLITUS (HCC): ICD-10-CM

## 2020-04-23 RX ORDER — TRIAMCINOLONE ACETONIDE 1 MG/G
CREAM TOPICAL
Qty: 15 G | Refills: 0 | Status: SHIPPED | OUTPATIENT
Start: 2020-04-23 | End: 2020-07-11 | Stop reason: SDUPTHER

## 2020-04-23 RX ORDER — INSULIN ASPART 100 [IU]/ML
INJECTION, SOLUTION INTRAVENOUS; SUBCUTANEOUS
Qty: 15 ML | Refills: 3 | Status: SHIPPED | OUTPATIENT
Start: 2020-04-23 | End: 2021-08-20 | Stop reason: SDUPTHER

## 2020-04-23 NOTE — PROGRESS NOTES
Consent: Vitaliy Sanchez, who was seen by synchronous (real-time) audio-video technology, and/or his healthcare decision maker, is aware that this patient-initiated, Telehealth encounter on 4/23/2020 is a billable service, with coverage as determined by his insurance carrier. He is aware that he may receive a bill and has provided verbal consent to proceed: Yes. Assessment & Plan:   Diagnoses and all orders for this visit:    1. Diabetes mellitus type 1, uncontrolled, with complications ([address]  Has not been well controlled and home blood sugars about the same. Reminded him of importance of rotating injection sites. Perhaps that is part of the problem. -     HEMOGLOBIN A1C WITH EAG; Future  -     insulin aspart U-100 (NovoLOG Flexpen U-100 Insulin) 100 unit/mL (3 mL) inpn; INJECT 15-20 UNITS SUBCUTANEOUSLY WITH MEALS  -     MICROALBUMIN, UR, RAND W/ MICROALB/CREAT RATIO; Future    2. Essential hypertension  Hypertension is controlled. 3. Hypercholesterolemia  Hyperlipidemia is controlle  -     LIPID PANEL; Future  -     METABOLIC PANEL, COMPREHENSIVE; Future    4. Major depressive disorder with single episode, in partial remission (HCC)  Symptoms are improved. 5. Anxiety about health  He seems improved based on new work up being undertaken for demyelinating disease. 6. Diabetic polyneuropathy associated with type 1 diabetes mellitus (HCC)  Stable     7. Rash  -     triamcinolone acetonide (KENALOG) 0.1 % topical cream; Apply  to affected area two (2) times daily as needed for Skin Irritation. Use thin layer on neck rash and external ear. .          Follow-up and Dispositions       Follow up in 3 months for DM, HTN, Chol, dep/anx  Fasting lab one week prior         712  Subjective:   Vitaliy Sanchez is a 52 y.o. male who was seen for Diabetes    He presents for follow up of diabetes mellitus, hypertension and hyperlipidemia. In December Levimir was increased to 50 units twice a day.    Diet and Lifestyle: not attempting to follow a low fat, low cholesterol diet, not attempting to follow a low sodium diet, follows a diabetic diet regularly, exercises sporadically, nonsmoker Eating only 1 meal a day. 2 hamburgers without bun and vegetable medley  Diabetic ROS - medication compliance: compliant all of the time,      home glucose monitoring: afternoon sugars 200-250     home BP Monitorin's/70's.      further diabetic ROS: no polyuria or polydipsia, no unusual visual symptoms, no hypoglycemia, has dysesthesias in the feet. Cardiovascular ROS:  He complains of dizziness. Upon rising form bed in the morning  He denies palpitations, exertional chest pressure/discomfort, claudication, lower extremity edema, dyspnea on exertion      He is seen for followup of depression and anxiety. Current therapy includes: fluoxetine and buspirone. With therapy symptoms are imrpoved  Ongoing symptoms include anhedonia,racing thoughts    He denies depressed mood, insomnia, feelings of worthlessness/guilt, difficulty concentrating, hopelessness and recurrent thoughts of death, fear of impending doom, feelings of apprehension/worry, poor concentration/attention and restlessness  He  experiences the following side effects from the treatment: none. 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 ROS        Objective:   Vital Signs: (As obtained by patient/caregiver at home)  There were no vitals taken for this visit.      [INSTRUCTIONS:  \"[x]\" Indicates a positive item  \"[]\" Indicates a negative item  -- DELETE ALL ITEMS NOT EXAMINED]    Constitutional: [x] Appears well-developed and well-nourished [x] No apparent distress      [] Abnormal -     Mental status: [x] Alert and awake  [x] Oriented to person/place/time [x] Able to follow commands    [] Abnormal -     Eyes:   EOM    [x]  Normal    [] Abnormal -   Sclera  [x]  Normal    [] Abnormal -          Discharge [x] None visible   [] Abnormal -     HENT: [x] Normocephalic, atraumatic  [] Abnormal -   [x] Mouth/Throat: Mucous membranes are moist    External Ears [x] Normal  [] Abnormal -    Neck: [x] No visualized mass [] Abnormal -     Pulmonary/Chest: [x] Respiratory effort normal   [x] No visualized signs of difficulty breathing or respiratory distress        [] Abnormal -      Musculoskeletal:   [x] Normal gait with no signs of ataxia         [x] Normal range of motion of neck        [] Abnormal -     Neurological:        [x] No Facial Asymmetry (Cranial nerve 7 motor function) (limited exam due to video visit)          [x] No gaze palsy        [] Abnormal -          Skin:        [x] No significant exanthematous lesions or discoloration noted on facial skin         [] Abnormal -            Psychiatric:       [x] Normal Affect [] Abnormal -        [x] No Hallucinations    Other pertinent observable physical exam findings:-    Lab Results   Component Value Date/Time    Hemoglobin A1c 11.4 (H) 08/16/2018 02:02 PM    Hemoglobin A1c (POC) 10.3 10/24/2019 10:32 AM     Lab Results   Component Value Date/Time    Microalb/Creat ratio (ug/mg creat.) 16.7 05/24/2019 12:34 PM     Lab Results   Component Value Date/Time    Cholesterol, total 161 05/24/2019 12:21 PM    HDL Cholesterol 54 05/24/2019 12:21 PM    LDL, calculated 88 05/24/2019 12:21 PM    VLDL, calculated 19 05/24/2019 12:21 PM    Triglyceride 94 05/24/2019 12:21 PM     Lab Results   Component Value Date/Time    Sodium 150 (H) 02/11/2020 10:55 AM    Potassium 4.9 02/11/2020 10:55 AM    Chloride 103 02/11/2020 10:55 AM    CO2 27 02/11/2020 10:55 AM    Anion gap 4 (L) 08/16/2018 02:02 PM    Glucose 120 (H) 02/11/2020 10:55 AM    BUN 16 02/11/2020 10:55 AM    Creatinine 1.04 02/11/2020 10:55 AM    BUN/Creatinine ratio 15 02/11/2020 10:55 AM    GFR est AA 97 02/11/2020 10:55 AM    GFR est non-AA 84 02/11/2020 10:55 AM    Calcium 9.5 02/11/2020 10:55 AM    Bilirubin, total 0.4 02/11/2020 10:55 AM    AST (SGOT) 35 02/11/2020 10:55 AM    Alk. phosphatase 109 02/11/2020 10:55 AM    Protein, total 7.1 02/11/2020 10:55 AM    Albumin 4.4 02/11/2020 10:55 AM    Globulin 3.6 08/16/2018 02:02 PM    A-G Ratio 1.6 02/11/2020 10:55 AM    ALT (SGPT) 38 02/11/2020 10:55 AM         We discussed the expected course, resolution and complications of the diagnosis(es) in detail. Medication risks, benefits, costs, interactions, and alternatives were discussed as indicated. I advised him to contact the office if his condition worsens, changes or fails to improve as anticipated. He expressed understanding with the diagnosis(es) and plan. Elma Goodrich is a 52 y.o. male being evaluated by a video visit encounter for concerns as above. A caregiver was present when appropriate. Due to this being a TeleHealth encounter ([facility]), evaluation of the following organ systems was limited: Vitals/Constitutional/EENT/Resp/CV/GI//MS/Neuro/Skin/Heme-Lymph-Imm. Pursuant to the emergency declaration under the [facility], 1135 waiver authority and the [employer] Act, this Virtual  Visit was conducted, with patient's (and/or legal guardian's) consent, to reduce the patient's risk of exposure to COVID-19 and provide necessary medical care. Services were provided through a video synchronous discussion virtually to substitute for in-person clinic visit. Patient and provider were located at their individual homes.         Sebastien Garcia MD

## 2020-04-23 NOTE — PROGRESS NOTES
Jenna Orozco  Identified pt with two pt identifiers(name and ). Chief Complaint   Patient presents with    Diabetes       Reviewed record In preparation for visit and have obtained necessary documentation. Has info on advanced directive but has not filled them out. 1. Have you been to the ER, urgent care clinic or hospitalized since your last visit? No     2. Have you seen or consulted any other health care providers outside of the [address] [place] [address] since your last visit? Include any pap smears or colon screening. Jay      Vitals reviewed with provider.     Health Maintenance reviewed:     Health Maintenance Due   Topic    A1C test (Diabetic or Prediabetic)           Wt Readings from Last 3 Encounters:   20 192 lb (87.1 kg)   12/10/19 193 lb 8 oz (87.8 kg)   19 194 lb 8 oz (88.2 kg)        Temp Readings from Last 3 Encounters:   12/10/19 97.9 °F (36.6 °C) (Oral)   19 97.4 °F (36.3 °C) (Oral)   10/24/19 97.6 °F (36.4 °C) (Oral)        BP Readings from Last 3 Encounters:   20 108/80   12/10/19 122/84   19 148/86        Pulse Readings from Last 3 Encounters:   20 (!) 101   12/10/19 94   19 92        Vitals:    20 1029   PainSc:   7   PainLoc: Generalized          Learning Assessment:   :       Learning Assessment 2020 3/29/2018 2017 2017 2017 2016 2015   PRIMARY LEARNER Patient Patient Patient Patient Patient Patient Patient   HIGHEST LEVEL OF EDUCATION - PRIMARY LEARNER  - - - - - 4 YEARS OF COLLEGE [place] LEARNER - - - - - [address] [facility] - - - - - No No   PRIMARY LANGUAGE ENGLISH ENGLISH ENGLISH ENGLISH ENGLISH ENGLISH ENGLISH   LEARNER PREFERENCE PRIMARY VIDEOS DEMONSTRATION DEMONSTRATION DEMONSTRATION DEMONSTRATION VIDEOS DEMONSTRATION   ANSWERED BY self patient patient patient patient Patient Patient   RELATIONSHIP SELF SELF SELF SELF SELF SELF SELF Depression Screening:   :       3 most recent PHQ Screens 4/23/2020   PHQ Not Done Active Diagnosis of Depression or Bipolar Disorder   Little interest or pleasure in doing things -   Feeling down, depressed, irritable, or hopeless -   Total Score PHQ 2 -   Trouble falling or staying asleep, or sleeping too much -   Feeling tired or having little energy -   Poor appetite, weight loss, or overeating -   Feeling bad about yourself - or that you are a failure or have let yourself or your family down -   Trouble concentrating on things such as school, work, reading, or watching TV -   Moving or speaking so slowly that other people could have noticed; or the opposite being so fidgety that others notice -   Thoughts of being better off dead, or hurting yourself in some way -   PHQ 9 Score -   How difficult have these problems made it for you to do your work, take care of your home and get along with others -        Fall Risk Assessment:   :     No flowsheet data found. Abuse Screening:   :     No flowsheet data found.      ADL Screening:   :       ADL Assessment 5/24/2019   Feeding yourself No Help Needed   Getting from bed to chair No Help Needed   Getting dressed No Help Needed   Bathing or showering No Help Needed   Walk across the room (includes cane/walker) No Help Needed   Using the telphone No Help Needed   Taking your medications No Help Needed   Preparing meals No Help Needed   Managing money (expenses/bills) No Help Needed   Moderately strenuous housework (laundry) No Help Needed   Shopping for personal items (toiletries/medicines) No Help Needed   Shopping for groceries No Help Needed   Driving Help Needed   Climbing a flight of stairs No Help Needed   Getting to places beyond walking distances Help Needed

## 2020-04-27 ENCOUNTER — VIRTUAL VISIT (OUTPATIENT)
Dept: NEUROLOGY | Age: 50
End: 2020-04-27

## 2020-04-27 DIAGNOSIS — Z79.4 TYPE 2 DIABETES MELLITUS WITH DIABETIC POLYNEUROPATHY, WITH LONG-TERM CURRENT USE OF INSULIN (HCC): ICD-10-CM

## 2020-04-27 DIAGNOSIS — G36.0 NEUROMYELITIS OPTICA (HCC): ICD-10-CM

## 2020-04-27 DIAGNOSIS — R26.0 ATAXIC GAIT: ICD-10-CM

## 2020-04-27 DIAGNOSIS — E11.42 TYPE 2 DIABETES MELLITUS WITH DIABETIC POLYNEUROPATHY, WITH LONG-TERM CURRENT USE OF INSULIN (HCC): ICD-10-CM

## 2020-04-27 DIAGNOSIS — G37.9 DEMYELINATING DISEASE OF CENTRAL NERVOUS SYSTEM (HCC): Primary | ICD-10-CM

## 2020-04-27 DIAGNOSIS — H53.9 VISUAL CHANGES: ICD-10-CM

## 2020-04-27 DIAGNOSIS — F32.4 MAJOR DEPRESSIVE DISORDER WITH SINGLE EPISODE, IN PARTIAL REMISSION (HCC): ICD-10-CM

## 2020-04-27 DIAGNOSIS — G82.20 PARAPARESIS OF BOTH LOWER LIMBS (HCC): ICD-10-CM

## 2020-04-27 NOTE — PROGRESS NOTES
Vitaliy Sanchez is a 52 y.o. male who presents today for the following:  Chief Complaint   Patient presents with    Neurologic Problem     Demyelinating disease of the central nervous system    Eye Problem     Vitaliy Sanchez is a 52 y.o. male who was seen by synchronous (real-time) audio-video technology on 4/27/2020. HPI  Historical Data  This is a patient previously known to the practice. He has a diagnosis of NMO. Spinal tap June 2017 did not reveal any oligoclonal banding. However he had an increased CSF/serum albumin index, elevated IgG synthesis elevated IgG quantitative and elevated albumin. He had AquaPorin-4 ordered July 2017 but it was never completed. However remainder of his blood work that was ordered to get completed so apparently lab never edenilson it [I had my nurse call at today's office visit February 11, 2020 and she did confirm with the labs that this was never done  ]    AquaPorin-4 was completed on March 2, 2020 through [facility] which is a threshold not a cellular count and was deemed negative as level was <1.5 we asked him to get a cellular level through [facility] labs he never got that completed    MRI of the cervical spine continues to show a significant lesion at the level C1 with slight volume loss in the dorsal aspect of the cord 18 mm craniocaudal dimension  MRI of the brain minimal T2 lesion activity  MRI of the thoracic cord [from 2017 which is the most current to date] did not show any lesion activity in the thoracic cord  MRI scans were personally reviewed at office visit of 2/11/2020  with Dr. Sandro Castano And myself        Interim Data:     The patient has never been on any significant treatment for NMO  In reviewing his medication history I do not see where he was placed on any treatment for MS regarding drug modifying therapies in general    He persist with ambulatory difficulties related to balance and weakness in his legs.   He is able to ambulate independently and there have been no recent falls    There are persistent problems with visual disturbance to include acuity, light sensitivity, blurred vision    Cognition: Patient states he has slow processing, some recall issues, some word finding difficulties    For now there are no complaints of bowel or bladder issues, spasticity, mood concerns    He does not drive    ROS  Other than what is stated above under HPI there is no new or changing issues related to the following:  Constitutional: No recent weight change, fever,fatigue, sleep difficulties, or loss of appetite. ENT/Mouth:  No hearing loss, ringing in the ears, chronic sinus problem, nose bleeds sore throat, voice change, hoarseness, swollen glands in neck, or difficulties with chewing and swallowing. Cardiovascular:  No chest pain/angina pectoris, palpitations,swelling of feet/ankles/hands, or calf pain while walking. Respiratory: No chronic or frequent coughs, spitting up blood, shortness of breath, asthma, or wheezing. Gastrointestinal: No abdominal pain, heartburn, nausea, vomiting, constipation, frequent diarrhea, rectal bleeding, or blood in stool. Genitourinary: No frequent urination, burning or painful urination, blood in urine, incontinence or dribbling. Musculoskeletal:   No joint pain, stiffness/swelling, weakness of muscles, muscle pain/cramp, or back pain. Integument:   No rash/itching, change in skin color, change in hair/nails, or change in color/size of moles. Neurological:  No dizziness/vertigo, loss of consciousness, numbness/tingling sensation, tremors, weakness in limbs, difficulty with balance, frequent or recurring headaches, memory loss or confusion. Psychiatric:   No nervousness, depression, hallucinations, paranoia or suspiciousness. Endocrine: No excessive thirst or urination, heat or cold intolerance. Hematologic/Lymphatic: No bleeding/bruising tendency, phlebitis, or past transfusion.        EXAMINATION  There were no vitals taken for this visit. General appearance: Patient is well-developed and well-nourished in no apparent distress and well groomed. Psych/mental health:  Affect: Appropriate    PHQ  3 most recent PHQ Screens 4/23/2020   PHQ Not Done Active Diagnosis of Depression or Bipolar Disorder   Little interest or pleasure in doing things -   Feeling down, depressed, irritable, or hopeless -   Total Score PHQ 2 -   Trouble falling or staying asleep, or sleeping too much -   Feeling tired or having little energy -   Poor appetite, weight loss, or overeating -   Feeling bad about yourself - or that you are a failure or have let yourself or your family down -   Trouble concentrating on things such as school, work, reading, or watching TV -   Moving or speaking so slowly that other people could have noticed; or the opposite being so fidgety that others notice -   Thoughts of being better off dead, or hurting yourself in some way -   PHQ 9 Score -   How difficult have these problems made it for you to do your work, take care of your home and get along with others -       HEENT: Normocephalic, without evidence of trauma    Cardiovascular: Not applicable  Respiratory: No dyspnea on exertion noted, normal effort on casual observation    Musculoskeletal: No evidence of significant bony deformities or spinal curvature    Integumentary:  No obvious bruising, lacerations or discoloaration on casual observation. Neurological Examination:   Mental Status:        MMSE  No flowsheet data found. Formal testing was not completed    there was nothing concerning on general observation and discussion.    Alert oriented and appropriate to general conversation  Normal processing on general observation  Followed conversation and responded seemingly appropriate throughout the office visit  No word finding difficulties noted on casual observation  Able to follow directions without difficulty     Cranial Nerves:    Gaze is disconjugate  No nystagmus is appreciated  Facial motor  intact bilaterally  Hearing intact to conversation  Voice with normal projection, no evidence of secretion pooling  Shoulder shrug intact bilaterally  No tongue deviation appreciated     Motor:   Normal bulk  Increased tone with extension in the lower extremities no clonus appreciated  Fine dexterity intact bilaterally  No tremor appreciated on today's exam  No abnormal movements appreciated on today's exam    Sensation: Not applicable    Coordination/Cerebellar:   Mild terminal tremor with finger-to-nose bilaterally; and only grossly accurate    Gait: Ambulates independently but has a mildly ataxic gait    Fall risk assessment  No flowsheet data found. Reflexes: unremarkable      ASSESSMENT AND PLAN  Demyelinating disease of central nervous system most probably NMO by presentation, MRI, and CSF. Lurdes Menedz is no oligoclonal banding but other abnormalities that would be consistent with a chronic inflammation of the central nervous system]    We need to get additional lab work to include AquaPorin-4  If this is positive we will get him started on [address] has been initiated for [employer] with these levels pending  We need to get a cellular count not a threshold count I will reorder that lab and have it sent to [facility]    He also needs to be seen by neuro-ophthalmology. We need to get a baseline as to what his vision is doing and perhaps there are some recommendations regarding glasses to include shading and prisms that may help his vision    He did test positive on the PHQ 9 for depression. Patient does not feel he is depressed. We will continue to monitor this. We also need current baseline of his neuro axis therefore MRI of the brain, cervical spine, thoracic spine will be ordered in addition to blood work this was ordered back in February but due to Jose 19 it is been postponed hopefully we ability of the gated done in June 2020      ICD-10-CM ICD-9-CM    1. Demyelinating disease of central nervous system ([facility] G37.9 341.9    2. Paraparesis of both lower limbs ([facility]) G82.20 344.1    3. Ataxic gait R26.0 781.2    4. Visual changes H53.9 368.9    5. Neuromyelitis optica ([facility] G36.0 341.0    6. Major depressive disorder with single episode, in partial remission ([facility] F32.4 296.25    7. Type 2 diabetes mellitus with diabetic polyneuropathy, with long-term current use of insulin ([facility]) E11.42 250.60     Z79.4 357.2      V58.67            Additional pertinent medical data reviewed at today's office visit:    Flowsheets    Extended / Orthostatic Vitals:            No Known Allergies    Current Outpatient Medications   Medication Sig    triamcinolone acetonide (KENALOG) 0.1 % topical cream Apply  to affected area two (2) times daily as needed for Skin Irritation. Use thin layer on neck rash and external ear. Jose Mello insulin aspart U-100 (NovoLOG Flexpen U-100 Insulin) 100 unit/mL (3 mL) inpn INJECT 15-20 UNITS SUBCUTANEOUSLY WITH MEALS    ProAir HFA 90 mcg/actuation inhaler INHALE 1 PUFF BY MOUTH EVERY 4 HOURS AS NEEDED FOR WHEEZING    insulin detemir U-100 (LEVEMIR FLEXTOUCH) 100 unit/mL (3 mL) inpn Inject 50 units am and 50 units HS  Indications: type 1 diabetes mellitus    busPIRone (BUSPAR) 10 mg tablet Take 1 Tab by mouth three (3) times daily (with meals).  amLODIPine (NORVASC) 2.5 mg tablet Take 1 Tab by mouth daily.  diphenoxylate-atropine (LOMOTIL) 2.5-0.025 mg per tablet Take 1 Tab by mouth four (4) times daily. Max Daily Amount: 4 Tabs.  hydrOXYzine pamoate (VISTARIL) 25 mg capsule Si po qid prn itching. May switch to hydroxyzine hcl for availability, if needed  Indications: itching    Insulin Needles, Disposable, 31 gauge x 5/16\" ndle Use with insulin    gabapentin (NEURONTIN) 600 mg tablet Take 1 Tab by mouth four (4) times daily. Indications: Neuropathic Pain    pravastatin (PRAVACHOL) 20 mg tablet Take 1 Tab by mouth nightly.     FLUoxetine (PROZAC) 20 mg capsule Take 1 Cap by mouth daily.  multivitamin, tx-iron-ca-min (THERA-M W/ IRON) 9 mg iron-400 mcg tab tablet Take 1 Tab by mouth daily.  cyanocobalamin 1,000 mcg tablet Take 1,000 mcg by mouth daily.  losartan (COZAAR) 50 mg tablet Take 1 Tab by mouth daily.  selenium sulfide (SELSUN BLUE) 1 % shampoo Apply to head, mustache area and left ear at least several times daily.  glucose blood VI test strips (PRODIGY NO CODING) strip Monitor BS twice daily     No current facility-administered medications for this visit.         Past Medical History:   Diagnosis Date    Asthma     Type 2 diabetes mellitus with peripheral neuropathy (HCC)        Past Surgical History:   Procedure Laterality Date    HX HEENT      ear surgery(both)       Social History     Socioeconomic History    Marital status: SINGLE     Spouse name: Not on file    Number of children: Not on file    Years of education: Not on file    Highest education level: Not on file   Tobacco Use    Smoking status: Never Smoker    Smokeless tobacco: Never Used   Substance and Sexual Activity    Alcohol use: No     Alcohol/week: 0.0 standard drinks    Drug use: No    Sexual activity: Not Currently       Family History   Problem Relation Age of Onset    Diabetes Father     Cancer Father         prostate    Hypertension Mother     Other Mother         Crohn's disease    Heart Disease Mother     Hypertension Maternal Grandmother     Thyroid Disease Sister     No Known Problems Brother           Orders Only on 03/02/2020   Component Date Value    NMO IGG AUTOANTIBODIES 03/02/2020 <1.5    Office Visit on 02/11/2020   Component Date Value    DAPHNIE VIRUS DNA,PCR (WHOLE * 02/11/2020 Negative     QuantiFERON Incubation 02/11/2020 Comment     QuantiFERON Criteria 02/11/2020 Comment     QuantiFERON TB1 Ag 02/11/2020 0.01     QuantiFERON TB2 Ag 02/11/2020 0.01     QuantiFERON Nil Value 02/11/2020 0.01     QuantiFERON Mitogen Value 02/11/2020 >10.00     QuantiFERON Plus 02/11/2020 Negative     VARICELLA ZOSTER IGG 02/11/2020 >4000     Hep B surface Ag screen 02/11/2020 Negative     Hepatitis Be Antigen 02/11/2020 Negative     Hep B Core Ab, IgM 02/11/2020 Negative     Hep B Core Ab, total 02/11/2020 Negative     Hepatitis Be Antibody 02/11/2020 Negative     HEP B SURFACE AB, QUAL 02/11/2020 Non Reactive     Glucose 02/11/2020 120*    BUN 02/11/2020 16     Creatinine 02/11/2020 1.04     GFR est non-AA 02/11/2020 84     GFR est AA 02/11/2020 97     BUN/Creatinine ratio 02/11/2020 15     Sodium 02/11/2020 150*    Potassium 02/11/2020 4.9     Chloride 02/11/2020 103     CO2 02/11/2020 27     Calcium 02/11/2020 9.5     Protein, total 02/11/2020 7.1     Albumin 02/11/2020 4.4     GLOBULIN, TOTAL 02/11/2020 2.7     A-G Ratio 02/11/2020 1.6     Bilirubin, total 02/11/2020 0.4     Alk. phosphatase 02/11/2020 109     AST (SGOT) 02/11/2020 35     ALT (SGPT) 02/11/2020 38     WBC 02/11/2020 6.7     RBC 02/11/2020 5.09     HGB 02/11/2020 14.7     HCT 02/11/2020 45.1     MCV 02/11/2020 89     MCH 02/11/2020 28.9     MCHC 02/11/2020 32.6     RDW 02/11/2020 13.4     PLATELET 52/80/3309 969     NEUTROPHILS 02/11/2020 51     Lymphocytes 02/11/2020 37     MONOCYTES 02/11/2020 10     EOSINOPHILS 02/11/2020 1     BASOPHILS 02/11/2020 1     ABS. NEUTROPHILS 02/11/2020 3.5     Abs Lymphocytes 02/11/2020 2.5     ABS. MONOCYTES 02/11/2020 0.6     ABS. EOSINOPHILS 02/11/2020 0.1     ABS. BASOPHILS 02/11/2020 0.1     IMMATURE GRANULOCYTES 02/11/2020 0     ABS. IMM. GRANS. 02/11/2020 0.0     NMO IGG AUTOANTIBODIES 02/11/2020 <1.5        XR Results (maximum last 3): Results from [place] encounter on 06/19/17   XR SPINAL PUNC LUMB DX    Impression  impression: Fluoroscopy assisted diagnostic lumbar puncture.    Results from [place] encounter on 12/20/16   XR CHEST PA LAT    Impression IMPRESSION:    Normal chest views. No change given difference in technique. Results from [place] encounter on 04/26/11   XR CHEST PORTABLE       CT Results (maximum last 3): Results from [place] encounter on 12/20/16   CTA CHEST W WO CONT    Impression IMPRESSION: No evidence of pulmonary embolism. MRI Results (maximum last 3): Results from [place] encounter on 02/12/19   MRI CERV SPINE W WO CONT    Impression IMPRESSION:  Stable cord lesion at the level of C1, stable slight volume loss in the dorsal  aspect of the cord. No new cord lesions demonstrated. No evidence of canal or foraminal compromise. MRI BRAIN W WO CONT    Impression IMPRESSION:  Minimal abnormal foci of increased T2 signal intensity in the cerebral white  matter less conspicuous than on the 7/18/2017 examination. There is no evidence  of postcontrast enhancement or interval progression. No intracranial mass, hemorrhage or evidence of acute infarction. Results from [place] encounter on 07/18/17   MRI BRAIN W WO CONT    Impression IMPRESSION: Diffuse bilateral optic nerve enhancement. Stable tiny intracranial  findings. Findings in keeping with provided clinical diagnosis. Due to this being a TeleHealth evaluation, many elements of the physical examination are unable to be assessed. Pursuant to the emergency declaration under the [facility] waiver authority and the [employer] General Act, this Virtual  Visit was conducted, with patient's consent, to reduce the patient's risk of exposure to COVID-19 and provide continuity of care for an established patient. Services were provided through a video synchronous discussion virtually to substitute for in-person clinic visit.         Christine Yin MS, ANP-BC, [facility]

## 2020-04-27 NOTE — PATIENT INSTRUCTIONS
As we talked about on her telehealth visit I need you to get the labs done 1 more time because they need to be specific for the [facility]. We will get those mailed out to you. I will tentatively see you back in 3 months hopefully in person and of course sooner if there are any major changes you do need to let us know about that. Your MRI scan orders are still active please call them about mid May to set up for a June MRI scan. Everything is been deferred due to COVID-19 but I am hopeful we will be able to get them in June.

## 2020-07-11 DIAGNOSIS — R21 RASH: ICD-10-CM

## 2020-07-11 DIAGNOSIS — R19.7 DIARRHEA, UNSPECIFIED TYPE: ICD-10-CM

## 2020-07-13 RX ORDER — DIPHENOXYLATE HYDROCHLORIDE AND ATROPINE SULFATE 2.5; .025 MG/1; MG/1
1 TABLET ORAL 4 TIMES DAILY
Qty: 120 TAB | Refills: 2 | Status: SHIPPED | OUTPATIENT
Start: 2020-07-13 | End: 2020-10-13 | Stop reason: SDUPTHER

## 2020-07-13 RX ORDER — TRIAMCINOLONE ACETONIDE 1 MG/G
CREAM TOPICAL
Qty: 15 G | Refills: 0 | Status: SHIPPED | OUTPATIENT
Start: 2020-07-13 | End: 2020-10-25 | Stop reason: SDUPTHER

## 2020-07-13 RX ORDER — ALBUTEROL SULFATE 90 UG/1
AEROSOL, METERED RESPIRATORY (INHALATION)
Qty: 9 G | Refills: 0 | Status: SHIPPED | OUTPATIENT
Start: 2020-07-13 | End: 2020-09-02

## 2020-07-13 NOTE — TELEPHONE ENCOUNTER
PCP: Patricia Benson MD     Last appt: 4/23/2020   Future Appointments   Date Time Provider Neil Brown   7/27/2020  1:30 PM Ashley Wall NP [facility]        Requested Prescriptions     Pending Prescriptions Disp Refills    diphenoxylate-atropine (LOMOTIL) 2.5-0.025 mg per tablet 120 Tab 2     Sig: Take 1 Tab by mouth four (4) times daily. Max Daily Amount: 4 Tabs.  ProAir HFA 90 mcg/actuation inhaler 9 g 0     Sig: INHALE 1 PUFF BY MOUTH EVERY 4 HOURS AS NEEDED FOR WHEEZING    triamcinolone acetonide (KENALOG) 0.1 % topical cream 15 g 0     Sig: Apply  to affected area two (2) times daily as needed for Skin Irritation. Use thin layer on neck rash and external ear. Gerldine Prost

## 2020-07-27 ENCOUNTER — VIRTUAL VISIT (OUTPATIENT)
Dept: NEUROLOGY | Age: 50
End: 2020-07-27

## 2020-07-27 DIAGNOSIS — G82.20 PARAPARESIS OF BOTH LOWER LIMBS (HCC): ICD-10-CM

## 2020-07-27 DIAGNOSIS — H53.9 VISUAL CHANGES: ICD-10-CM

## 2020-07-27 DIAGNOSIS — G37.9 DEMYELINATING DISEASE OF CENTRAL NERVOUS SYSTEM (HCC): Primary | ICD-10-CM

## 2020-07-27 DIAGNOSIS — R26.0 ATAXIC GAIT: ICD-10-CM

## 2020-07-27 DIAGNOSIS — G36.0 NEUROMYELITIS OPTICA (HCC): ICD-10-CM

## 2020-07-27 DIAGNOSIS — F32.4 MAJOR DEPRESSIVE DISORDER WITH SINGLE EPISODE, IN PARTIAL REMISSION (HCC): ICD-10-CM

## 2020-07-27 RX ORDER — BUSPIRONE HYDROCHLORIDE 5 MG/1
TABLET ORAL
COMMUNITY
Start: 2020-07-11 | End: 2020-10-29

## 2020-07-27 NOTE — PATIENT INSTRUCTIONS
Please go back to [facility] to get the additional blood work completed it is specifically stated on the labs that it needs to be sent out to [facility] so hopefully they will comply Please call to get your MRI scans rescheduled Your intermittently worsening symptoms are probably related to the heat and not true flareup of disease process Continue to stay healthy, stay safe, stay sane in this COVID 19 era Do things that make you happy Please stay out of heat and drink lots of cool water If you do need to go outside please do in the morning when temperatures and heat index are low or as low as are going to get for the day Follow-up in 3 months sooner if needed

## 2020-07-27 NOTE — PROGRESS NOTES
Gonzalo Nassar is a 52 y.o. male who presents today for the following:  Chief Complaint   Patient presents with    Neurologic Problem     Pt reports  he walks slower and that somedays he can hardly see     Gonzalo Nassar is a 52 y.o. male who was seen by synchronous (real-time) audio-video technology on 7/27/2020. HPI  Historical Data  This is a patient previously known to the practice. He has a diagnosis of NMO. Spinal tap June 2017 did not reveal any oligoclonal banding. However he had an increased CSF/serum albumin index, elevated IgG synthesis elevated IgG quantitative and elevated albumin. He had AquaPorin-4 ordered July 2017 but it was never completed. However remainder of his blood work that was ordered to get completed so apparently lab never edenilson it [I had my nurse call at today's office visit February 11, 2020 and she did confirm with the labs that this was never done  ]    AquaPorin-4 was completed on March 2, 2020 through [facility] which is a threshold not a cellular count and was deemed negative as level was <1.5 we asked him to get a cellular level through [facility] labs he never got that completed    MRI of the cervical spine continues to show a significant lesion at the level C1 with slight volume loss in the dorsal aspect of the cord 18 mm craniocaudal dimension  MRI of the brain minimal T2 lesion activity  MRI of the thoracic cord [from 2017 which is the most current to date] did not show any lesion activity in the thoracic cord  MRI scans were personally reviewed at office visit of 2/11/2020  with Dr. Naeem Bob And myself        Interim Data:     MRI scans ordered in February 2020 have not been completed.   It was scheduled but that was canceled due to COVID 19 and has not been rescheduled since  He still has not gotten his NMO titer with cellular count completed; threshold level is less than 1.5 which is considered not positive for NMO  Cellular level has been reordered and he was requested to go to [facility] however the [facility] labs does not take his insurance  The orders do read to send out to [facility] so of asked him to go back to [facility] and hopefully they will mail it out this time    The patient has never been on any significant treatment for NMO  In reviewing his medication history I do not see where he was placed on any treatment for MS regarding drug modifying therapies in general    He persist with ambulatory difficulties related to balance and weakness in his legs. He is able to ambulate independently and there have been no recent falls  Patient feels a bit more fatigued and is able to do less  But states this seemed to started with a significant elevation with outside temperatures    There are persistent problems with visual disturbance to include acuity, light sensitivity, blurred vision  In and Out throughout the day  Bright lights make it worse: wears sunglasses a great deal  Worsening vision seems to be correlated to the worsening temperatures outside    Cognition: Patient states he has slow processing, some recall issues, some word finding difficulties    For now there are no complaints of bowel or bladder issues, spasticity, mood concerns    He does not drive    ROS  Other than what is stated above under HPI there is no new or changing issues related to the following:  Constitutional: No recent weight change, fever,fatigue, sleep difficulties, or loss of appetite. ENT/Mouth:  No hearing loss, ringing in the ears, chronic sinus problem, nose bleeds sore throat, voice change, hoarseness, swollen glands in neck, or difficulties with chewing and swallowing. Cardiovascular:  No chest pain/angina pectoris, palpitations,swelling of feet/ankles/hands, or calf pain while walking. Respiratory: No chronic or frequent coughs, spitting up blood, shortness of breath, asthma, or wheezing.    Gastrointestinal: No abdominal pain, heartburn, nausea, vomiting, constipation, frequent diarrhea, rectal bleeding, or blood in stool. Genitourinary: No frequent urination, burning or painful urination, blood in urine, incontinence or dribbling. Musculoskeletal:   No joint pain, stiffness/swelling, weakness of muscles, muscle pain/cramp, or back pain. Integument:   No rash/itching, change in skin color, change in hair/nails, or change in color/size of moles. Neurological:  No dizziness/vertigo, loss of consciousness, numbness/tingling sensation, tremors, weakness in limbs, difficulty with balance, frequent or recurring headaches, memory loss or confusion. Psychiatric:   No nervousness, depression, hallucinations, paranoia or suspiciousness. Endocrine: No excessive thirst or urination, heat or cold intolerance. Hematologic/Lymphatic: No bleeding/bruising tendency, phlebitis, or past transfusion. EXAMINATION  There were no vitals taken for this visit. General appearance: Patient is well-developed and well-nourished in no apparent distress and well groomed.     Psych/mental health:  Affect: Appropriate    PHQ  3 most recent PHQ Screens 4/23/2020   PHQ Not Done Active Diagnosis of Depression or Bipolar Disorder   Little interest or pleasure in doing things -   Feeling down, depressed, irritable, or hopeless -   Total Score PHQ 2 -   Trouble falling or staying asleep, or sleeping too much -   Feeling tired or having little energy -   Poor appetite, weight loss, or overeating -   Feeling bad about yourself - or that you are a failure or have let yourself or your family down -   Trouble concentrating on things such as school, work, reading, or watching TV -   Moving or speaking so slowly that other people could have noticed; or the opposite being so fidgety that others notice -   Thoughts of being better off dead, or hurting yourself in some way -   PHQ 9 Score -   How difficult have these problems made it for you to do your work, take care of your home and get along with others -       HEENT: Normocephalic, without evidence of trauma    Cardiovascular: Not applicable  Respiratory: No dyspnea on exertion noted, normal effort on casual observation    Musculoskeletal: No evidence of significant bony deformities or spinal curvature    Integumentary:  No obvious bruising, lacerations or discoloaration on casual observation. Neurological Examination:   Mental Status:        MMSE  No flowsheet data found. Formal testing was not completed    there was nothing concerning on general observation and discussion. Alert oriented and appropriate to general conversation  Normal processing on general observation  Followed conversation and responded seemingly appropriate throughout the office visit  No word finding difficulties noted on casual observation  Able to follow directions without difficulty     Cranial Nerves:    Difficult to visualize due to lighting and technical difficulties with video      Motor:   Difficult to visualize due to lighting and technical difficulties with video    Sensation: Not applicable    Coordination/Cerebellar:   Difficult to visualize due to lighting and technical difficulties with video      Gait: Ambulates independently but has a mildly ataxic gait    Fall risk assessment  No flowsheet data found. Reflexes: Not tested      ASSESSMENT AND PLAN  Demyelinating disease of central nervous system most probably NMO by presentation, MRI, and CSF. Sidney Bloch is no oligoclonal banding but other abnormalities that would be consistent with a chronic inflammation of the central nervous system]    We need to get additional lab work to include AquaPorin-4  If this is positive we will get him started on FDA approved NMO therapy  We need to get a cellular count not a threshold count I will reorder that lab and have it sent to [facility]    He also needs to be seen by neuro-ophthalmology.   We need to get a baseline as to what his vision is doing and perhaps there are some recommendations regarding glasses to include shading and prisms that may help his vision    Patient has a history and diagnosis of depression: Being managed outside our office although patient states he does not feel depressed he is on BuSpar  In the past has been on Prozac    We also need current baseline of his neuro axis therefore MRI of the brain, cervical spine, thoracic spine will be ordered in addition to blood work this was ordered back in February but due to Jose 19 it is been postponed I have asked him to get this rescheduled      ICD-10-CM ICD-9-CM    1. Demyelinating disease of central nervous system ([facility]  G37.9 341.9    2. Paraparesis of both lower limbs (HCC)  G82.20 344.1    3. Ataxic gait  R26.0 781.2    4. Visual changes  H53.9 368.9    5. Neuromyelitis optica ([facility]  G36.0 341.0    6. Major depressive disorder with single episode, in partial remission ([facility]  F32.4 296.25            Additional pertinent medical data reviewed at today's office visit:    No Known Allergies    Current Outpatient Medications   Medication Sig    diphenoxylate-atropine (LOMOTIL) 2.5-0.025 mg per tablet Take 1 Tab by mouth four (4) times daily. Max Daily Amount: 4 Tabs.  ProAir HFA 90 mcg/actuation inhaler INHALE 1 PUFF BY MOUTH EVERY 4 HOURS AS NEEDED FOR WHEEZING    triamcinolone acetonide (KENALOG) 0.1 % topical cream Apply  to affected area two (2) times daily as needed for Skin Irritation. Use thin layer on neck rash and external ear. Macario James gabapentin (NEURONTIN) 600 mg tablet Take 1 Tab by mouth four (4) times daily. Nees followup appt  Indications: neuropathic pain    insulin aspart U-100 (NovoLOG Flexpen U-100 Insulin) 100 unit/mL (3 mL) inpn INJECT 15-20 UNITS SUBCUTANEOUSLY WITH MEALS    insulin detemir U-100 (LEVEMIR FLEXTOUCH) 100 unit/mL (3 mL) inpn Inject 50 units am and 50 units HS  Indications: type 1 diabetes mellitus    busPIRone (BUSPAR) 10 mg tablet Take 1 Tab by mouth three (3) times daily (with meals).     amLODIPine (NORVASC) 2.5 mg tablet Take 1 Tab by mouth daily.  hydrOXYzine pamoate (VISTARIL) 25 mg capsule Si po qid prn itching. May switch to hydroxyzine hcl for availability, if needed  Indications: itching    Insulin Needles, Disposable, 31 gauge x \" ndle Use with insulin    pravastatin (PRAVACHOL) 20 mg tablet Take 1 Tab by mouth nightly.  multivitamin, tx-iron-ca-min (THERA-M W/ IRON) 9 mg iron-400 mcg tab tablet Take 1 Tab by mouth daily.  cyanocobalamin 1,000 mcg tablet Take 1,000 mcg by mouth daily.  losartan (COZAAR) 50 mg tablet Take 1 Tab by mouth daily.  selenium sulfide (SELSUN BLUE) 1 % shampoo Apply to head, mustache area and left ear at least several times daily.  glucose blood VI test strips (PRODIGY NO CODING) strip Monitor BS twice daily    FLUoxetine (PROZAC) 20 mg capsule Take 1 Cap by mouth daily. No current facility-administered medications for this visit. Past Medical History:   Diagnosis Date    Asthma     Type 2 diabetes mellitus with peripheral neuropathy (HCC)        Past Surgical History:   Procedure Laterality Date    HX HEENT      ear surgery(both)       Social History     Socioeconomic History    Marital status: SINGLE     Spouse name: Not on file    Number of children: Not on file    Years of education: Not on file    Highest education level: Not on file   Tobacco Use    Smoking status: Never Smoker    Smokeless tobacco: Never Used   Substance and Sexual Activity    Alcohol use: No     Alcohol/week: 0.0 standard drinks    Drug use: No    Sexual activity: Not Currently       Family History   Problem Relation Age of Onset    Diabetes Father     Cancer Father         prostate    Hypertension Mother     Other Mother         Crohn's disease    Heart Disease Mother     Hypertension Maternal Grandmother     Thyroid Disease Sister     No Known Problems Brother           No visits with results within 3 Month(s) from this visit. Latest known visit with results is:   Orders Only on 03/02/2020   Component Date Value    NMO IGG AUTOANTIBODIES 03/02/2020 <1.5        XR Results (maximum last 3): Results from [place] encounter on 06/19/17   XR SPINAL PUNC LUMB DX    Impression  impression: Fluoroscopy assisted diagnostic lumbar puncture. Results from [place] encounter on 12/20/16   XR CHEST PA LAT    Impression IMPRESSION:    Normal chest views. No change given difference in technique. Results from [place] encounter on 04/26/11   XR CHEST PORTABLE       CT Results (maximum last 3): Results from [place] encounter on 12/20/16   CTA CHEST W WO CONT    Impression IMPRESSION: No evidence of pulmonary embolism. MRI Results (maximum last 3): Results from [place] encounter on 02/12/19   MRI CERV SPINE W WO CONT    Impression IMPRESSION:  Stable cord lesion at the level of C1, stable slight volume loss in the dorsal  aspect of the cord. No new cord lesions demonstrated. No evidence of canal or foraminal compromise. MRI BRAIN W WO CONT    Impression IMPRESSION:  Minimal abnormal foci of increased T2 signal intensity in the cerebral white  matter less conspicuous than on the 7/18/2017 examination. There is no evidence  of postcontrast enhancement or interval progression. No intracranial mass, hemorrhage or evidence of acute infarction. Results from [place] encounter on 07/18/17   MRI BRAIN W WO CONT    Impression IMPRESSION: Diffuse bilateral optic nerve enhancement. Stable tiny intracranial  findings. Findings in keeping with provided clinical diagnosis. Due to this being a TeleHealth evaluation, many elements of the physical examination are unable to be assessed.              Pursuant to the emergency declaration under the [address] [place] [address] [employer] [place], [address] waiver authority and the [place] Resources and Response Supplemental Appropriations Act, this Virtual  Visit was conducted, with patient's consent, to reduce the patient's risk of exposure to COVID-19 and provide continuity of care for an established patient. Services were provided through a video synchronous discussion virtually to substitute for in-person clinic visit.         Su Wick MS, ANP-BC, [facility]

## 2020-09-02 RX ORDER — ALBUTEROL SULFATE 90 UG/1
AEROSOL, METERED RESPIRATORY (INHALATION)
Qty: 9 G | Refills: 0 | Status: SHIPPED | OUTPATIENT
Start: 2020-09-02 | End: 2020-10-25 | Stop reason: SDUPTHER

## 2020-09-02 RX ORDER — HYDROXYZINE PAMOATE 25 MG/1
CAPSULE ORAL
Qty: 60 CAP | Refills: 0 | Status: SHIPPED | OUTPATIENT
Start: 2020-09-02 | End: 2020-10-25 | Stop reason: SDUPTHER

## 2020-09-04 RX ORDER — GABAPENTIN 600 MG/1
TABLET ORAL
Qty: 360 TAB | Refills: 0 | Status: SHIPPED | OUTPATIENT
Start: 2020-09-04 | End: 2020-10-13 | Stop reason: ALTCHOICE

## 2020-09-15 ENCOUNTER — TELEPHONE (OUTPATIENT)
Dept: NEUROLOGY | Age: 50
End: 2020-09-15

## 2020-09-15 NOTE — TELEPHONE ENCOUNTER
----- Message from Alicia So sent at 9/15/2020 12:17 PM EDT -----  Regarding: MARIAA Jay/Yusuf Dunn from [address] [place] [address] scheduling would like a call back to see what MRI the pt is suppose to have scheduled Contact is [phone]

## 2020-09-15 NOTE — TELEPHONE ENCOUNTER
Attempted to call. Couldn't get through. Printed orders off mailed to patient with instructions to call and schedule.

## 2020-09-30 ENCOUNTER — TELEPHONE (OUTPATIENT)
Dept: INTERNAL MEDICINE CLINIC | Age: 50
End: 2020-09-30

## 2020-09-30 NOTE — TELEPHONE ENCOUNTER
Anya Cole from [employer] called to check on the status of a supply request form that was faxed in for this pt. Please return call at [phone].

## 2020-10-12 ENCOUNTER — TELEPHONE (OUTPATIENT)
Dept: NEUROLOGY | Age: 50
End: 2020-10-12

## 2020-10-12 DIAGNOSIS — G35 MS (MULTIPLE SCLEROSIS) (HCC): Primary | ICD-10-CM

## 2020-10-12 NOTE — TELEPHONE ENCOUNTER
Returned patients call he stated that he was unable to schedule his MRI and now his order  in September pt states he needs another order.

## 2020-10-12 NOTE — TELEPHONE ENCOUNTER
----- Message from Christin Bettencourt sent at 10/12/2020 12:26 PM EDT -----  Regarding: Dr. Whitaker/Telephone  General Message/Vendor Calls    Caller's first and last name:Jenna Orozco      Reason for call:schedule an MRI      Callback required yes/no and why:yes      Best contact number(s): [phone]      Details to clarify the request:Pt requested a call to schedule an MRI.       Christin Bettencourt

## 2020-10-12 NOTE — PROGRESS NOTES
Clive Ervin is a 52 y.o. male who was seen by synchronous (real-time) audio-video technology on 10/13/2020 for Medication Refill        Assessment & Plan:   Diagnoses and all orders for this visit:    1. Diabetic polyneuropathy associated with type 1 diabetes mellitus (HCC)  -     pregabalin (LYRICA) 75 mg capsule; Take 1 Cap by mouth two (2) times a day. Max Daily Amount: 150 mg. Will see if insurance will cover lyrica as he is having mild relief on max dose of gabapentin  If not covered will switch back. Will likely need to titrate up lyrica-will make sure pt tolerates first.    2. Uncontrolled type 1 diabetes mellitus with diabetic polyneuropathy (HCC)  -     REFERRAL TO OPHTHALMOLOGY  F/u with Dr. Yaquelin Kline next week    3. Essential hypertension  -     REFERRAL TO OPHTHALMOLOGY  Home numbers reasonably controlled  Offered pt to bring home meter to our office for nurse visit BP check and to compare monitors    4. Major depressive disorder with single episode, in partial remission (HCC)  Reasonably controlled on current medications    5. Gastroparesis  -     diphenoxylate-atropine (LOMOTIL) 2.5-0.025 mg per tablet; Take 1 Tab by mouth four (4) times daily. Max Daily Amount: 4 Tabs. Unclear etiology- per Dr. Chiki Yoder note suspect r/t uncontrolled DM- pt has f/u with Endocrine next week                Subjective:     Pt here for refills on lomotil and gabapentin. He follows with Dr. Gil Rome for demyelinating disease of CNS, last seen July. He follows with Dr. Yaquelin Kline for DM, has appt 10-19. Stopped taking prozac at beginning of this year bc felt like it was making him crazy. Occ feels down and depressed. Feels better when taylor outside. Gabapentin helps take the edge off but doesn't take the pain away. He is interested in trying lyrica now that he has medicare if it will be covered. He has chronic uncontrolled DM and polyneuropathy. Hasn't seen eye MD in the last two years.      HTN  Home BP: normally 110/70s, has gone up a few times lately   Medication regimen/ADR/missed doses:  Diet: Eating 1-2 meals/day, doesn't have much of an appetite and stomach upset easily, has chronic diarrhea- takes lomotil 4 times a day  Exercise: no regular exercise  Weight: has lost a few lbs, not sure how much exactly  Denies chest pain, chest pressure, or palpitations. Denies SOB, orthopnea, or PND. Feels lightheaded and dizzy most everyday, chronic the last year and a half- follows with Neurology. Has upcoming MRI. 3 most recent PHQ Screens 4/23/2020   PHQ Not Done Active Diagnosis of Depression or Bipolar Disorder   Little interest or pleasure in doing things -   Feeling down, depressed, irritable, or hopeless -   Total Score PHQ 2 -   Trouble falling or staying asleep, or sleeping too much -   Feeling tired or having little energy -   Poor appetite, weight loss, or overeating -   Feeling bad about yourself - or that you are a failure or have let yourself or your family down -   Trouble concentrating on things such as school, work, reading, or watching TV -   Moving or speaking so slowly that other people could have noticed; or the opposite being so fidgety that others notice -   Thoughts of being better off dead, or hurting yourself in some way -   PHQ 9 Score -   How difficult have these problems made it for you to do your work, take care of your home and get along with others -         Prior to Admission medications    Medication Sig Start Date End Date Taking?  Authorizing Provider   gabapentin (NEURONTIN) 600 mg tablet TAKE 1 TABLET BY MOUTH 4 TIMES DAILY FOR NEUROPATHIC PAIN,   NEEDS  FOLLOW  UP  APPT 9/4/20   Jaymie Mason MD   ProAir HFA 90 mcg/actuation inhaler INHALE 1 PUFF BY MOUTH EVERY 4 HOURS AS NEEDED FOR WHEEZING 9/2/20   Cheyanne Lopez NP   hydrOXYzine pamoate (VISTARIL) 25 mg capsule TAKE 1 CAPSULE BY MOUTH 4 TIMES DAILY IF  NEEDED  FOR  ITCHING 9/2/20   Raymundo Minor MD   busPIRone (BUSPAR) 5 mg tablet TAKE 1 TABLET BY MOUTH THREE TIMES DAILY WITH MEALS 7/11/20   Provider, Historical   diphenoxylate-atropine (LOMOTIL) 2.5-0.025 mg per tablet Take 1 Tab by mouth four (4) times daily. Max Daily Amount: 4 Tabs. 7/13/20   Steven Peterson MD   triamcinolone acetonide (KENALOG) 0.1 % topical cream Apply  to affected area two (2) times daily as needed for Skin Irritation. Use thin layer on neck rash and external ear. . 7/13/20   Steven Peterson MD   insulin aspart U-100 (NovoLOG Flexpen U-100 Insulin) 100 unit/mL (3 mL) inpn INJECT 15-20 UNITS SUBCUTANEOUSLY WITH MEALS 4/23/20   Steven Peterson MD   insulin detemir U-100 (LEVEMIR FLEXTOUCH) 100 unit/mL (3 mL) inpn Inject 50 units am and 50 units HS  Indications: type 1 diabetes mellitus 12/10/19   Steven Peterson MD   busPIRone (BUSPAR) 10 mg tablet Take 1 Tab by mouth three (3) times daily (with meals). 11/18/19   Steven Peterson MD   amLODIPine (NORVASC) 2.5 mg tablet Take 1 Tab by mouth daily. 11/18/19   Steven Peterson MD   Insulin Needles, Disposable, 31 gauge x 5/16\" ndle Use with insulin 8/13/19   Steven Peterson MD   pravastatin (PRAVACHOL) 20 mg tablet Take 1 Tab by mouth nightly. 7/29/19   Steven Peterson MD   FLUoxetine (PROZAC) 20 mg capsule Take 1 Cap by mouth daily. 7/29/19   Steven Peterson MD   multivitamin, tx-iron-ca-min (THERA-M W/ IRON) 9 mg iron-400 mcg tab tablet Take 1 Tab by mouth daily. Provider, Historical   cyanocobalamin 1,000 mcg tablet Take 1,000 mcg by mouth daily. Provider, Historical   losartan (COZAAR) 50 mg tablet Take 1 Tab by mouth daily. 5/24/19   Steven Peterson MD   selenium sulfide (SELSUN BLUE) 1 % shampoo Apply to head, mustache area and left ear at least several times daily.  11/13/18   Qasim Cuellar MD   glucose blood VI test strips (PRODIGY NO CODING) strip Monitor BS twice daily 11/6/18   Naeem Duran MD         ROS see hpi    Objective:     Patient-Reported Vitals 10/13/2020   Patient-Reported Weight 190lb   Patient-Reported Systolic  796   Patient-Reported Diastolic 75        [INSTRUCTIONS:  \"[x]\" Indicates a positive item  \"[]\" Indicates a negative item  -- DELETE ALL ITEMS NOT EXAMINED]    Constitutional: [x] Appears well-developed and well-nourished [x] No apparent distress      [] Abnormal -     Mental status: [x] Alert and awake  [x] Oriented to person/place/time [x] Able to follow commands    [] Abnormal -     Eyes:   EOM    [x]  Normal    [] Abnormal -   Sclera  [x]  Normal    [] Abnormal -          Discharge [x]  None visible   [] Abnormal -     HENT: [x] Normocephalic, atraumatic  [] Abnormal -   [x] Mouth/Throat: Mucous membranes are moist    External Ears [x] Normal  [] Abnormal -    Neck: [x] No visualized mass [] Abnormal -     Pulmonary/Chest: [x] Respiratory effort normal   [x] No visualized signs of difficulty breathing or respiratory distress        [] Abnormal -      Musculoskeletal:   [] Normal gait with no signs of ataxia         [x] Normal range of motion of neck        [] Abnormal -     Neurological:        [x] No Facial Asymmetry (Cranial nerve 7 motor function) (limited exam due to video visit)          [x] No gaze palsy        [] Abnormal -          Skin:        [x] No significant exanthematous lesions or discoloration noted on facial skin         [] Abnormal -            Psychiatric:       [x] Normal Affect [] Abnormal -        [] No Hallucinations    Other pertinent observable physical exam findings:-        We discussed the expected course, resolution and complications of the diagnosis(es) in detail. Medication risks, benefits, costs, interactions, and alternatives were discussed as indicated. I advised him to contact the office if his condition worsens, changes or fails to improve as anticipated. He expressed understanding with the diagnosis(es) and plan.        Bin Collins, who was evaluated through a patient-initiated, synchronous (real-time) audio-video encounter, and/or his healthcare decision maker, is aware that it is a billable service, with coverage as determined by his insurance carrier. He provided verbal consent to proceed: Yes, and patient identification was verified. It was conducted pursuant to the emergency declaration under the [address] [place] [address] authority and the [employer] Act. A caregiver was present when appropriate. Ability to conduct physical exam was limited. I was at home. The patient was at home.       Chon Mejia, MARIAA

## 2020-10-13 ENCOUNTER — TELEPHONE (OUTPATIENT)
Dept: NEUROLOGY | Age: 50
End: 2020-10-13

## 2020-10-13 ENCOUNTER — VIRTUAL VISIT (OUTPATIENT)
Dept: INTERNAL MEDICINE CLINIC | Age: 50
End: 2020-10-13
Payer: MEDICARE

## 2020-10-13 DIAGNOSIS — K31.84 GASTROPARESIS: ICD-10-CM

## 2020-10-13 DIAGNOSIS — I10 ESSENTIAL HYPERTENSION: ICD-10-CM

## 2020-10-13 DIAGNOSIS — E10.42 DIABETIC POLYNEUROPATHY ASSOCIATED WITH TYPE 1 DIABETES MELLITUS (HCC): Primary | ICD-10-CM

## 2020-10-13 DIAGNOSIS — F32.4 MAJOR DEPRESSIVE DISORDER WITH SINGLE EPISODE, IN PARTIAL REMISSION (HCC): ICD-10-CM

## 2020-10-13 PROCEDURE — 99214 OFFICE O/P EST MOD 30 MIN: CPT | Performed by: NURSE PRACTITIONER

## 2020-10-13 RX ORDER — DIPHENOXYLATE HYDROCHLORIDE AND ATROPINE SULFATE 2.5; .025 MG/1; MG/1
1 TABLET ORAL 4 TIMES DAILY
Qty: 120 TAB | Refills: 2 | Status: SHIPPED | OUTPATIENT
Start: 2020-10-13 | End: 2021-01-17 | Stop reason: SDUPTHER

## 2020-10-13 RX ORDER — GABAPENTIN 600 MG/1
600 TABLET ORAL 4 TIMES DAILY
Qty: 360 TAB | Refills: 0 | Status: CANCELLED | OUTPATIENT
Start: 2020-10-13

## 2020-10-13 RX ORDER — PREGABALIN 75 MG/1
75 CAPSULE ORAL 2 TIMES DAILY
Qty: 60 CAP | Refills: 0 | Status: SHIPPED | OUTPATIENT
Start: 2020-10-13 | End: 2020-11-25

## 2020-10-13 NOTE — TELEPHONE ENCOUNTER
Called pt no answer LVM to return offices call was calling to advise patient that new MRI order was placed.

## 2020-10-13 NOTE — PROGRESS NOTES
Jenna Orozco  Identified pt with two pt identifiers(name and ). Chief Complaint   Patient presents with    Medication Refill       Reviewed record In preparation for visit and have obtained necessary documentation. 1. Have you been to the ER, urgent care clinic or hospitalized since your last visit? No     2. Have you seen or consulted any other health care providers outside of the [address] [place] [address] since your last visit? Include any pap smears or colon screening. No    Patient does not have an advance directive. Vitals reviewed with provider. Health Maintenance reviewed:     Health Maintenance Due   Topic    DTaP/Tdap/Td series (1 - Tdap)    A1C test (Diabetic or Prediabetic)     MICROALBUMIN Q1     Lipid Screen     Foot Exam Q1     Eye Exam Retinal or Dilated     Flu Vaccine (1)        Wt Readings from Last 3 Encounters:   20 192 lb (87.1 kg)   12/10/19 193 lb 8 oz (87.8 kg)   19 194 lb 8 oz (88.2 kg)      Temp Readings from Last 3 Encounters:   12/10/19 97.9 °F (36.6 °C) (Oral)   19 97.4 °F (36.3 °C) (Oral)   10/24/19 97.6 °F (36.4 °C) (Oral)      BP Readings from Last 3 Encounters:   20 108/80   12/10/19 122/84   19 148/86      Pulse Readings from Last 3 Encounters:   20 (!) 101   12/10/19 94   19 92      There were no vitals filed for this visit.        Learning Assessment:   :     Learning Assessment 2020 3/29/2018 2017 2017 2017 2016 2015   PRIMARY LEARNER Patient Patient Patient Patient Patient Patient Patient   HIGHEST LEVEL OF EDUCATION - PRIMARY LEARNER  - - - - - 4 YEARS OF COLLEGE 4 YEARS OF COLLEGE   BARRIERS PRIMARY LEARNER - - - - - NONE NONE   CO-LEARNER CAREGIVER - - - - - No No   PRIMARY LANGUAGE ENGLISH ENGLISH ENGLISH ENGLISH ENGLISH ENGLISH ENGLISH   LEARNER PREFERENCE PRIMARY VIDEOS DEMONSTRATION DEMONSTRATION DEMONSTRATION DEMONSTRATION VIDEOS DEMONSTRATION   ANSWERED BY self patient patient patient patient Patient Patient   RELATIONSHIP SELF SELF SELF SELF SELF SELF SELF        Depression Screening:   :     3 most recent PHQ Screens 4/23/2020   PHQ Not Done Active Diagnosis of Depression or Bipolar Disorder   Little interest or pleasure in doing things -   Feeling down, depressed, irritable, or hopeless -   Total Score PHQ 2 -   Trouble falling or staying asleep, or sleeping too much -   Feeling tired or having little energy -   Poor appetite, weight loss, or overeating -   Feeling bad about yourself - or that you are a failure or have let yourself or your family down -   Trouble concentrating on things such as school, work, reading, or watching TV -   Moving or speaking so slowly that other people could have noticed; or the opposite being so fidgety that others notice -   Thoughts of being better off dead, or hurting yourself in some way -   PHQ 9 Score -   How difficult have these problems made it for you to do your work, take care of your home and get along with others -        Fall Risk Assessment:   :     No flowsheet data found. Abuse Screening:   :     No flowsheet data found.      ADL Screening:   :     ADL Assessment 5/24/2019   Feeding yourself No Help Needed   Getting from bed to chair No Help Needed   Getting dressed No Help Needed   Bathing or showering No Help Needed   Walk across the room (includes cane/walker) No Help Needed   Using the telphone No Help Needed   Taking your medications No Help Needed   Preparing meals No Help Needed   Managing money (expenses/bills) No Help Needed   Moderately strenuous housework (laundry) No Help Needed   Shopping for personal items (toiletries/medicines) No Help Needed   Shopping for groceries No Help Needed   Driving Help Needed   Climbing a flight of stairs No Help Needed   Getting to places beyond walking distances Help Needed

## 2020-10-13 NOTE — TELEPHONE ENCOUNTER
----- Message from Simon Wilkes sent at 10/13/2020 10:18 AM EDT -----  Regarding: MARIAA Jay/telephone  Patient return call    Caller's first and last name and relationship (if not the patient):      Best contact number(s):[phone]      Whose call is being returned:nurse      Details to clarify the request:regarding schedule an MRI, due to his  order expiring he will need a new order      Simon Wilkes

## 2020-10-13 NOTE — TELEPHONE ENCOUNTER
Orders were written yesterday 10/12/2020 there are visible to me in the imaging section and state active-future

## 2020-10-19 ENCOUNTER — VIRTUAL VISIT (OUTPATIENT)
Dept: ENDOCRINOLOGY | Age: 50
End: 2020-10-19
Payer: MEDICARE

## 2020-10-19 DIAGNOSIS — F32.4 MAJOR DEPRESSIVE DISORDER WITH SINGLE EPISODE, IN PARTIAL REMISSION (HCC): ICD-10-CM

## 2020-10-19 PROCEDURE — 99214 OFFICE O/P EST MOD 30 MIN: CPT | Performed by: INTERNAL MEDICINE

## 2020-10-19 NOTE — PROGRESS NOTES
This is an established visit conducted via telemedicine using [facility]. video. The patient has been instructed that this meets HIPAA criteria ,that they may receive a bill for these services and acknowledges and agrees to this method of visitation. This gentleman returns for follow-up of his type 1 diabetes mellitus with poor blood sugar control and neuromyelitis optica with progressive vision loss.   He does continue to lose vision and there appears to be nothing possible to reverse this or at least mitigate this decrease unless he can be on rituximab which is being explored. At his last visit, we inserted a pro freestyle neal system and he returns today to download data. Download demonstrates consistently elevated blood sugars averaging 300 overnight and 300 in the evening. He does have a modest decrease in blood sugar between noon and 6 PM but then the blood sugar goes right back up. Current Medications  Levemir 45 units BID  Novolog 15-20 with meals  Now has DexcomG6 with magnifiying glass (not using Clarity)    His diet is really unchanged. He has 2 meals. He has eggs and peñaloza but no starch for breakfast.  He has chicken nuggets and [employer] fries for dinner. He is taking the NovoLog with both of those meals. Examination  GENERAL: NCAT, Appears well nourished   EYES: EOMI, non-icteric, no proptosis   Ear/Nose/Throat: NCAT, no visible inflammation or masses   CARDIOVASCULAR: no cyanosis, no visible JVD   RESPIRATORY: comfortable respirations observed, no cyanosis   MUSCULOSKELETAL: Normal ROM of upper extremities observed   SKIN: No edema, rash, or other significant changes observed   NEUROLOGIC:  AAOx3   PSYCHIATRIC: Normal affect, Normal insight and judgement       Impression type 1 diabetes mellitus and neuromyelitis optica. Plan: I have asked him to download the clarity karen so that he can download and send me his blood sugars on the Dexcom.   I think for the first time I will have the opportunity to see what is going on with his blood sugars and modify his insulin accordingly. Soon as he gets that I will call him back and we will make adjustments as needed. I will see him back in 1 month.

## 2020-10-25 DIAGNOSIS — K31.84 GASTROPARESIS: ICD-10-CM

## 2020-10-25 DIAGNOSIS — R21 RASH: ICD-10-CM

## 2020-10-25 DIAGNOSIS — I10 ESSENTIAL HYPERTENSION: ICD-10-CM

## 2020-10-26 ENCOUNTER — TELEPHONE (OUTPATIENT)
Dept: NEUROLOGY | Age: 50
End: 2020-10-26

## 2020-10-26 DIAGNOSIS — F40.240 CLAUSTROPHOBIA: Primary | ICD-10-CM

## 2020-10-26 RX ORDER — AMLODIPINE BESYLATE 2.5 MG/1
2.5 TABLET ORAL DAILY
Qty: 30 TAB | Refills: 5 | Status: SHIPPED | OUTPATIENT
Start: 2020-10-26 | End: 2021-01-17 | Stop reason: SDUPTHER

## 2020-10-26 RX ORDER — ALBUTEROL SULFATE 90 UG/1
2 AEROSOL, METERED RESPIRATORY (INHALATION)
Qty: 9 G | Refills: 2 | Status: SHIPPED | OUTPATIENT
Start: 2020-10-26 | End: 2020-11-25

## 2020-10-26 RX ORDER — TRIAMCINOLONE ACETONIDE 1 MG/G
CREAM TOPICAL
Qty: 15 G | Refills: 0 | Status: SHIPPED | OUTPATIENT
Start: 2020-10-26 | End: 2020-11-25

## 2020-10-26 RX ORDER — ALPRAZOLAM 0.25 MG/1
TABLET ORAL
Qty: 2 TAB | Refills: 0 | Status: SHIPPED | OUTPATIENT
Start: 2020-10-26 | End: 2021-12-29 | Stop reason: ALTCHOICE

## 2020-10-26 RX ORDER — DIPHENOXYLATE HYDROCHLORIDE AND ATROPINE SULFATE 2.5; .025 MG/1; MG/1
1 TABLET ORAL 4 TIMES DAILY
Qty: 120 TAB | Refills: 2 | Status: CANCELLED | OUTPATIENT
Start: 2020-10-26

## 2020-10-26 RX ORDER — HYDROXYZINE PAMOATE 25 MG/1
CAPSULE ORAL
Qty: 60 CAP | Refills: 0 | Status: SHIPPED | OUTPATIENT
Start: 2020-10-26 | End: 2020-11-25

## 2020-10-26 NOTE — TELEPHONE ENCOUNTER
----- Message from Jose Disla sent at 10/26/2020 10:13 AM EDT -----  Regarding: MARIAA Jay/ telephone    Caller's first and last name: n/a  Reason for call: sedative   Callback required yes/no and why: yes  Best contact number(s): [phone]  Details to clarify the request: Pt is requesting a sedative or something to help him with his claustrophobia during closed MRI

## 2020-10-26 NOTE — TELEPHONE ENCOUNTER
REFILL     PCP: Kanu Cabezas NP     Last appt: 4/23/2020   Future Appointments   Date Time Provider Neil Brown   10/27/2020  1:30 PM Rebekah Cosby NP NEUM BS AMB   11/30/2020  1:30 PM Mariella Feliciano MD RDE [facility] A [facility] BS AMB        Requested Prescriptions     Pending Prescriptions Disp Refills    hydrOXYzine pamoate (VISTARIL) 25 mg capsule 60 Cap 0     Sig: TAKE 1 CAPSULE BY MOUTH 4 TIMES DAILY IF  NEEDED  FOR  ITCHING

## 2020-10-26 NOTE — TELEPHONE ENCOUNTER
REFILL     PCP: Lidia Benitez NP     Last appt: 4/23/2020   Future Appointments   Date Time Provider Neil Brown   10/27/2020  1:30 PM Buffy Leigh NP NEUM BS AMB   11/30/2020  1:30 PM Micaela Ceballos MD RDE [facility] [address] BS AMB        Requested Prescriptions     Pending Prescriptions Disp Refills    amLODIPine (NORVASC) 2.5 mg tablet 30 Tab 5     Sig: Take 1 Tab by mouth daily.  insulin detemir U-100 (LEVEMIR FLEXTOUCH) 100 unit/mL (3 mL) inpn 12 Pen 11     Sig: Inject 50 units am and 50 units HS  Indications: type 1 diabetes mellitus    triamcinolone acetonide (KENALOG) 0.1 % topical cream 15 g 0     Sig: Apply  to affected area two (2) times daily as needed for Skin Irritation. Use thin layer on neck rash and external ear. Keny Gregg

## 2020-10-26 NOTE — TELEPHONE ENCOUNTER
REFILL     PCP: Trent Seals NP     Last appt: 4/23/2020   Future Appointments   Date Time Provider Neil Brown   10/27/2020  1:30 PM MARIAA Serna BS AMB   11/30/2020  1:30 PM Danish Simmons MD RDE [facility] [address] BS AMB        Requested Prescriptions     Pending Prescriptions Disp Refills    ProAir HFA 90 mcg/actuation inhaler 9 g 0    diphenoxylate-atropine (LOMOTIL) 2.5-0.025 mg per tablet 120 Tab 2     Sig: Take 1 Tab by mouth four (4) times daily. Max Daily Amount: 4 Tabs.

## 2020-11-19 ENCOUNTER — TELEPHONE (OUTPATIENT)
Dept: NEUROLOGY | Age: 50
End: 2020-11-19

## 2020-11-25 DIAGNOSIS — E10.42 DIABETIC POLYNEUROPATHY ASSOCIATED WITH TYPE 1 DIABETES MELLITUS (HCC): ICD-10-CM

## 2020-11-25 DIAGNOSIS — R21 RASH: ICD-10-CM

## 2020-11-25 RX ORDER — TRIAMCINOLONE ACETONIDE 1 MG/G
CREAM TOPICAL
Qty: 15 G | Refills: 0 | Status: SHIPPED | OUTPATIENT
Start: 2020-11-25 | End: 2021-10-18 | Stop reason: SDUPTHER

## 2020-11-25 RX ORDER — PREGABALIN 75 MG/1
CAPSULE ORAL
Qty: 60 CAP | Refills: 0 | Status: SHIPPED | OUTPATIENT
Start: 2020-11-25 | End: 2021-01-16 | Stop reason: SDUPTHER

## 2020-11-25 RX ORDER — HYDROXYZINE PAMOATE 25 MG/1
CAPSULE ORAL
Qty: 60 CAP | Refills: 0 | Status: SHIPPED | OUTPATIENT
Start: 2020-11-25 | End: 2021-01-17 | Stop reason: SDUPTHER

## 2020-11-25 RX ORDER — ALBUTEROL SULFATE 90 UG/1
AEROSOL, METERED RESPIRATORY (INHALATION)
Qty: 9 G | Refills: 0 | Status: SHIPPED | OUTPATIENT
Start: 2020-11-25 | End: 2021-10-20

## 2020-11-25 RX ORDER — HYDROXYZINE PAMOATE 25 MG/1
CAPSULE ORAL
Qty: 60 CAP | Refills: 0 | OUTPATIENT
Start: 2020-11-25

## 2020-12-04 ENCOUNTER — TELEPHONE (OUTPATIENT)
Dept: INTERNAL MEDICINE CLINIC | Age: 50
End: 2020-12-04

## 2020-12-04 NOTE — TELEPHONE ENCOUNTER
Spoke with patient. Advised he call pharmacy to give information on his insurance coverage as he states he has [place]. Patient stated understanding.

## 2020-12-04 NOTE — TELEPHONE ENCOUNTER
[place] and advised them per message generated by Cover My Meds prior authorization for Levemir Flextouch pen: Based on [facility] MEDICAID eligibility information this Patient has other primary coverage and the pharmacy will need to bill the other third party first before billing [address]

## 2020-12-04 NOTE — TELEPHONE ENCOUNTER
Attempted call to patient. Left message on his voice mail to call office. Patient needs to call his insurance company Medicaid as prescription wont go thru insurance, instead message is received to submit primary insurance.  Patient needs to then call pharmacy and let them know if Medicaid or [employer] is primary

## 2020-12-18 ENCOUNTER — HOSPITAL ENCOUNTER (OUTPATIENT)
Dept: MRI IMAGING | Age: 50
Discharge: HOME | End: 2020-12-18
Payer: MEDICARE

## 2020-12-18 DIAGNOSIS — G35 MS (MULTIPLE SCLEROSIS) (HCC): ICD-10-CM

## 2020-12-18 PROCEDURE — 70553 MRI BRAIN STEM W/O & W/DYE: CPT

## 2020-12-18 PROCEDURE — 72156 MRI NECK SPINE W/O & W/DYE: CPT

## 2020-12-18 PROCEDURE — A9575 INJ GADOTERATE MEGLUMI 0.1ML: HCPCS | Performed by: PSYCHIATRY & NEUROLOGY

## 2020-12-18 PROCEDURE — 74011250636 HC RX REV CODE- 250/636: Performed by: PSYCHIATRY & NEUROLOGY

## 2020-12-18 PROCEDURE — 72157 MRI CHEST SPINE W/O & W/DYE: CPT

## 2020-12-18 RX ORDER — GADOTERATE MEGLUMINE 376.9 MG/ML
17 INJECTION INTRAVENOUS
Status: COMPLETED | OUTPATIENT
Start: 2020-12-18 | End: 2020-12-18

## 2020-12-18 RX ADMIN — GADOTERATE MEGLUMINE 17 ML: 376.9 INJECTION INTRAVENOUS at 09:56

## 2020-12-19 LAB
ALBUMIN SERPL-MCNC: 4.2 G/DL (ref 4–5)
ALBUMIN/CREAT UR: 393 MG/G CREAT (ref 0–29)
ALBUMIN/GLOB SERPL: 1.6 {RATIO} (ref 1.2–2.2)
ALP SERPL-CCNC: 119 IU/L (ref 39–117)
ALT SERPL-CCNC: 28 IU/L (ref 0–44)
AST SERPL-CCNC: 22 IU/L (ref 0–40)
BILIRUB SERPL-MCNC: 0.3 MG/DL (ref 0–1.2)
BUN SERPL-MCNC: 9 MG/DL (ref 6–24)
BUN/CREAT SERPL: 11 (ref 9–20)
CALCIUM SERPL-MCNC: 9.4 MG/DL (ref 8.7–10.2)
CHLORIDE SERPL-SCNC: 104 MMOL/L (ref 96–106)
CHOLEST SERPL-MCNC: 184 MG/DL (ref 100–199)
CO2 SERPL-SCNC: 27 MMOL/L (ref 20–29)
CREAT SERPL-MCNC: 0.81 MG/DL (ref 0.76–1.27)
CREAT UR-MCNC: 149 MG/DL
EST. AVERAGE GLUCOSE BLD GHB EST-MCNC: 263 MG/DL
GLOBULIN SER CALC-MCNC: 2.7 G/DL (ref 1.5–4.5)
GLUCOSE SERPL-MCNC: 170 MG/DL (ref 65–99)
HBA1C MFR BLD: 10.8 % (ref 4.8–5.6)
HDLC SERPL-MCNC: 52 MG/DL
LDLC SERPL CALC-MCNC: 107 MG/DL (ref 0–99)
MICROALBUMIN UR-MCNC: 586.3 UG/ML
POTASSIUM SERPL-SCNC: 3.8 MMOL/L (ref 3.5–5.2)
PROT SERPL-MCNC: 6.9 G/DL (ref 6–8.5)
SODIUM SERPL-SCNC: 143 MMOL/L (ref 134–144)
TRIGL SERPL-MCNC: 141 MG/DL (ref 0–149)
VLDLC SERPL CALC-MCNC: 25 MG/DL (ref 5–40)

## 2020-12-31 LAB
Lab: NORMAL
REFERRAL TEST CODE OR MNEMONIC: NORMAL

## 2021-01-08 LAB
Lab: NORMAL
REFERRAL TEST CODE OR MNEMONIC: NORMAL

## 2021-01-16 DIAGNOSIS — E10.42 DIABETIC POLYNEUROPATHY ASSOCIATED WITH TYPE 1 DIABETES MELLITUS (HCC): ICD-10-CM

## 2021-01-17 DIAGNOSIS — K31.84 GASTROPARESIS: ICD-10-CM

## 2021-01-17 DIAGNOSIS — F32.4 MAJOR DEPRESSIVE DISORDER WITH SINGLE EPISODE, IN PARTIAL REMISSION (HCC): ICD-10-CM

## 2021-01-17 DIAGNOSIS — I10 ESSENTIAL HYPERTENSION: ICD-10-CM

## 2021-01-18 RX ORDER — AMLODIPINE BESYLATE 2.5 MG/1
2.5 TABLET ORAL DAILY
Qty: 30 TAB | Refills: 5 | Status: SHIPPED | OUTPATIENT
Start: 2021-01-18 | End: 2021-06-18 | Stop reason: SDUPTHER

## 2021-01-18 RX ORDER — PEN NEEDLE, DIABETIC 30 GX3/16"
NEEDLE, DISPOSABLE MISCELLANEOUS
Qty: 450 PEN NEEDLE | Refills: 3 | Status: SHIPPED | OUTPATIENT
Start: 2021-01-18 | End: 2022-02-11

## 2021-01-18 RX ORDER — FLUOXETINE HYDROCHLORIDE 20 MG/1
20 CAPSULE ORAL DAILY
Qty: 30 CAP | Refills: 0 | Status: SHIPPED | OUTPATIENT
Start: 2021-01-18 | End: 2021-06-18

## 2021-01-18 RX ORDER — PRAVASTATIN SODIUM 20 MG/1
20 TABLET ORAL
Qty: 30 TAB | Refills: 0 | Status: SHIPPED | OUTPATIENT
Start: 2021-01-18 | End: 2021-02-01 | Stop reason: SDUPTHER

## 2021-01-18 RX ORDER — HYDROXYZINE PAMOATE 25 MG/1
CAPSULE ORAL
Qty: 60 CAP | Refills: 0 | Status: SHIPPED | OUTPATIENT
Start: 2021-01-18 | End: 2021-04-15

## 2021-01-18 RX ORDER — LOSARTAN POTASSIUM 50 MG/1
50 TABLET ORAL DAILY
Qty: 30 TAB | Refills: 11 | Status: SHIPPED | OUTPATIENT
Start: 2021-01-18 | End: 2021-06-18 | Stop reason: SDUPTHER

## 2021-01-18 RX ORDER — DIPHENOXYLATE HYDROCHLORIDE AND ATROPINE SULFATE 2.5; .025 MG/1; MG/1
1 TABLET ORAL 4 TIMES DAILY
Qty: 120 TAB | Refills: 2 | Status: SHIPPED | OUTPATIENT
Start: 2021-01-18

## 2021-01-18 RX ORDER — PREGABALIN 75 MG/1
75 CAPSULE ORAL 2 TIMES DAILY
Qty: 60 CAP | Refills: 0 | Status: SHIPPED | OUTPATIENT
Start: 2021-01-18 | End: 2021-04-15

## 2021-01-18 NOTE — TELEPHONE ENCOUNTER
PCP: Zahra Deng NP     Last appt: 10/13/2020   No future appointments. Requested Prescriptions     Pending Prescriptions Disp Refills    diphenoxylate-atropine (LOMOTIL) 2.5-0.025 mg per tablet 120 Tab 2     Sig: Take 1 Tab by mouth four (4) times daily. Max Daily Amount: 4 Tabs.  amLODIPine (NORVASC) 2.5 mg tablet 30 Tab 5     Sig: Take 1 Tab by mouth daily.     hydrOXYzine pamoate (VISTARIL) 25 mg capsule 60 Cap 0     Sig: TAKE 1 CAPSULE BY MOUTH 4 TIMES DAILY AS NEEDED FOR ITCHING

## 2021-01-18 NOTE — TELEPHONE ENCOUNTER
PCP: Zahra Deng NP     Last appt: 4/23/2020   No future appointments. Requested Prescriptions     Pending Prescriptions Disp Refills    losartan (COZAAR) 50 mg tablet 30 Tab 11     Sig: Take 1 Tab by mouth daily.     Insulin Needles, Disposable, 31 gauge x 5/16\" ndle 450 Pen Needle 3     Sig: Use with insulin    FLUoxetine (PROzac) 20 mg capsule 30 Cap 0    pravastatin (PRAVACHOL) 20 mg tablet 30 Tab 0

## 2021-01-27 ENCOUNTER — TELEPHONE (OUTPATIENT)
Dept: NEUROLOGY | Age: 51
End: 2021-01-27

## 2021-01-27 LAB
Lab: NORMAL
REFERRAL TEST CODE OR MNEMONIC: NORMAL

## 2021-01-29 ENCOUNTER — VIRTUAL VISIT (OUTPATIENT)
Dept: NEUROLOGY | Age: 51
End: 2021-01-29
Payer: COMMERCIAL

## 2021-01-29 DIAGNOSIS — R26.0 ATAXIC GAIT: ICD-10-CM

## 2021-01-29 DIAGNOSIS — H53.9 VISUAL CHANGES: ICD-10-CM

## 2021-01-29 DIAGNOSIS — G82.20 PARAPARESIS OF BOTH LOWER LIMBS (HCC): ICD-10-CM

## 2021-01-29 DIAGNOSIS — G37.9 DEMYELINATING DISEASE OF CENTRAL NERVOUS SYSTEM (HCC): Primary | ICD-10-CM

## 2021-01-29 DIAGNOSIS — G36.0 NEUROMYELITIS OPTICA (HCC): ICD-10-CM

## 2021-01-29 PROCEDURE — 99215 OFFICE O/P EST HI 40 MIN: CPT | Performed by: PSYCHIATRY & NEUROLOGY

## 2021-01-29 RX ORDER — GABAPENTIN 600 MG/1
600 TABLET ORAL 4 TIMES DAILY
COMMUNITY
End: 2021-05-18 | Stop reason: ALTCHOICE

## 2021-01-29 NOTE — PROGRESS NOTES
[facility] Virtual FOLLOW-UP VISIT        Alexandr Steele is a 48 y.o. male who was seen by synchronous (real-time) audio-video technology on 1/29/2021. Alexandr Steele is a 48 y.o. male who presents today for the following:  Chief Complaint   Patient presents with    Follow-up    Results     mri, labs          ASSESSMENT AND PLAN    Demyelinating disease of the CNS  Can diagnosis is neuromyelitis optica  Threshold testing for NMO titer through [facility] was negative cellular level is more sensitive and we have sent levels off to [facility]    Presently he is on no drug modifying therapy  Pending the results of his titers through [facility] will guide our decision regarding treatment options  Presently all NMO DMT is only effective the research and antibody positive patients    Discussion regarding labs and MRI were completed today's office visit. Patient was given time to ask questions and voiced concerns and it believe all of his questions and concerns were adequately addressed at today's office visit        ICD-10-CM ICD-9-CM    1. Demyelinating disease of central nervous system ([facility]  G37.9 341.9    2. Ataxic gait  R26.0 781.2    3. Paraparesis of both lower limbs (HCC)  G82.20 344.1    4. Visual changes  H53.9 368.9    5. Neuromyelitis optica ([facility]  G36.0 341.0          I attest that 40 minutes was spent on today's visit reviewing medical records and diagnostic testing deemed pertinent to this patient's care, along with direct time spent at patient's visit including the history, physical assessment and plan, discussing diagnosis and management along with documentation. HPI  Historical Data  This is a patient previously known to the practice. He has a diagnosis of NMO.     Spinal tap June 2017 did not reveal any oligoclonal banding.   However he had an increased CSF/serum albumin index, elevated IgG synthesis elevated IgG quantitative and elevated albumin.     He had AquaPorin-4 ordered July 2017 but it was never completed. However remainder of his blood work that was ordered to get completed so apparently lab never edenilson it [I had my nurse call at today's office visit February 11, 2020 and she did confirm with the labs that this was never done  ]     AquaPorin-4 was completed on March 2, 2020 through [place] which is a threshold not a cellular count and was deemed negative as level was <1.5 we asked him to get a cellular level through [facility] labs he never got that completed     MRI of the cervical spine continues to show a significant lesion at the level C1 with slight volume loss in the dorsal aspect of the cord 18 mm craniocaudal dimension  MRI of the brain minimal T2 lesion activity  MRI of the thoracic cord [from 2017 which is the most current to date] did not show any lesion activity in the thoracic cord  MRI scans were personally reviewed at office visit of 2/11/2020  with Dr. Darline Larios And myself     MRI scans completed on 12/18/2020  MRI of the brain with and without contrast  IMPRESSION:   1. Stable minimal tiny T2/FLAIR white matter hyperintensities. No evidence of  abnormal enhancement. MRI of the cervical spine with and without contrast   IMPRESSION:     1. Unchanged area of chronic myelomalacia in the cord at C1 likely related to  the patient's history of demyelinating disease. No new lesions. No abnormal  enhancement to suggest active disease. 2. No significant spinal stenosis or neural foraminal narrowing. MRI of the thoracic spine with and without contrast  IMPRESSION:     1. No evidence of demyelinating disease in the visualized cord. 2. Multinodular goiter.     Interim Data:      The patient has never been on any significant treatment for NMO  In reviewing his medication history I do not see where he was placed on any treatment for MS regarding drug modifying therapies in general  Still waiting on testing for NMO antibodies through [facility] which was completed December 2020    We reviewed the results of his most recent MRI scans from December 2020  Clinically there has been no change     He persist with ambulatory difficulties related to balance and weakness in his legs. He is able to ambulate independently and there have been no recent falls  Patient feels a bit more fatigued and is able to do less  But states this seemed to started with a significant elevation with outside temperatures     There are persistent problems with visual disturbance to include acuity, light sensitivity, blurred vision  In and Out throughout the day  Bright lights make it worse: wears sunglasses a great deal  Worsening vision seems to be correlated to the worsening temperatures outside     Cognition: Patient states he has slow processing, some recall issues, some word finding difficulties     For now there are no complaints of bowel or bladder issues, spasticity, mood concerns     He does not drive        No Known Allergies    Current Outpatient Medications   Medication Sig    gabapentin (NEURONTIN) 600 mg tablet Take 600 mg by mouth four (4) times daily.  pregabalin (LYRICA) 75 mg capsule Take 1 Cap by mouth two (2) times a day. Max Daily Amount: 150 mg.    losartan (COZAAR) 50 mg tablet Take 1 Tab by mouth daily.  Insulin Needles, Disposable, 31 gauge x 5/16\" ndle Use with insulin    pravastatin (PRAVACHOL) 20 mg tablet Take 1 Tab by mouth nightly.  diphenoxylate-atropine (LOMOTIL) 2.5-0.025 mg per tablet Take 1 Tab by mouth four (4) times daily. Max Daily Amount: 4 Tabs.  amLODIPine (NORVASC) 2.5 mg tablet Take 1 Tab by mouth daily.  hydrOXYzine pamoate (VISTARIL) 25 mg capsule TAKE 1 CAPSULE BY MOUTH 4 TIMES DAILY AS NEEDED FOR ITCHING    triamcinolone acetonide (KENALOG) 0.1 % topical cream APPLY  CREAM EXTERNALLY TO AFFECTED AREA TWICE DAILY AS NEEDED FOR SKIN IRRITATION. USE THIN LAYER ON NECK RASH AND EXTERNAL EAR    ProAir HFA 80 mcg/actuation inhaler INHALE 1 PUFF BY MOUTH EVERY 4 HOURS AS NEEDED FOR WHEEZING    insulin detemir U-100 (LEVEMIR FLEXTOUCH) 100 unit/mL (3 mL) inpn Inject 50 units am and 50 units HS  Indications: type 1 diabetes mellitus    insulin aspart U-100 (NovoLOG Flexpen U-100 Insulin) 100 unit/mL (3 mL) inpn INJECT 15-20 UNITS SUBCUTANEOUSLY WITH MEALS    busPIRone (BUSPAR) 10 mg tablet Take 1 Tab by mouth three (3) times daily (with meals).  multivitamin, tx-iron-ca-min (THERA-M W/ IRON) 9 mg iron-400 mcg tab tablet Take 1 Tab by mouth daily.  cyanocobalamin 1,000 mcg tablet Take 1,000 mcg by mouth daily.  selenium sulfide (SELSUN BLUE) 1 % shampoo Apply to head, mustache area and left ear at least several times daily.  glucose blood VI test strips (PRODIGY NO CODING) strip Monitor BS twice daily    FLUoxetine (PROzac) 20 mg capsule Take 1 Cap by mouth daily.  busPIRone (BUSPAR) 5 mg tablet TAKE 1 TABLET BY MOUTH THREE TIMES DAILY WITH MEALS    ALPRAZolam (XANAX) 0.25 mg tablet 1 tablet 30 to 60 minutes prior to MRI scan may repeat x1 if needed must have someone to drive you to and from the MRI scanner  Indications: anxious     No current facility-administered medications for this visit. Past medical history/surgical history, family history, and social history have been reviewed for today's visit      ROS    A ten system review of constitutional, cardiovascular, respiratory, musculoskeletal, endocrine, skin, SHEENT, genitourinary, psychiatric and neurologic systems was obtained and is unremarkable except as mentioned under HPI          EXAMINATION: Within the context of virtual telehealth visit:    General appearance: Patient is well-developed and well-nourished in no apparent distress and well groomed.     Psych/mental health:  Affect: Appropriate    PHQ  3 most recent PHQ Screens 4/23/2020   PHQ Not Done Active Diagnosis of Depression or Bipolar Disorder   Little interest or pleasure in doing things -   Feeling down, depressed, irritable, or hopeless -   Total Score PHQ 2 -   Trouble falling or staying asleep, or sleeping too much -   Feeling tired or having little energy -   Poor appetite, weight loss, or overeating -   Feeling bad about yourself - or that you are a failure or have let yourself or your family down -   Trouble concentrating on things such as school, work, reading, or watching TV -   Moving or speaking so slowly that other people could have noticed; or the opposite being so fidgety that others notice -   Thoughts of being better off dead, or hurting yourself in some way -   PHQ 9 Score -   How difficult have these problems made it for you to do your work, take care of your home and get along with others -       HEENT:   Normocephalic  With evidence of trauma: No  Full range of motion head neck: Yes  Tenderness to palpation of the head neck region: N/A      Cardiovascular:     Extremities warm to touch: N/A  Extremity swelling: No  Discoloration: No  Evidence of PVD: No    Respiratory:   Dyspnea on exertion: No   Abnormal effort on casual observation: No   Use of portable oxygen: No   Evidence of cyanosis: No     Musculoskeletal:   Evidence of significant bone deformities: No   Spinal curvature: No     Integumentary:    Obvious bruising: No   Lacerations or discoloration on casual observation: No       Neurological Examination:   Mental Status:        MMSE  No flowsheet data found. Formal testing was not completed    there was nothing concerning on general observation and discussion.    Alert oriented and appropriate to general conversation  Normal processing on general observation  Followed conversation and responded seemingly appropriate throughout the office visit  No word finding difficulties noted on casual observation  Able to follow directions without difficulty     Cranial Nerves:    Grossly intact    Motor:   Normal bulk  No tremor appreciated on today's exam  No abnormal movements appreciated on today's exam        Sensation: Not tested    Coordination/Cerebellar:   Grossly intact    Gait: Not tested    Fall risk assessment  No flowsheet data found. Due to this being a TeleHealth evaluation, many elements of the physical examination are unable to be assessed. Pursuant to the emergency declaration under the [address] [place] [address], [facility] waiver authority and the [employer] Act, this Virtual  Visit was conducted, with patient's consent, to reduce the patient's risk of exposure to COVID-19 and provide continuity of care for an established patient. Services were provided through a video synchronous discussion virtually to substitute for in-person clinic visit. Follow-up and Dispositions    · Return in about 6 months (around 7/29/2021) for In office appointment.            Analia Sloan MS, ANP-BC, [facility]

## 2021-01-29 NOTE — PATIENT INSTRUCTIONS
As per our discussion today Still waiting on results of the labs that I sent out to the [facility] If there are antibody positive we can talk about treatment options for NMO If they are negative the treatment options are not effective so we will just continue to monitor over time Good news: Your MRI scans have not changed over time so that is awesome: :-) I strongly encourage you to use my chart if you need to contact us. Presently with a high utilization of telehealth it is very difficult to get through on her phone lines. If you have not already activated my chart or you forget your password their instructions in this packet to get my chart activated. Per discussion I do strongly recommend that you get the Covid vaccination when it becomes available to you there are no contraindications from a neurologic perspective or from a medical perspective based on our medical history review. Although you may want to discuss further with your primary care or another specialist to make sure there is no other concerns related to your health condition. Below is a website you can go to to get further information about the vaccine and vaccine scheduling through the state website and indirectly through [facility] system 
 
[email or web address] 
 
Next Steps: 
? If [address] patients reach out to you for more information on how to get a vaccine, please communicate the following: 
o Watch for a [employer] message that will let you know when you are eligible to schedule an appointment for a vaccine. o If you don't have [employer], go to [employer] and click ? atient Portal to sign up. 
o You can also watch our website for updates: [email or web address] ? Community members should complete the interest form on the [facility] website and monitor their local health district website to learn more about additional vaccine distribution opportunities. Office Policies 
 
o Phone calls/patient messages: Please allow up to 24 hours for someone in the office to contact you about your call or message. Be mindful your provider may be out of the office or your message may require further review. We encourage you to use [employer] for your messages as this is a faster, more efficient way to communicate with our office 
 
o Medication Refills: 
Prescription medications require up to 48 business hours to process. We encourage you to use [employer] for your refills. For controlled medications: Please allow up to 72 business hours to process. Certain medications may require you to  a written prescription at our office. NO narcotic/controlled medications will be prescribed after 4pm Monday through Friday or on weekends 
 
o Form/Paperwork Completion: We ask that you allow 7-14 business days. You may also download your forms to [employer] to have your doctor print off.

## 2021-02-01 RX ORDER — PRAVASTATIN SODIUM 20 MG/1
20 TABLET ORAL
Qty: 30 TAB | Refills: 5 | Status: SHIPPED | OUTPATIENT
Start: 2021-02-01 | End: 2021-10-16

## 2021-02-01 NOTE — TELEPHONE ENCOUNTER
[facility] on file sent a fax requesting a refill of   Requested Prescriptions     Pending Prescriptions Disp Refills    pravastatin (PRAVACHOL) 20 mg tablet 30 Tab 0     Sig: Take 1 Tab by mouth nightly.

## 2021-02-01 NOTE — TELEPHONE ENCOUNTER
PCP: Char Bob NP     Last appt: 10/13/2020   Future Appointments   Date Time Provider Neil Brown   7/28/2021  2:30 PM MARIAA Thao AMB        Requested Prescriptions     Pending Prescriptions Disp Refills    pravastatin (PRAVACHOL) 20 mg tablet 30 Tab 5     Sig: Take 1 Tab by mouth nightly.

## 2021-02-02 DIAGNOSIS — G36.0 NEUROMYELITIS OPTICA (HCC): ICD-10-CM

## 2021-02-02 DIAGNOSIS — G37.9 DEMYELINATING DISEASE OF CENTRAL NERVOUS SYSTEM (HCC): Primary | ICD-10-CM

## 2021-02-02 NOTE — PROGRESS NOTES
Insetting the patient back to [facility]  for the third time to get AquaPorin-4 titer drawn and sent to [facility].   It must go to [facility] for the cellular count [facility] only uses a threshold count which is not specific enough related to treatment options for the diagnosis

## 2021-03-13 NOTE — PROGRESS NOTES
I have been unable to reach this patient by phone. A letter is being sent to the last known home address.
I left a message to call back.
Left message for patient to call the office for test results.
Pls let pt know diabetes is not well controlled and needs to make a fu appt with Dr. Cindy Farrell.
good minus

## 2021-03-16 ENCOUNTER — TELEPHONE (OUTPATIENT)
Dept: PHARMACY | Age: 51
End: 2021-03-16

## 2021-03-16 NOTE — LETTER
[address] Marjorie Mckinley [address] Jenna Mike[address] [place] [address]  
 
 
 
03/17/21 Dear Joe Duncan, We tried to reach you recently regarding your pravastatin and losartan, but were unable to reach you on the telephone. We have on file that you are currently taking: 
- pravastatin 20 mg tablets- take 1 tablet by mouth nightly 
- losartan 50 mg tablets- take 1 tablet by mouth daily. If you are no longer taking or taking differently, please call us at the number below so that we can discuss this and update your medication profile. It appears that this medication has not been filled at proper times. We are worried you might be missing doses or not taking it as directed. It is important that you take your medications regularly and try not to miss a single dose. Some ways to help you remember to take and refill your medications are to: · Use a pill box, set an alarm, and/or keep your medication near something that you do every day · Fill a 3-month supply of your prescription at a time to save you time and trips to the pharmacy  if you would like assistance in switching your prescriptions to a 3-month supply, please contact us · Ask your pharmacy if they participate in [facility]", a program where you can  all of your medications on the same day · Ask your pharmacy if you can be set up with automatic refill, where they will automatically refill your prescription when it is due and let you know it's ready to  Sincerely, Tanmay Aguilar Direct: [phone] Department, toll free: [phone], option 7

## 2021-03-16 NOTE — TELEPHONE ENCOUNTER
CLINICAL PHARMACY: ADHERENCE REVIEW  Identified care gap per Mandeep; fills at [facility]: ACE/ARB and Statin adherence    Patient identified as LIS = 3, therefore patient's co-pays are $0.00 through all phases of the benefit regardless of the days' supply dispensed    Patient also appears to be prescribed: insulin     Patient not found in Outcomes MTM    ASSESSMENT  ACE/ARB ADHERENCE    Per [address]:   Losartan 50 mg last picked up on 1/18/21 for 30 day supply. 2/25 filled again and returned to stock. refills remaining. Billed through [employer]    BP Readings from Last 3 Encounters:   02/11/20 108/80   12/10/19 122/84   11/18/19 148/86     CrCl cannot be calculated (Unknown ideal weight.). STATIN ADHERENCE    Per [address]:   Pravastatin last picked up on 1/18/21 for 30 day supply. 2/25 filled again and returned to stock. refills remaining. Billed through [employer] Rx Value Date/Time    Cholesterol, total 184 12/18/2020 11:09 AM    HDL Cholesterol 52 12/18/2020 11:09 AM    LDL, calculated 107 (H) 12/18/2020 11:09 AM    LDL, calculated 88 05/24/2019 12:21 PM    VLDL, calculated 25 12/18/2020 11:09 AM    VLDL, calculated 19 05/24/2019 12:21 PM    Triglyceride 141 12/18/2020 11:09 AM     ALT (SGPT)   Date Value Ref Range Status   12/18/2020 28 0 - 44 IU/L Final     AST (SGOT)   Date Value Ref Range Status   12/18/2020 22 0 - 40 IU/L Final     The ASCVD Risk score (Julian Whittaker., et al., 2013) failed to calculate for the following reasons:    ASCVD risk score not calculated     PLAN  The following are interventions that have been identified:  - Patient overdue refilling pravastatin and losartan and active on home medication list  - Patient eligible for 90 day supply of pravastatin and losartan    Attempting to reach patient to review.  Left message asking for return call.     Future Appointments   Date Time Provider Neil Brown   7/28/2021  2:30 PM MARIAA Alexander AMB Lizett Becerra, PharmD, [address]  Direct: [phone]  Department, toll free: [phone], option 7

## 2021-03-17 NOTE — TELEPHONE ENCOUNTER
Second attempt made to contact patient. Left voice message to return call to [phone] or [phone], option 7. Letter sent.     Anne Coon, PharmD, [facility], [address] [facility] Clinical Pharmacist  O: [phone]  Department, toll free: [phone], option 7     CLINICAL PHARMACY CONSULT: MED RECONCILIATION/REVIEW ADDENDUM    For Pharmacy Admin Tracking Only    PHSO: PHSO Patient?: Yes  Total # of Interventions Recommended: Count: 0  Recommended intervention potential cost savings: 0  Total Interventions Accepted: 0  Time Spent (min): 10

## 2021-04-14 DIAGNOSIS — E10.42 DIABETIC POLYNEUROPATHY ASSOCIATED WITH TYPE 1 DIABETES MELLITUS (HCC): ICD-10-CM

## 2021-04-14 RX ORDER — HYDROXYZINE PAMOATE 25 MG/1
CAPSULE ORAL
Qty: 60 CAP | Refills: 0 | Status: CANCELLED | OUTPATIENT
Start: 2021-04-14

## 2021-04-15 RX ORDER — HYDROXYZINE PAMOATE 25 MG/1
CAPSULE ORAL
Qty: 60 CAP | Refills: 0 | Status: SHIPPED | OUTPATIENT
Start: 2021-04-15 | End: 2021-04-16

## 2021-04-15 RX ORDER — PREGABALIN 75 MG/1
CAPSULE ORAL
Qty: 60 CAP | Refills: 0 | Status: SHIPPED | OUTPATIENT
Start: 2021-04-15 | End: 2021-05-18 | Stop reason: DRUGHIGH

## 2021-04-16 RX ORDER — HYDROXYZINE PAMOATE 25 MG/1
CAPSULE ORAL
Qty: 60 CAP | Refills: 0 | Status: SHIPPED | OUTPATIENT
Start: 2021-04-16 | End: 2021-08-29

## 2021-04-19 ENCOUNTER — TELEPHONE (OUTPATIENT)
Dept: PHARMACY | Age: 51
End: 2021-04-19

## 2021-04-19 NOTE — TELEPHONE ENCOUNTER
CLINICAL PHARMACY: ADHERENCE REVIEW  Identified care gap per [place]; fills at [facility]: ACE/ARB and Statin adherence    Patient identified as LIS = 3, therefore patient's co-pays are $0.00 through all phases of the benefit regardless of the days' supply dispensed    Patient also appears to be prescribed: insulin    ASSESSMENT  ACE/ARB ADHERENCE    Per Insurance Records through 4/10/21 (2020 [place] = n/a%; [place] = filled only once; absolute fail date 4/17/21): Losartan last filled on 1/18/21 for 30 day supply. Next refill due: 2/17/21  Per prior outreach, 2/25 filled again and returned to stock. refills remaining. Billed through [employer]. BP Readings from Last 3 Encounters:   02/11/20 108/80   12/10/19 122/84   11/18/19 148/86     CrCl cannot be calculated (Unknown ideal weight.). [facility]    Per Insurance Records through 4/10/21 ([address] [place] = n/a%; YTD [place] = filled only once; absolute fail date 4/17/21):   Pravastatin 20 mg tablet last filled on 1/18/21 for 30 day supply. Next refill due: 2/17/21  Per prior outreach, 2/25 filled again and returned to stock. refills remaining. Billed through [employer].     Lab Results   Component Value Date/Time    Cholesterol, total 184 12/18/2020 11:09 AM    HDL Cholesterol 52 12/18/2020 11:09 AM    LDL, calculated 107 (H) 12/18/2020 11:09 AM    LDL, calculated 88 05/24/2019 12:21 PM    VLDL, calculated 25 12/18/2020 11:09 AM    VLDL, calculated 19 05/24/2019 12:21 PM    Triglyceride 141 12/18/2020 11:09 AM     ALT (SGPT)   Date Value Ref Range Status   12/18/2020 28 0 - 44 IU/L Final     AST (SGOT)   Date Value Ref Range Status   12/18/2020 22 0 - 40 IU/L Final     The 10-year ASCVD risk score (Nicolas Cheney et al., 2013) is: 11.5%    Values used to calculate the score:      Age: 48 years      Sex: Male      Is Non- : Yes      Diabetic: Yes      Tobacco smoker: No      Systolic Blood Pressure: 558 mmHg      Is BP treated: Yes      HDL Cholesterol: 52 mg/dL      Total Cholesterol: 184 mg/dL     PLAN  The following are interventions that have been identified:  - Patient overdue refilling losartan and pravastatin and active on home medication list. They are also eligible for 90 day supply. Attempting to reach patient to review.  Left message asking for return call. MyChart message sent.     Future Appointments   Date Time Provider Neil Brown   7/28/2021  2:30 PM Ashley Wall NP [facility] PharmD, [address]  Direct: [phone]  Department, toll free: [phone], option 7

## 2021-04-20 NOTE — TELEPHONE ENCOUNTER
Second attempt made to contact patient. Left voice message to return call to [phone] or [phone], option 7. No further outreach planned at this time.

## 2021-05-17 NOTE — PROGRESS NOTES
Robby Krueger is a 48 y.o. male who was seen by synchronous (real-time) audio-video technology on 5/18/2021 for Hypertension, Other (Mood), and Neuropathy        Assessment & Plan:   Diagnoses and all orders for this visit:    1. Diabetes mellitus type 1, uncontrolled, with complications (HCC)  -     REFERRAL TO OPHTHALMOLOGY  -     METABOLIC PANEL, COMPREHENSIVE; Future  -     HEMOGLOBIN A1C WITH EAG; Future  Overdue for fu with Dr. Karyn Gutiérrez- will send team msg to get scheduled asap  Blood sugars have been elevated- pt not consistent with his night time insulin- discussed importance of taking as prescribed and keeping log of blood sugars    2. Major depressive disorder with single episode, in partial remission ([facility] [address]  Well controlled, continue current management. Wants to hold off on restarting prozac at this time. 3. Diabetic polyneuropathy associated with type 1 diabetes mellitus (HCC)  -     pregabalin (LYRICA) 150 mg capsule; Take 1 Cap by mouth two (2) times a day. Max Daily Amount: 300 mg. STOP gabapentin- discussed importance of not taking both medications  Will increase lyrica as above fu in 1 month    4. Hypercholesterolemia  -     LIPID PANEL; Future    5. Anxiety  Well controlled, continue current management. 6. Essential hypertension  -     REFERRAL TO OPHTHALMOLOGY  -     METABOLIC PANEL, COMPREHENSIVE; Future  -     CBC WITH AUTOMATED DIFF; Future  Home numbers fluctuating, needs in person eval for BP check    7. Screen for colon cancer  -     REFERRAL TO GASTROENTEROLOGY    8. Encounter for hepatitis C screening test for low risk patient  -     HEPATITIS C AB; Future            I spent at least 30 minutes on this visit with this established patient. Subjective:     Pt here to fu on HTN, mood and neuropathy. He has type 1 DM, follows with Dr. Karyn Gutiérrez but has not seen since Oct 2019, was supposed to return for 1 month fu. He follows with Zach Purdy NP for demyelinating disease of CNS.    States he has had trouble getting appt with Dr. Zeferino Cunningham. Blood sugars have been in 200-300 range. Has had three episodes of lows in 46s. Takes levemir 50 units BID- normally misses the night dose. Takes insulin aspart 20 units with every meals. Sugars run higher in the AM.    Denies chest pain, chest pressure, or palpitations. Denies SOB, orthopnea, or PND. Denies lower extremity swelling. Denies HA or dizziness. Has had some blurry vision. Mood- taking buspar, stopped prozac bc was making him feel off. Reports mood has been stable, good.    HTN- BP has been fluctuating 120-140/80, 173/100 once, taking losartan daily, reports compliance  XOL- tolearting statin  Neuropathy- taking lyrica and gabapentin together, had leftover gabapentin and wasn't clear he was supposed to stop it when started lyrica, has been making him feel funny in the head          3 most recent PHQ Screens 5/18/2021   PHQ Not Done Active Diagnosis of Depression or Bipolar Disorder   Little interest or pleasure in doing things -   Feeling down, depressed, irritable, or hopeless -   Total Score PHQ 2 -   Trouble falling or staying asleep, or sleeping too much -   Feeling tired or having little energy -   Poor appetite, weight loss, or overeating -   Feeling bad about yourself - or that you are a failure or have let yourself or your family down -   Trouble concentrating on things such as school, work, reading, or watching TV -   Moving or speaking so slowly that other people could have noticed; or the opposite being so fidgety that others notice -   Thoughts of being better off dead, or hurting yourself in some way -   PHQ 9 Score -   How difficult have these problems made it for you to do your work, take care of your home and get along with others -         Health Maintenance Due   Topic Date Due    Hepatitis C Screening  Never done    Pneumococcal 0-64 years (1 of 1 - PPSV23) Never done    COVID-19 Vaccine (1) Never done  DTaP/Tdap/Td series (1 - Tdap) Never done    Foot Exam Q1  07/29/2020    Eye Exam Retinal or Dilated  08/30/2020    Shingrix Vaccine Age 50> (1 of 2) Never done    Colorectal Cancer Screening Combo  Never done    A1C test (Diabetic or Prediabetic)  03/18/2021   COVID vaccine- March 2021  Had Pneum vaccine at [facility] [address]. Hasn't seen eye MD in >3 years. Has not had hep c, shingles vac, or colon ca screen. Denies family history of colon cancer. Denies BRBPR or melena.            Prior to Admission medications    Medication Sig Start Date End Date Taking? Authorizing Provider   hydrOXYzine pamoate (VISTARIL) 25 mg capsule TAKE 1 CAPSULE BY MOUTH 4 TIMES DAILY AS NEEDED FOR ITCHING 4/16/21   Marce Burrell NP   pregabalin (LYRICA) 75 mg capsule TAKE 1 CAPSULE BY MOUTH TWICE DAILY MAX  DAILY  AMOUNT  150  MG. 4/15/21   Marce Burrell NP   pravastatin (PRAVACHOL) 20 mg tablet Take 1 Tab by mouth nightly. 2/1/21   Marce Burrell NP   gabapentin (NEURONTIN) 600 mg tablet Take 600 mg by mouth four (4) times daily. Provider, Historical   losartan (COZAAR) 50 mg tablet Take 1 Tab by mouth daily. 1/18/21   Marce Burrell NP   Insulin Needles, Disposable, 31 gauge x 5/16\" ndle Use with insulin 1/18/21   Marce Burrell NP   FLUoxetine (PROzac) 20 mg capsule Take 1 Cap by mouth daily. 1/18/21   Marce Burrell NP   diphenoxylate-atropine (LOMOTIL) 2.5-0.025 mg per tablet Take 1 Tab by mouth four (4) times daily. Max Daily Amount: 4 Tabs. 1/18/21   Marce Burrell NP   amLODIPine (NORVASC) 2.5 mg tablet Take 1 Tab by mouth daily. 1/18/21   Marce Burrell NP   triamcinolone acetonide (KENALOG) 0.1 % topical cream APPLY  CREAM EXTERNALLY TO AFFECTED AREA TWICE DAILY AS NEEDED FOR SKIN IRRITATION. USE THIN LAYER ON NECK RASH AND EXTERNAL EAR 11/25/20   Juliette Butcher MD   ProAir HFA 90 mcg/actuation inhaler INHALE 1 PUFF BY MOUTH EVERY 4 HOURS AS NEEDED FOR WHEEZING 11/25/20 Param Torres MD   busPIRone (BUSPAR) 5 mg tablet TAKE 1 TABLET BY MOUTH THREE TIMES DAILY WITH MEALS 10/29/20   Bette Yin, MARIAA   insulin detemir U-100 (LEVEMIR FLEXTOUCH) 100 unit/mL (3 mL) inpn Inject 50 units am and 50 units HS  Indications: type 1 diabetes mellitus 10/26/20   Bette Yin, NP   ALPRAZolam Zayra Bears) 0.25 mg tablet 1 tablet 30 to 60 minutes prior to MRI scan may repeat x1 if needed must have someone to drive you to and from the MRI scanner  Indications: anxious 10/26/20   Jd Fears, NP   insulin aspart U-100 (NovoLOG Flexpen U-100 Insulin) 100 unit/mL (3 mL) inpn INJECT 15-20 UNITS SUBCUTANEOUSLY WITH MEALS 4/23/20   Vee Phoenix MD   busPIRone (BUSPAR) 10 mg tablet Take 1 Tab by mouth three (3) times daily (with meals). 11/18/19   Vee Phoenix MD   multivitamin, tx-iron-ca-min (THERA-M W/ IRON) 9 mg iron-400 mcg tab tablet Take 1 Tab by mouth daily. Provider, Historical   cyanocobalamin 1,000 mcg tablet Take 1,000 mcg by mouth daily. Provider, Historical   selenium sulfide (SELSUN BLUE) 1 % shampoo Apply to head, mustache area and left ear at least several times daily. 11/13/18   Susanne Stafford MD   glucose blood VI test strips (PRODIGY NO CODING) strip Monitor BS twice daily 11/6/18   Patrick Degroot MD         ROS see hpi  This visit was completed virtually using doxy. me       Objective:     Patient-Reported Vitals 10/13/2020   Patient-Reported Weight 190lb   Patient-Reported Systolic  794   Patient-Reported Diastolic 75        [INSTRUCTIONS:  \"[x]\" Indicates a positive item  \"[]\" Indicates a negative item  -- DELETE ALL ITEMS NOT EXAMINED]    Constitutional: [x] Appears well-developed and well-nourished [x] No apparent distress      [] Abnormal -     Mental status: [x] Alert and awake  [x] Oriented to person/place/time [x] Able to follow commands    [] Abnormal -     Eyes:   EOM    [x]  Normal    [] Abnormal -   Sclera  [x]  Normal    [] Abnormal - Discharge [x]  None visible   [] Abnormal -     HENT: [x] Normocephalic, atraumatic  [] Abnormal -   [x] Mouth/Throat: Mucous membranes are moist    External Ears [x] Normal  [] Abnormal -    Neck: [x] No visualized mass [] Abnormal -     Pulmonary/Chest: [x] Respiratory effort normal   [x] No visualized signs of difficulty breathing or respiratory distress        [] Abnormal -      Musculoskeletal:   [] Normal gait with no signs of ataxia         [x] Normal range of motion of neck        [] Abnormal -     Neurological:        [x] No Facial Asymmetry (Cranial nerve 7 motor function) (limited exam due to video visit)          [x] No gaze palsy        [] Abnormal -          Skin:        [x] No significant exanthematous lesions or discoloration noted on facial skin         [] Abnormal -            Psychiatric:       [x] Normal Affect [] Abnormal -        [x] No Hallucinations    Other pertinent observable physical exam findings:-        We discussed the expected course, resolution and complications of the diagnosis(es) in detail. Medication risks, benefits, costs, interactions, and alternatives were discussed as indicated. I advised him to contact the office if his condition worsens, changes or fails to improve as anticipated. He expressed understanding with the diagnosis(es) and plan. Ana Maria Lou, was evaluated through a synchronous (real-time) audio-video encounter. The patient (or guardian if applicable) is aware that this is a billable service. Verbal consent to proceed has been obtained within the past 12 months. The visit was conducted pursuant to the emergency declaration under the [address] [place] [address] authority and the [place] [employer] and [employer] General Act. Patient identification was verified, and a caregiver was present when appropriate.  The patient was located in a state where the provider was credentialed to provide zander.      [place], NP

## 2021-05-18 ENCOUNTER — VIRTUAL VISIT (OUTPATIENT)
Dept: INTERNAL MEDICINE CLINIC | Age: 51
End: 2021-05-18
Payer: COMMERCIAL

## 2021-05-18 DIAGNOSIS — E78.00 HYPERCHOLESTEROLEMIA: ICD-10-CM

## 2021-05-18 DIAGNOSIS — Z12.11 SCREEN FOR COLON CANCER: ICD-10-CM

## 2021-05-18 DIAGNOSIS — I10 ESSENTIAL HYPERTENSION: ICD-10-CM

## 2021-05-18 DIAGNOSIS — F32.4 MAJOR DEPRESSIVE DISORDER WITH SINGLE EPISODE, IN PARTIAL REMISSION (HCC): ICD-10-CM

## 2021-05-18 DIAGNOSIS — E10.42 DIABETIC POLYNEUROPATHY ASSOCIATED WITH TYPE 1 DIABETES MELLITUS (HCC): ICD-10-CM

## 2021-05-18 DIAGNOSIS — F41.9 ANXIETY: ICD-10-CM

## 2021-05-18 DIAGNOSIS — Z11.59 ENCOUNTER FOR HEPATITIS C SCREENING TEST FOR LOW RISK PATIENT: ICD-10-CM

## 2021-05-18 PROCEDURE — 99214 OFFICE O/P EST MOD 30 MIN: CPT | Performed by: NURSE PRACTITIONER

## 2021-05-18 RX ORDER — GABAPENTIN 600 MG/1
600 TABLET ORAL 4 TIMES DAILY
Status: CANCELLED | OUTPATIENT
Start: 2021-05-18

## 2021-05-18 RX ORDER — PREGABALIN 150 MG/1
150 CAPSULE ORAL 2 TIMES DAILY
Qty: 60 CAP | Refills: 0 | Status: SHIPPED | OUTPATIENT
Start: 2021-05-18 | End: 2021-08-20 | Stop reason: SDUPTHER

## 2021-05-18 RX ORDER — FLUOXETINE HYDROCHLORIDE 20 MG/1
20 CAPSULE ORAL DAILY
Qty: 30 CAP | Refills: 0 | Status: CANCELLED | OUTPATIENT
Start: 2021-05-18

## 2021-05-18 NOTE — PROGRESS NOTES
Jenna Orozco  Identified pt with two pt identifiers(name and ). Chief Complaint   Patient presents with    Hypertension    Other     Mood    Neuropathy       Reviewed record In preparation for visit and have obtained necessary documentation. 1. Have you been to the ER, urgent care clinic or hospitalized since your last visit? No     2. Have you seen or consulted any other health care providers outside of the [address] [place] [address] since your last visit? Include any pap smears or colon screening. No    Patient does not have an advance directive. Vitals reviewed with provider. Health Maintenance reviewed:     Health Maintenance Due   Topic    Hepatitis C Screening     Pneumococcal 0-64 years (1 of 1 - PPSV23)    COVID-19 Vaccine (1)    DTaP/Tdap/Td series (1 - Tdap)    Foot Exam Q1     Eye Exam Retinal or Dilated     Shingrix Vaccine Age 49> (1 of 2)    Colorectal Cancer Screening Combo     A1C test (Diabetic or Prediabetic)           Wt Readings from Last 3 Encounters:   20 192 lb (87.1 kg)   12/10/19 193 lb 8 oz (87.8 kg)   19 194 lb 8 oz (88.2 kg)        Temp Readings from Last 3 Encounters:   12/10/19 97.9 °F (36.6 °C) (Oral)   19 97.4 °F (36.3 °C) (Oral)   10/24/19 97.6 °F (36.4 °C) (Oral)        BP Readings from Last 3 Encounters:   20 108/80   12/10/19 122/84   19 148/86        Pulse Readings from Last 3 Encounters:   20 (!) 101   12/10/19 94   19 92      There were no vitals filed for this visit.        Learning Assessment:   :       Learning Assessment 2020 3/29/2018 2017 2017 2017 2016 2015   PRIMARY LEARNER Patient Patient Patient Patient Patient Patient Patient   HIGHEST LEVEL OF EDUCATION - PRIMARY LEARNER  - - - - - 4 YEARS OF COLLEGE 4 YEARS OF COLLEGE   BARRIERS PRIMARY LEARNER - - - - - NONE NONE   CO-LEARNER CAREGIVER - - - - - No No   PRIMARY LANGUAGE ENGLISH ENGLISH ENGLISH ENGLISH ENGLISH ENGLISH ENGLISH   LEARNER PREFERENCE PRIMARY VIDEOS DEMONSTRATION DEMONSTRATION DEMONSTRATION DEMONSTRATION VIDEOS DEMONSTRATION   ANSWERED BY self patient patient patient patient Patient Patient   RELATIONSHIP SELF SELF SELF SELF SELF SELF SELF        Depression Screening:   :       3 most recent PHQ Screens 5/18/2021   PHQ Not Done Active Diagnosis of Depression or Bipolar Disorder   Little interest or pleasure in doing things -   Feeling down, depressed, irritable, or hopeless -   Total Score PHQ 2 -   Trouble falling or staying asleep, or sleeping too much -   Feeling tired or having little energy -   Poor appetite, weight loss, or overeating -   Feeling bad about yourself - or that you are a failure or have let yourself or your family down -   Trouble concentrating on things such as school, work, reading, or watching TV -   Moving or speaking so slowly that other people could have noticed; or the opposite being so fidgety that others notice -   Thoughts of being better off dead, or hurting yourself in some way -   PHQ 9 Score -   How difficult have these problems made it for you to do your work, take care of your home and get along with others -        Fall Risk Assessment:   :     No flowsheet data found. Abuse Screening:   :     No flowsheet data found.      ADL Screening:   :       ADL Assessment 5/24/2019   Feeding yourself No Help Needed   Getting from bed to chair No Help Needed   Getting dressed No Help Needed   Bathing or showering No Help Needed   Walk across the room (includes cane/walker) No Help Needed   Using the telphone No Help Needed   Taking your medications No Help Needed   Preparing meals No Help Needed   Managing money (expenses/bills) No Help Needed   Moderately strenuous housework (laundry) No Help Needed   Shopping for personal items (toiletries/medicines) No Help Needed   Shopping for groceries No Help Needed   Driving Help Needed   Climbing a flight of stairs No Help Needed   Getting to places beyond walking distances Help Needed

## 2021-05-19 ENCOUNTER — VIRTUAL VISIT (OUTPATIENT)
Dept: ENDOCRINOLOGY | Age: 51
End: 2021-05-19
Payer: COMMERCIAL

## 2021-05-19 DIAGNOSIS — F32.4 MAJOR DEPRESSIVE DISORDER WITH SINGLE EPISODE, IN PARTIAL REMISSION (HCC): ICD-10-CM

## 2021-05-19 DIAGNOSIS — G36.0 NEUROMYELITIS OPTICA (HCC): ICD-10-CM

## 2021-05-19 PROCEDURE — 99214 OFFICE O/P EST MOD 30 MIN: CPT | Performed by: INTERNAL MEDICINE

## 2021-05-19 NOTE — PROGRESS NOTES
This gentleman returns for follow-up of his type 1 diabetes mellitus with poor blood sugar control and neuromyelitis optica with progressive vision loss.  He saw neurology recently and all of his imaging studies are stable at this time. Lizett Sargent does continue to lose vision and there appears to be nothing possible to reverse this or at least mitigate this decrease unless he can be on rituximab which is being explored. Not seen him since 2019 because of Covid and because of multiple issues leading him to missing visits. Transportation is a major issue for him. In any case, he had been on a freestyle neal and that was switched to a Dexcom. He has had great difficulty with the Dexcom. He says it only last 2 days so he is not been checking any blood sugars. He also does not have a meter. He had a recent A1c of 10.8% and that is why he is primary care physician requested this visit. When I saw him last he was taking 30 units of Levemir twice daily and NovoLog with meals. For some reason he is switched to taking 50 units of Levemir once in the morning and 20 units of NovoLog with his meals. Again he is not checking blood sugars. His first meal the day is at about 10:00 in the morning. That usually cereal with milk. Lunch is a sandwich. Dinner can be a hamburger and Tash Maxon fries. He takes 20 units of NovoLog for each 1 of those meals. He complains of hypoglycemia mid afternoon fairly frequently. He denies chest pain, shortness of breath, constipation or diarrhea. He does complain of decreased visual acuity.     Examination  GENERAL: NCAT, Appears well nourished   EYES: EOMI, non-icteric, no proptosis   Ear/Nose/Throat: NCAT, no visible inflammation or masses   CARDIOVASCULAR: no cyanosis, no visible JVD   RESPIRATORY: comfortable respirations observed, no cyanosis   MUSCULOSKELETAL: Normal ROM of upper extremities observed   SKIN: No edema, rash, or other significant changes observed   NEUROLOGIC: AAOx3   PSYCHIATRIC: Normal affect, Normal insight and judgement       Impression  1. Type 1 diabetes mellitus with poor blood sugar control and an A1c of 10.8%  2. 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 Significant adverse social determinants of health    Plan:  1. I have switched his Levemir to 30 units at 7 AM and 30 units at 7 PM.  He tells me he should be able to remember to do that. He was previously taking the second dose of Levemir at night and he frequently forgot that and/or fell asleep. He believes this will work. 2.  I have continued the NovoLog at 20 units with meals  3. I have arranged for him to come to the office next Tuesday and we will do a Dexcom insertion together to make sure his technique is good. I also advised him to bring his smart phone so that we can see if the clarity karen can be uploaded. 4.  I will see him back in 2 months.

## 2021-05-24 ENCOUNTER — TELEPHONE (OUTPATIENT)
Dept: ENDOCRINOLOGY | Age: 51
End: 2021-05-24

## 2021-05-24 NOTE — TELEPHONE ENCOUNTER
----- Message from [place] sent at 5/24/2021  1:59 PM EDT -----  Regarding: /Telephone  Caller's first and last name and relationship (if not the patient): n/a    Best contact number(s): [phone]    Whose call is being returned: pt    Details to clarify the request: PT is supposed to be coming into the office to have help setting up his Dexcom meter. Was told to come Tuesday but is not sure about the time.

## 2021-06-17 NOTE — PROGRESS NOTES
HPI  Carrillo Owens is a 48y.o. year old male patient of Leonor Flores NP who presents with c/o   Chief Complaint   Patient presents with    Blood Pressure Check     Room 1A //    Other     Mood //        Pt has history of has Diabetic polyneuropathy associated with type 1 diabetes mellitus ([address] Neuromyelitis optica ([address] Diabetes mellitus type 1, uncontrolled, with complications ([address] Major depressive disorder with single episode, in partial remission ([facility] [address] Essential hypertension, Anxiety about health, and Hypercholesterolemia on their problem list..      Pt here for 1 month fu BP, neuropathy, DM. Did not get labs after last appt. Last seen in person by Dr. Sonia Candelario in 2019. Seen by Dr. Kwaku Abdullahi on 5-19 for DM, switched his Levemir to 30 units at 7 AM and 30 units at 7 PM, continued the NovoLog at 20 units with meals, set up with Dexcom and advised fu 2 mos. Has been trying to do better with his nightime insulin but still misses it occasionally. At last visit we discussed not taking gabapentin and lyrica together and switched to only lyrica at increased dose. Pt states he doesn't check his blood sugars. Gets frustrated bc sugar was always high. Hasn't put Dexcom on, says keeps falling out, doesn't have it with him today. Hasn't taken BP medicines day. Denies chest pain, pressure, sob, rutledge, or le edema. Tries to go for walks but gets very fatigued. Has chronic pain in both arms, legs, feet, hands. Didn't take lyrica this AM. Has numbness/tingling in feet- feels like electric shocks. Lyrica makes him tired. Does help with the pain, takes edge off. Denies family history of cancer. Has never had colonoscopy. Had pneum vaccine at [facility] [address]          Lab Results   Component Value Date/Time    Hemoglobin A1c 10.8 (H) 12/18/2020 11:09 AM    Hemoglobin A1c (POC) 10.3 10/24/2019 10:32 AM         Lab Results   Component Value Date/Time    Sodium 143 12/18/2020 11:09 AM    Potassium 3.8 12/18/2020 11:09 AM    Chloride 104 12/18/2020 11:09 AM    CO2 27 12/18/2020 11:09 AM    Anion gap 4 (L) 08/16/2018 02:02 PM    Glucose 170 (H) 12/18/2020 11:09 AM    BUN 9 12/18/2020 11:09 AM    Creatinine 0.81 12/18/2020 11:09 AM    BUN/Creatinine ratio 11 12/18/2020 11:09 AM    GFR est  12/18/2020 11:09 AM    GFR est non- 12/18/2020 11:09 AM    Calcium 9.4 12/18/2020 11:09 AM    Bilirubin, total 0.3 12/18/2020 11:09 AM    Alk.  phosphatase 119 (H) 12/18/2020 11:09 AM    Protein, total 6.9 12/18/2020 11:09 AM    Albumin 4.2 12/18/2020 11:09 AM    Globulin 3.6 08/16/2018 02:02 PM    A-G Ratio 1.6 12/18/2020 11:09 AM    ALT (SGPT) 28 12/18/2020 11:09 AM    AST (SGOT) 22 12/18/2020 11:09 AM     Lab Results   Component Value Date/Time    Cholesterol, total 184 12/18/2020 11:09 AM    HDL Cholesterol 52 12/18/2020 11:09 AM    LDL, calculated 107 (H) 12/18/2020 11:09 AM    LDL, calculated 88 05/24/2019 12:21 PM    VLDL, calculated 25 12/18/2020 11:09 AM    VLDL, calculated 19 05/24/2019 12:21 PM    Triglyceride 141 12/18/2020 11:09 AM         Health Maintenance Overdue  Health Maintenance Due   Topic Date Due    Hepatitis C Screening  Never done    Pneumococcal 0-64 years (1 of 2 - PPSV23) Never done    COVID-19 Vaccine (1) Never done    DTaP/Tdap/Td series (1 - Tdap) Never done    Eye Exam Retinal or Dilated  08/30/2019    Foot Exam Q1  07/29/2020    Shingrix Vaccine Age 50> (1 of 2) Never done    Colorectal Cancer Screening Combo  Never done    A1C test (Diabetic or Prediabetic)  03/18/2021       Depression Screen  3 most recent PHQ Screens 6/18/2021   PHQ Not Done Active Diagnosis of Depression or Bipolar Disorder   Little interest or pleasure in doing things -   Feeling down, depressed, irritable, or hopeless -   Total Score PHQ 2 -   Trouble falling or staying asleep, or sleeping too much -   Feeling tired or having little energy -   Poor appetite, weight loss, or overeating -   Feeling bad about yourself - or that you are a failure or have let yourself or your family down -   Trouble concentrating on things such as school, work, reading, or watching TV -   Moving or speaking so slowly that other people could have noticed; or the opposite being so fidgety that others notice -   Thoughts of being better off dead, or hurting yourself in some way -   PHQ 9 Score -   How difficult have these problems made it for you to do your work, take care of your home and get along with others -           Patient Active Problem List   Diagnosis Code    Diabetic polyneuropathy associated with type 1 diabetes mellitus ([facility] [address] E10.42    Neuromyelitis optica ([facility] [address] G36.0    Diabetes mellitus type 1, uncontrolled, with complications ([facility] [address] T74.2, E10.65    Major depressive disorder with single episode, in partial remission ([facility] [address] F32.4    Essential hypertension I10    Anxiety about health F41.8    Hypercholesterolemia E78.00     Past Medical History:   Diagnosis Date    Asthma     Type 2 diabetes mellitus with peripheral neuropathy ([facility] [address]      Past Surgical History:   Procedure Laterality Date    HX HEENT      ear surgery(both)     Social History     Socioeconomic History    Marital status: SINGLE     Spouse name: Not on file    Number of children: Not on file    Years of education: Not on file    Highest education level: Not on file   Tobacco Use    Smoking status: Never Smoker    Smokeless tobacco: Never Used   Vaping Use    Vaping Use: Never used   Substance and Sexual Activity    Alcohol use: No     Alcohol/week: 0.0 standard drinks    Drug use: No    Sexual activity: Not Currently     Social Determinants of Health     Financial Resource Strain:     Difficulty of Paying Living Expenses:    Food Insecurity:     Worried About Running Out of Food in the Last Year:     Ran Out of Food in the Last Year:    Transportation Needs:     Lack of Transportation (Medical):      Lack of Transportation (Non-Medical):    Physical Activity:     Days of Exercise per Week:     Minutes of Exercise per Session:    Stress:     Feeling of Stress :    Social Connections:     Frequency of Communication with Friends and Family:     Frequency of Social Gatherings with Friends and Family:     Attends Mandaen Services:     Active Member of Clubs or Organizations:     Attends Club or Organization Meetings:     Marital Status:      Family History   Problem Relation Age of Onset    Diabetes Father     Cancer Father         prostate    Hypertension Mother     Other Mother         Crohn's disease    Heart Disease Mother     Hypertension Maternal Grandmother     Thyroid Disease Sister     No Known Problems Brother      No Known Allergies    MEDICATIONS  Current Outpatient Medications   Medication Sig    amLODIPine (NORVASC) 2.5 mg tablet Take 1 Tablet by mouth daily.  losartan (COZAAR) 50 mg tablet Take 1 Tablet by mouth daily.  pregabalin (LYRICA) 150 mg capsule Take 1 Cap by mouth two (2) times a day. Max Daily Amount: 300 mg.  hydrOXYzine pamoate (VISTARIL) 25 mg capsule TAKE 1 CAPSULE BY MOUTH 4 TIMES DAILY AS NEEDED FOR ITCHING    pravastatin (PRAVACHOL) 20 mg tablet Take 1 Tab by mouth nightly.  Insulin Needles, Disposable, 31 gauge x 5/16\" ndle Use with insulin    diphenoxylate-atropine (LOMOTIL) 2.5-0.025 mg per tablet Take 1 Tab by mouth four (4) times daily. Max Daily Amount: 4 Tabs.  triamcinolone acetonide (KENALOG) 0.1 % topical cream APPLY  CREAM EXTERNALLY TO AFFECTED AREA TWICE DAILY AS NEEDED FOR SKIN IRRITATION. USE THIN LAYER ON NECK RASH AND EXTERNAL EAR    ProAir HFA 90 mcg/actuation inhaler INHALE 1 PUFF BY MOUTH EVERY 4 HOURS AS NEEDED FOR WHEEZING    insulin detemir U-100 (LEVEMIR FLEXTOUCH) 100 unit/mL (3 mL) inpn Inject 50 units am and 50 units HS  Indications: type 1 diabetes mellitus    ALPRAZolam (XANAX) 0.25 mg tablet 1 tablet 30 to 60 minutes prior to MRI scan may repeat x1 if needed must have someone to drive you to and from the MRI scanner  Indications: anxious    insulin aspart U-100 (NovoLOG Flexpen U-100 Insulin) 100 unit/mL (3 mL) inpn INJECT 15-20 UNITS SUBCUTANEOUSLY WITH MEALS (Patient taking differently: INJECT 20 UNITS SUBCUTANEOUSLY WITH MEALS)    busPIRone (BUSPAR) 10 mg tablet Take 1 Tab by mouth three (3) times daily (with meals).  multivitamin, tx-iron-ca-min (THERA-M W/ IRON) 9 mg iron-400 mcg tab tablet Take 1 Tab by mouth daily.  cyanocobalamin 1,000 mcg tablet Take 1,000 mcg by mouth daily.  selenium sulfide (SELSUN BLUE) 1 % shampoo Apply to head, mustache area and left ear at least several times daily.  glucose blood VI test strips (PRODIGY NO CODING) strip Monitor BS twice daily     No current facility-administered medications for this visit. REVIEW OF SYSTEMS  Per HPI        Visit Vitals  BP (!) 140/95   Pulse 94   Temp 98 °F (36.7 °C) (Oral)   Resp 16   Ht 5' 10\" (1.778 m)   Wt 219 lb 6.4 oz (99.5 kg)   SpO2 98%   BMI 31.48 kg/m²         General: Well-developed, well-nourished. In no distress. A&O x 3. Head: Normocephalic, atraumatic. Eyes: Conjunctiva clear. Pupils equal, round, reactive to light. Extraocular movements intact. Ears/Nose: TM's and ear canals normal bilaterally. Nares normal. Septum midline. Normal nasal mucosa. No drainage or sinus tenderness. Mouth/Throat: Lips, mucosa, and tongue normal. Oropharynx benign. Neck: Supple, symmetrical, trachea midline, no lymphadenopathy, no carotid bruits, no JVD, thyroid: not enlarged, symmetric, no tenderness/mass/nodules. Lungs: Clear to auscultation bilaterally. No crackles or wheezes. No use of accessory muscles. Speaks in full sentences without SOB. Chest Wall: No tenderness or deformity. Heart: RRR, normal S1 and S2, no murmur, click, rub, or gallop. Back: Symmetric. ROM intact. No CVA tenderness. Abdomen: Soft, non-distended, bowel sounds normal. No tenderness. No masses.  No hepatosplenomegaly. .   Skin: No rashes or lesions. Neurological: CN II-XII intact. Normal strength, sensation, and reflexes throughout. Neurovasc: No edema appreciated. Dorsalis pedis pulses are 2+ on the right side, and 2+ on the left side. Posterior tibial pulses are 2+ on the right side, and 2+ on the left side. Musculoskeletal: Gait normal. ROM normal at both knees and shoulders. Psychiatric: Normal mood and affect. Behavior is normal.       No results found for any visits on 06/18/21. ASSESSMENT and PLAN  Diagnoses and all orders for this visit:    1. Diabetic polyneuropathy associated with type 1 diabetes mellitus (HCC)  -     AMB POC HEMOGLOBIN L3M  -     METABOLIC PANEL, COMPREHENSIVE; Future  -     LIPID PANEL; Future    2. Essential hypertension  -     amLODIPine (NORVASC) 2.5 mg tablet; Take 1 Tablet by mouth daily. -     losartan (COZAAR) 50 mg tablet; Take 1 Tablet by mouth daily.  -     METABOLIC PANEL, COMPREHENSIVE; Future  -     CBC WITH AUTOMATED DIFF; Future    3. Screen for colon cancer  -     REFERRAL TO GASTROENTEROLOGY    4. Hypercholesterolemia  -     LIPID PANEL; Future            Patient Instructions          High Blood Pressure: Care Instructions  Overview     It's normal for blood pressure to go up and down throughout the day. But if it stays up, you have high blood pressure. Another name for high blood pressure is hypertension. Despite what a lot of people think, high blood pressure usually doesn't cause headaches or make you feel dizzy or lightheaded. It usually has no symptoms. But it does increase your risk of stroke, heart attack, and other problems. You and your doctor will talk about your risks of these problems based on your blood pressure. Your doctor will give you a goal for your blood pressure. Your goal will be based on your health and your age.   Lifestyle changes, such as eating healthy and being active, are always important to help lower blood pressure. You might also take medicine to reach your blood pressure goal.  Follow-up care is a key part of your treatment and safety. Be sure to make and go to all appointments, and call your doctor if you are having problems. It's also a good idea to know your test results and keep a list of the medicines you take. How can you care for yourself at home? Medical treatment  · If you stop taking your medicine, your blood pressure will go back up. You may take one or more types of medicine to lower your blood pressure. Be safe with medicines. Take your medicine exactly as prescribed. Call your doctor if you think you are having a problem with your medicine. · Talk to your doctor before you start taking aspirin every day. Aspirin can help certain people lower their risk of a heart attack or stroke. But taking aspirin isn't right for everyone, because it can cause serious bleeding. · See your doctor regularly. You may need to see the doctor more often at first or until your blood pressure comes down. · If you are taking blood pressure medicine, talk to your doctor before you take decongestants or anti-inflammatory medicine, such as ibuprofen. Some of these medicines can raise blood pressure. · Learn how to check your blood pressure at home. Lifestyle changes  · Stay at a healthy weight. This is especially important if you put on weight around the waist. Losing even 10 pounds can help you lower your blood pressure. · If your doctor recommends it, get more exercise. Walking is a good choice. Bit by bit, increase the amount you walk every day. Try for at least 30 minutes on most days of the week. You also may want to swim, bike, or do other activities. · Avoid or limit alcohol. Talk to your doctor about whether you can drink any alcohol. · Try to limit how much sodium you eat to less than 2,300 milligrams (mg) a day. Your doctor may ask you to try to eat less than 1,500 mg a day.   · Eat plenty of fruits (such as bananas and oranges), vegetables, legumes, whole grains, and low-fat dairy products. · Lower the amount of saturated fat in your diet. Saturated fat is found in animal products such as milk, cheese, and meat. Limiting these foods may help you lose weight and also lower your risk for heart disease. · Do not smoke. Smoking increases your risk for heart attack and stroke. If you need help quitting, talk to your doctor about stop-smoking programs and medicines. These can increase your chances of quitting for good. When should you call for help? Call  911 anytime you think you may need emergency care. This may mean having symptoms that suggest that your blood pressure is causing a serious heart or blood vessel problem. Your blood pressure may be over 180/120. For example, call 911 if:    · You have symptoms of a heart attack. These may include:  ? Chest pain or pressure, or a strange feeling in the chest.  ? Sweating. ? Shortness of breath. ? Nausea or vomiting. ? Pain, pressure, or a strange feeling in the back, neck, jaw, or upper belly or in one or both shoulders or arms. ? Lightheadedness or sudden weakness. ? A fast or irregular heartbeat.     · You have symptoms of a stroke. These may include:  ? Sudden numbness, tingling, weakness, or loss of movement in your face, arm, or leg, especially on only one side of your body. ? Sudden vision changes. ? Sudden trouble speaking. ? Sudden confusion or trouble understanding simple statements. ? Sudden problems with walking or balance. ? A sudden, severe headache that is different from past headaches.     · You have severe back or belly pain. Do not wait until your blood pressure comes down on its own. Get help right away.   Call your doctor now or seek immediate care if:    · Your blood pressure is much higher than normal (such as 180/120 or higher), but you don't have symptoms.     · You think high blood pressure is causing symptoms, such as:  ? Severe headache.  ? Blurry vision. Watch closely for changes in your health, and be sure to contact your doctor if:    · Your blood pressure measures higher than your doctor recommends at least 2 times. That means the top number is higher or the bottom number is higher, or both.     · You think you may be having side effects from your blood pressure medicine. Where can you learn more? Go to http://www.gray.com/  Enter [id number] in the search box to learn more about \"High Blood Pressure: Care Instructions. \"  Current as of: August 31, 2020               Content Version: 12.8  © 2006-2021 [employer]. Care instructions adapted under license by [employer] (which disclaims liability or warranty for this information). If you have questions about a medical condition or this instruction, always ask your healthcare professional. [address] any warranty or liability for your use of this information. Diabetic Nephropathy: Care Instructions  Your Care Instructions     Finding out that your kidneys have been damaged can be very distressing. It may have taken you by surprise, since damage to kidneys usually does not cause symptoms early on. It is normal to feel upset and afraid. Having diabetic nephropathy means that for some time you have had high blood sugar, which damages the kidneys. Healthy kidneys keep protein in your blood, where it belongs. Damaged kidneys do not work the way they should. Your kidneys are letting protein pass into your urine. Sometimes diabetic kidney disease can lead to kidney failure. Your doctor will tell you how you might be able to slow damage to your kidneys. In many cases, prompt and regular treatment can prevent kidney failure. You will need to take medicine and may need to make a number of changes in your normal routines.  If you can keep your blood sugar and blood pressure under control and take certain medicines, you may reduce your chance of kidney failure. Follow-up care is a key part of your treatment and safety. Be sure to make and go to all appointments, and call your doctor if you are having problems. It's also a good idea to know your test results and keep a list of the medicines you take. How can you care for yourself at home? · Take your medicines exactly as prescribed. It is very important that you take your insulin or other diabetes medicine as your doctor tells you. Call your doctor if you think you are having a problem with your medicine. · Try to keep blood sugar in your target range. ? Eat a variety of foods, with carbohydrate spread out in your meals. Your doctor may restrict your protein. A dietitian can help you plan meals. ? If your doctor recommends it, get more exercise. Walking is a good choice. Bit by bit, increase the amount you walk every day. Try for at least 30 minutes on most days of the week. ? Check your blood sugar as often as your doctor recommends. · Take and record your blood pressure at home if your doctor tells you to. Learn the importance of the two measures of blood pressure (such as 130 over 80, or 130/80). To take your blood pressure at home:  ? Ask your doctor to check your blood pressure monitor. Your doctor can make sure that it is accurate and that the cuff fits you. Also ask your doctor to watch you to make sure that you are using it right. ? Do not use tobacco products or use medicine known to raise blood pressure (such as some nasal decongestant sprays) before taking your blood pressure. ? Avoid taking your blood pressure if you have just exercised or are nervous or upset. Rest at least 15 minutes before taking a reading. · Eat a low-salt diet to help keep your blood pressure in your target range. · Do not smoke. Smoking raises your risk of many health problems, including diabetic nephropathy.  If you need help quitting, talk to your doctor about stop-smoking programs and medicines. These can increase your chances of quitting for good. · Do not take ibuprofen, naproxen, or similar medicines, unless your doctor tells you to. These medicines may make kidney problems worse. When should you call for help? Call 911 anytime you think you may need emergency care. For example, call if:    · You passed out (lost consciousness). Call your doctor now or seek immediate medical care if:    · You have new or worse nausea and vomiting.     · You have much less urine than normal, or you have no urine.     · You are feeling confused or cannot think clearly.     · You have new or more blood in your urine.     · You have new swelling.     · You are dizzy or lightheaded, or feel like you may faint. Watch closely for changes in your health, and be sure to contact your doctor if:    · You do not get better as expected. Where can you learn more? Go to http://www.gray.com/  Enter P361 in the search box to learn more about \"Diabetic Nephropathy: Care Instructions. \"  Current as of: December 17, 2020               Content Version: 12.8  © 2693-9382 [employer]. Care instructions adapted under license by [employer] (which disclaims liability or warranty for this information). If you have questions about a medical condition or this instruction, always ask your healthcare professional. [place] [address] any warranty or liability for your use of this information. Please keep your follow-up appointment with eLonor Flores NP.          Health Maintenance Due   Topic Date Due    Hepatitis C Screening  Never done    Pneumococcal 0-64 years (1 of 2 - PPSV23) Never done    COVID-19 Vaccine (1) Never done    DTaP/Tdap/Td series (1 - Tdap) Never done    Eye Exam Retinal or Dilated  08/30/2019    Foot Exam Q1  07/29/2020    Shingrix Vaccine Age 50> (1 of 2) Never done    Colorectal Cancer Screening Combo  Never done    A1C test (Diabetic or Prediabetic)  03/18/2021       I have discussed the diagnosis with the patient and the intended plan as seen in the above orders. Patient is in agreement. The patient has received an after-visit summary and questions were answered concerning future plans. I have discussed medication side effects and warnings with the patient as well. Warning signs for the above conditions were discussed including when to call our office or go to the emergency room. The nurse provided the patient and/or family with advanced directive information if needed and encouraged the patient to provide a copy to the office when available.

## 2021-06-18 ENCOUNTER — OFFICE VISIT (OUTPATIENT)
Dept: INTERNAL MEDICINE CLINIC | Age: 51
End: 2021-06-18
Payer: COMMERCIAL

## 2021-06-18 VITALS
WEIGHT: 219.4 LBS | OXYGEN SATURATION: 98 % | HEIGHT: 70 IN | SYSTOLIC BLOOD PRESSURE: 140 MMHG | DIASTOLIC BLOOD PRESSURE: 95 MMHG | BODY MASS INDEX: 31.41 KG/M2 | RESPIRATION RATE: 16 BRPM | HEART RATE: 94 BPM | TEMPERATURE: 98 F

## 2021-06-18 DIAGNOSIS — E78.00 HYPERCHOLESTEROLEMIA: ICD-10-CM

## 2021-06-18 DIAGNOSIS — E10.42 DIABETIC POLYNEUROPATHY ASSOCIATED WITH TYPE 1 DIABETES MELLITUS (HCC): Primary | ICD-10-CM

## 2021-06-18 DIAGNOSIS — Z12.11 SCREEN FOR COLON CANCER: ICD-10-CM

## 2021-06-18 DIAGNOSIS — I10 ESSENTIAL HYPERTENSION: ICD-10-CM

## 2021-06-18 LAB — HBA1C MFR BLD HPLC: 7.8 %

## 2021-06-18 PROCEDURE — 83036 HEMOGLOBIN GLYCOSYLATED A1C: CPT | Performed by: NURSE PRACTITIONER

## 2021-06-18 PROCEDURE — 99214 OFFICE O/P EST MOD 30 MIN: CPT | Performed by: NURSE PRACTITIONER

## 2021-06-18 PROCEDURE — 3051F HG A1C>EQUAL 7.0%<8.0%: CPT | Performed by: NURSE PRACTITIONER

## 2021-06-18 RX ORDER — AMLODIPINE BESYLATE 2.5 MG/1
2.5 TABLET ORAL DAILY
Qty: 30 TABLET | Refills: 5 | Status: SHIPPED | OUTPATIENT
Start: 2021-06-18 | End: 2021-12-17 | Stop reason: SDUPTHER

## 2021-06-18 RX ORDER — LOSARTAN POTASSIUM 50 MG/1
50 TABLET ORAL DAILY
Qty: 30 TABLET | Refills: 11 | Status: SHIPPED | OUTPATIENT
Start: 2021-06-18 | End: 2021-12-17 | Stop reason: SDUPTHER

## 2021-06-18 NOTE — PROGRESS NOTES
Jenna Orozco  Identified pt with two pt identifiers(name and ). Chief Complaint   Patient presents with    Blood Pressure Check     Room 1A //    Other     Mood //        Reviewed record In preparation for visit and have obtained necessary documentation. 1. Have you been to the ER, urgent care clinic or hospitalized since your last visit? No     2. Have you seen or consulted any other health care providers outside of the [address] [place] [address] since your last visit? Include any pap smears or colon screening. No    Patient does not have an advance directive. Vitals reviewed with provider.     Health Maintenance reviewed:     Health Maintenance Due   Topic    Hepatitis C Screening     Pneumococcal 0-64 years (1 of 2 - PPSV23)    COVID-19 Vaccine (1)    DTaP/Tdap/Td series (1 - Tdap)    Eye Exam Retinal or Dilated     Foot Exam Q1     Shingrix Vaccine Age 49> (1 of 2)    Colorectal Cancer Screening Combo     A1C test (Diabetic or Prediabetic)           Wt Readings from Last 3 Encounters:   21 219 lb 6.4 oz (99.5 kg)   20 192 lb (87.1 kg)   12/10/19 193 lb 8 oz (87.8 kg)        Temp Readings from Last 3 Encounters:   21 98 °F (36.7 °C) (Oral)   12/10/19 97.9 °F (36.6 °C) (Oral)   19 97.4 °F (36.3 °C) (Oral)        BP Readings from Last 3 Encounters:   21 (!) 179/111   20 108/80   12/10/19 122/84        Pulse Readings from Last 3 Encounters:   21 94   20 (!) 101   12/10/19 94        Vitals:    21 0801   BP: (!) 179/111   Pulse: 94   Resp: 16   Temp: 98 °F (36.7 °C)   TempSrc: Oral   SpO2: 98%   Weight: 219 lb 6.4 oz (99.5 kg)   Height: 5' 10\" (1.778 m)   PainSc:   7   PainLoc: Arm          Learning Assessment:   :       Learning Assessment 2020 3/29/2018 2017 2017 2017 2016 2015   PRIMARY LEARNER Patient Patient Patient Patient Patient Patient Patient   HIGHEST LEVEL OF EDUCATION - PRIMARY LEARNER  - - - - - 4 YEARS OF COLLEGE 4 YEARS OF COLLEGE   BARRIERS PRIMARY LEARNER - - - - - NONE NONE   CO-LEARNER CAREGIVER - - - - - No No   PRIMARY LANGUAGE ENGLISH ENGLISH ENGLISH ENGLISH ENGLISH ENGLISH ENGLISH   LEARNER PREFERENCE PRIMARY VIDEOS DEMONSTRATION DEMONSTRATION DEMONSTRATION DEMONSTRATION VIDEOS DEMONSTRATION   ANSWERED BY self patient patient patient patient Patient Patient   RELATIONSHIP SELF SELF SELF SELF SELF SELF SELF        Depression Screening:   :       3 most recent PHQ Screens 6/18/2021   PHQ Not Done Active Diagnosis of Depression or Bipolar Disorder   Little interest or pleasure in doing things -   Feeling down, depressed, irritable, or hopeless -   Total Score PHQ 2 -   Trouble falling or staying asleep, or sleeping too much -   Feeling tired or having little energy -   Poor appetite, weight loss, or overeating -   Feeling bad about yourself - or that you are a failure or have let yourself or your family down -   Trouble concentrating on things such as school, work, reading, or watching TV -   Moving or speaking so slowly that other people could have noticed; or the opposite being so fidgety that others notice -   Thoughts of being better off dead, or hurting yourself in some way -   PHQ 9 Score -   How difficult have these problems made it for you to do your work, take care of your home and get along with others -        Fall Risk Assessment:   :     No flowsheet data found. Abuse Screening:   :     No flowsheet data found.      ADL Screening:   :       ADL Assessment 5/24/2019   Feeding yourself No Help Needed   Getting from bed to chair No Help Needed   Getting dressed No Help Needed   Bathing or showering No Help Needed   Walk across the room (includes cane/walker) No Help Needed   Using the telphone No Help Needed   Taking your medications No Help Needed   Preparing meals No Help Needed   Managing money (expenses/bills) No Help Needed   Moderately strenuous housework (laundry) No Help Needed Shopping for personal items (toiletries/medicines) No Help Needed   Shopping for groceries No Help Needed   Driving Help Needed   Climbing a flight of stairs No Help Needed   Getting to places beyond walking distances Help Needed

## 2021-06-18 NOTE — PATIENT INSTRUCTIONS
High Blood Pressure: Care Instructions Overview It's normal for blood pressure to go up and down throughout the day. But if it stays up, you have high blood pressure. Another name for high blood pressure is hypertension. Despite what a lot of people think, high blood pressure usually doesn't cause headaches or make you feel dizzy or lightheaded. It usually has no symptoms. But it does increase your risk of stroke, heart attack, and other problems. You and your doctor will talk about your risks of these problems based on your blood pressure. Your doctor will give you a goal for your blood pressure. Your goal will be based on your health and your age. Lifestyle changes, such as eating healthy and being active, are always important to help lower blood pressure. You might also take medicine to reach your blood pressure goal. 
Follow-up care is a key part of your treatment and safety. Be sure to make and go to all appointments, and call your doctor if you are having problems. It's also a good idea to know your test results and keep a list of the medicines you take. How can you care for yourself at home? Medical treatment · If you stop taking your medicine, your blood pressure will go back up. You may take one or more types of medicine to lower your blood pressure. Be safe with medicines. Take your medicine exactly as prescribed. Call your doctor if you think you are having a problem with your medicine. · Talk to your doctor before you start taking aspirin every day. Aspirin can help certain people lower their risk of a heart attack or stroke. But taking aspirin isn't right for everyone, because it can cause serious bleeding. · See your doctor regularly. You may need to see the doctor more often at first or until your blood pressure comes down. · If you are taking blood pressure medicine, talk to your doctor before you take decongestants or anti-inflammatory medicine, such as ibuprofen.  Some of these medicines can raise blood pressure. · Learn how to check your blood pressure at home. Lifestyle changes · Stay at a healthy weight. This is especially important if you put on weight around the waist. Losing even 10 pounds can help you lower your blood pressure. · If your doctor recommends it, get more exercise. Walking is a good choice. Bit by bit, increase the amount you walk every day. Try for at least 30 minutes on most days of the week. You also may want to swim, bike, or do other activities. · Avoid or limit alcohol. Talk to your doctor about whether you can drink any alcohol. · Try to limit how much sodium you eat to less than 2,300 milligrams (mg) a day. Your doctor may ask you to try to eat less than 1,500 mg a day. · Eat plenty of fruits (such as bananas and oranges), vegetables, legumes, whole grains, and low-fat dairy products. · Lower the amount of saturated fat in your diet. Saturated fat is found in animal products such as milk, cheese, and meat. Limiting these foods may help you lose weight and also lower your risk for heart disease. · Do not smoke. Smoking increases your risk for heart attack and stroke. If you need help quitting, talk to your doctor about stop-smoking programs and medicines. These can increase your chances of quitting for good. When should you call for help? Call  911 anytime you think you may need emergency care. This may mean having symptoms that suggest that your blood pressure is causing a serious heart or blood vessel problem. Your blood pressure may be over 180/120. For example, call 911 if: 
  · You have symptoms of a heart attack. These may include: 
? Chest pain or pressure, or a strange feeling in the chest. 
? Sweating. ? Shortness of breath. ? Nausea or vomiting. ? Pain, pressure, or a strange feeling in the back, neck, jaw, or upper belly or in one or both shoulders or arms. ? Lightheadedness or sudden weakness.  
? A fast or irregular heartbeat.  
  · You have symptoms of a stroke. These may include: 
? Sudden numbness, tingling, weakness, or loss of movement in your face, arm, or leg, especially on only one side of your body. ? Sudden vision changes. ? Sudden trouble speaking. ? Sudden confusion or trouble understanding simple statements. ? Sudden problems with walking or balance. ? A sudden, severe headache that is different from past headaches.  
  · You have severe back or belly pain. Do not wait until your blood pressure comes down on its own. Get help right away. Call your doctor now or seek immediate care if: 
  · Your blood pressure is much higher than normal (such as 180/120 or higher), but you don't have symptoms.  
  · You think high blood pressure is causing symptoms, such as: 
? Severe headache. 
? Blurry vision. Watch closely for changes in your health, and be sure to contact your doctor if: 
  · Your blood pressure measures higher than your doctor recommends at least 2 times. That means the top number is higher or the bottom number is higher, or both.  
  · You think you may be having side effects from your blood pressure medicine. Where can you learn more? Go to http://www.gray.com/ Enter I680 in the search box to learn more about \"High Blood Pressure: Care Instructions. \" Current as of: August 31, 2020               Content Version: 12.8 © 2006-2021 [employer]. Care instructions adapted under license by [employer] (which disclaims liability or warranty for this information). If you have questions about a medical condition or this instruction, always ask your healthcare professional. [place] [address] any warranty or liability for your use of this information. Diabetic Nephropathy: Care Instructions Your Care Instructions Finding out that your kidneys have been damaged can be very distressing.  It may have taken you by surprise, since damage to kidneys usually does not cause symptoms early on. It is normal to feel upset and afraid. Having diabetic nephropathy means that for some time you have had high blood sugar, which damages the kidneys. Healthy kidneys keep protein in your blood, where it belongs. Damaged kidneys do not work the way they should. Your kidneys are letting protein pass into your urine. Sometimes diabetic kidney disease can lead to kidney failure. Your doctor will tell you how you might be able to slow damage to your kidneys. In many cases, prompt and regular treatment can prevent kidney failure. You will need to take medicine and may need to make a number of changes in your normal routines. If you can keep your blood sugar and blood pressure under control and take certain medicines, you may reduce your chance of kidney failure. Follow-up care is a key part of your treatment and safety. Be sure to make and go to all appointments, and call your doctor if you are having problems. It's also a good idea to know your test results and keep a list of the medicines you take. How can you care for yourself at home? · Take your medicines exactly as prescribed. It is very important that you take your insulin or other diabetes medicine as your doctor tells you. Call your doctor if you think you are having a problem with your medicine. · Try to keep blood sugar in your target range. ? Eat a variety of foods, with carbohydrate spread out in your meals. Your doctor may restrict your protein. A dietitian can help you plan meals. ? If your doctor recommends it, get more exercise. Walking is a good choice. Bit by bit, increase the amount you walk every day. Try for at least 30 minutes on most days of the week. ? Check your blood sugar as often as your doctor recommends. · Take and record your blood pressure at home if your doctor tells you to. Learn the importance of the two measures of blood pressure (such as 130 over 80, or 130/80).  To take your blood pressure at home: ? Ask your doctor to check your blood pressure monitor. Your doctor can make sure that it is accurate and that the cuff fits you. Also ask your doctor to watch you to make sure that you are using it right. ? Do not use tobacco products or use medicine known to raise blood pressure (such as some nasal decongestant sprays) before taking your blood pressure. ? Avoid taking your blood pressure if you have just exercised or are nervous or upset. Rest at least 15 minutes before taking a reading. · Eat a low-salt diet to help keep your blood pressure in your target range. · Do not smoke. Smoking raises your risk of many health problems, including diabetic nephropathy. If you need help quitting, talk to your doctor about stop-smoking programs and medicines. These can increase your chances of quitting for good. · Do not take ibuprofen, naproxen, or similar medicines, unless your doctor tells you to. These medicines may make kidney problems worse. When should you call for help? Call 911 anytime you think you may need emergency care. For example, call if: 
  · You passed out (lost consciousness). Call your doctor now or seek immediate medical care if: 
  · You have new or worse nausea and vomiting.  
  · You have much less urine than normal, or you have no urine.  
  · You are feeling confused or cannot think clearly.  
  · You have new or more blood in your urine.  
  · You have new swelling.  
  · You are dizzy or lightheaded, or feel like you may faint. Watch closely for changes in your health, and be sure to contact your doctor if: 
  · You do not get better as expected. Where can you learn more? Go to http://www.gray.com/ Enter P361 in the search box to learn more about \"Diabetic Nephropathy: Care Instructions. \" Current as of: December 17, 2020               Content Version: 12.8 © 1215-1924 [employer].   
Care instructions adapted under license by [address] (which disclaims liability or warranty for this information). If you have questions about a medical condition or this instruction, always ask your healthcare professional. [address] any warranty or liability for your use of this information.

## 2021-07-17 LAB
ALBUMIN SERPL-MCNC: 4.1 G/DL (ref 4–5)
ALBUMIN/GLOB SERPL: 1.4 {RATIO} (ref 1.2–2.2)
ALP SERPL-CCNC: 101 IU/L (ref 48–121)
ALT SERPL-CCNC: 23 IU/L (ref 0–44)
AST SERPL-CCNC: 21 IU/L (ref 0–40)
BASOPHILS # BLD AUTO: 0.1 X10E3/UL (ref 0–0.2)
BASOPHILS NFR BLD AUTO: 1 %
BILIRUB SERPL-MCNC: 0.2 MG/DL (ref 0–1.2)
BUN SERPL-MCNC: 23 MG/DL (ref 6–24)
BUN/CREAT SERPL: 19 (ref 9–20)
CALCIUM SERPL-MCNC: 9.6 MG/DL (ref 8.7–10.2)
CHLORIDE SERPL-SCNC: 102 MMOL/L (ref 96–106)
CHOLEST SERPL-MCNC: 193 MG/DL (ref 100–199)
CO2 SERPL-SCNC: 26 MMOL/L (ref 20–29)
CREAT SERPL-MCNC: 1.2 MG/DL (ref 0.76–1.27)
EOSINOPHIL # BLD AUTO: 0.1 X10E3/UL (ref 0–0.4)
EOSINOPHIL NFR BLD AUTO: 1 %
ERYTHROCYTE [DISTWIDTH] IN BLOOD BY AUTOMATED COUNT: 13 % (ref 11.6–15.4)
GLOBULIN SER CALC-MCNC: 2.9 G/DL (ref 1.5–4.5)
GLUCOSE SERPL-MCNC: 310 MG/DL (ref 65–99)
HCT VFR BLD AUTO: 40.2 % (ref 37.5–51)
HDLC SERPL-MCNC: 60 MG/DL
HGB BLD-MCNC: 13.5 G/DL (ref 13–17.7)
IMM GRANULOCYTES # BLD AUTO: 0.1 X10E3/UL (ref 0–0.1)
IMM GRANULOCYTES NFR BLD AUTO: 1 %
LDLC SERPL CALC-MCNC: 113 MG/DL (ref 0–99)
LYMPHOCYTES # BLD AUTO: 2.5 X10E3/UL (ref 0.7–3.1)
LYMPHOCYTES NFR BLD AUTO: 30 %
MCH RBC QN AUTO: 29.7 PG (ref 26.6–33)
MCHC RBC AUTO-ENTMCNC: 33.6 G/DL (ref 31.5–35.7)
MCV RBC AUTO: 88 FL (ref 79–97)
MONOCYTES # BLD AUTO: 1 X10E3/UL (ref 0.1–0.9)
MONOCYTES NFR BLD AUTO: 12 %
NEUTROPHILS # BLD AUTO: 4.5 X10E3/UL (ref 1.4–7)
NEUTROPHILS NFR BLD AUTO: 55 %
PLATELET # BLD AUTO: 243 X10E3/UL (ref 150–450)
POTASSIUM SERPL-SCNC: 4.5 MMOL/L (ref 3.5–5.2)
PROT SERPL-MCNC: 7 G/DL (ref 6–8.5)
RBC # BLD AUTO: 4.55 X10E6/UL (ref 4.14–5.8)
SODIUM SERPL-SCNC: 143 MMOL/L (ref 134–144)
TRIGL SERPL-MCNC: 113 MG/DL (ref 0–149)
VLDLC SERPL CALC-MCNC: 20 MG/DL (ref 5–40)
WBC # BLD AUTO: 8.1 X10E3/UL (ref 3.4–10.8)

## 2021-07-19 NOTE — PROGRESS NOTES
Blood counts normal.  Kidney and liver function normal.  LDL cholesterol is above goa- recommend he increase his pravastatin to 40mg daily and recheck lipids in 6 mos. Also work on reducing saturated fats in diet and getting regular exercise.

## 2021-07-27 LAB
Lab: NORMAL
REFERRAL TEST CODE OR MNEMONIC: NORMAL

## 2021-07-28 ENCOUNTER — OFFICE VISIT (OUTPATIENT)
Dept: NEUROLOGY | Age: 51
End: 2021-07-28

## 2021-07-28 ENCOUNTER — OFFICE VISIT (OUTPATIENT)
Dept: ENDOCRINOLOGY | Age: 51
End: 2021-07-28
Payer: COMMERCIAL

## 2021-07-28 VITALS
RESPIRATION RATE: 18 BRPM | DIASTOLIC BLOOD PRESSURE: 82 MMHG | OXYGEN SATURATION: 97 % | HEIGHT: 70 IN | WEIGHT: 226 LBS | BODY MASS INDEX: 32.35 KG/M2 | SYSTOLIC BLOOD PRESSURE: 150 MMHG | HEART RATE: 108 BPM

## 2021-07-28 VITALS
HEART RATE: 98 BPM | SYSTOLIC BLOOD PRESSURE: 188 MMHG | WEIGHT: 224 LBS | BODY MASS INDEX: 32.07 KG/M2 | HEIGHT: 70 IN | DIASTOLIC BLOOD PRESSURE: 100 MMHG

## 2021-07-28 DIAGNOSIS — G36.0 NEUROMYELITIS OPTICA (HCC): Primary | ICD-10-CM

## 2021-07-28 DIAGNOSIS — R69 MULTIPLE COMORBID CONDITIONS: ICD-10-CM

## 2021-07-28 DIAGNOSIS — Z79.899 POLYPHARMACY: ICD-10-CM

## 2021-07-28 DIAGNOSIS — Z91.81 AT RISK FOR FALLS: ICD-10-CM

## 2021-07-28 DIAGNOSIS — R53.83 OTHER FATIGUE: ICD-10-CM

## 2021-07-28 DIAGNOSIS — R26.1 SPASTIC GAIT: ICD-10-CM

## 2021-07-28 DIAGNOSIS — F32.4 MAJOR DEPRESSIVE DISORDER WITH SINGLE EPISODE, IN PARTIAL REMISSION (HCC): ICD-10-CM

## 2021-07-28 DIAGNOSIS — H53.9 VISUAL CHANGES: ICD-10-CM

## 2021-07-28 DIAGNOSIS — R26.0 ATAXIC GAIT: ICD-10-CM

## 2021-07-28 DIAGNOSIS — G36.0 NEUROMYELITIS OPTICA (HCC): ICD-10-CM

## 2021-07-28 PROCEDURE — 99215 OFFICE O/P EST HI 40 MIN: CPT | Performed by: PSYCHIATRY & NEUROLOGY

## 2021-07-28 PROCEDURE — 3051F HG A1C>EQUAL 7.0%<8.0%: CPT | Performed by: INTERNAL MEDICINE

## 2021-07-28 PROCEDURE — 99214 OFFICE O/P EST MOD 30 MIN: CPT | Performed by: INTERNAL MEDICINE

## 2021-07-28 RX ORDER — ARMODAFINIL 150 MG/1
150 TABLET ORAL DAILY
Qty: 30 TABLET | Refills: 5 | Status: SHIPPED | OUTPATIENT
Start: 2021-07-28 | End: 2021-10-18

## 2021-07-28 NOTE — LETTER
7/28/2021    Patient: Whitney Jaramillo   YOB: 1970   Date of Visit: 7/28/2021     Margo Cesar NP  [address] [place] [address]  [address] [facility] [address]    Dear Margo Cesar NP,      Thank you for referring Mr. Whitney Jaramillo to [address] [place] [address] for evaluation. My notes for this consultation are attached. If you have questions, please do not hesitate to call me. I look forward to following your patient along with you.       Sincerely,    Elvia Stokes NP

## 2021-07-28 NOTE — PROGRESS NOTES
This gentleman returns for follow-up of his type 1 diabetes mellitus with poor blood sugar control and neuromyelitis optica with progressive vision loss.  He saw neurology recently and all of his imaging studies are stable at this time. Avani Read does continue to lose vision and there appears to be nothing possible to reverse this or at least mitigate this decrease unless he can be on rituximab which is being explored.       Transportation is a major issue for him. In any case, he had been on a freestyle neal and that was switched to a Dexcom. He has had great difficulty with the Dexcom. He says it only last 2 days so he is not been checking any blood sugars. He also does not have a meter. His last A1c had been 10.8%. His A1c in June was 7.8% which is significantly improved. Current diabetes medication  Levemir 50 units twice daily  Novolog 20 units with meals    His first meal the day is at about 10:00 in the morning. That usually oatmeal with milk. He used to have lunch but he has stopped doing so. Dinner can be a hamburger and [employer] fries. Last night  he had a pork chop mashed potatoes and greens. He takes 20 units of NovoLog for each of those meals. He has occasional episodes of mild hypoglycemia after dinner . He denies chest pain, shortness of breath, constipation or diarrhea. He does complain of decreased visual acuity which continues to wax and wane. Examination  Blood pressure 188/100  Pulse 74  Weight 224  BMI 32.1  HEENT unremarkable  Lungs clear  Heart reveals a regular rate and rhythm  Abdomen obese  Extremities unremarkable. The feet are dry without ulceration or tissue breakdown. Diabetic foot exam:     Left Foot:   Visual Exam: normal    Pulse DP: 2+ (normal)   Filament test: absent sensation    Vibratory sensation: absent      Right Foot:   Visual Exam: normal    Pulse DP: 2+ (normal)   Filament test: absent sensation    Vibratory sensation: absent  . Impression  1.   Type 1 diabetes mellitus with improved glucose control on basal bolus insulin  2. neuromyelitis optica with progressive vision loss. 3.  Diabetic peripheral neuropathy    Plan:  1. I will continue the Levemir and NovoLog and we wrote prescriptions  2. He has not been able to use continuous glucose monitoring despite our best efforts. 3.  We will see him back in 4 months.

## 2021-07-28 NOTE — PROGRESS NOTES
Reza Anaya is a 48 y.o. male  Chief Complaint   Patient presents with    Follow-up     6 month     Health Maintenance Due   Topic Date Due    Hepatitis C Screening  Never done    COVID-19 Vaccine (1) Never done    DTaP/Tdap/Td series (1 - Tdap) Never done    Colorectal Cancer Screening Combo  Never done    Eye Exam Retinal or Dilated  08/30/2019    Shingrix Vaccine Age 50> (1 of 2) Never done     Visit Vitals  BP (!) 150/82   Pulse (!) 108   Resp 18   Ht 5' 10\" (1.778 m)   Wt 226 lb (102.5 kg)   SpO2 97%   BMI 32.43 kg/m²

## 2021-07-28 NOTE — PROGRESS NOTES
[address] 83  In Office FOLLOW-UP VISIT         Neal Choe is a 48 y.o. 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  Threshold testing for NMO titer through [facility] was negative cellular level is more sensitive and we have sent levels off to [address]: Results pending     Presently he is on no drug modifying therapy  Pending the results of his titers through [address] will guide our decision regarding treatment options  Presently all NMO DMT is only effective the research and antibody positive patients     Fatigue  Start Nuvigil 150 mg daily    Spastic and ataxic gait  At risk for falls  Gait impaired but still walking without a cane routinely  Luckily no serious falls to date    Visual changes  Presumably due to NMO but certainly with poorly controlled blood sugars there may be retinopathy macular degeneration or other issues contributing  Agree with seeing ophthalmology since that appointment is already set I have encouraged the patient to continue with that and pending those results we may need to see neuro-ophthalmology      Patient and/or family was given time to ask questions and voice concerns. I believe all questions concerns were adequately addressed at this  office visit. Patient and/or family also verbalized agreement and understanding of the above-stated plan    Patient remains a complex patient secondary to polypharmacy, significant comorbid conditions, and use of high-risk medications which complicate the decision making process related to patient's neurologic diagnosis      ICD-10-CM ICD-9-CM    1. Neuromyelitis optica ([facility] [address]  G36.0 341.0    2. Other fatigue  R53.83 780.79 Armodafinil (NuvigiL) 150 mg tab   3. Spastic gait  R26.1 781.2    4. Ataxic gait  R26.0 781.2    5.  Visual changes H53.9 368.9    6. Polypharmacy  Z79.899 V58.69    7. Multiple comorbid conditions  R69 799.9    8. At risk for falls  Z91.81 V15.88          I attest that 40 minutes was spent on today's visit reviewing medical records and diagnostic testing deemed pertinent to this patient's care, along with direct time spent at patient's visit including the history, physical assessment and plan, discussing diagnosis and management along with documentation. HPI  Historical Data  This is a patient previously known to the practice. [facility] has a diagnosis of NMO.     Spinal tap June 2017 did not reveal any oligoclonal banding.  However he had an increased CSF/serum albumin index, elevated IgG synthesis elevated IgG quantitative and elevated albumin.     AquaPorin-4  AquaPorin-4 was completed on March 2, 2020 through [facility] which is a threshold not a cellular count and was deemed negative as level was <1.5    Interim data:    NMO diagnosis  Has never been on any drug modifying therapy  AquaPorin-4 levels sent out to [facility] are pending    Baseline symptoms  Ambulatory difficulties   Related to balance and weakness and muscle spasticity   Continues to ambulate independently   No reported falls    Temperature sensitivity   Symptoms worse with elevated temperatures environmentally    Visual disturbance   Acuity, sensitivity, blurry vision   Waxes and wanes throughout the day   Has an appointment with Dr. Esa Stern ophthalmology as set up by PCP due to poorly controlled blood sugar    Cognition   Slow processing, difficulty with recall, difficulty with word finding    Fatigue   New complaint as of 7/28/2021 patient states this is not a new problem but more recently it has become almost unbearable he is having to nap more during the day he does not describe any symptoms that would be consistent with his sleep disorder at night    Other:  Patient drives rarely    [facility]    MRI BRAIN W WO CONT    Impression  IMPRESSION:  1. Stable minimal tiny T2/FLAIR white matter hyperintensities. No evidence of  abnormal enhancement. MRI CERV SPINE W WO CONT    Impression  IMPRESSION:    1. Unchanged area of chronic myelomalacia in the cord at C1 likely related to  the patient's history of demyelinating disease. No new lesions. No abnormal  enhancement to suggest active disease. 2. No significant spinal stenosis or neural foraminal narrowing. MRI [facility] SPINE W WO CONT    Impression  IMPRESSION:    1. No evidence of demyelinating disease in the visualized cord. 2. Multinodular goiter. No visits with results within 1 Month(s) from this visit. Latest known visit with results is:   Office Visit on 06/18/2021   Component Date Value Ref Range Status    Hemoglobin A1c (POC) 06/18/2021 7.8  % Final    WBC 07/16/2021 8.1  3.4 - 10.8 x10E3/uL Final    RBC 07/16/2021 4.55  4.14 - 5.80 x10E6/uL Final    HGB 07/16/2021 13.5  13.0 - 17.7 g/dL Final    HCT 07/16/2021 40.2  37.5 - 51.0 % Final    MCV 07/16/2021 88  79 - 97 fL Final    MCH 07/16/2021 29.7  26.6 - 33.0 pg Final    MCHC 07/16/2021 33.6  31.5 - 35.7 g/dL Final    RDW 07/16/2021 13.0  11.6 - 15.4 % Final    PLATELET 05/27/9088 700  150 - 450 x10E3/uL Final    NEUTROPHILS 07/16/2021 55  Not Estab. % Final    Lymphocytes 07/16/2021 30  Not Estab. % Final    MONOCYTES 07/16/2021 12  Not Estab. % Final    EOSINOPHILS 07/16/2021 1  Not Estab. % Final    BASOPHILS 07/16/2021 1  Not Estab. % Final    ABS. NEUTROPHILS 07/16/2021 4.5  1.4 - 7.0 x10E3/uL Final    Abs Lymphocytes 07/16/2021 2.5  0.7 - 3.1 x10E3/uL Final    ABS. MONOCYTES 07/16/2021 1.0* 0.1 - 0.9 x10E3/uL Final    ABS. EOSINOPHILS 07/16/2021 0.1  0.0 - 0.4 x10E3/uL Final    ABS. BASOPHILS 07/16/2021 0.1  0.0 - 0.2 x10E3/uL Final    IMMATURE GRANULOCYTES 07/16/2021 1  Not Estab. % Final    ABS. IMM.  GRANS. 07/16/2021 0.1  0.0 - 0.1 x10E3/uL Final    Glucose 07/16/2021 310* 65 - 99 mg/dL Final    BUN 07/16/2021 23  6 - 24 mg/dL Final    Creatinine 07/16/2021 1.20  0.76 - 1.27 mg/dL Final    GFR est non-AA 07/16/2021 70  >59 mL/min/1.73 Final    GFR est AA 07/16/2021 81  >59 mL/min/1.73 Final    BUN/Creatinine ratio 07/16/2021 19  9 - 20 Final    Sodium 07/16/2021 143  134 - 144 mmol/L Final    Potassium 07/16/2021 4.5  3.5 - 5.2 mmol/L Final    Chloride 07/16/2021 102  96 - 106 mmol/L Final    CO2 07/16/2021 26  20 - 29 mmol/L Final    Calcium 07/16/2021 9.6  8.7 - 10.2 mg/dL Final    Protein, total 07/16/2021 7.0  6.0 - 8.5 g/dL Final    Albumin 07/16/2021 4.1  4.0 - 5.0 g/dL Final    GLOBULIN, TOTAL 07/16/2021 2.9  1.5 - 4.5 g/dL Final    A-G Ratio 07/16/2021 1.4  1.2 - 2.2 Final    Bilirubin, total 07/16/2021 0.2  0.0 - 1.2 mg/dL Final    Alk. phosphatase 07/16/2021 101  48 - 121 IU/L Final    AST (SGOT) 07/16/2021 21  0 - 40 IU/L Final    ALT (SGPT) 07/16/2021 23  0 - 44 IU/L Final    Cholesterol, total 07/16/2021 193  100 - 199 mg/dL Final    Triglyceride 07/16/2021 113  0 - 149 mg/dL Final    HDL Cholesterol 07/16/2021 60  >39 mg/dL Final    VLDL, calculated 07/16/2021 20  5 - 40 mg/dL Final    LDL, calculated 07/16/2021 113* 0 - 99 mg/dL Final         No Known Allergies    Current Outpatient Medications   Medication Sig    insulin detemir U-100 (LEVEMIR FLEXTOUCH) 100 unit/mL (3 mL) inpn Inject 50 units am and 50 units HS  Indications: type 1 diabetes mellitus    Armodafinil (NuvigiL) 150 mg tab Take 150 mg by mouth daily. Max Daily Amount: 150 mg. Indications: MS fatigue    amLODIPine (NORVASC) 2.5 mg tablet Take 1 Tablet by mouth daily.  losartan (COZAAR) 50 mg tablet Take 1 Tablet by mouth daily.  pregabalin (LYRICA) 150 mg capsule Take 1 Cap by mouth two (2) times a day. Max Daily Amount: 300 mg.     hydrOXYzine pamoate (VISTARIL) 25 mg capsule TAKE 1 CAPSULE BY MOUTH 4 TIMES DAILY AS NEEDED FOR ITCHING    pravastatin (PRAVACHOL) 20 mg tablet Take 1 Tab by mouth nightly.  Insulin Needles, Disposable, 31 gauge x 5/16\" ndle Use with insulin    diphenoxylate-atropine (LOMOTIL) 2.5-0.025 mg per tablet Take 1 Tab by mouth four (4) times daily. Max Daily Amount: 4 Tabs.  triamcinolone acetonide (KENALOG) 0.1 % topical cream APPLY  CREAM EXTERNALLY TO AFFECTED AREA TWICE DAILY AS NEEDED FOR SKIN IRRITATION. USE THIN LAYER ON NECK RASH AND EXTERNAL EAR    ProAir HFA 90 mcg/actuation inhaler INHALE 1 PUFF BY MOUTH EVERY 4 HOURS AS NEEDED FOR WHEEZING    ALPRAZolam (XANAX) 0.25 mg tablet 1 tablet 30 to 60 minutes prior to MRI scan may repeat x1 if needed must have someone to drive you to and from the MRI scanner  Indications: anxious    insulin aspart U-100 (NovoLOG Flexpen U-100 Insulin) 100 unit/mL (3 mL) inpn INJECT 15-20 UNITS SUBCUTANEOUSLY WITH MEALS (Patient taking differently: INJECT 20 UNITS SUBCUTANEOUSLY WITH MEALS)    busPIRone (BUSPAR) 10 mg tablet Take 1 Tab by mouth three (3) times daily (with meals).  multivitamin, tx-iron-ca-min (THERA-M W/ IRON) 9 mg iron-400 mcg tab tablet Take 1 Tab by mouth daily.  cyanocobalamin 1,000 mcg tablet Take 1,000 mcg by mouth daily.  selenium sulfide (SELSUN BLUE) 1 % shampoo Apply to head, mustache area and left ear at least several times daily.  glucose blood VI test strips (PRODIGY NO CODING) strip Monitor BS twice daily     No current facility-administered medications for this visit.        Past medical history/surgical history, family history, and social history have been reviewed for today's visit      ROS    A ten system review of constitutional, cardiovascular, respiratory, musculoskeletal, endocrine, skin, SHEENT, genitourinary, psychiatric and neurologic systems was obtained and is unremarkable except as mentioned under HPI          EXAMINATION:     Visit Vitals  BP (!) 150/82   Pulse (!) 108   Resp 18   Ht 5' 10\" (1.778 m)   Wt 226 lb (102.5 kg)   SpO2 97%   BMI 32.43 kg/m² General appearance: Patient is well-developed and well-nourished in no apparent distress and well groomed. Psych/mental health:  Affect: Appropriate    PHQ  3 most recent PHQ Screens 6/18/2021   PHQ Not Done Active Diagnosis of Depression or Bipolar Disorder   Little interest or pleasure in doing things -   Feeling down, depressed, irritable, or hopeless -   Total Score PHQ 2 -   Trouble falling or staying asleep, or sleeping too much -   Feeling tired or having little energy -   Poor appetite, weight loss, or overeating -   Feeling bad about yourself - or that you are a failure or have let yourself or your family down -   Trouble concentrating on things such as school, work, reading, or watching TV -   Moving or speaking so slowly that other people could have noticed; or the opposite being so fidgety that others notice -   Thoughts of being better off dead, or hurting yourself in some way -   PHQ 9 Score -   How difficult have these problems made it for you to do your work, take care of your home and get along with others -       HEENT:   Normocephalic  With evidence of trauma: No  Full range of motion head neck: Yes  Tenderness to palpation of the head neck region: No      Cardiovascular:     Extremities warm to touch: Yes  Extremity swelling: No  Discoloration: No  Evidence of PVD: No    Respiratory:   Dyspnea on exertion: No   Abnormal effort on casual observation: No   Use of portable oxygen: No   Evidence of cyanosis: No     Musculoskeletal:   Evidence of significant bone deformities: No   Spinal curvature: No     Integumentary:    Obvious bruising: No   Lacerations or discoloration on casual observation: No       Neurological Examination:   Mental Status:        MMSE  No flowsheet data found.      Formal testing was not completed    Patient has some difficulty processing fast-moving information and does demonstrate some mild retention issues but can easily follow conversation and respond appropriately and can make concerns and needs known without difficulty  Does better with notes or other assistive devices  He is alert oriented and appropriate  No word finding difficulties  Sentence structure is appropriate      Cranial Nerves:    EOMs intact gaze is conjugate  No nystagmus is appreciated  Facial motor intact bilaterally  Hearing intact to conversation  Voice with normal projection, no evidence of secretion pooling  Shoulder shrug intact bilaterally  No tongue deviation appreciated     Motor:   Normal bulk  Increased tone lower extremities bilaterally  No tremor appreciated on today's exam  No abnormal movements appreciated on today's exam  Moves extremities spontaneously and with purpose  5/5 x 4      Sensation: Intact to light touch    Coordination/Cerebellar:   Grossly intact    Gait: Ambulates independently but with significant spastic ataxic gait; unsteady especially with turns    Reflexes: Brisk but symmetrical    Fall risk assessment  No flowsheet data found. Follow-up and Dispositions    · Return in about 6 months (around 1/28/2022) for In office appointment.            Brooklyn Hamm MS, ANP-BC, [facility]

## 2021-07-28 NOTE — PATIENT INSTRUCTIONS
As per discussion    Please have the ophthalmologist send me a copy of their note as you not only have NMO which can affect your vision you also have poorly controlled diabetes which can affect your vision  Based on those results we can decide whether you need to see neuro-ophthalmology going forward you may even ask the ophthalmologist if she thinks she needs to see a neuro-ophthalmologist as well    Waiting on the results of the test that got sent out to [facility]  Once we get those results I will send you note through my chart as to what the next steps are going to be    I have started you on a medication called Nuvigil 150 mg 1 daily to help with the fatigue  If your insurance will pay for it usual good Rx karen which should run you about $30 per month        Office Policies    o Phone calls/patient messages:  Please allow up to 24 hours for someone in the office to contact you about your call or message. Be mindful your provider may be out of the office or your message may require further review. We encourage you to use [employer] for your messages as this is a faster, more efficient way to communicate with our office    o Medication Refills:  Prescription medications require up to 48 business hours to process. We encourage you to use [employer] for your refills. For controlled medications: Please allow up to 72 business hours to process. Certain medications may require you to  a written prescription at our office. NO narcotic/controlled medications will be prescribed after 4pm Monday through Friday or on weekends    o Form/Paperwork Completion:  We ask that you allow 7-14 business days. You may also download your forms to [employer] to have your doctor print off.

## 2021-08-20 ENCOUNTER — PATIENT MESSAGE (OUTPATIENT)
Dept: ENDOCRINOLOGY | Age: 51
End: 2021-08-20

## 2021-08-20 DIAGNOSIS — R53.83 OTHER FATIGUE: ICD-10-CM

## 2021-08-20 DIAGNOSIS — E10.42 DIABETIC POLYNEUROPATHY ASSOCIATED WITH TYPE 1 DIABETES MELLITUS (HCC): ICD-10-CM

## 2021-08-20 RX ORDER — ALBUTEROL SULFATE 90 UG/1
AEROSOL, METERED RESPIRATORY (INHALATION)
Qty: 9 G | Refills: 0 | Status: CANCELLED | OUTPATIENT
Start: 2021-08-20

## 2021-08-20 RX ORDER — INSULIN ASPART 100 [IU]/ML
INJECTION, SOLUTION INTRAVENOUS; SUBCUTANEOUS
Qty: 45 ML | Refills: 3 | Status: SHIPPED | OUTPATIENT
Start: 2021-08-20 | End: 2021-10-14 | Stop reason: CLARIF

## 2021-08-20 RX ORDER — ARMODAFINIL 150 MG/1
150 TABLET ORAL DAILY
Qty: 30 TABLET | Refills: 5 | OUTPATIENT
Start: 2021-08-20

## 2021-08-20 NOTE — TELEPHONE ENCOUNTER
From: Kristy Hale  To: Yue Douglas MD  Sent: 8/20/2021 12:58 PM EDT  Subject: Prescription Question    Good afternoon. I wish to request a refill on my prescription Novalog. Your assistance in this matter is greatly appreciated.

## 2021-08-20 NOTE — TELEPHONE ENCOUNTER
PCP: Joan Johansen NP    Last appt: 7/28/2021  Future Appointments   Date Time Provider Neil Brown   10/18/2021  8:30 AM Ravin Ruiz PA-C [facility] BS AMB   1/21/2022  9:30 AM Nevaeh Lazaro MD RDE [facility] A [facility] BS AMB   1/26/2022  3:00 PM MARIAA Perdomo BS AMB       Requested Prescriptions     Pending Prescriptions Disp Refills    Armodafinil (NuvigiL) 150 mg tab 30 Tablet 5     Sig: Take 150 mg by mouth daily. Max Daily Amount: 150 mg.  Indications: MS fatigue

## 2021-08-20 NOTE — TELEPHONE ENCOUNTER
PCP: Abdi Galeana NP    Last appt: 6/18/2021  Future Appointments   Date Time Provider Neil Brown   10/18/2021  8:30 AM [facility]   1/21/2022  9:30 AM Wilian Molina MD RDE [facility] [address] BS AMB   1/26/2022  3:00 PM MARIAA Echols BS AMB       Requested Prescriptions     Pending Prescriptions Disp Refills    pregabalin (LYRICA) 150 mg capsule 60 Capsule 0     Sig: Take 1 Capsule by mouth two (2) times a day. Max Daily Amount: 300 mg.

## 2021-08-23 RX ORDER — PREGABALIN 150 MG/1
150 CAPSULE ORAL 2 TIMES DAILY
Qty: 60 CAPSULE | Refills: 0 | Status: SHIPPED | OUTPATIENT
Start: 2021-08-23 | End: 2021-10-20

## 2021-08-29 RX ORDER — HYDROXYZINE PAMOATE 25 MG/1
CAPSULE ORAL
Qty: 60 CAPSULE | Refills: 0 | Status: SHIPPED | OUTPATIENT
Start: 2021-08-29 | End: 2021-12-17 | Stop reason: SDUPTHER

## 2021-10-14 RX ORDER — INSULIN LISPRO 100 [IU]/ML
INJECTION, SOLUTION INTRAVENOUS; SUBCUTANEOUS
Qty: 45 ML | Refills: 3 | Status: SHIPPED | OUTPATIENT
Start: 2021-10-14 | End: 2021-12-17 | Stop reason: SDUPTHER

## 2021-10-16 RX ORDER — PRAVASTATIN SODIUM 20 MG/1
TABLET ORAL
Qty: 90 TABLET | Refills: 0 | Status: SHIPPED | OUTPATIENT
Start: 2021-10-16 | End: 2021-12-17 | Stop reason: SDUPTHER

## 2021-10-17 NOTE — PROGRESS NOTES
Preoperative Evaluation    Date of Exam: 10/18/2021     Visit Vitals  BP (!) 160/90 (BP 1 Location: Right arm, BP Patient Position: Sitting, BP Cuff Size: Adult)   Pulse (!) 104   Temp 98 °F (36.7 °C) (Oral)   Resp 12   Ht 5' 10\" (1.778 m)   Wt 235 lb (106.6 kg)   SpO2 97%   BMI 33.72 kg/m²        Osman Crain is a 46 y.o. male (:1970) who presents for preoperative evaluation. Procedure/Surgery:BL Cataract surgery  Date of Procedure/Surgery: 10/27/21, 11/3/21  Surgeon: [facility]/Surgical Facility: [facility]  Primary Physician: Bianca Nelson NP    Questions:  -Normal state of health today? yes  -Do you usually get chest pain or breathlessness when you climb up two flights of stairs at normal speed? Rarely, has had heart checked out.  Normal stress test    Latex Allergy: No      Patient Active Problem List   Diagnosis Code    Diabetic polyneuropathy associated with type 1 diabetes mellitus (HCC) E10.42    Neuromyelitis optica ([address] G36.0    Diabetes mellitus type 1, uncontrolled, with complications ([address] A53.7, E10.65    Major depressive disorder with single episode, in partial remission ([address] F32.4    Essential hypertension I10    Anxiety about health F41.8    Hypercholesterolemia E78.00     Past Medical History:   Diagnosis Date    Asthma     Type 2 diabetes mellitus with peripheral neuropathy (HCC)      Past Surgical History:   Procedure Laterality Date    HX HEENT      ear surgery(both)     Social History     Socioeconomic History    Marital status: SINGLE     Spouse name: Not on file    Number of children: Not on file    Years of education: Not on file    Highest education level: Not on file   Tobacco Use    Smoking status: Never Smoker    Smokeless tobacco: Never Used   Vaping Use    Vaping Use: Never used   Substance and Sexual Activity    Alcohol use: No     Alcohol/week: 0.0 standard drinks    Drug use: No    Sexual activity: Not Currently     Social Determinants of Health     Financial Resource Strain:     Difficulty of Paying Living Expenses:    Food Insecurity:     Worried About Running Out of Food in the Last Year:     [address] in the Last Year:    Transportation Needs:     Lack of Transportation (Medical):  Lack of Transportation (Non-Medical):    Physical Activity:     Days of Exercise per Week:     Minutes of Exercise per Session:    Stress:     Feeling of Stress :    Social Connections:     Frequency of Communication with Friends and Family:     Frequency of Social Gatherings with Friends and Family:     Attends Christianity Services:     Active Member of Clubs or Organizations:     Attends Club or Organization Meetings:     Marital Status:      Family History   Problem Relation Age of Onset    Diabetes Father     Cancer Father         prostate    Hypertension Mother     Other Mother         Crohn's disease    Heart Disease Mother     Hypertension Maternal Grandmother     Thyroid Disease Sister     No Known Problems Brother      No Known Allergies    Current Outpatient Medications   Medication Sig    triamcinolone acetonide (KENALOG) 0.1 % topical cream APPLY  CREAM EXTERNALLY TO AFFECTED AREA TWICE DAILY AS NEEDED FOR SKIN IRRITATION. USE THIN LAYER ON NECK RASH AND EXTERNAL EAR    pravastatin (PRAVACHOL) 20 mg tablet Take 1 tablet by mouth nightly    insulin lispro (HumaLOG KwikPen Insulin) 100 unit/mL kwikpen 20 units twice daily with meals    hydrOXYzine pamoate (VISTARIL) 25 mg capsule TAKE 1 CAPSULE BY MOUTH 4 TIMES DAILY AS NEEDED FOR ITCHING    pregabalin (LYRICA) 150 mg capsule Take 1 Capsule by mouth two (2) times a day.  Max Daily Amount: 300 mg.    insulin detemir U-100 (LEVEMIR FLEXTOUCH) 100 unit/mL (3 mL) inpn Inject 50 units am and 50 units HS  Indications: type 1 diabetes mellitus (Patient taking differently: Inject 45 units am and 45 units HS  Indications: type 1 diabetes mellitus)    amLODIPine (NORVASC) 2.5 mg tablet Take 1 Tablet by mouth daily.  losartan (COZAAR) 50 mg tablet Take 1 Tablet by mouth daily.  Insulin Needles, Disposable, 31 gauge x 5/16\" ndle Use with insulin    diphenoxylate-atropine (LOMOTIL) 2.5-0.025 mg per tablet Take 1 Tab by mouth four (4) times daily. Max Daily Amount: 4 Tabs.  ProAir HFA 90 mcg/actuation inhaler INHALE 1 PUFF BY MOUTH EVERY 4 HOURS AS NEEDED FOR WHEEZING    ALPRAZolam (XANAX) 0.25 mg tablet 1 tablet 30 to 60 minutes prior to MRI scan may repeat x1 if needed must have someone to drive you to and from the MRI scanner  Indications: anxious    busPIRone (BUSPAR) 10 mg tablet Take 1 Tab by mouth three (3) times daily (with meals).  multivitamin, tx-iron-ca-min (THERA-M W/ IRON) 9 mg iron-400 mcg tab tablet Take 1 Tab by mouth daily.  cyanocobalamin 1,000 mcg tablet Take 1,000 mcg by mouth daily.  selenium sulfide (SELSUN BLUE) 1 % shampoo Apply to head, mustache area and left ear at least several times daily.  Armodafinil (NuvigiL) 150 mg tab Take 150 mg by mouth daily. Max Daily Amount: 150 mg. Indications: MS fatigue    glucose blood VI test strips (PRODIGY NO CODING) strip Monitor BS twice daily     No current facility-administered medications for this visit. Recent use of:  NSAIDS or steroids  No    Tetanus up to date: Not UTD      Anesthesia Complications: Never had surgery  History of abnormal bleeding : No   History of Blood Transfusions: No  Health Care Directive or Living Will: Not on file    REVIEW OF SYSTEMS:  Constitutional: Negative for recent fever and chills. Skin: Negative for rash or skin lesions. HENT: Negative review. Eyes: Negative for visual changes. Cardiovascular:  Negative for chest pain, exertional dyspnea and palpitations. Respiratory: Negative for cough and hemoptysis, shortness of breath, orthopnea, PND. Gastrointestinal: Negative for nausea and vomitting.  Negative for abdominal pain   diarrhea or constipation. No melena. Genitourinary: Negative for dysuria, blood in the urine, frequency or burning. Lymphoreticular: No lymphadenopathy. Musculoskeletal: Negative  for arthralgias, back pain or neck pain. History of edema: -  Neurological: Negative for dizziness, seizures or syncopal episodes   Psychiatric: Negative. Physical Examination:   Vital signs:   Visit Vitals  BP (!) 160/90 (BP 1 Location: Right arm, BP Patient Position: Sitting, BP Cuff Size: Adult)   Pulse (!) 104   Temp 98 °F (36.7 °C) (Oral)   Resp 12   Ht 5' 10\" (1.778 m)   Wt 235 lb (106.6 kg)   SpO2 97%   BMI 33.72 kg/m²     General appearance - alert, well appearing, and in no distress  Mental status - alert, oriented to person, place, and time, normal mood, behavior, speech, dress, motor activity, and thought processes  Eyes - pupils equal and reactive, extraocular eye movements intact  Ears - bilateral TM's and external ear canals normal  Nose - normal and patent, no erythema, discharge or polyps  Mouth - mucous membranes moist, pharynx normal without lesions  Neck - supple, no significant adenopathy, no thyromegaly  Chest - clear to auscultation, no wheezes, rales or rhonchi, symmetric air entry  Heart - normal rate, regular rhythm, normal S1, S2, no murmurs, rubs, clicks or gallops  Abdomen - soft, nontender, nondistended, no masses or organomegaly, bowel sounds normal and present  Neurological - alert, oriented, normal speech, no focal findings or movement disorder noted, cranial nerves II through XII intact, DTR's normal and symmetric, motor and sensory grossly normal bilaterally  Musculoskeletal - no joint tenderness, deformity or swelling  Extremities - peripheral pulses normal, no pedal edema, no clubbing or cyanosis  Skin - normal coloration and turgor, no rashes, no suspicious skin lesions noted       DIAGNOSTICS:   1. EKG: EKG FINDINGS - normal EKG, normal sinus rhythm  2.  CXR:   3. Labs:   Lab Results   Component Value Date/Time    Hemoglobin A1c 10.8 (H) 12/18/2020 11:09 AM    Hemoglobin A1c (POC) 8.9 10/18/2021 09:02 AM          Results for orders placed or performed in visit on 10/18/21   AMB POC HEMOGLOBIN A1C   Result Value Ref Range    Hemoglobin A1c (POC) 8.9 %             ASSESSMENT and PLAN  Diagnoses and all orders for this visit:    1. Pre-operative clearance    2. Essential hypertension  -     AMB POC EKG ROUTINE W/ 12 LEADS, INTER & REP    3. Diabetic polyneuropathy associated with type 1 diabetes mellitus ([address]    4. Diabetes mellitus type 1, uncontrolled, with complications (HCC)  -     AMB POC HEMOGLOBIN A1C  -     AMB POC EKG ROUTINE W/ 12 LEADS, INTER & REP    5. Major depressive disorder with single episode, in partial remission (HCC)  -     REFERRAL TO PSYCHIATRY  -     REFERRAL TO PSYCHOLOGY    6. Hypercholesterolemia    7. Colon cancer screening  -     REFERRAL TO GASTROENTEROLOGY    8. Rash  -     triamcinolone acetonide (KENALOG) 0.1 % topical cream; APPLY  CREAM EXTERNALLY TO AFFECTED AREA TWICE DAILY AS NEEDED FOR SKIN IRRITATION. USE THIN LAYER ON NECK RASH AND EXTERNAL EAR    9. Needs flu shot  -     INFLUENZA VIRUS VAC QUAD,SPLIT,PRESV FREE SYRINGE IM    Patient did not take BP meds today. His A1C has elevated. Insulin is managed by endocrinology. Will defer changing his meds to them today. Reports depression, needs to return to discuss additional treatment. BP came down to a level I am comfortable as he did not take his medications. Based on ACC/AHA guidelines patient is at acceptable risk for scheduled level of surgery.            Patient Instructions   For a therapist and/or psychiatrist, call:    [facility] ([address] [facility])  [facility] [employer] [facility], [facility] [place], [address]  [address], [address]   [address] at [address] [facility], [facility], [address], [address]  [phone]    [facility]  [address], [place], [address]  [address]  [address], [place], [address]  [phone]    [address] [place], [address]  [address] [facility], [address], [facility], [address]  [phone]    [facility]  [address] [facility], [address]  [address] [facility]  [address] [facility], [address] [facility], [facility], [address] [facility]  [address] [place] [address]   [phone]    [facility] ([facility])- [address], [address] [place] [address]  [phone]    Vaccine Information Statement    Influenza (Flu) Vaccine (Inactivated or Recombinant): What You Need to Know    Many vaccine information statements are available in [place] and other languages. See www.immunize.org/vis. Hojas de información sobre vacunas están disponibles en español y en muchos otros idiomas. Visite www.immunize.org/vis. 1. Why get vaccinated? Influenza vaccine can prevent influenza (flu). Flu is a contagious disease that spreads around the [place] every year, usually between October and May. Anyone can get the flu, but it is more dangerous for some people. Infants and young children, people 72 years and older, pregnant people, and people with certain health conditions or a weakened immune system are at greatest risk of flu complications. Pneumonia, bronchitis, sinus infections, and ear infections are examples of flu-related complications. If you have a medical condition, such as heart disease, cancer, or diabetes, flu can make it worse.     Flu can cause fever and chills, sore throat, muscle aches, fatigue, cough, headache, and runny or stuffy nose. Some people may have vomiting and diarrhea, though this is more common in children than adults. In an average year, thousands of people in the [facility] die from flu, and many more are hospitalized. Flu vaccine prevents millions of illnesses and flu-related visits to the doctor each year. 2. Influenza vaccines     CDC recommends everyone 6 months and older get vaccinated every flu season. Children 6 months through 6years of age may need 2 doses during a single flu season. Everyone else needs only 1 dose each flu season. It takes about 2 weeks for protection to develop after vaccination. There are many flu viruses, and they are always changing. Each year a new flu vaccine is made to protect against the influenza viruses believed to be likely to cause disease in the upcoming flu season. Even when the vaccine doesnt exactly match these viruses, it may still provide some protection. Influenza vaccine does not cause flu. Influenza vaccine may be given at the same time as other vaccines. 3. Talk with your health care provider    Tell your vaccination provider if the person getting the vaccine:   Has had an allergic reaction after a previous dose of influenza vaccine, or has any severe, life-threatening allergies    Has ever had Guillain-Barré Syndrome (also called GBS)    In some cases, your health care provider may decide to postpone influenza vaccination until a future visit. Influenza vaccine can be administered at any time during pregnancy. People who are or will be pregnant during influenza season should receive inactivated influenza vaccine. People with minor illnesses, such as a cold, may be vaccinated. People who are moderately or severely ill should usually wait until they recover before getting influenza vaccine. Your health care provider can give you more information.     4. Risks of a vaccine reaction     Soreness, redness, and swelling where the shot is given, fever, muscle aches, and headache can happen after influenza vaccination.  There may be a very small increased risk of Guillain-Barré Syndrome (GBS) after inactivated influenza vaccine (the flu shot). Rexene Sinks children who get the flu shot along with pneumococcal vaccine (PCV13) and/or DTaP vaccine at the same time might be slightly more likely to have a seizure caused by fever. Tell your health care provider if a child who is getting flu vaccine has ever had a seizure. People sometimes faint after medical procedures, including vaccination. Tell your provider if you feel dizzy or have vision changes or ringing in the ears. As with any medicine, there is a very remote chance of a vaccine causing a severe allergic reaction, other serious injury, or death. 5. What if there is a serious problem? An allergic reaction could occur after the vaccinated person leaves the clinic. If you see signs of a severe allergic reaction (hives, swelling of the face and throat, difficulty breathing, a fast heartbeat, dizziness, or weakness), call 9-1-1 and get the person to the nearest hospital.    For other signs that concern you, call your health care provider. Adverse reactions should be reported to the Vaccine Adverse Event Reporting System (VAERS). Your health care provider will usually file this report, or you can do it yourself. Visit the VAERS website at www.vaers. hhs.gov or call 3-147.773.5738. VAERS is only for reporting reactions, and VAERS staff members do not give medical advice. 6. The National Vaccine Injury Compensation Program    The [facility] Vaccine Injury Compensation Program (VICP) is a federal program that was created to compensate people who may have been injured by certain vaccines. Claims regarding alleged injury or death due to vaccination have a time limit for filing, which may be as short as two years.  Visit the VICP website at www.hrsa.gov/vaccinecompensation or call 4-522.834.9188 to learn about the program and about filing a claim. 7. How can I learn more?  Ask your health care provider.  Call your local or state health department.  Visit the website of the Food and Drug Administration (FDA) for vaccine package inserts and additional information at www.fda.gov/vaccines-blood-biologics/vaccines.  Contact the Centers for Disease Control and Prevention (CDC):  - Call 0-575.678.7463 (9-843-EFA-INFO) or  - Visit CDCs influenza website at www.cdc.gov/flu. Vaccine Information Statement   Inactivated Influenza Vaccine   8/6/2021  [id number]   Department of Health and Human Services  Centers for Disease Control and Prevention    Office Use Only          Please keep your follow-up appointment with Dyan Garza NP. Follow-up and Dispositions    · Return in about 2 months (around 12/18/2021) for DEPRESSION, HTN. Health Maintenance Due   Topic Date Due    Hepatitis C Screening  Never done    DTaP/Tdap/Td series (1 - Tdap) Never done    Colorectal Cancer Screening Combo  Never done    Eye Exam Retinal or Dilated  08/30/2019    Shingrix Vaccine Age 50> (1 of 2) Never done    Medicare Yearly Exam  Never done       I have discussed the diagnosis with the patient and the intended plan as seen in the above orders. Patient is in agreement. The patient has received an after-visit summary and questions were answered concerning future plans. I have discussed medication side effects and warnings with the patient as well. Warning signs for the above conditions were discussed including when to call our office or go to the emergency room. The nurse provided the patient and/or family with advanced directive information if needed and encouraged the patient to provide a copy to the office when available.

## 2021-10-18 ENCOUNTER — OFFICE VISIT (OUTPATIENT)
Dept: INTERNAL MEDICINE CLINIC | Age: 51
End: 2021-10-18
Payer: COMMERCIAL

## 2021-10-18 VITALS
OXYGEN SATURATION: 97 % | TEMPERATURE: 98 F | DIASTOLIC BLOOD PRESSURE: 90 MMHG | WEIGHT: 235 LBS | HEART RATE: 104 BPM | BODY MASS INDEX: 33.64 KG/M2 | RESPIRATION RATE: 12 BRPM | HEIGHT: 70 IN | SYSTOLIC BLOOD PRESSURE: 160 MMHG

## 2021-10-18 DIAGNOSIS — Z12.11 COLON CANCER SCREENING: ICD-10-CM

## 2021-10-18 DIAGNOSIS — R21 RASH: ICD-10-CM

## 2021-10-18 DIAGNOSIS — F32.4 MAJOR DEPRESSIVE DISORDER WITH SINGLE EPISODE, IN PARTIAL REMISSION (HCC): ICD-10-CM

## 2021-10-18 DIAGNOSIS — Z23 NEEDS FLU SHOT: ICD-10-CM

## 2021-10-18 DIAGNOSIS — I10 ESSENTIAL HYPERTENSION: ICD-10-CM

## 2021-10-18 DIAGNOSIS — E10.42 DIABETIC POLYNEUROPATHY ASSOCIATED WITH TYPE 1 DIABETES MELLITUS (HCC): ICD-10-CM

## 2021-10-18 DIAGNOSIS — E78.00 HYPERCHOLESTEROLEMIA: ICD-10-CM

## 2021-10-18 DIAGNOSIS — Z01.818 PRE-OPERATIVE CLEARANCE: Primary | ICD-10-CM

## 2021-10-18 LAB — HBA1C MFR BLD HPLC: 8.9 %

## 2021-10-18 PROCEDURE — G8755 DIAS BP > OR = 90: HCPCS | Performed by: PHYSICIAN ASSISTANT

## 2021-10-18 PROCEDURE — 3017F COLORECTAL CA SCREEN DOC REV: CPT | Performed by: PHYSICIAN ASSISTANT

## 2021-10-18 PROCEDURE — G9717 DOC PT DX DEP/BP F/U NT REQ: HCPCS | Performed by: PHYSICIAN ASSISTANT

## 2021-10-18 PROCEDURE — 90686 IIV4 VACC NO PRSV 0.5 ML IM: CPT | Performed by: PHYSICIAN ASSISTANT

## 2021-10-18 PROCEDURE — 83036 HEMOGLOBIN GLYCOSYLATED A1C: CPT | Performed by: PHYSICIAN ASSISTANT

## 2021-10-18 PROCEDURE — 2022F DILAT RTA XM EVC RTNOPTHY: CPT | Performed by: PHYSICIAN ASSISTANT

## 2021-10-18 PROCEDURE — G8427 DOCREV CUR MEDS BY ELIG CLIN: HCPCS | Performed by: PHYSICIAN ASSISTANT

## 2021-10-18 PROCEDURE — G8417 CALC BMI ABV UP PARAM F/U: HCPCS | Performed by: PHYSICIAN ASSISTANT

## 2021-10-18 PROCEDURE — 99214 OFFICE O/P EST MOD 30 MIN: CPT | Performed by: PHYSICIAN ASSISTANT

## 2021-10-18 PROCEDURE — 90471 IMMUNIZATION ADMIN: CPT | Performed by: PHYSICIAN ASSISTANT

## 2021-10-18 PROCEDURE — G8753 SYS BP > OR = 140: HCPCS | Performed by: PHYSICIAN ASSISTANT

## 2021-10-18 PROCEDURE — 3052F HG A1C>EQUAL 8.0%<EQUAL 9.0%: CPT | Performed by: PHYSICIAN ASSISTANT

## 2021-10-18 PROCEDURE — 93000 ELECTROCARDIOGRAM COMPLETE: CPT | Performed by: PHYSICIAN ASSISTANT

## 2021-10-18 RX ORDER — TRIAMCINOLONE ACETONIDE 1 MG/G
CREAM TOPICAL
Qty: 15 G | Refills: 0 | Status: SHIPPED | OUTPATIENT
Start: 2021-10-18 | End: 2021-10-28 | Stop reason: SDUPTHER

## 2021-10-18 NOTE — PROGRESS NOTES
Jenna Orozco  Identified pt with two pt identifiers(name and ). Chief Complaint   Patient presents with    Pre-op Exam    Hypertension    Neuropathy    Diabetes       Reviewed record In preparation for visit and have obtained necessary documentation. 1. Have you been to the ER, urgent care clinic or hospitalized since your last visit? No     2. Have you seen or consulted any other health care providers outside of the [address] [place] [address] since your last visit? Include any pap smears or colon screening. No    Vitals reviewed with provider. Health Maintenance reviewed: After verbal order read back of [facility], patient received Flu Shot in right deltoid. [address]: 06245-032-75 Lot: [id number] 22. Patient tolerated procedure without complaints and received VIS.     Health Maintenance Due   Topic    Hepatitis C Screening     DTaP/Tdap/Td series (1 - Tdap)    Colorectal Cancer Screening Combo     Eye Exam Retinal or Dilated     Shingrix Vaccine Age 50> (1 of 2)    Medicare Yearly Exam           Wt Readings from Last 3 Encounters:   10/18/21 235 lb (106.6 kg)   21 226 lb (102.5 kg)   21 224 lb (101.6 kg)        Temp Readings from Last 3 Encounters:   10/18/21 98 °F (36.7 °C) (Oral)   21 98 °F (36.7 °C) (Oral)   12/10/19 97.9 °F (36.6 °C) (Oral)        BP Readings from Last 3 Encounters:   10/18/21 (!) 160/90   21 (!) 150/82   21 (!) 188/100        Pulse Readings from Last 3 Encounters:   10/18/21 (!) 104   21 (!) 108   21 98        Vitals:    10/18/21 0849 10/18/21 0854 10/18/21 0930   BP: (!) 183/113 (!) 179/113 (!) 160/90   Pulse: (!) 104     Resp: 12     Temp: 98 °F (36.7 °C)     TempSrc: Oral     SpO2: 97%     Weight: 235 lb (106.6 kg)     Height: 5' 10\" (1.778 m)     PainSc:   8     PainLoc: Generalized            Learning Assessment:   :       Learning Assessment 2020 3/29/2018 2017 2017 2017 2016 2015   PRIMARY LEARNER Patient Patient Patient Patient Patient Patient Patient   HIGHEST LEVEL OF EDUCATION - PRIMARY LEARNER  - - - - - 4 YEARS OF COLLEGE 4 YEARS OF COLLEGE   BARRIERS PRIMARY LEARNER - - - - - NONE NONE   CO-LEARNER CAREGIVER - - - - - No No   PRIMARY LANGUAGE ENGLISH ENGLISH ENGLISH ENGLISH ENGLISH ENGLISH ENGLISH   LEARNER PREFERENCE PRIMARY VIDEOS DEMONSTRATION DEMONSTRATION DEMONSTRATION DEMONSTRATION VIDEOS DEMONSTRATION   ANSWERED BY self patient patient patient patient Patient Patient   RELATIONSHIP SELF SELF SELF SELF SELF SELF SELF        Depression Screening:   :       3 most recent PHQ Screens 10/18/2021   PHQ Not Done -   Little interest or pleasure in doing things Nearly every day   Feeling down, depressed, irritable, or hopeless More than half the days   Total Score PHQ 2 5   Trouble falling or staying asleep, or sleeping too much Nearly every day   Feeling tired or having little energy Nearly every day   Poor appetite, weight loss, or overeating Nearly every day   Feeling bad about yourself - or that you are a failure or have let yourself or your family down More than half the days   Trouble concentrating on things such as school, work, reading, or watching TV Nearly every day   Moving or speaking so slowly that other people could have noticed; or the opposite being so fidgety that others notice Nearly every day   Thoughts of being better off dead, or hurting yourself in some way Not at all   PHQ 9 Score 22   How difficult have these problems made it for you to do your work, take care of your home and get along with others -        Fall Risk Assessment:   :     No flowsheet data found. Abuse Screening:   :     No flowsheet data found.      ADL Screening:   :       ADL Assessment 5/24/2019   Feeding yourself No Help Needed   Getting from bed to chair No Help Needed   Getting dressed No Help Needed   Bathing or showering No Help Needed   Walk across the room (includes cane/walker) No Help Needed Using the telphone No Help Needed   Taking your medications No Help Needed   Preparing meals No Help Needed   Managing money (expenses/bills) No Help Needed   Moderately strenuous housework (laundry) No Help Needed   Shopping for personal items (toiletries/medicines) No Help Needed   Shopping for groceries No Help Needed   Driving Help Needed   Climbing a flight of stairs No Help Needed   Getting to places beyond walking distances Help Needed

## 2021-10-18 NOTE — PATIENT INSTRUCTIONS
For a therapist and/or psychiatrist, call:    [facility] ([address] [facility])  DTE [employer], Tee Lira, [address]  [address] [facility] [address]  187 [facility] Tee Garcia, [address]  [phone]    [facility] at [facility], [facility], [address], [place], [address]  [phone]    [facility] [place]  [address] Noah Campbell [facility] [place][note 2], [facility], [address] [facility] 11  [address], [place], [address]  [phone]    [address] [place], [address], [address]  [phone]    [facility]  [facility], [address] [place] [address]  [address] [facility]  12[facility], [address] [place] [address]   [phone]    Ezequiel Montez 73 (RACSB)-  [facility] [address] [facility], [address] [place] [address], [facility] [address]  [phone]    Vaccine Information Statement    Influenza (Flu) Vaccine (Inactivated or Recombinant): What You Need to Know    Many vaccine information statements are available in [place] and other languages. See www.immunize.org/vis. Hojas de información sobre vacunas están disponibles en español y en muchos otros idiomas. Visite www.immunize.org/vis. 1. Why get vaccinated? Influenza vaccine can prevent influenza (flu). Flu is a contagious disease that spreads around the [place] every year, usually between October and May. Anyone can get the flu, but it is more dangerous for some people.  Infants and young children, people 72 years and older, pregnant people, and people with certain health conditions or a weakened immune system are at greatest risk of flu complications. Pneumonia, bronchitis, sinus infections, and ear infections are examples of flu-related complications. If you have a medical condition, such as heart disease, cancer, or diabetes, flu can make it worse. Flu can cause fever and chills, sore throat, muscle aches, fatigue, cough, headache, and runny or stuffy nose. Some people may have vomiting and diarrhea, though this is more common in children than adults. In an average year, thousands of people in the [facility] die from flu, and many more are hospitalized. Flu vaccine prevents millions of illnesses and flu-related visits to the doctor each year. 2. Influenza vaccines     CDC recommends everyone 6 months and older get vaccinated every flu season. Children 6 months through 6years of age may need 2 doses during a single flu season. Everyone else needs only 1 dose each flu season. It takes about 2 weeks for protection to develop after vaccination. There are many flu viruses, and they are always changing. Each year a new flu vaccine is made to protect against the influenza viruses believed to be likely to cause disease in the upcoming flu season. Even when the vaccine doesnt exactly match these viruses, it may still provide some protection. Influenza vaccine does not cause flu. Influenza vaccine may be given at the same time as other vaccines. 3. Talk with your health care provider    Tell your vaccination provider if the person getting the vaccine:   Has had an allergic reaction after a previous dose of influenza vaccine, or has any severe, life-threatening allergies    Has ever had Guillain-Barré Syndrome (also called GBS)    In some cases, your health care provider may decide to postpone influenza vaccination until a future visit. Influenza vaccine can be administered at any time during pregnancy.  People who are or will be pregnant during influenza season should receive inactivated influenza vaccine. People with minor illnesses, such as a cold, may be vaccinated. People who are moderately or severely ill should usually wait until they recover before getting influenza vaccine. Your health care provider can give you more information. 4. Risks of a vaccine reaction     Soreness, redness, and swelling where the shot is given, fever, muscle aches, and headache can happen after influenza vaccination.  There may be a very small increased risk of Guillain-Barré Syndrome (GBS) after inactivated influenza vaccine (the flu shot). Young Born children who get the flu shot along with pneumococcal vaccine (PCV13) and/or DTaP vaccine at the same time might be slightly more likely to have a seizure caused by fever. Tell your health care provider if a child who is getting flu vaccine has ever had a seizure. People sometimes faint after medical procedures, including vaccination. Tell your provider if you feel dizzy or have vision changes or ringing in the ears. As with any medicine, there is a very remote chance of a vaccine causing a severe allergic reaction, other serious injury, or death. 5. What if there is a serious problem? An allergic reaction could occur after the vaccinated person leaves the clinic. If you see signs of a severe allergic reaction (hives, swelling of the face and throat, difficulty breathing, a fast heartbeat, dizziness, or weakness), call 9-1-1 and get the person to the nearest hospital.    For other signs that concern you, call your health care provider. Adverse reactions should be reported to the Vaccine Adverse Event Reporting System (VAERS). Your health care provider will usually file this report, or you can do it yourself. Visit the VAERS website at www.vaers. hhs.gov or call 3-973.572.6406. VAERS is only for reporting reactions, and VAERS staff members do not give medical advice.     6. The National Vaccine Injury Compensation Program    The [employer] Injury Compensation Program (VICP) is a federal program that was created to compensate people who may have been injured by certain vaccines. Claims regarding alleged injury or death due to vaccination have a time limit for filing, which may be as short as two years. Visit the VICP website at www.[facility].gov/vaccinecompensation or call 5-118.727.3750 to learn about the program and about filing a claim. 7. How can I learn more?  Ask your health care provider.  Call your local or state health department.  Visit the website of the Food and Drug Administration (FDA) for vaccine package inserts and additional information at www.fda.gov/vaccines-blood-biologics/vaccines.  Contact the Centers for Disease Control and Prevention (CDC):  - Call 6-837.322.8805 (1-800-CDC-INFO) or  - Visit CDCs influenza website at www.cdc.gov/flu. Vaccine Information Statement   Inactivated Influenza Vaccine   8/6/2021  [id number]   Department of Health and Human Services  Centers for Disease Control and Prevention    Office Use Only

## 2021-10-20 DIAGNOSIS — E10.42 DIABETIC POLYNEUROPATHY ASSOCIATED WITH TYPE 1 DIABETES MELLITUS (HCC): ICD-10-CM

## 2021-10-20 RX ORDER — ALBUTEROL SULFATE 90 UG/1
AEROSOL, METERED RESPIRATORY (INHALATION)
Qty: 9 G | Refills: 0 | Status: SHIPPED | OUTPATIENT
Start: 2021-10-20 | End: 2021-12-17 | Stop reason: SDUPTHER

## 2021-10-20 RX ORDER — PREGABALIN 150 MG/1
CAPSULE ORAL
Qty: 60 CAPSULE | Refills: 0 | Status: SHIPPED | OUTPATIENT
Start: 2021-10-20 | End: 2021-12-17 | Stop reason: SDUPTHER

## 2021-10-21 PROBLEM — H25.811 COMBINED FORMS OF AGE-RELATED CATARACT OF RIGHT EYE: Status: ACTIVE | Noted: 2021-10-21

## 2021-10-21 RX ORDER — OMEGA-3-ACID ETHYL ESTERS 1 G/1
2 CAPSULE, LIQUID FILLED ORAL 2 TIMES DAILY
COMMUNITY

## 2021-10-21 RX ORDER — LANOLIN ALCOHOL/MO/W.PET/CERES
1 CREAM (GRAM) TOPICAL DAILY
COMMUNITY
End: 2022-09-02

## 2021-10-21 NOTE — PERIOP NOTES
Unable to reach patient x 3 different days at all numbers listed. No return phone calls received back. Notified Lisa Lazaro at Dr Veronika Bailey' office. They will reach out to patient to see if able to get patient to phone to have patient call [facility]. Lisa Lazaro was able to reach patient, and ask that we reach back out to patient, patient states he had just checked his messages. [phone] - finished up phone call with patient. Patient will be here for covid test tomorrow am and then come by [facility] PAT to  dial and hibiclens soap per pt     1036 - notified Lisa Lazaro at Dr Veronika Bailey office that patient has history of MRSA and given dial and hibiclens soap for washing prior to procedure.  No new orders

## 2021-10-21 NOTE — PERIOP NOTES
Hibiclens/Chlorhexidine    Preventing Infections Before and After - Your Surgery    IMPORTANT INSTRUCTIONS    Please read and follow these instructions carefully. If you are unable to comply with the below instructions your procedure will be cancelled. Every Night for Three (3) nights before your surgery:  1. Shower with an antibacterial soap, such as Dial, or the soap provided at your preassessment appointment. A shower is better than a bath for cleaning your skin. 2. If needed, ask someone to help you reach all areas of your body. Dont forget to clean your belly button with every shower. The night before your surgery: If you lose your Hibiclens/chlorhexidine please contact surgery center or you can purchase it at a local pharmacy  1. On the night before your surgery, shower with an antibacterial soap, such as Dial, or the soap provided at your preassessment appointment. 2. With one packet of Hibiclens/Chlorhexidine in hand, turn water off.  3. Apply Hibiclens antiseptic skin cleanser with a clean, freshly washed washcloth. ? Gently apply to your body from chin to toes (except the genital area) and especially the area(s) where your incision(s) will be. ? Leave Hibiclens/Chlorhexidine on your skin for at least 20 seconds. CAUTION: If needed, Hibiclens/chlorhexidine may be used to clean the folds of skin of the legs (such as in the area of the groin) and on your buttocks and hips. However, do not use Hibiclens/Chlorhexidine above the neck or in the genital area (your bottom) or put inside any area of your body. 4. Turn the water back on and rinse. 5. Dry gently with a clean, freshly washed towel. 6. After your shower, do not use any powder, deodorant, perfumes or lotion. 7. Use clean, freshly washed towels and washcloths every time you shower. 8. Wear clean, freshly washed pajamas to bed the night before surgery. 9. Sleep on clean, freshly washed sheets.   10. Do not allow pets to sleep in your bed with you. The Morning of your surgery:  1. Shower again thoroughly with an antibacterial soap, such as Dial or the soap provided at your preassessment appointment. If needed, ask someone for help to reach all areas of your body. Dont forget to clean your belly button! Rinse. 2. Dry gently with a clean, freshly washed towel. 3. After your shower, do not use any powder, deodorant, perfumes or lotion prior to surgery. 4. Put on clean, freshly washed clothing. Tips to help prevent infections after your surgery:  1. Protect your surgical wound from germs:  ? Hand washing is the most important thing you and your caregivers can do to prevent infections. ? Keep your bandage clean and dry! ? Do not touch your surgical wound. 2. Use clean, freshly washed towels and washcloths every time you shower; do not share bath linens with others. 3. Until your surgical wound is healed, wear clothing and sleep on bed linens each day that are clean and freshly washed. 4. Do not allow pets to sleep in your bed with you or touch your surgical wound. 5. Do not smoke - smoking delays wound healing. This may be a good time to stop smoking. 6. If you have diabetes, it is important for you to manage your blood sugar levels properly before your surgery as well as after your surgery. Poorly managed blood sugar levels slow down wound healing and prevent you from healing completely. If you lose your Hibiclens/chlorhexidine, please call the [facility], or it is available for purchase at your pharmacy.                ___________________      ___________________      ________________  (Signature of Patient)          (Witness)                   (Date and Time)

## 2021-10-21 NOTE — PERIOP NOTES
[facility]  Ambulatory Surgery Unit  Pre-operative Instructions    Surgery/Procedure Date  Wednesday October 27th            Tentative Arrival Time TBD      1. On the day of your surgery/procedure, please report to the Ambulatory Surgery Unit Registration Desk and sign in at your designated time. The Ambulatory Surgery Unit is located in [facility] on the [facility] side of the [facility] across from the [address] [place] [address]. Please have all of your health insurance cards and a photo ID. 2. You must have someone with you to drive you home, as you should not drive a car for 24 hours following anesthesia. Please make arrangements for a responsible adult friend or family member to stay with you for at least the first 24 hours after your surgery. 3. Do not have anything to eat or drink (including water, gum, mints, coffee, juice) after 11:59 PM  Tuesday October 26th. This may not apply to medications prescribed by your physician. (Please note below the special instructions with medications to take the morning of surgery, if applicable.)    4. We recommend you do not drink any alcoholic beverages for 24 hours before and after your surgery. 5. Contact your surgeons office for instructions on the following medications: non-steroidal anti-inflammatory drugs (i.e. Advil, Aleve), vitamins, and supplements. (Some surgeons will want you to stop these medications prior to surgery and others may allow you to take them)   **If you are currently taking Plavix, Coumadin, Aspirin and/or other blood-thinning agents, contact your surgeon for instructions. ** Your surgeon will partner with the physician prescribing these medications to determine if it is safe to stop or if you need to continue taking. Please do not stop taking these medications without instructions from your surgeon. 6.See bathing instructions on following sheet     7. Wear comfortable clothes. Wear glasses instead of contacts.  Do not bring any jewelry or money (other than copays or fees as instructed). Do not wear make-up, particularly mascara, the morning of your surgery. Do not wear nail polish, particularly if you are having foot /hand surgery. Wear your hair loose or down, no ponytails, buns, abel pins or clips. All body piercings must be removed. 8. You should understand that if you do not follow these instructions your surgery may be cancelled. If your physical condition changes (i.e. fever, cold or flu) please contact your surgeon as soon as possible. 9. It is important that you be on time. If a situation occurs where you may be late, or if you have any questions or problems, please call [phone].    10. Your surgery time may be subject to change. You will receive a phone call the day prior to surgery to confirm your arrival time. 11. Pediatric patients: please bring a change of clothes, diapers, bottle/sippy cup, pacifier, etc.      Special Instructions: Take all medications and inhalers, as prescribed, on the morning of surgery with a sip of water EXCEPT: diabetic medications      Insulin Dependent Diabetic patients: Take your diabetic medications as prescribed the day before surgery. Hold all diabetic medications the day of surgery. If you are scheduled to arrive for surgery after 8:00 AM, and your AM blood sugar is >200, please call Ambulatory Surgery. I understand a pre-operative phone call will be made to verify my surgery time. In the event that I am not available, I give permission for a message to be left on my answering service and/or with another person?       yes         ___________________      ___________________      ________________  (Signature of Patient)          (Witness)                   (Date and Time)

## 2021-10-22 ENCOUNTER — HOSPITAL ENCOUNTER (OUTPATIENT)
Dept: PREADMISSION TESTING | Age: 51
Discharge: HOME | End: 2021-10-22
Payer: COMMERCIAL

## 2021-10-22 PROCEDURE — U0005 INFEC AGEN DETEC AMPLI PROBE: HCPCS

## 2021-10-24 LAB
SARS-COV-2, XPLCVT: NOT DETECTED
SOURCE, COVRS: NORMAL

## 2021-10-26 ENCOUNTER — ANESTHESIA EVENT (OUTPATIENT)
Dept: SURGERY | Age: 51
End: 2021-10-26
Payer: MEDICARE

## 2021-10-26 RX ORDER — SODIUM CHLORIDE, SODIUM LACTATE, POTASSIUM CHLORIDE, CALCIUM CHLORIDE 600; 310; 30; 20 MG/100ML; MG/100ML; MG/100ML; MG/100ML
25 INJECTION, SOLUTION INTRAVENOUS CONTINUOUS
Status: CANCELLED | OUTPATIENT
Start: 2021-10-26

## 2021-10-26 RX ORDER — DIPHENHYDRAMINE HYDROCHLORIDE 50 MG/ML
12.5 INJECTION, SOLUTION INTRAMUSCULAR; INTRAVENOUS AS NEEDED
Status: CANCELLED | OUTPATIENT
Start: 2021-10-26 | End: 2021-10-26

## 2021-10-26 RX ORDER — FENTANYL CITRATE 50 UG/ML
25 INJECTION, SOLUTION INTRAMUSCULAR; INTRAVENOUS
Status: CANCELLED | OUTPATIENT
Start: 2021-10-26

## 2021-10-26 RX ORDER — SODIUM CHLORIDE 0.9 % (FLUSH) 0.9 %
5-40 SYRINGE (ML) INJECTION AS NEEDED
Status: CANCELLED | OUTPATIENT
Start: 2021-10-26

## 2021-10-26 RX ORDER — SODIUM CHLORIDE 0.9 % (FLUSH) 0.9 %
5-40 SYRINGE (ML) INJECTION EVERY 8 HOURS
Status: CANCELLED | OUTPATIENT
Start: 2021-10-26

## 2021-10-26 RX ORDER — SODIUM CHLORIDE, SODIUM LACTATE, POTASSIUM CHLORIDE, CALCIUM CHLORIDE 600; 310; 30; 20 MG/100ML; MG/100ML; MG/100ML; MG/100ML
25 INJECTION, SOLUTION INTRAVENOUS CONTINUOUS
Status: CANCELLED | OUTPATIENT
Start: 2021-10-26 | End: 2021-10-27

## 2021-10-26 RX ORDER — ONDANSETRON 2 MG/ML
4 INJECTION INTRAMUSCULAR; INTRAVENOUS AS NEEDED
Status: CANCELLED | OUTPATIENT
Start: 2021-10-26

## 2021-10-27 ENCOUNTER — ANESTHESIA (OUTPATIENT)
Dept: SURGERY | Age: 51
End: 2021-10-27
Payer: MEDICARE

## 2021-10-27 ENCOUNTER — HOSPITAL ENCOUNTER (OUTPATIENT)
Age: 51
Setting detail: OUTPATIENT SURGERY
Discharge: HOME | End: 2021-10-27
Attending: OPHTHALMOLOGY | Admitting: OPHTHALMOLOGY
Payer: MEDICARE

## 2021-10-27 VITALS
DIASTOLIC BLOOD PRESSURE: 88 MMHG | TEMPERATURE: 98 F | WEIGHT: 233.6 LBS | OXYGEN SATURATION: 98 % | RESPIRATION RATE: 12 BRPM | BODY MASS INDEX: 33.44 KG/M2 | SYSTOLIC BLOOD PRESSURE: 163 MMHG | HEART RATE: 87 BPM | HEIGHT: 70 IN

## 2021-10-27 LAB
GLUCOSE BLD STRIP.AUTO-MCNC: 265 MG/DL (ref 65–117)
GLUCOSE BLD STRIP.AUTO-MCNC: 298 MG/DL (ref 65–117)
SERVICE CMNT-IMP: ABNORMAL
SERVICE CMNT-IMP: ABNORMAL

## 2021-10-27 PROCEDURE — 74011000250 HC RX REV CODE- 250

## 2021-10-27 PROCEDURE — 74011250637 HC RX REV CODE- 250/637: Performed by: OPHTHALMOLOGY

## 2021-10-27 PROCEDURE — 76030000000 HC AMB SURG OR TIME 0.5 TO 1: Performed by: OPHTHALMOLOGY

## 2021-10-27 PROCEDURE — 74011250636 HC RX REV CODE- 250/636: Performed by: OPHTHALMOLOGY

## 2021-10-27 PROCEDURE — V2632 POST CHMBR INTRAOCULAR LENS: HCPCS | Performed by: OPHTHALMOLOGY

## 2021-10-27 PROCEDURE — 76210000046 HC AMBSU PH II REC FIRST 0.5 HR: Performed by: OPHTHALMOLOGY

## 2021-10-27 PROCEDURE — 82962 GLUCOSE BLOOD TEST: CPT

## 2021-10-27 PROCEDURE — 77030021352 HC CBL LD SYS DISP COVD -B: Performed by: OPHTHALMOLOGY

## 2021-10-27 PROCEDURE — 74011250636 HC RX REV CODE- 250/636: Performed by: NURSE ANESTHETIST, CERTIFIED REGISTERED

## 2021-10-27 PROCEDURE — 2709999900 HC NON-CHARGEABLE SUPPLY: Performed by: OPHTHALMOLOGY

## 2021-10-27 PROCEDURE — 76210000040 HC AMBSU PH I REC FIRST 0.5 HR: Performed by: OPHTHALMOLOGY

## 2021-10-27 PROCEDURE — 76060000061 HC AMB SURG ANES 0.5 TO 1 HR: Performed by: OPHTHALMOLOGY

## 2021-10-27 PROCEDURE — 74011000250 HC RX REV CODE- 250: Performed by: OPHTHALMOLOGY

## 2021-10-27 DEVICE — LENS IOL POST 1-PC 6X13 20.0 -- ACRYSOF: Type: IMPLANTABLE DEVICE | Site: EYE | Status: FUNCTIONAL

## 2021-10-27 RX ORDER — TROPICAMIDE 10 MG/ML
1 SOLUTION/ DROPS OPHTHALMIC ONCE
Status: COMPLETED | OUTPATIENT
Start: 2021-10-27 | End: 2021-10-27

## 2021-10-27 RX ORDER — TETRACAINE HYDROCHLORIDE 5 MG/ML
SOLUTION OPHTHALMIC AS NEEDED
Status: DISCONTINUED | OUTPATIENT
Start: 2021-10-27 | End: 2021-10-27 | Stop reason: HOSPADM

## 2021-10-27 RX ORDER — TROPICAMIDE 10 MG/ML
SOLUTION/ DROPS OPHTHALMIC
Status: COMPLETED
Start: 2021-10-27 | End: 2021-10-27

## 2021-10-27 RX ORDER — SODIUM CHLORIDE 0.9 % (FLUSH) 0.9 %
5-40 SYRINGE (ML) INJECTION AS NEEDED
Status: DISCONTINUED | OUTPATIENT
Start: 2021-10-27 | End: 2021-10-27 | Stop reason: HOSPADM

## 2021-10-27 RX ORDER — PROPARACAINE HYDROCHLORIDE 5 MG/ML
1 SOLUTION/ DROPS OPHTHALMIC ONCE
Status: COMPLETED | OUTPATIENT
Start: 2021-10-27 | End: 2021-10-27

## 2021-10-27 RX ORDER — LIDOCAINE HYDROCHLORIDE 40 MG/ML
3 INJECTION, SOLUTION RETROBULBAR; TOPICAL ONCE
Status: COMPLETED | OUTPATIENT
Start: 2021-10-27 | End: 2021-10-27

## 2021-10-27 RX ORDER — LIDOCAINE HYDROCHLORIDE 10 MG/ML
1 INJECTION, SOLUTION EPIDURAL; INFILTRATION; INTRACAUDAL; PERINEURAL ONCE
Status: DISCONTINUED | OUTPATIENT
Start: 2021-10-27 | End: 2021-10-27 | Stop reason: HOSPADM

## 2021-10-27 RX ORDER — MIDAZOLAM HYDROCHLORIDE 1 MG/ML
INJECTION, SOLUTION INTRAMUSCULAR; INTRAVENOUS AS NEEDED
Status: DISCONTINUED | OUTPATIENT
Start: 2021-10-27 | End: 2021-10-27 | Stop reason: HOSPADM

## 2021-10-27 RX ORDER — DICLOFENAC SODIUM 1 MG/ML
SOLUTION/ DROPS OPHTHALMIC
Status: COMPLETED
Start: 2021-10-27 | End: 2021-10-27

## 2021-10-27 RX ORDER — DICLOFENAC SODIUM 1 MG/ML
1 SOLUTION/ DROPS OPHTHALMIC ONCE
Status: COMPLETED | OUTPATIENT
Start: 2021-10-27 | End: 2021-10-27

## 2021-10-27 RX ORDER — LIDOCAINE HYDROCHLORIDE 10 MG/ML
0.1 INJECTION, SOLUTION EPIDURAL; INFILTRATION; INTRACAUDAL; PERINEURAL AS NEEDED
Status: DISCONTINUED | OUTPATIENT
Start: 2021-10-27 | End: 2021-10-27 | Stop reason: HOSPADM

## 2021-10-27 RX ORDER — PREDNISOLONE ACETATE 10 MG/ML
1 SUSPENSION/ DROPS OPHTHALMIC 2 TIMES DAILY
Status: DISCONTINUED | OUTPATIENT
Start: 2021-10-27 | End: 2021-10-27 | Stop reason: HOSPADM

## 2021-10-27 RX ORDER — SODIUM CHLORIDE 9 MG/ML
25 INJECTION, SOLUTION INTRAVENOUS CONTINUOUS
Status: DISCONTINUED | OUTPATIENT
Start: 2021-10-27 | End: 2021-10-27 | Stop reason: HOSPADM

## 2021-10-27 RX ORDER — POVIDONE-IODINE 10 %
SOLUTION, NON-ORAL TOPICAL
Status: COMPLETED | OUTPATIENT
Start: 2021-10-27 | End: 2021-10-27

## 2021-10-27 RX ORDER — ERYTHROMYCIN 5 MG/G
OINTMENT OPHTHALMIC ONCE
Status: COMPLETED | OUTPATIENT
Start: 2021-10-27 | End: 2021-10-27

## 2021-10-27 RX ORDER — TETRACAINE HYDROCHLORIDE 5 MG/ML
1 SOLUTION OPHTHALMIC
Status: ACTIVE | OUTPATIENT
Start: 2021-10-27 | End: 2021-10-27

## 2021-10-27 RX ORDER — SODIUM CHLORIDE 0.9 % (FLUSH) 0.9 %
5-40 SYRINGE (ML) INJECTION EVERY 8 HOURS
Status: DISCONTINUED | OUTPATIENT
Start: 2021-10-27 | End: 2021-10-27 | Stop reason: HOSPADM

## 2021-10-27 RX ADMIN — MIDAZOLAM HYDROCHLORIDE 1 MG: 1 INJECTION, SOLUTION INTRAMUSCULAR; INTRAVENOUS at 09:11

## 2021-10-27 RX ADMIN — SODIUM CHLORIDE 25 ML/HR: 9 INJECTION, SOLUTION INTRAVENOUS at 08:19

## 2021-10-27 RX ADMIN — TROPICAMIDE 1 DROP: 10 SOLUTION/ DROPS OPHTHALMIC at 08:20

## 2021-10-27 RX ADMIN — DICLOFENAC SODIUM 1 DROP: 1 SOLUTION/ DROPS OPHTHALMIC at 08:20

## 2021-10-27 RX ADMIN — PROPARACAINE HYDROCHLORIDE 1 DROP: 5 SOLUTION/ DROPS OPHTHALMIC at 08:20

## 2021-10-27 RX ADMIN — MIDAZOLAM HYDROCHLORIDE 1 MG: 1 INJECTION, SOLUTION INTRAMUSCULAR; INTRAVENOUS at 08:52

## 2021-10-27 NOTE — OP NOTES
Name: Ziyad Fletcher MASC-4        updated 3/1/2013  Description:  Rhett Vides with intraocular lens implant    PREOPERATIVE DIAGNOSIS: Visually impairing combined cataract, Right eye. POSTOPERATIVE DIAGNOSIS: Visually impairing combined   cataract,Right eye. OPERATION: Procedure(s):  RIGHT EYE FIRST REFRACTIVE CATARACT REMOVAL LENS IMPLANTATION    ANESTHESIA: Topical with intravenous sedation    TYPE OF LENS IMPLANT USED:   Implant Name Type Inv. Item Serial No.  Lot No. LRB No. Used Action   LENS IOL POST 1-PC 6X13 20.0 -- [employer] - [id number] Other LENS IOL POST 1-PC 6X13 20.0 -- [employer] [id number] [employer] N/A Right 1 Implanted       PHACO TIME:   27.7 seconds at an average power of 9.8%. Estimated blood loss: None    Complications:None    Specimen removed: None    DESCRIPTION OF PROCEDURE:  The patient was brought to the holding area where an IV was begun at a  keep open rate. The patient was connected to cardiovascular monitoring. The patient received 1 drop of mydriacyl 1% and proparacaine 0.5%. The patient was then brought back to the operating suite and cardiovascular monitoring was reestablished. The patient was  prepped and draped in the usual manner for ocular surgery. The patient received 1 drop of Proparacaine followed by 2 drops of 5% Betadine solution in the inferior conjunctival cul-de-sac The operating microscope was brought into position and 4% Xylocaine drops were instilled onto the eye. The lid speculum was set into position. A paracentesis was created and Sugarcane solution was instilled into the anterior chamber for adequate anesthesia and pupillary dilation. Viscoelastic was instilled into the anterior chamber. The 2.4 mm keratome was then utilized to create a clear corneal incision, and a circular capsulorrhexis was performed. BSS was utilized to perform careful hydrodissection of the lens.  Viscoelastic was then instilled into the anterior chamber to protect the corneal endothelium. Phacoemulsification was then carried out. Any remaining cortical material was removed in irrigation/aspiration mode. Viscoelastic was then instilled into the capsular bag. The lens implant was inspected for any defects. After finding none, the lens was placed in its injector and inserted into the capsular bag. The lens implant was centered on the patient's visual/astigmatic axis. The viscoelastic was then removed from the eye. Miochol was instilled posterior to the iris plane to facilitate pupillary miosis. The wound was checked for any leaks, after finding none, Iopidine and Pred Forte drops were instilled into the inferior cul-de-sac, and erythromycin ointment was placed over the cornea. A semi-pressure dressing and shield were then placed for the patient. The patient tolerated the procedure very well, and was brought to the recovery room in an alert and stable and satisfactory postoperative condition. Instructions were given to the patient and their family for their immediate postoperative care.   [address] [place] [address]  10/27/2021

## 2021-10-27 NOTE — ANESTHESIA POSTPROCEDURE EVALUATION
Procedure(s):  RIGHT EYE FIRST REFRACTIVE CATARACT REMOVAL LENS IMPLANTATION.     MAC    Anesthesia Post Evaluation      Multimodal analgesia: multimodal analgesia not used between 6 hours prior to anesthesia start to PACU discharge  Patient location during evaluation: PACU  Patient participation: complete - patient participated  Level of consciousness: awake and alert  Pain score: 0  Airway patency: patent  Anesthetic complications: no  Cardiovascular status: acceptable  Respiratory status: acceptable  Hydration status: acceptable  Comments: Pt's blood glucose is high/ Pt states this is normal for him and his doctor is aware  Post anesthesia nausea and vomiting:  none  Final Post Anesthesia Temperature Assessment:  Normothermia (36.0-37.5 degrees C)      INITIAL Post-op Vital signs:   Vitals Value Taken Time   /81 10/27/21 0935   Temp 36.6 °C (97.9 °F) 10/27/21 0925   Pulse 84 10/27/21 0935   Resp 13 10/27/21 0935   SpO2 96 % 10/27/21 0935

## 2021-10-27 NOTE — DISCHARGE INSTRUCTIONS
Laith Vinson D.O., PLuis FelipeCLuis Felipe  [facility] [address].  [phone]    Post-Operative Instructions for Cataract Surgery     Remove your eye shield and bandage at 12 noon the same day as surgery and start your eye drops. THROW AWAY THE GAUZE UNDER THE SHIELD.  Place the blue eye shield back on for one week while asleep, even if napping in the recliner. Use the tape included in your bag.  DO NOT RUB YOUR EYE EVER! DO NOT WIPE YOUR EYE! ONLY WIPE ON YOUR CHEEK!                DO NOT WEAR EYE MAKEUP FOR 1 WEEK!  You can shower starting tomorrow.  You may resume your previous diet.  If you use glaucoma drops in the operative eye, continue them as directed.  Common symptoms after surgery include a scratchy feeling, slight headache, red eye, and/or blurred vision. You may use Advil or Tylenol for any discomfort.  Avoid strenuous activities and driving until you see Dr. Canelo Montoya tomorrow. Please use care when walking, your depth perception may be altered.  Bring your bag with your drops to your follow up appointment. CONTINUE DROPS UNTIL ALL BOTTLES ARE EMPTY! Follow-up appointment tomorrow at Dr. Jain Click office:________8:30 am____________    Please call the office at [phone] if you experience severe pain or nausea. The following personal items collected during your admission are returned to you:   Dental Appliance: Dental Appliances: None  Vision: Visual Aid: None  Hearing Aid: @FLOW  DISCHARGE SUMMARY from Nurse  ([phone]:LAST)@  Jewelry:    Clothing:    Other Valuables:    Valuables sent to safe:        PATIENT INSTRUCTIONS:    After General Anesthesia or Intravenous Sedation, for 24 hours or while taking prescription Narcotics:        Someone should be with you for the next 24 hours. For your own safety, a responsible adult must drive you home. · Limit your activities  · Recommended activity: Rest today, up with assistance today.  Do not climb stairs or shower unattended for the next 24 hours. · Please start with a soft bland diet and advance as tolerated (no nausea) to regular diet. · If you have a sore throat you should try the following: fluids, warm salt water gargles, or throat lozenges. If it does not improve after several days please follow up with your primary physician. · Do not drive and operate hazardous machinery  · Do not make important personal or business decisions  · Do  not drink alcoholic beverages  · If you have not urinated within 8 hours after discharge, please contact your surgeon on call. Report the following to your surgeon:  · Excessive pain, swelling, redness or odor of or around the surgical area  · Temperature over 100.5  · Any nausea or vomiting. · You will receive a Post Operative Call from one of the Recovery Room Nurses on the day after your surgery to check on you. It is very important for us to know how you are recovering after your surgery. If you have an issue or need to speak with someone, please call your surgeon, do not wait for the post operative call. · You may receive an e-mail or letter in the mail from [employer] regarding your experience with us in the Ambulatory Surgery Unit. Your feedback is valuable to us and we appreciate your participation in the survey. · If the above instructions are not adequate or you are having problems after your surgery, call the physician at their office number. · We wish you a speedy recovery ? What to do at Home:      *  Please give a list of your current medications to your Primary Care Provider. *  Please update this list whenever your medications are discontinued, doses are      changed, or new medications (including over-the-counter products) are added. *  Please carry medication information at all times in case of emergency situations.               If you have not received your influenza and/or pneumococcal vaccine, please follow up with your primary care physician. The discharge information has been reviewed with the patient and family. The patient and family verbalized understanding. TO PREVENT AN INFECTION      1. [address] YOUR HANDS     To prevent infection, good handwashing is the most important thing you or your caregiver can do.  Wash your hands with soap and water or use the hand  we gave you before you touch any wounds. 2.  USE CLEAN SHEETS     Use freshly cleaned sheets on your bed after surgery.  To keep the surgery site clean, do not allow pets to sleep with you while your wound is still healing. 3. STOP SMOKING    Stop smoking, or at least cut back on smoking     Smoking slows your healing. 4.  CONTROL YOUR BLOOD SUGAR     High blood sugars slow wound healing.  If you are diabetic, control your blood sugar levels before and after your surgery.

## 2021-10-27 NOTE — PERIOP NOTES
Permission received to review discharge instructions and discuss private health information with uncle and will have someone with them after discharge

## 2021-10-27 NOTE — PERIOP NOTES
Cortneyhonorio Gutierrezodalis  1970  [id number]    Situation:  Verbal report given from: PHYLLIS Hansen CRNA  Procedure: Procedure(s):  RIGHT EYE FIRST REFRACTIVE CATARACT REMOVAL LENS IMPLANTATION    Background:    Preoperative diagnosis: Combined form of age-related cataract, right eye [H25.811]    Postoperative diagnosis: Combined form of age-related cataract, right eye [H25.811]    :  Dr. Derek Hinojosa    Assistant(s): Circ-1: Birgit Bhat RN  Scrub Tech-1: Portia Solis    Specimens: * No specimens in log *    Assessment:  Intra-procedure medications         Anesthesia gave intra-procedure sedation and medications, see anesthesia flow sheet     Intravenous fluids: LR@ KVO     Vital signs stable       Recommendation:    Permission to share finding with uncle : yes

## 2021-10-27 NOTE — ANESTHESIA PREPROCEDURE EVALUATION
Relevant Problems   NEUROLOGY   (+) Major depressive disorder with single episode, in partial remission (HCC)      CARDIOVASCULAR   (+) Essential hypertension      ENDOCRINE   (+) Diabetes mellitus type 1, uncontrolled, with complications (HCC)       Anesthetic History   No history of anesthetic complications            Review of Systems / Medical History  Patient summary reviewed, nursing notes reviewed and pertinent labs reviewed    Pulmonary            Asthma : well controlled       Neuro/Psych         Psychiatric history    Comments: Multiple Sclerosis  Anxiety  Depression  Peripheral neuropathy Cardiovascular    Hypertension              Exercise tolerance: >4 METS  Comments: 10/21 EKG= SR   GI/Hepatic/Renal     GERD (associated with certain food)           Endo/Other    Diabetes: poorly controlled, type 2         Other Findings   Comments: Cataracts         Physical Exam    Airway  Mallampati: II  TM Distance: > 6 cm  Neck ROM: normal range of motion   Mouth opening: Normal     Cardiovascular    Rhythm: regular  Rate: normal         Dental  No notable dental hx       Pulmonary  Breath sounds clear to auscultation               Abdominal  GI exam deferred       Other Findings            Anesthetic Plan    ASA: 3  Anesthesia type: MAC          Induction: Intravenous  Anesthetic plan and risks discussed with: Patient      preop glucose 265 ( good for pt)

## 2021-10-27 NOTE — PERIOP NOTES
Patient: Dao Reid MRN: [id number]  SSN: [id number]   YOB: 1970  Age: 46 y.o. Sex: male     Patient is status post Procedure(s):  RIGHT EYE FIRST REFRACTIVE CATARACT REMOVAL LENS IMPLANTATION.     Surgeon(s) and Role:     * Vega Valdez,  - Primary    Local/Dose/Irrigation:  SEE MAR                Peripheral IV 10/27/21 Right Antecubital (Active)   Site Assessment Clean, dry, & intact 10/27/21 0815   Phlebitis Assessment 0 10/27/21 0815   Infiltration Assessment 0 10/27/21 0815   Dressing Status Clean, dry, & intact 10/27/21 0815   Dressing Type Transparent;Tape 10/27/21 0815   Hub Color/Line Status Infusing;Blue 10/27/21 0815                           Dressing/Packing:  Incision 10/27/21 Eye Right-Dressing/Treatment: Eye pad;Eye shield;Tape/Soft cloth adhesive tape (SECURED WITH TAPE BY MD.) (10/27/21 0700)

## 2021-10-28 DIAGNOSIS — R21 RASH: ICD-10-CM

## 2021-10-28 RX ORDER — TRIAMCINOLONE ACETONIDE 1 MG/G
CREAM TOPICAL
Qty: 15 G | Refills: 0 | Status: SHIPPED | OUTPATIENT
Start: 2021-10-28 | End: 2022-06-30 | Stop reason: SDUPTHER

## 2021-10-28 NOTE — TELEPHONE ENCOUNTER
PCP: Bianca Nelson NP     Last appt: 10/18/2021   Future Appointments   Date Time Provider Neil Brown   12/21/2021  8:30 AM Yomi Cosme PA-C BSIMA BS AMB   1/21/2022  9:30 AM Zandra Kelly MD RDE JUAN A 332 BS AMB        Requested Prescriptions     Pending Prescriptions Disp Refills    triamcinolone acetonide (KENALOG) 0.1 % topical cream 15 g 0     Sig: APPLY  CREAM EXTERNALLY TO AFFECTED AREA TWICE DAILY AS NEEDED FOR SKIN IRRITATION. USE THIN LAYER ON NECK RASH AND EXTERNAL EAR

## 2021-10-28 NOTE — TELEPHONE ENCOUNTER
Requested Prescriptions     Pending Prescriptions Disp Refills    triamcinolone acetonide (KENALOG) 0.1 % topical cream 15 g 0     Sig: APPLY  CREAM EXTERNALLY TO AFFECTED AREA TWICE DAILY AS NEEDED FOR SKIN IRRITATION. USE THIN LAYER ON NECK RASH AND EXTERNAL EAR

## 2021-10-29 ENCOUNTER — HOSPITAL ENCOUNTER (OUTPATIENT)
Dept: PREADMISSION TESTING | Age: 51
Discharge: HOME | End: 2021-10-29
Payer: MEDICARE

## 2021-10-29 PROCEDURE — U0005 INFEC AGEN DETEC AMPLI PROBE: HCPCS

## 2021-10-29 NOTE — PERIOP NOTES
[facility]  Ambulatory Surgery Unit  Pre-operative Instructions    Surgery/Procedure Date  Wednesday, November 3, 2021            Tentative Arrival Time TBD      1. On the day of your surgery/procedure, please report to the Ambulatory Surgery Unit Registration Desk and sign in at your designated time. The Ambulatory Surgery Unit is located in [facility] on the [facility] side of the [facility] across from the [address] [place] [address]. Please have all of your health insurance cards and a photo ID. 2. You must have someone with you to drive you home, as you should not drive a car for 24 hours following anesthesia. Please make arrangements for a responsible adult friend or family member to stay with you for at least the first 24 hours after your surgery. 3. Do not have anything to eat or drink (including water, gum, mints, coffee, juice) after 11:59 PM, Tuesday. This may not apply to medications prescribed by your physician. (Please note below the special instructions with medications to take the morning of surgery, if applicable.)    4. We recommend you do not drink any alcoholic beverages for 24 hours before and after your surgery. 5. Contact your surgeons office for instructions on the following medications: non-steroidal anti-inflammatory drugs (i.e. Advil, Aleve), vitamins, and supplements. (Some surgeons will want you to stop these medications prior to surgery and others may allow you to take them)   **If you are currently taking Plavix, Coumadin, Aspirin and/or other blood-thinning agents, contact your surgeon for instructions. ** Your surgeon will partner with the physician prescribing these medications to determine if it is safe to stop or if you need to continue taking. Please do not stop taking these medications without instructions from your surgeon.     6. In an effort to help prevent surgical site infection, we ask that you shower with an anti-bacterial soap (i.e. Dial/Safeguard, or the soap provided to you at your preadmission testing appointment) for 3 days prior to and on the morning of surgery, using a fresh towel after each shower. (Please begin this process with fresh bed linens.) Do not apply any lotions, powders, or deodorants after the shower on the day of your procedure. If applicable, please do not shave the operative site for 48 hours prior to surgery. 7. Wear comfortable clothes. Wear glasses instead of contacts. Do not bring any jewelry or money (other than copays or fees as instructed). Do not wear make-up, particularly mascara, the morning of your surgery. Do not wear nail polish, particularly if you are having foot /hand surgery. Wear your hair loose or down, no ponytails, buns, abel pins or clips. All body piercings must be removed. 8. You should understand that if you do not follow these instructions your surgery may be cancelled. If your physical condition changes (i.e. fever, cold or flu) please contact your surgeon as soon as possible. 9. It is important that you be on time. If a situation occurs where you may be late, or if you have any questions or problems, please call [phone].    10. Your surgery time may be subject to change. You will receive a phone call the day prior to surgery to confirm your arrival time. 11. Pediatric patients: please bring a change of clothes, diapers, bottle/sippy cup, pacifier, etc.      Special Instructions: Take all medications and inhalers, as prescribed, on the morning of surgery with a sip of water. I understand a pre-operative phone call will be made to verify my surgery time. In the event that I am not available, I give permission for a message to be left on my answering service and/or with another person?       yes    Preop instructions reviewed  Pt verbalized understanding.      ___________________      ___________________      ________________  (Signature of Patient)          (Witness) (Date and Time)

## 2021-10-30 LAB
SARS-COV-2, XPLCVT: NOT DETECTED
SOURCE, COVRS: NORMAL

## 2021-10-31 PROBLEM — H25.812 COMBINED FORMS OF AGE-RELATED CATARACT OF LEFT EYE: Status: ACTIVE | Noted: 2021-10-31

## 2021-10-31 PROBLEM — H25.811 COMBINED FORMS OF AGE-RELATED CATARACT OF RIGHT EYE: Status: RESOLVED | Noted: 2021-10-21 | Resolved: 2021-10-31

## 2021-11-01 PROBLEM — E10.3593 PROLIFERATIVE DIABETIC RETINOPATHY OF BOTH EYES ASSOCIATED WITH TYPE 1 DIABETES MELLITUS (HCC): Status: ACTIVE | Noted: 2021-11-01

## 2021-11-02 ENCOUNTER — ANESTHESIA EVENT (OUTPATIENT)
Dept: SURGERY | Age: 51
End: 2021-11-02
Payer: MEDICARE

## 2021-11-02 NOTE — ANESTHESIA PREPROCEDURE EVALUATION
Relevant Problems   NEUROLOGY   (+) Major depressive disorder with single episode, in partial remission (HCC)      CARDIOVASCULAR   (+) Essential hypertension      ENDOCRINE   (+) Diabetes mellitus type 1, uncontrolled, with complications (HCC)       Anesthetic History   No history of anesthetic complications            Review of Systems / Medical History  Patient summary reviewed, nursing notes reviewed and pertinent labs reviewed    Pulmonary            Asthma : well controlled       Neuro/Psych         Psychiatric history    Comments: Multiple Sclerosis  Anxiety  Depression  Peripheral neuropathy  Proliferative diabetic retinopathy of both eyes associated with type 1 diabetes mellitus  Cardiovascular    Hypertension              Exercise tolerance: >4 METS  Comments: ECG (10/18/21):   Sinus  Rhythm   WITHIN NORMAL LIMITS   GI/Hepatic/Renal     GERD (associated with certain food)           Endo/Other    Diabetes: poorly controlled, type 2    Obesity     Other Findings   Comments: Bilateral Cataracts s/p right cataract removal with IOL (10/27/21)           Physical Exam    Airway  Mallampati: II  TM Distance: > 6 cm  Neck ROM: normal range of motion   Mouth opening: Normal     Cardiovascular    Rhythm: regular  Rate: normal         Dental  No notable dental hx       Pulmonary  Breath sounds clear to auscultation               Abdominal  GI exam deferred       Other Findings            Anesthetic Plan    ASA: 3  Anesthesia type: MAC and total IV anesthesia          Induction: Intravenous  Anesthetic plan and risks discussed with: Patient      Pt's blood glucose is high/ Pt states this is normal for him and his doctor is aware

## 2021-11-03 ENCOUNTER — HOSPITAL ENCOUNTER (OUTPATIENT)
Age: 51
Setting detail: OUTPATIENT SURGERY
Discharge: HOME | End: 2021-11-03
Attending: OPHTHALMOLOGY | Admitting: OPHTHALMOLOGY
Payer: MEDICARE

## 2021-11-03 ENCOUNTER — ANESTHESIA (OUTPATIENT)
Dept: SURGERY | Age: 51
End: 2021-11-03
Payer: MEDICARE

## 2021-11-03 VITALS
BODY MASS INDEX: 33.21 KG/M2 | RESPIRATION RATE: 21 BRPM | OXYGEN SATURATION: 97 % | WEIGHT: 232 LBS | TEMPERATURE: 98.2 F | DIASTOLIC BLOOD PRESSURE: 81 MMHG | HEART RATE: 90 BPM | SYSTOLIC BLOOD PRESSURE: 168 MMHG | HEIGHT: 70 IN

## 2021-11-03 LAB
GLUCOSE BLD STRIP.AUTO-MCNC: 326 MG/DL (ref 65–117)
GLUCOSE BLD STRIP.AUTO-MCNC: 334 MG/DL (ref 65–117)
SERVICE CMNT-IMP: ABNORMAL
SERVICE CMNT-IMP: ABNORMAL

## 2021-11-03 PROCEDURE — 76210000040 HC AMBSU PH I REC FIRST 0.5 HR: Performed by: OPHTHALMOLOGY

## 2021-11-03 PROCEDURE — 74011250636 HC RX REV CODE- 250/636: Performed by: OPHTHALMOLOGY

## 2021-11-03 PROCEDURE — 82962 GLUCOSE BLOOD TEST: CPT

## 2021-11-03 PROCEDURE — 74011000250 HC RX REV CODE- 250

## 2021-11-03 PROCEDURE — 74011250636 HC RX REV CODE- 250/636: Performed by: NURSE ANESTHETIST, CERTIFIED REGISTERED

## 2021-11-03 PROCEDURE — 74011000250 HC RX REV CODE- 250: Performed by: OPHTHALMOLOGY

## 2021-11-03 PROCEDURE — 76030000000 HC AMB SURG OR TIME 0.5 TO 1: Performed by: OPHTHALMOLOGY

## 2021-11-03 PROCEDURE — 76210000046 HC AMBSU PH II REC FIRST 0.5 HR: Performed by: OPHTHALMOLOGY

## 2021-11-03 PROCEDURE — 77030021352 HC CBL LD SYS DISP COVD -B: Performed by: OPHTHALMOLOGY

## 2021-11-03 PROCEDURE — V2632 POST CHMBR INTRAOCULAR LENS: HCPCS | Performed by: OPHTHALMOLOGY

## 2021-11-03 PROCEDURE — 74011250637 HC RX REV CODE- 250/637: Performed by: OPHTHALMOLOGY

## 2021-11-03 PROCEDURE — 76060000061 HC AMB SURG ANES 0.5 TO 1 HR: Performed by: OPHTHALMOLOGY

## 2021-11-03 PROCEDURE — 2709999900 HC NON-CHARGEABLE SUPPLY: Performed by: OPHTHALMOLOGY

## 2021-11-03 DEVICE — LENS IOL POST 1-PC 6X13 20.0 -- ACRYSOF: Type: IMPLANTABLE DEVICE | Site: EYE | Status: FUNCTIONAL

## 2021-11-03 RX ORDER — TROPICAMIDE 10 MG/ML
SOLUTION/ DROPS OPHTHALMIC
Status: COMPLETED
Start: 2021-11-03 | End: 2021-11-03

## 2021-11-03 RX ORDER — DICLOFENAC SODIUM 1 MG/ML
1 SOLUTION/ DROPS OPHTHALMIC ONCE
Status: COMPLETED | OUTPATIENT
Start: 2021-11-03 | End: 2021-11-03

## 2021-11-03 RX ORDER — LIDOCAINE HYDROCHLORIDE 40 MG/ML
3 INJECTION, SOLUTION RETROBULBAR; TOPICAL ONCE
Status: COMPLETED | OUTPATIENT
Start: 2021-11-03 | End: 2021-11-03

## 2021-11-03 RX ORDER — SODIUM CHLORIDE 0.9 % (FLUSH) 0.9 %
5-40 SYRINGE (ML) INJECTION AS NEEDED
Status: DISCONTINUED | OUTPATIENT
Start: 2021-11-03 | End: 2021-11-03 | Stop reason: HOSPADM

## 2021-11-03 RX ORDER — ACETAMINOPHEN 500 MG
1000 TABLET ORAL ONCE
Status: COMPLETED | OUTPATIENT
Start: 2021-11-03 | End: 2021-11-03

## 2021-11-03 RX ORDER — SODIUM CHLORIDE, SODIUM LACTATE, POTASSIUM CHLORIDE, CALCIUM CHLORIDE 600; 310; 30; 20 MG/100ML; MG/100ML; MG/100ML; MG/100ML
25 INJECTION, SOLUTION INTRAVENOUS CONTINUOUS
Status: DISCONTINUED | OUTPATIENT
Start: 2021-11-03 | End: 2021-11-03 | Stop reason: HOSPADM

## 2021-11-03 RX ORDER — PROPARACAINE HYDROCHLORIDE 5 MG/ML
1 SOLUTION/ DROPS OPHTHALMIC ONCE
Status: COMPLETED | OUTPATIENT
Start: 2021-11-03 | End: 2021-11-03

## 2021-11-03 RX ORDER — HYDROMORPHONE HYDROCHLORIDE 1 MG/ML
0.2 INJECTION, SOLUTION INTRAMUSCULAR; INTRAVENOUS; SUBCUTANEOUS
Status: DISCONTINUED | OUTPATIENT
Start: 2021-11-03 | End: 2021-11-03 | Stop reason: HOSPADM

## 2021-11-03 RX ORDER — POVIDONE-IODINE 10 %
SOLUTION, NON-ORAL TOPICAL
Status: COMPLETED | OUTPATIENT
Start: 2021-11-03 | End: 2021-11-03

## 2021-11-03 RX ORDER — TETRACAINE HYDROCHLORIDE 5 MG/ML
1 SOLUTION OPHTHALMIC
Status: DISCONTINUED | OUTPATIENT
Start: 2021-11-03 | End: 2021-11-03 | Stop reason: HOSPADM

## 2021-11-03 RX ORDER — LIDOCAINE HYDROCHLORIDE 10 MG/ML
0.1 INJECTION, SOLUTION EPIDURAL; INFILTRATION; INTRACAUDAL; PERINEURAL AS NEEDED
Status: DISCONTINUED | OUTPATIENT
Start: 2021-11-03 | End: 2021-11-03 | Stop reason: HOSPADM

## 2021-11-03 RX ORDER — MIDAZOLAM HYDROCHLORIDE 1 MG/ML
INJECTION, SOLUTION INTRAMUSCULAR; INTRAVENOUS AS NEEDED
Status: DISCONTINUED | OUTPATIENT
Start: 2021-11-03 | End: 2021-11-03 | Stop reason: HOSPADM

## 2021-11-03 RX ORDER — ERYTHROMYCIN 5 MG/G
OINTMENT OPHTHALMIC ONCE
Status: COMPLETED | OUTPATIENT
Start: 2021-11-03 | End: 2021-11-03

## 2021-11-03 RX ORDER — DIPHENHYDRAMINE HYDROCHLORIDE 50 MG/ML
12.5 INJECTION, SOLUTION INTRAMUSCULAR; INTRAVENOUS AS NEEDED
Status: DISCONTINUED | OUTPATIENT
Start: 2021-11-03 | End: 2021-11-03 | Stop reason: HOSPADM

## 2021-11-03 RX ORDER — ACETAMINOPHEN 500 MG
TABLET ORAL
Status: DISCONTINUED
Start: 2021-11-03 | End: 2021-11-03 | Stop reason: HOSPADM

## 2021-11-03 RX ORDER — TROPICAMIDE 10 MG/ML
1 SOLUTION/ DROPS OPHTHALMIC ONCE
Status: COMPLETED | OUTPATIENT
Start: 2021-11-03 | End: 2021-11-03

## 2021-11-03 RX ORDER — PREDNISOLONE ACETATE 10 MG/ML
1 SUSPENSION/ DROPS OPHTHALMIC 2 TIMES DAILY
Status: DISCONTINUED | OUTPATIENT
Start: 2021-11-03 | End: 2021-11-03 | Stop reason: HOSPADM

## 2021-11-03 RX ORDER — SODIUM CHLORIDE 0.9 % (FLUSH) 0.9 %
5-40 SYRINGE (ML) INJECTION EVERY 8 HOURS
Status: DISCONTINUED | OUTPATIENT
Start: 2021-11-03 | End: 2021-11-03 | Stop reason: HOSPADM

## 2021-11-03 RX ORDER — FENTANYL CITRATE 50 UG/ML
25 INJECTION, SOLUTION INTRAMUSCULAR; INTRAVENOUS
Status: DISCONTINUED | OUTPATIENT
Start: 2021-11-03 | End: 2021-11-03 | Stop reason: HOSPADM

## 2021-11-03 RX ORDER — SODIUM CHLORIDE 9 MG/ML
25 INJECTION, SOLUTION INTRAVENOUS CONTINUOUS
Status: DISCONTINUED | OUTPATIENT
Start: 2021-11-03 | End: 2021-11-03 | Stop reason: HOSPADM

## 2021-11-03 RX ADMIN — TROPICAMIDE 1 DROP: 10 SOLUTION/ DROPS OPHTHALMIC at 08:30

## 2021-11-03 RX ADMIN — DICLOFENAC SODIUM 1 DROP: 1 SOLUTION/ DROPS OPHTHALMIC at 08:30

## 2021-11-03 RX ADMIN — PROPARACAINE HYDROCHLORIDE 1 DROP: 5 SOLUTION/ DROPS OPHTHALMIC at 08:30

## 2021-11-03 RX ADMIN — SODIUM CHLORIDE 25 ML/HR: 9 INJECTION, SOLUTION INTRAVENOUS at 08:26

## 2021-11-03 RX ADMIN — MIDAZOLAM HYDROCHLORIDE 1 MG: 1 INJECTION, SOLUTION INTRAMUSCULAR; INTRAVENOUS at 09:04

## 2021-11-03 RX ADMIN — Medication 1000 MG: at 09:39

## 2021-11-03 RX ADMIN — MIDAZOLAM HYDROCHLORIDE 1 MG: 1 INJECTION, SOLUTION INTRAMUSCULAR; INTRAVENOUS at 08:48

## 2021-11-03 NOTE — PERIOP NOTES
Dr. Magda Grubbs is aware of patients BG of 326. No additional orders at this time. Patient denies any symptoms of hyperglycemia.

## 2021-11-03 NOTE — ANESTHESIA POSTPROCEDURE EVALUATION
Procedure(s):  LEFT EYE SECOND REFRACTIVE CATARACT REMOVAL LENS IMPLANTATION. MAC, total IV anesthesia    Anesthesia Post Evaluation        Patient location during evaluation: PACU  Note status: Adequate. Level of consciousness: responsive to verbal stimuli and sleepy but conscious  Pain management: satisfactory to patient  Airway patency: patent  Anesthetic complications: no  Cardiovascular status: acceptable  Respiratory status: acceptable  Hydration status: acceptable  Comments: +Post-Anesthesia Evaluation and Assessment    Patient: Karen Cabello MRN: [id number]  SSN: [id number]   YOB: 1970  Age: 46 y.o. Sex: male      Cardiovascular Function/Vital Signs    BP (!) 165/88   Pulse 90   Temp 36.8 °C (98.2 °F)   Resp 24   Ht 5' 10\" (1.778 m)   Wt 105.2 kg (232 lb)   SpO2 97%   BMI 33.29 kg/m²     Patient is status post Procedure(s):  LEFT EYE SECOND REFRACTIVE CATARACT REMOVAL LENS IMPLANTATION. Nausea/Vomiting: Controlled. Postoperative hydration reviewed and adequate. Pain:  Pain Scale 1: (P) Numeric (0 - 10) (11/03/21 0927)  Pain Intensity 1: (P) 2 (11/03/21 5895)   Managed. Neurological Status:   Neuro (WDL): (P) Exceptions to WDL (11/03/21 0920)   At baseline. Mental Status and Level of Consciousness: Arousable. Pulmonary Status:   O2 Device: None (Room air) (11/03/21 0920)   Adequate oxygenation and airway patent. Complications related to anesthesia: None    Post-anesthesia assessment completed. No concerns.     Signed By: Karlos Waite MD    11/3/2021  Post anesthesia nausea and vomiting:  controlled      INITIAL Post-op Vital signs:   Vitals Value Taken Time   /88 11/03/21 0927   Temp 36.8 °C (98.2 °F) 11/03/21 0920   Pulse 90 11/03/21 0927   Resp 24 11/03/21 0927   SpO2 97 % 11/03/21 0927

## 2021-11-03 NOTE — PERIOP NOTES
Jenna Orozco  1970  [id number]    Situation:  Verbal report given from: Randi Jane RN and TERRI Brown CRNA  Procedure: Procedure(s):  LEFT EYE SECOND REFRACTIVE CATARACT REMOVAL LENS IMPLANTATION    Background:    Preoperative diagnosis: Combined forms of age-related cataract of left eye [H25.812]    Postoperative diagnosis: Combined forms of age-related cataract of left eye [H25.812]    :  Dr. Soria Mcnally    Assistant(s): Circ-1: Tawana Rodriguez RN  Scrub Tech-2: Ryan Herron    Specimens: * No specimens in log *    Assessment:  Intra-procedure medications         Anesthesia gave intra-procedure sedation and medications, see anesthesia flow sheet     Intravenous fluids: NS @ KVO     Vital signs stable       Recommendation:    Permission to share finding with family friend  : yes

## 2021-11-03 NOTE — DISCHARGE INSTRUCTIONS
John Elizabeth D.O., P.CLuis Felipe  [facility] [address].  [phone]    Post-Operative Instructions for Cataract Surgery     Remove your eye shield and bandage at 12 noon the same day as surgery and start your eye drops. THROW AWAY THE GAUZE UNDER THE SHIELD.  Place the blue eye shield back on for one week while asleep, even if napping in the recliner. Use the tape included in your bag.  DO NOT RUB YOUR EYE EVER! DO NOT WIPE YOUR EYE! ONLY WIPE ON YOUR CHEEK!                DO NOT WEAR EYE MAKEUP FOR 1 WEEK!  You can shower starting tomorrow.  You may resume your previous diet.  If you use glaucoma drops in the operative eye, continue them as directed.  Common symptoms after surgery include a scratchy feeling, slight headache, red eye, and/or blurred vision. You may use Advil or Tylenol for any discomfort.  Avoid strenuous activities and driving until you see Dr. Theo Jang tomorrow. Please use care when walking, your depth perception may be altered.  Bring your bag with your drops to your follow up appointment. CONTINUE DROPS UNTIL ALL BOTTLES ARE EMPTY! Follow-up appointment tomorrow at Dr. Sandra Mckeon office:________8:30am____________    Please call the office at [phone] if you experience severe pain or nausea. You received Tylenol 1000 mg po at [facility] take again at 340 pm if needed. The following personal items collected during your admission are returned to you:   Dental Appliance: Dental Appliances: None  Vision: Visual Aid: None  Hearing Aid: @FLOW  DISCHARGE SUMMARY from Nurse  (1341:LAST)@  Jewelry: Jewelry: None  Clothing: Clothing: With patient  Other Valuables:  Other Valuables: With patient, Other (comment) (Hat and jacket underneath stretcher)  Valuables sent to safe:        PATIENT INSTRUCTIONS:    After General Anesthesia or Intravenous Sedation, for 24 hours or while taking prescription Narcotics:        Someone should be with you for the next 24 hours. For your own safety, a responsible adult must drive you home. · Limit your activities  · Recommended activity: Rest today, up with assistance today. Do not climb stairs or shower unattended for the next 24 hours. · Please start with a soft bland diet and advance as tolerated (no nausea) to regular diet. · If you have a sore throat you should try the following: fluids, warm salt water gargles, or throat lozenges. If it does not improve after several days please follow up with your primary physician. · Do not drive and operate hazardous machinery  · Do not make important personal or business decisions  · Do  not drink alcoholic beverages  · If you have not urinated within 8 hours after discharge, please contact your surgeon on call. Report the following to your surgeon:  · Excessive pain, swelling, redness or odor of or around the surgical area  · Temperature over 100.5  · Any nausea or vomiting. · You will receive a Post Operative Call from one of the Recovery Room Nurses on the day after your surgery to check on you. It is very important for us to know how you are recovering after your surgery. If you have an issue or need to speak with someone, please call your surgeon, do not wait for the post operative call. · You may receive an e-mail or letter in the mail from [employer] regarding your experience with us in the Ambulatory Surgery Unit. Your feedback is valuable to us and we appreciate your participation in the survey. · If the above instructions are not adequate or you are having problems after your surgery, call the physician at their office number. · We wish you a speedy recovery ? What to do at Home:      *  Please give a list of your current medications to your Primary Care Provider.     *  Please update this list whenever your medications are discontinued, doses are      changed, or new medications (including over-the-counter products) are added.    *  Please carry medication information at all times in case of emergency situations. If you have not received your influenza and/or pneumococcal vaccine, please follow up with your primary care physician. The discharge information has been reviewed with the patient and family. The patient and family verbalized understanding. TO PREVENT AN INFECTION      1. [address] YOUR HANDS     To prevent infection, good handwashing is the most important thing you or your caregiver can do.  Wash your hands with soap and water or use the hand  we gave you before you touch any wounds. 2.  USE CLEAN SHEETS     Use freshly cleaned sheets on your bed after surgery.  To keep the surgery site clean, do not allow pets to sleep with you while your wound is still healing. 3. STOP SMOKING    Stop smoking, or at least cut back on smoking     Smoking slows your healing. 4.  CONTROL YOUR BLOOD SUGAR     High blood sugars slow wound healing.  If you are diabetic, control your blood sugar levels before and after your surgery.

## 2021-11-03 NOTE — OP NOTES
Name: Ivan Shoemaker MASC-4        updated 3/1/2013  Description:  Arnulfo Dsouza with intraocular lens implant    PREOPERATIVE DIAGNOSIS: Visually impairing combined cataract, Left eye. POSTOPERATIVE DIAGNOSIS: Visually impairing combined   cataract,Left eye. OPERATION: Procedure(s):  LEFT EYE SECOND REFRACTIVE CATARACT REMOVAL LENS IMPLANTATION    ANESTHESIA: Topical with intravenous sedation    TYPE OF LENS IMPLANT USED:   Implant Name Type Inv. Item Serial No.  Lot No. LRB No. Used Action   LENS IOL POST 1-PC 6X13 20.0 -- [employer] - [id number] Other LENS IOL POST 1-PC 6X13 20.0 -- [employer] [id number] [employer] N/A Left 1 Implanted       PHACO TIME:   37.4 seconds at an average power of 12.8%. Estimated blood loss: None    Complications:None    Specimen removed: None    DESCRIPTION OF PROCEDURE:  The patient was brought to the holding area where an IV was begun at a  keep open rate. The patient was connected to cardiovascular monitoring. The patient received 1 drop of mydriacyl 1% and proparacaine 0.5%. The patient was then brought back to the operating suite and cardiovascular monitoring was reestablished. The patient was  prepped and draped in the usual manner for ocular surgery. The patient received 1 drop of Proparacaine followed by 2 drops of 5% Betadine solution in the inferior conjunctival cul-de-sac The operating microscope was brought into position and 4% Xylocaine drops were instilled onto the eye. The lid speculum was set into position. A paracentesis was created and Sugarcane solution was instilled into the anterior chamber for adequate anesthesia and pupillary dilation. Viscoelastic was instilled into the anterior chamber. The 2.4 mm keratome was then utilized to create a clear corneal incision, and a circular capsulorrhexis was performed. BSS was utilized to perform careful hydrodissection of the lens.  Viscoelastic was then instilled into the anterior chamber to protect the corneal endothelium. Phacoemulsification was then carried out. Any remaining cortical material was removed in irrigation/aspiration mode. Viscoelastic was then instilled into the capsular bag. The lens implant was inspected for any defects. After finding none, the lens was placed in its injector and inserted into the capsular bag. The lens implant was centered on the patient's visual/astigmatic axis. The viscoelastic was then removed from the eye. Miochol was instilled posterior to the iris plane to facilitate pupillary miosis. The wound was checked for any leaks, after finding none, Iopidine and Pred Forte drops were instilled into the inferior cul-de-sac, and erythromycin ointment was placed over the cornea. A semi-pressure dressing and shield were then placed for the patient. The patient tolerated the procedure very well, and was brought to the recovery room in an alert and stable and satisfactory postoperative condition. Instructions were given to the patient and their family for their immediate postoperative care.   [address] [place] [address]  11/3/2021

## 2021-11-03 NOTE — PERIOP NOTES
Awake, alert. BS plaqkcn=158. Talked with patient re:blood sugars, diabetes, encouraged to see endocrinologist for more frequent monitoring /control  0935 Pt c/o mild eye soreness. 0940 Tylenol 1000 mg po given per 's order  5462 Discharged to home via/wc,accompanied to car per RN. Skin warm and dry, awake and alert. Respirations even, unlabored. Pt and family members questions and concerns addressed prior to discharge. All belongings with pt.

## 2021-11-03 NOTE — PERIOP NOTES
Permission received to review discharge instructions and discuss private health information with uncle, Cindy Acevedovais. Patient states that family/friend will be with them for at least 24 hours following today's procedure.

## 2021-11-03 NOTE — PERIOP NOTES
Patient: Karen Cabello MRN: [id number]  SSN: [id number]   YOB: 1970  Age: 46 y.o. Sex: male     Patient is status post Procedure(s):  LEFT EYE SECOND REFRACTIVE CATARACT REMOVAL LENS IMPLANTATION. Surgeon(s) and Role:     * Kendy Valdez, DO - Primary    Local/Dose/Irrigation:  SEE MAR              Peripheral IV 11/03/21 Right Antecubital (Active)   Site Assessment Clean, dry, & intact 11/03/21 0820   Phlebitis Assessment 0 11/03/21 0820   Dressing Status Clean, dry, & intact 11/03/21 0820   Dressing Type Transparent 11/03/21 0820   Hub Color/Line Status Blue;  Infusing 11/03/21 0820                           Dressing/Packing:  Incision 11/03/21 Eye Left-Dressing/Treatment: Eye pad; Eye shield; Tape/Soft cloth adhesive tape (SECURED WITH TAPE BY MD.) (11/03/21 0700)

## 2021-11-11 ENCOUNTER — TELEPHONE (OUTPATIENT)
Dept: INTERNAL MEDICINE CLINIC | Age: 51
End: 2021-11-11

## 2021-12-17 DIAGNOSIS — E10.42 DIABETIC POLYNEUROPATHY ASSOCIATED WITH TYPE 1 DIABETES MELLITUS (HCC): ICD-10-CM

## 2021-12-17 DIAGNOSIS — I10 ESSENTIAL HYPERTENSION: ICD-10-CM

## 2021-12-20 RX ORDER — HYDROXYZINE PAMOATE 25 MG/1
CAPSULE ORAL
Qty: 60 CAPSULE | Refills: 0 | Status: SHIPPED | OUTPATIENT
Start: 2021-12-20 | End: 2022-05-24

## 2021-12-20 RX ORDER — ALBUTEROL SULFATE 90 UG/1
2 AEROSOL, METERED RESPIRATORY (INHALATION)
Qty: 9 G | Refills: 0 | Status: SHIPPED | OUTPATIENT
Start: 2021-12-20 | End: 2022-04-28

## 2021-12-20 RX ORDER — PREGABALIN 150 MG/1
150 CAPSULE ORAL 2 TIMES DAILY
Qty: 60 CAPSULE | Refills: 0 | Status: SHIPPED | OUTPATIENT
Start: 2021-12-20 | End: 2022-03-18

## 2021-12-20 RX ORDER — LOSARTAN POTASSIUM 50 MG/1
50 TABLET ORAL DAILY
Qty: 30 TABLET | Refills: 11 | Status: SHIPPED | OUTPATIENT
Start: 2021-12-20 | End: 2022-03-24 | Stop reason: DRUGHIGH

## 2021-12-20 RX ORDER — PRAVASTATIN SODIUM 20 MG/1
20 TABLET ORAL
Qty: 90 TABLET | Refills: 0 | Status: SHIPPED | OUTPATIENT
Start: 2021-12-20 | End: 2022-04-01 | Stop reason: SDUPTHER

## 2021-12-20 RX ORDER — INSULIN LISPRO 100 [IU]/ML
INJECTION, SOLUTION INTRAVENOUS; SUBCUTANEOUS
Qty: 45 ML | Refills: 3 | Status: SHIPPED | OUTPATIENT
Start: 2021-12-20 | End: 2022-09-19 | Stop reason: SDUPTHER

## 2021-12-20 RX ORDER — AMLODIPINE BESYLATE 2.5 MG/1
2.5 TABLET ORAL DAILY
Qty: 30 TABLET | Refills: 5 | Status: SHIPPED | OUTPATIENT
Start: 2021-12-20 | End: 2021-12-29 | Stop reason: DRUGHIGH

## 2021-12-20 NOTE — TELEPHONE ENCOUNTER
PCP: Alexandria Ryder NP     Last appt: 10/18/2021     Future Appointments   Date Time Provider Neil Brown   12/29/2021  1:00 PM SHARA HammerMA BS AMB   1/21/2022  9:30 AM Bishnu Nesbitt MD RDE JUAN A 332 BS AMB          Requested Prescriptions     Pending Prescriptions Disp Refills    amLODIPine (NORVASC) 2.5 mg tablet 30 Tablet 5     Sig: Take 1 Tablet by mouth daily.  losartan (COZAAR) 50 mg tablet 30 Tablet 11     Sig: Take 1 Tablet by mouth daily.  hydrOXYzine pamoate (VISTARIL) 25 mg capsule 60 Capsule 0     Sig: TAKE 1 CAPSULE BY MOUTH 4 TIMES DAILY AS NEEDED FOR ITCHING    pravastatin (PRAVACHOL) 20 mg tablet 90 Tablet 0     Sig: Take 1 Tablet by mouth nightly.  ProAir HFA 90 mcg/actuation inhaler 9 g 0     Sig: Take 2 Puffs by inhalation every four (4) hours as needed for Wheezing.  pregabalin (LYRICA) 150 mg capsule 60 Capsule 0     Sig: Take 1 Capsule by mouth two (2) times a day. Max Daily Amount: 300 mg.

## 2021-12-29 ENCOUNTER — OFFICE VISIT (OUTPATIENT)
Dept: INTERNAL MEDICINE CLINIC | Age: 51
End: 2021-12-29
Payer: MEDICARE

## 2021-12-29 VITALS
RESPIRATION RATE: 12 BRPM | HEART RATE: 104 BPM | HEIGHT: 70 IN | DIASTOLIC BLOOD PRESSURE: 92 MMHG | SYSTOLIC BLOOD PRESSURE: 151 MMHG | OXYGEN SATURATION: 95 % | WEIGHT: 236.5 LBS | BODY MASS INDEX: 33.86 KG/M2 | TEMPERATURE: 98.4 F

## 2021-12-29 DIAGNOSIS — E10.3593 PROLIFERATIVE DIABETIC RETINOPATHY OF BOTH EYES ASSOCIATED WITH TYPE 1 DIABETES MELLITUS, UNSPECIFIED PROLIFERATIVE RETINOPATHY TYPE (HCC): ICD-10-CM

## 2021-12-29 DIAGNOSIS — F32.4 MAJOR DEPRESSIVE DISORDER WITH SINGLE EPISODE, IN PARTIAL REMISSION (HCC): ICD-10-CM

## 2021-12-29 DIAGNOSIS — R10.12 LUQ ABDOMINAL PAIN: ICD-10-CM

## 2021-12-29 DIAGNOSIS — K21.9 GASTROESOPHAGEAL REFLUX DISEASE, UNSPECIFIED WHETHER ESOPHAGITIS PRESENT: ICD-10-CM

## 2021-12-29 DIAGNOSIS — E10.42 DIABETIC POLYNEUROPATHY ASSOCIATED WITH TYPE 1 DIABETES MELLITUS (HCC): ICD-10-CM

## 2021-12-29 DIAGNOSIS — G36.0 NEUROMYELITIS OPTICA (HCC): ICD-10-CM

## 2021-12-29 DIAGNOSIS — Z00.00 MEDICARE ANNUAL WELLNESS VISIT, SUBSEQUENT: Primary | ICD-10-CM

## 2021-12-29 DIAGNOSIS — Z13.31 POSITIVE DEPRESSION SCREENING: ICD-10-CM

## 2021-12-29 DIAGNOSIS — Z20.2 TRICHOMONAS EXPOSURE: ICD-10-CM

## 2021-12-29 DIAGNOSIS — I10 ESSENTIAL HYPERTENSION: ICD-10-CM

## 2021-12-29 PROCEDURE — G8427 DOCREV CUR MEDS BY ELIG CLIN: HCPCS | Performed by: PHYSICIAN ASSISTANT

## 2021-12-29 PROCEDURE — G9717 DOC PT DX DEP/BP F/U NT REQ: HCPCS | Performed by: PHYSICIAN ASSISTANT

## 2021-12-29 PROCEDURE — 3017F COLORECTAL CA SCREEN DOC REV: CPT | Performed by: PHYSICIAN ASSISTANT

## 2021-12-29 PROCEDURE — 99214 OFFICE O/P EST MOD 30 MIN: CPT | Performed by: PHYSICIAN ASSISTANT

## 2021-12-29 PROCEDURE — 2022F DILAT RTA XM EVC RTNOPTHY: CPT | Performed by: PHYSICIAN ASSISTANT

## 2021-12-29 PROCEDURE — 3052F HG A1C>EQUAL 8.0%<EQUAL 9.0%: CPT | Performed by: PHYSICIAN ASSISTANT

## 2021-12-29 PROCEDURE — G8755 DIAS BP > OR = 90: HCPCS | Performed by: PHYSICIAN ASSISTANT

## 2021-12-29 PROCEDURE — G8753 SYS BP > OR = 140: HCPCS | Performed by: PHYSICIAN ASSISTANT

## 2021-12-29 PROCEDURE — G0439 PPPS, SUBSEQ VISIT: HCPCS | Performed by: PHYSICIAN ASSISTANT

## 2021-12-29 PROCEDURE — G8417 CALC BMI ABV UP PARAM F/U: HCPCS | Performed by: PHYSICIAN ASSISTANT

## 2021-12-29 RX ORDER — DULOXETIN HYDROCHLORIDE 20 MG/1
20 CAPSULE, DELAYED RELEASE ORAL DAILY
Qty: 30 CAPSULE | Refills: 5 | Status: SHIPPED | OUTPATIENT
Start: 2021-12-29 | End: 2022-03-24 | Stop reason: DRUGHIGH

## 2021-12-29 RX ORDER — AMLODIPINE BESYLATE 5 MG/1
5 TABLET ORAL DAILY
Qty: 30 TABLET | Refills: 5 | Status: SHIPPED | OUTPATIENT
Start: 2021-12-29 | End: 2022-06-30 | Stop reason: SDUPTHER

## 2021-12-29 RX ORDER — OMEPRAZOLE 40 MG/1
40 CAPSULE, DELAYED RELEASE ORAL DAILY
Qty: 30 CAPSULE | Refills: 5 | Status: SHIPPED | OUTPATIENT
Start: 2021-12-29 | End: 2022-06-30 | Stop reason: SDUPTHER

## 2021-12-29 RX ORDER — METRONIDAZOLE 500 MG/1
500 TABLET ORAL 2 TIMES DAILY
Qty: 14 TABLET | Refills: 0 | Status: SHIPPED | OUTPATIENT
Start: 2021-12-29 | End: 2022-06-29

## 2021-12-29 NOTE — PROGRESS NOTES
Jenna Orozco  Identified pt with two pt identifiers(name and ). Chief Complaint   Patient presents with    Annual Wellness Visit    Hypertension    Depression       Reviewed record In preparation for visit and have obtained necessary documentation. 1. Have you been to the ER, urgent care clinic or hospitalized since your last visit? No     2. Have you seen or consulted any other health care providers outside of the [address] [place] [address] since your last visit? Include any pap smears or colon screening. No    Vitals reviewed with provider.     Health Maintenance reviewed:     Health Maintenance Due   Topic    Hepatitis C Screening     DTaP/Tdap/Td series (1 - Tdap)    Colorectal Cancer Screening Combo     Shingrix Vaccine Age 50> (1 of 2)    COVID-19 Vaccine (3 - Booster for Moderna series)    Medicare Yearly Exam     MICROALBUMIN Q1           Wt Readings from Last 3 Encounters:   21 236 lb 8 oz (107.3 kg)   21 232 lb (105.2 kg)   10/27/21 233 lb 9.6 oz (106 kg)        Temp Readings from Last 3 Encounters:   21 98.4 °F (36.9 °C) (Oral)   21 98.2 °F (36.8 °C)   10/27/21 98 °F (36.7 °C)        BP Readings from Last 3 Encounters:   21 (!) 151/92   21 (!) 168/81   10/27/21 (!) 163/88        Pulse Readings from Last 3 Encounters:   21 (!) 104   21 90   10/27/21 87        Vitals:    21 1311 21 1316   BP: (!) 154/100 (!) 151/92   Pulse: (!) 104    Resp: 12    Temp: 98.4 °F (36.9 °C)    TempSrc: Oral    SpO2: 95%    Weight: 236 lb 8 oz (107.3 kg)    Height: 5' 10\" (1.778 m)    PainSc:   8    PainLoc: Generalized           Learning Assessment:   :       Learning Assessment 2020 3/29/2018 2017 2017 2017 2016 2015   PRIMARY LEARNER Patient Patient Patient Patient Patient Patient Patient   HIGHEST LEVEL OF EDUCATION - PRIMARY LEARNER  - - - - - 4 YEARS OF COLLEGE 4 [address] PRIMARY LEARNER - - - - - NONE NONE   CO-LEARNER CAREGIVER - - - - - No No   PRIMARY LANGUAGE ENGLISH ENGLISH ENGLISH ENGLISH ENGLISH ENGLISH ENGLISH   LEARNER PREFERENCE PRIMARY VIDEOS DEMONSTRATION DEMONSTRATION DEMONSTRATION DEMONSTRATION VIDEOS DEMONSTRATION   ANSWERED BY self patient patient patient patient Patient Patient   RELATIONSHIP SELF SELF SELF SELF SELF SELF SELF        Depression Screening:   :       3 most recent PHQ Screens 12/29/2021   PHQ Not Done -   Little interest or pleasure in doing things Nearly every day   Feeling down, depressed, irritable, or hopeless Nearly every day   Total Score PHQ 2 6   Trouble falling or staying asleep, or sleeping too much Nearly every day   Feeling tired or having little energy Nearly every day   Poor appetite, weight loss, or overeating Nearly every day   Feeling bad about yourself - or that you are a failure or have let yourself or your family down More than half the days   Trouble concentrating on things such as school, work, reading, or watching TV Nearly every day   Moving or speaking so slowly that other people could have noticed; or the opposite being so fidgety that others notice Not at all   Thoughts of being better off dead, or hurting yourself in some way Not at all   PHQ 9 Score 20   How difficult have these problems made it for you to do your work, take care of your home and get along with others -        Fall Risk Assessment:   :     No flowsheet data found. Abuse Screening:   :     No flowsheet data found.      ADL Screening:   :       ADL Assessment 12/29/2021   Feeding yourself No Help Needed   Getting from bed to chair No Help Needed   Getting dressed No Help Needed   Bathing or showering No Help Needed   Walk across the room (includes cane/walker) No Help Needed   Using the telphone No Help Needed   Taking your medications No Help Needed   Preparing meals No Help Needed   Managing money (expenses/bills) No Help Needed   Moderately strenuous housework (laundry) No Help Needed   Shopping for personal items (toiletries/medicines) No Help Needed   Shopping for groceries No Help Needed   Driving No Help Needed   Climbing a flight of stairs No Help Needed   Getting to places beyond walking distances No Help Needed

## 2021-12-29 NOTE — PATIENT INSTRUCTIONS
For a therapist and/or psychiatrist, call:    [address] [facility] ([address])  [facility], [place], [address]  [address] [facility] [address]  [address] [facility] Tee Garcia, [address]  [phone]    [facility] [address] [facility], [facility], [address], [place], [address]  [phone]    [facility] [place]  [address], [place], [address]  [address]  [address], [place], [address]  [phone]    [address] [place], [address], [address]  [phone]    [facility]  [address] [facility], [address] [place] [address]  [address] [facility]  [address] [facility], [address] [place] [address], [address] [place] [address]  [address] [place] [address]   [phone]    Ezequiel [address] ([facility])-  [address]  [phone]      If no improvement in Constipation/Abdominal pain in 2 weeks with improvements in water intake, prilosec daily and using Metamucil please call me and let me know so we can order appropriate imaging. If abd pain gets worse please notify me. Use Flagyl first to treat exposure to trichamonoas. Then begin Cymbalta.

## 2021-12-29 NOTE — PROGRESS NOTES
Ashish Baltazar is a 46y.o. year old male seen in clinic today for   Chief Complaint   Patient presents with    Annual Wellness Visit    Hypertension    Depression       he is here today to follow up for HTN, Depression    Hypertension: Controlled with amlodipine 2.5, Losartan 50 mg  Compliant with medications? always  Compliant with diet? Yes, vegetarian mostly, notes salt makes his BP sascha rocket  Compliant with exercise? Unable to exercise given NMO  Average blood pressure readings outside of office. 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 Followed by Vishal Conde. No current treatment. Has extreme fatigue and pains. Notes he is just used to the pains. Has hx of MDD. Has tried Prozac, Zoloft, Wellbutrin, Cymbalta all before. Notes none of them worked for longer than a few days. Notes his effect is in relation to his pains and his pains get worse in the winter with cold and rainy weather. Has also had LUQ pain x 2 weeks. Notes it is severe, stabbing but also feels like gas. Has had worsening constipation, with no bowel movement in the past 3-4 days with progressive hardening of his stools over the past few weeks. He has also had increased GERD sx's every day around 2:30 PM. Notes acid taste in mouth and splash back in his throat. Notes he is using TUMs, Pepcid and OTC Prilosec without effect. Has never seen GI.    he specifically denies any CP, SOB, HA. Dizziness, fevers, chills. Current Outpatient Medications on File Prior to Visit   Medication Sig Dispense Refill    losartan (COZAAR) 50 mg tablet Take 1 Tablet by mouth daily. 30 Tablet 11    hydrOXYzine pamoate (VISTARIL) 25 mg capsule TAKE 1 CAPSULE BY MOUTH 4 TIMES DAILY AS NEEDED FOR ITCHING 60 Capsule 0    pravastatin (PRAVACHOL) 20 mg tablet Take 1 Tablet by mouth nightly. 90 Tablet 0    ProAir HFA 90 mcg/actuation inhaler Take 2 Puffs by inhalation every four (4) hours as needed for Wheezing.  9 g 0    pregabalin (LYRICA) 150 mg capsule Take 1 Capsule by mouth two (2) times a day. Max Daily Amount: 300 mg. 60 Capsule 0    insulin lispro (HumaLOG KwikPen Insulin) 100 unit/mL kwikpen 20 units twice daily with meals 45 mL 3    triamcinolone acetonide (KENALOG) 0.1 % topical cream APPLY  CREAM EXTERNALLY TO AFFECTED AREA TWICE DAILY AS NEEDED FOR SKIN IRRITATION. USE THIN LAYER ON NECK RASH AND EXTERNAL EAR 15 g 0    glucosamine-chondroitin (ARTHX) 500-400 mg cap Take 1 Capsule by mouth daily.  insulin detemir U-100 (LEVEMIR FLEXTOUCH) 100 unit/mL (3 mL) inpn Inject 50 units am and 50 units HS  Indications: type 1 diabetes mellitus (Patient taking differently: Inject 45 units am and 45 units HS  Indications: type 1 diabetes mellitus) 15 Pen 10    Insulin Needles, Disposable, 31 gauge x 5/16\" ndle Use with insulin 450 Pen Needle 3    multivitamin, tx-iron-ca-min (THERA-M W/ IRON) 9 mg iron-400 mcg tab tablet Take 1 Tab by mouth daily.  cyanocobalamin 1,000 mcg tablet Take 1,000 mcg by mouth daily.  selenium sulfide (SELSUN BLUE) 1 % shampoo Apply to head, mustache area and left ear at least several times daily. 1 Bottle 11    [DISCONTINUED] amLODIPine (NORVASC) 2.5 mg tablet Take 1 Tablet by mouth daily. 30 Tablet 5    omega-3 acid ethyl esters (LOVAZA) 1 gram capsule Take 2 g by mouth two (2) times a day.  diphenoxylate-atropine (LOMOTIL) 2.5-0.025 mg per tablet Take 1 Tab by mouth four (4) times daily. Max Daily Amount: 4 Tabs. (Patient not taking: Reported on 12/29/2021) 120 Tab 2    [DISCONTINUED] ALPRAZolam (XANAX) 0.25 mg tablet 1 tablet 30 to 60 minutes prior to MRI scan may repeat x1 if needed must have someone to drive you to and from the MRI scanner  Indications: anxious (Patient not taking: Reported on 10/27/2021) 2 Tab 0     No current facility-administered medications on file prior to visit.          No Known Allergies  Past Medical History:   Diagnosis Date    Anxiety and depression per pt on 10/21/2021    Asthma     GERD (gastroesophageal reflux disease)     per pt on 10/21/2021    Hypertension     per pt on 10/21/2021    [facility]     per pt on 10/21/2021    Type 2 diabetes mellitus with peripheral neuropathy [facility])       Past Surgical History:   Procedure Laterality Date    HX CATARACT REMOVAL Right 10/2021    HX CATARACT REMOVAL Left 12/03/2021    HX HEENT      ear surgery(both)    HX TONSILLECTOMY      per pt on 10/21/2021        Family History   Problem Relation Age of Onset    Diabetes Father     Cancer Father         prostate    Hypertension Mother     Other Mother         Crohn's disease    Heart Disease Mother     Hypertension Maternal Grandmother     Thyroid Disease Sister     No Known Problems Brother         Social History     Socioeconomic History    Marital status: SINGLE     Spouse name: Not on file    Number of children: Not on file    Years of education: Not on file    Highest education level: Not on file   Occupational History    Not on file   Tobacco Use    Smoking status: Never Smoker    Smokeless tobacco: Never Used   Vaping Use    Vaping Use: Never used   Substance and Sexual Activity    Alcohol use: No     Alcohol/week: 0.0 standard drinks    Drug use: No    Sexual activity: Not Currently   Other Topics Concern    Not on file   Social History Narrative    Not on file     Social Determinants of Health     Financial Resource Strain:     Difficulty of Paying Living Expenses: Not on file   Food Insecurity:     Worried About Running Out of Food in the Last Year: Not on file    Jay of Food in the Last Year: Not on file   Transportation Needs:     Lack of Transportation (Medical): Not on file    Lack of Transportation (Non-Medical):  Not on file   Physical Activity:     Days of Exercise per Week: Not on file    Minutes of Exercise per Session: Not on file   Stress:     Feeling of Stress : Not on file   Social Connections:     Frequency of Communication with Friends and Family: Not on file    Frequency of Social Gatherings with Friends and Family: Not on file    Attends Advent Services: Not on file    Active Member of Clubs or Organizations: Not on file    Attends Club or Organization Meetings: Not on file    Marital Status: Not on file   Intimate Partner Violence:     Fear of Current or Ex-Partner: Not on file    Emotionally Abused: Not on file    Physically Abused: Not on file    Sexually Abused: Not on file   Housing Stability:     Unable to Pay for Housing in the Last Year: Not on file    Number of [place] in the Last Year: Not on file    Unstable Housing in the Last Year: Not on file           Visit Vitals  BP (!) 151/92 (BP 1 Location: Left upper arm, BP Patient Position: Sitting)   Pulse (!) 104   Temp 98.4 °F (36.9 °C) (Oral)   Resp 12   Ht 5' 10\" (1.778 m)   Wt 236 lb 8 oz (107.3 kg)   SpO2 95%   BMI 33.93 kg/m²       Review of Systems   Constitutional: Negative for chills, fever, malaise/fatigue and weight loss. Respiratory: Negative for cough, shortness of breath and wheezing. Cardiovascular: Negative for chest pain, palpitations and leg swelling. Gastrointestinal: Positive for abdominal pain, constipation and heartburn. Negative for blood in stool, diarrhea, melena, nausea and vomiting. Genitourinary: Negative for dysuria and frequency. Musculoskeletal: Negative for myalgias. Skin: Negative for rash. Neurological: Negative for dizziness, weakness and headaches. Endo/Heme/Allergies: Does not bruise/bleed easily. Psychiatric/Behavioral: Positive for depression. All other systems reviewed and are negative. Physical Exam  Vitals and nursing note reviewed. Constitutional:       Appearance: Normal appearance. HENT:      Head: Normocephalic and atraumatic.       Right Ear: Tympanic membrane, ear canal and external ear normal.      Left Ear: Tympanic membrane, ear canal and external ear normal.      Nose: Nose normal.      Mouth/Throat:      Mouth: Mucous membranes are moist.      Pharynx: Oropharynx is clear. No oropharyngeal exudate or posterior oropharyngeal erythema. Eyes:      Conjunctiva/sclera: Conjunctivae normal.      Pupils: Pupils are equal, round, and reactive to light. Neck:      Vascular: No carotid bruit. Cardiovascular:      Rate and Rhythm: Normal rate and regular rhythm. Pulses: Normal pulses. Heart sounds: Normal heart sounds. No murmur heard. Pulmonary:      Effort: Pulmonary effort is normal.      Breath sounds: Normal breath sounds. No stridor. No wheezing, rhonchi or rales. Abdominal:      General: Abdomen is flat. Bowel sounds are normal. There is no distension. Tenderness: There is no abdominal tenderness. There is no right CVA tenderness, left CVA tenderness or guarding. Musculoskeletal:         General: Normal range of motion. Cervical back: Normal range of motion and neck supple. No rigidity. Right lower leg: No edema. Left lower leg: No edema. Skin:     General: Skin is dry. Capillary Refill: Capillary refill takes less than 2 seconds. Neurological:      General: No focal deficit present. Mental Status: He is oriented to person, place, and time. Mental status is at baseline. Psychiatric:         Mood and Affect: Mood normal.          No results found for this or any previous visit (from the past 24 hour(s)). ASSESSMENT AND PLAN  Diagnoses and all orders for this visit:    1. Essential hypertension  -     amLODIPine (NORVASC) 5 mg tablet; Take 1 Tablet by mouth daily. Not at goal, increase amlodipine to 5 mg, check and follow up in 1 month   2. Diabetes mellitus type 1, uncontrolled, with complications (HCC)  -     MICROALBUMIN, UR, RAND W/ MICROALB/CREAT RATIO; Future  Endocrine controlled, reports last A1C in 8's  3.  Diabetic polyneuropathy associated with type 1 diabetes mellitus ([address]  Using Lyrica, but makes him sleepy  4. Proliferative diabetic retinopathy of both eyes associated with type 1 diabetes mellitus, unspecified proliferative retinopathy type ([address]    5. Major depressive disorder with single episode, in partial remission (HCC)  -     DULoxetine (CYMBALTA) 20 mg capsule; Take 1 Capsule by mouth daily. Add back Cymbalta during winter months, may continue if he finds benefits. Associated with pains, so hoping cymbalta will help dull pain from NMO. 6. Positive depression screening    7. Medicare annual wellness visit, subsequent    8. LUQ abdominal pain  -     omeprazole (PRILOSEC) 40 mg capsule; Take 1 Capsule by mouth daily.  -     REFERRAL TO GASTROENTEROLOGY    9. Gastroesophageal reflux disease, unspecified whether esophagitis present  -     omeprazole (PRILOSEC) 40 mg capsule; Take 1 Capsule by mouth daily.  -     REFERRAL TO GASTROENTEROLOGY  Potentially a cause of LUQ pain. Send to GI, treat with Prilosec 40 mg daily, pepcid as needed   10. Trichomonas exposure  -     metroNIDAZOLE (FLAGYL) 500 mg tablet; Take 1 Tablet by mouth two (2) times a day. Treat for trich exposure with flagyl BID x 7 days  11. Neuromyelitis optica (HCC)  -     DULoxetine (CYMBALTA) 20 mg capsule; Take 1 Capsule by mouth daily. Followed by Miguel Alves, use Cymbatla for MDD and hopes of improving overall pain level, follow up in 1 month         I have discussed the diagnosis with the patient and the intended plan as seen in the above orders. Patient is in agreement. The patient has received an after-visit summary and questions were answered concerning future plans. I have discussed medication side effects and warnings with the patient as well.     Jocelin Minaya PA-C    Depression screen positive, PHQ 9 Score: 20, C-SSRS completed, referral placed for depression evaluation and medication was prescribed, drug therapy education given - patient will call for any significant medication side effects or worsening symptoms of depression. This is the Subsequent Medicare Annual Wellness Exam, performed 12 months or more after the Initial AWV or the last Subsequent AWV    I have reviewed the patient's medical history in detail and updated the computerized patient record. Assessment/Plan   Education and counseling provided:  Are appropriate based on today's review and evaluation  Colorectal cancer screening tests    1. Essential hypertension  2. Diabetes mellitus type 1, uncontrolled, with complications (HCC)  -     MICROALBUMIN, UR, RAND W/ MICROALB/CREAT RATIO; Future  3. Diabetic polyneuropathy associated with type 1 diabetes mellitus ([facility]  4. Proliferative diabetic retinopathy of both eyes associated with type 1 diabetes mellitus, unspecified proliferative retinopathy type [facility]  5. Major depressive disorder with single episode, in partial remission ([facility]  6. Positive depression screening  7. Medicare annual wellness visit, subsequent  8. LUQ abdominal pain  -     omeprazole (PRILOSEC) 40 mg capsule; Take 1 Capsule by mouth daily. , Normal, Disp-30 Capsule, R-5  -     REFERRAL TO GASTROENTEROLOGY  9. Gastroesophageal reflux disease, unspecified whether esophagitis present  -     omeprazole (PRILOSEC) 40 mg capsule; Take 1 Capsule by mouth daily. , Normal, Disp-30 Capsule, R-5  -     REFERRAL TO GASTROENTEROLOGY  10. Trichomonas exposure       Depression Risk Factor Screening     3 most recent PHQ Screens 12/29/2021   PHQ Not Done -   Little interest or pleasure in doing things Nearly every day   Feeling down, depressed, irritable, or hopeless Nearly every day   Total Score PHQ 2 6   Trouble falling or staying asleep, or sleeping too much Nearly every day   Feeling tired or having little energy Nearly every day   Poor appetite, weight loss, or overeating Nearly every day   Feeling bad about yourself - or that you are a failure or have let yourself or your family down More than half the days   Trouble concentrating on things such as school, work, reading, or watching TV Nearly every day   Moving or speaking so slowly that other people could have noticed; or the opposite being so fidgety that others notice Not at all   Thoughts of being better off dead, or hurting yourself in some way Not at all   PHQ 9 Score 20   How difficult have these problems made it for you to do your work, take care of your home and get along with others -       Alcohol & Drug Abuse Risk Screen    Do you average more than 2 drinks per night or 14 drinks a week: No    On any one occasion in the past three months have you have had more than 4 drinks containing alcohol:  No          Functional Ability and Level of Safety    Hearing: Hearing is good. Activities of Daily Living: The home contains: no safety equipment. Patient does total self care      Ambulation: with mild difficulty and has been recommended by neuro to use a cane     Fall Risk:  No flowsheet data found.    Abuse Screen:  Patient is not abused       Cognitive Screening    Has your family/caregiver stated any concerns about your memory: no         Health Maintenance Due     Health Maintenance Due   Topic Date Due    Hepatitis C Screening  Never done    DTaP/Tdap/Td series (1 - Tdap) Never done    Colorectal Cancer Screening Combo  Never done    Shingrix Vaccine Age 50> (1 of 2) Never done    COVID-19 Vaccine (3 - Booster for Moderna series) 09/05/2021    MICROALBUMIN Q1  12/18/2021       Patient Care Team   Patient Care Team:  Sebastien Parker NP as PCP - General (Nurse Practitioner)  Sebastien Parker NP as PCP - [facility] Empaneled Provider  Molinda Snellen, MD as Physician (Endocrinology)  Annita Spatz, MD as Physician (Cardiology)  Emelia Ariza LCSW as Counselor ([facility]  GENEVA Herrera (Certified Clinical Nurse Specialist)  Antonina Adkins MD (Neurology)    History     Patient Active Problem List   Diagnosis Code    Diabetic polyneuropathy associated with type 1 diabetes mellitus (HCC) E10.42    Neuromyelitis optica ([address] [facility] G36.0    Diabetes mellitus type 1, uncontrolled, with complications ([address] B94.4, E10.65    Major depressive disorder with single episode, in partial remission ([address] F32.4    Essential hypertension I10    Anxiety about health F41.8    Hypercholesterolemia E78.00    Combined forms of age-related cataract of left eye H25.812    Proliferative diabetic retinopathy of both eyes associated with type 1 diabetes mellitus ([address] A39.9592     Past Medical History:   Diagnosis Date    Anxiety and depression     per pt on 10/21/2021    Asthma     GERD (gastroesophageal reflux disease)     per pt on 10/21/2021    Hypertension     per pt on 10/21/2021    MS (multiple sclerosis) ([address]     per pt on 10/21/2021    Type 2 diabetes mellitus with peripheral neuropathy [facility])       Past Surgical History:   Procedure Laterality Date    HX CATARACT REMOVAL Right 10/2021    HX CATARACT REMOVAL Left 12/03/2021    HX HEENT      ear surgery(both)    HX TONSILLECTOMY      per pt on 10/21/2021     Current Outpatient Medications   Medication Sig Dispense Refill    omeprazole (PRILOSEC) 40 mg capsule Take 1 Capsule by mouth daily. 30 Capsule 5    DULoxetine (CYMBALTA) 20 mg capsule Take 1 Capsule by mouth daily. 30 Capsule 5    metroNIDAZOLE (FLAGYL) 500 mg tablet Take 1 Tablet by mouth two (2) times a day. 14 Tablet 0    amLODIPine (NORVASC) 5 mg tablet Take 1 Tablet by mouth daily. 30 Tablet 5    losartan (COZAAR) 50 mg tablet Take 1 Tablet by mouth daily. 30 Tablet 11    hydrOXYzine pamoate (VISTARIL) 25 mg capsule TAKE 1 CAPSULE BY MOUTH 4 TIMES DAILY AS NEEDED FOR ITCHING 60 Capsule 0    pravastatin (PRAVACHOL) 20 mg tablet Take 1 Tablet by mouth nightly. 90 Tablet 0    ProAir HFA 90 mcg/actuation inhaler Take 2 Puffs by inhalation every four (4) hours as needed for Wheezing.  9 g 0    pregabalin (LYRICA) 150 mg capsule Take 1 Capsule by mouth two (2) times a day. Max Daily Amount: 300 mg. 60 Capsule 0    insulin lispro (HumaLOG KwikPen Insulin) 100 unit/mL kwikpen 20 units twice daily with meals 45 mL 3    triamcinolone acetonide (KENALOG) 0.1 % topical cream APPLY  CREAM EXTERNALLY TO AFFECTED AREA TWICE DAILY AS NEEDED FOR SKIN IRRITATION. USE THIN LAYER ON NECK RASH AND EXTERNAL EAR 15 g 0    glucosamine-chondroitin (ARTHX) 500-400 mg cap Take 1 Capsule by mouth daily.  insulin detemir U-100 (LEVEMIR FLEXTOUCH) 100 unit/mL (3 mL) inpn Inject 50 units am and 50 units HS  Indications: type 1 diabetes mellitus (Patient taking differently: Inject 45 units am and 45 units HS  Indications: type 1 diabetes mellitus) 15 Pen 10    Insulin Needles, Disposable, 31 gauge x 5/16\" ndle Use with insulin 450 Pen Needle 3    multivitamin, tx-iron-ca-min (THERA-M W/ IRON) 9 mg iron-400 mcg tab tablet Take 1 Tab by mouth daily.  cyanocobalamin 1,000 mcg tablet Take 1,000 mcg by mouth daily.  selenium sulfide (SELSUN BLUE) 1 % shampoo Apply to head, mustache area and left ear at least several times daily. 1 Bottle 11    omega-3 acid ethyl esters (LOVAZA) 1 gram capsule Take 2 g by mouth two (2) times a day.  diphenoxylate-atropine (LOMOTIL) 2.5-0.025 mg per tablet Take 1 Tab by mouth four (4) times daily. Max Daily Amount: 4 Tabs.  (Patient not taking: Reported on 12/29/2021) 120 Tab 2     No Known Allergies    Family History   Problem Relation Age of Onset    Diabetes Father     Cancer Father         prostate    Hypertension Mother     Other Mother         Crohn's disease    Heart Disease Mother     Hypertension Maternal Grandmother     Thyroid Disease Sister     No Known Problems Brother      Social History     Tobacco Use    Smoking status: Never Smoker    Smokeless tobacco: Never Used   Substance Use Topics    Alcohol use: No     Alcohol/week: 0.0 standard drinks         Shirin Conley PA-C

## 2021-12-30 LAB
ALBUMIN/CREAT UR: 270 MG/G CREAT (ref 0–29)
CREAT UR-MCNC: 135.1 MG/DL
MICROALBUMIN UR-MCNC: 365.1 UG/ML

## 2022-01-28 ENCOUNTER — TELEPHONE (OUTPATIENT)
Dept: INTERNAL MEDICINE CLINIC | Age: 52
End: 2022-01-28

## 2022-01-28 NOTE — TELEPHONE ENCOUNTER
----- Message from Tiffany Long sent at 1/28/2022 10:37 AM EST -----  Subject: Message to Provider    QUESTIONS  Information for Provider? [facility] called in today to get another   form for home health approval from Dr. Irena Tipton for patient. I called   clinical and there was no answer and [facility] states that clinical   needs to call patient back. ---------------------------------------------------------------------------  --------------  Liset ZENDEJAS  What is the best way for the office to contact you? OK to leave message on   voicemail  Preferred Call Back Phone Number? [phone]  ---------------------------------------------------------------------------  --------------  SCRIPT ANSWERS  Relationship to Patient?  Third Party  Representative Name? [employer]

## 2022-01-28 NOTE — TELEPHONE ENCOUNTER
----- Message from Sonia Vega sent at 1/28/2022 10:37 AM EST -----  Subject: Message to Provider    QUESTIONS  Information for Provider? [facility] called in today to get another   form for home health approval from Dr. Brandyn Fleming for patient. I called   clinical and there was no answer and [facility] states that clinical   needs to call patient back. ---------------------------------------------------------------------------  --------------  Charles Fearing INFO  What is the best way for the office to contact you? OK to leave message on   voicemail  Preferred Call Back Phone Number? [phone]  ---------------------------------------------------------------------------  --------------  SCRIPT ANSWERS  Relationship to Patient?  Third Party  Representative Name? [employer]

## 2022-02-11 RX ORDER — PEN NEEDLE, DIABETIC 30 GX3/16"
NEEDLE, DISPOSABLE MISCELLANEOUS
Qty: 400 EACH | Refills: 0 | Status: SHIPPED | OUTPATIENT
Start: 2022-02-11

## 2022-02-14 RX ORDER — PEN NEEDLE, DIABETIC 31 GX3/16"
NEEDLE, DISPOSABLE MISCELLANEOUS
Qty: 400 PEN NEEDLE | Refills: 0 | Status: SHIPPED | OUTPATIENT
Start: 2022-02-14

## 2022-02-14 NOTE — TELEPHONE ENCOUNTER
PCP: Teresa Elam NP     Last appt: 12/29/2021   Future Appointments   Date Time Provider Neil Brown   2/21/2022  1:30 PM MARIAA Bravo   6/30/2022  8:30 AM MARIAA Bravo        Requested Prescriptions     Pending Prescriptions Disp Refills    Insulin Needles, Disposable, (BD Ultra-Fine Mini Pen Needle) 31 gauge x 3/16\" ndle 400 Pen Needle 0     Sig: USE WITH INSULIN

## 2022-02-21 ENCOUNTER — VIRTUAL VISIT (OUTPATIENT)
Dept: INTERNAL MEDICINE CLINIC | Age: 52
End: 2022-02-21

## 2022-02-25 ENCOUNTER — TELEPHONE (OUTPATIENT)
Dept: ENDOCRINOLOGY | Age: 52
End: 2022-02-25

## 2022-02-25 NOTE — TELEPHONE ENCOUNTER
2/25/2022  3:21 PM    Warden Olivera (Pt Care Coordinator at [facility]) called and stated that Yamilet Orlando will be faxing over an order for the pt to receive the Dexcom some time next week. She's calling on Yamilet Orlando behalf. Her contact number is [phone].     Thanks,   Bishop Cherry

## 2022-03-17 DIAGNOSIS — E10.42 DIABETIC POLYNEUROPATHY ASSOCIATED WITH TYPE 1 DIABETES MELLITUS (HCC): ICD-10-CM

## 2022-03-18 PROBLEM — R45.89 ANXIETY ABOUT HEALTH: Status: ACTIVE | Noted: 2019-12-10

## 2022-03-18 PROBLEM — E78.00 HYPERCHOLESTEROLEMIA: Status: ACTIVE | Noted: 2020-02-03

## 2022-03-18 PROBLEM — F41.8 ANXIETY ABOUT HEALTH: Status: ACTIVE | Noted: 2019-12-10

## 2022-03-18 RX ORDER — PREGABALIN 150 MG/1
CAPSULE ORAL
Qty: 60 CAPSULE | Refills: 0 | Status: SHIPPED | OUTPATIENT
Start: 2022-03-18 | End: 2022-03-24 | Stop reason: SDUPTHER

## 2022-03-19 PROBLEM — E10.3593 PROLIFERATIVE DIABETIC RETINOPATHY OF BOTH EYES ASSOCIATED WITH TYPE 1 DIABETES MELLITUS (HCC): Status: ACTIVE | Noted: 2021-11-01

## 2022-03-19 PROBLEM — G36.0 NEUROMYELITIS OPTICA (HCC): Status: ACTIVE | Noted: 2018-09-20

## 2022-03-19 PROBLEM — F32.4 MAJOR DEPRESSIVE DISORDER WITH SINGLE EPISODE, IN PARTIAL REMISSION (HCC): Status: ACTIVE | Noted: 2019-05-25

## 2022-03-19 PROBLEM — I10 ESSENTIAL HYPERTENSION: Status: ACTIVE | Noted: 2019-05-25

## 2022-03-19 PROBLEM — H25.812 COMBINED FORMS OF AGE-RELATED CATARACT OF LEFT EYE: Status: ACTIVE | Noted: 2021-10-31

## 2022-03-23 NOTE — PROGRESS NOTES
GUIDO Molina is a 46y.o. year old male patient of Lilo Guillen NP who presents with c/o   Chief Complaint   Patient presents with    Form Completion     Room 1B // incontience supplies       Pt has history of has Diabetic polyneuropathy associated with type 1 diabetes mellitus ([address] Neuromyelitis optica ([address] Diabetes mellitus type 1, uncontrolled, with complications ([address] Major depressive disorder with single episode, in partial remission ([address] Essential hypertension, Anxiety about health, Hypercholesterolemia, Combined forms of age-related cataract of left eye, and Proliferative diabetic retinopathy of both eyes associated with type 1 diabetes mellitus ([address] on their problem list..      Pt here for urinary incontinence supplies form. He follows with Neuro for neuromyelitis. Wears incontinence pads mostly for sleep and occ during the day. Has incontinence of stool and urine without warning. Degree of incontinence is moderate and occurs frequently. Incontinence is due to his neuromyelitis, similar to MS. Seen by Cam in Dec, amlodipine increased at that time. BP at home has been 150/90 range but will be normal in the evenings- 110-120/60s but higher in morning. Occ will feel lightheaded or dizzy when BP is low. Has occ dizzy spells in middle of name. Follows with Dr. Aiden Patricio for DM. Missed his appt in February. Blood sugars have been in the 200s. Lowest has been 60, felt weak. Not always good about checking his sugars. Doesn't feel like Cymbalta is helping. Feels sad often. Denies SI/HI. Doesn't get out of house much. Volunteers twice a week. Would like to try counseling. Plan verbatim from appt with Cam in Dec:  Has hx of Neuromyelitis optica. Similar to MS. Followed by Doc Camacho. No current treatment. Has extreme fatigue and pains. Notes he is just used to the pains. Has hx of MDD. Has tried Prozac, Zoloft, Wellbutrin, Cymbalta all before.  Notes none of them worked for longer than a few days. Notes his effect is in relation to his pains and his pains get worse in the winter with cold and rainy weather. BP Not at goal, increase amlodipine to 5 mg, check and follow up in 1 month   Add back Cymbalta during winter months, may continue if he finds benefits. Associated with pains, so hoping cymbalta will help dull pain from NMO. Lab Results   Component Value Date/Time    Sodium 143 07/16/2021 03:50 PM    Potassium 4.5 07/16/2021 03:50 PM    Chloride 102 07/16/2021 03:50 PM    CO2 26 07/16/2021 03:50 PM    Anion gap 4 (L) 08/16/2018 02:02 PM    Glucose 310 (H) 07/16/2021 03:50 PM    BUN 23 07/16/2021 03:50 PM    Creatinine 1.20 07/16/2021 03:50 PM    BUN/Creatinine ratio 19 07/16/2021 03:50 PM    GFR est AA 81 07/16/2021 03:50 PM    GFR est non-AA 70 07/16/2021 03:50 PM    Calcium 9.6 07/16/2021 03:50 PM    Bilirubin, total 0.2 07/16/2021 03:50 PM    Alk.  phosphatase 101 07/16/2021 03:50 PM    Protein, total 7.0 07/16/2021 03:50 PM    Albumin 4.1 07/16/2021 03:50 PM    Globulin 3.6 08/16/2018 02:02 PM    A-G Ratio 1.4 07/16/2021 03:50 PM    ALT (SGPT) 23 07/16/2021 03:50 PM    AST (SGOT) 21 07/16/2021 03:50 PM           Health Maintenance Overdue  Health Maintenance Due   Topic Date Due    Hepatitis C Screening  Never done    Pneumococcal 0-64 years (1 of 2 - PPSV23) Never done    DTaP/Tdap/Td series (1 - Tdap) Never done    Colorectal Cancer Screening Combo  Never done    Shingrix Vaccine Age 50> (1 of 2) Never done    COVID-19 Vaccine (3 - Booster for Moderna series) 08/05/2021       Depression Screen  3 most recent PHQ Screens 3/24/2022   PHQ Not Done -   Little interest or pleasure in doing things Nearly every day   Feeling down, depressed, irritable, or hopeless Nearly every day   Total Score PHQ 2 6   Trouble falling or staying asleep, or sleeping too much Nearly every day   Feeling tired or having little energy Nearly every day   Poor appetite, weight loss, or overeating Nearly every day   Feeling bad about yourself - or that you are a failure or have let yourself or your family down Several days   Trouble concentrating on things such as school, work, reading, or watching TV Nearly every day   Moving or speaking so slowly that other people could have noticed; or the opposite being so fidgety that others notice Nearly every day   Thoughts of being better off dead, or hurting yourself in some way Not at all   PHQ 9 Score 22   How difficult have these problems made it for you to do your work, take care of your home and get along with others -           Patient Active Problem List   Diagnosis Code    Diabetic polyneuropathy associated with type 1 diabetes mellitus ([address] E10.42    Neuromyelitis optica ([address] G36.0    Diabetes mellitus type 1, uncontrolled, with complications ([address] J45.8, E10.65    Major depressive disorder with single episode, in partial remission ([address] F32.4    Essential hypertension I10    Anxiety about health F41.8    Hypercholesterolemia E78.00    Combined forms of age-related cataract of left eye H25.812    Proliferative diabetic retinopathy of both eyes associated with type 1 diabetes mellitus ([address] E00.2754     Past Medical History:   Diagnosis Date    Anxiety and depression     per pt on 10/21/2021    Asthma     GERD (gastroesophageal reflux disease)     per pt on 10/21/2021    Hypertension     per pt on 10/21/2021    MS (multiple sclerosis) ([address]     per pt on 10/21/2021    Type 2 diabetes mellitus with peripheral neuropathy [facility])      Past Surgical History:   Procedure Laterality Date    HX CATARACT REMOVAL Right 10/2021    HX CATARACT REMOVAL Left 12/03/2021    HX HEENT      ear surgery(both)    HX TONSILLECTOMY      per pt on 10/21/2021     Social History     Socioeconomic History    Marital status: SINGLE   Tobacco Use    Smoking status: Never Smoker    Smokeless tobacco: Never Used   Vaping Use    Vaping Use: Never used Substance and Sexual Activity    Alcohol use: No     Alcohol/week: 0.0 standard drinks    Drug use: No    Sexual activity: Not Currently     Family History   Problem Relation Age of Onset    Diabetes Father     Cancer Father         prostate    Hypertension Mother     Other Mother         Crohn's disease    Heart Disease Mother     Hypertension Maternal Grandmother     Thyroid Disease Sister     No Known Problems Brother      No Known Allergies    MEDICATIONS  Current Outpatient Medications   Medication Sig    pregabalin (LYRICA) 150 mg capsule TAKE 1 CAPSULE BY MOUTH TWICE DAILY . DO NOT EXCEED 2 PER 24 HOURS    Insulin Needles, Disposable, (BD Ultra-Fine Mini Pen Needle) 31 gauge x 3/16\" ndle USE WITH INSULIN    Insulin Needles, Disposable, (BD Ultra-Fine Short Pen Needle) 31 gauge x 5/16\" ndle USE WITH INSULIN    omeprazole (PRILOSEC) 40 mg capsule Take 1 Capsule by mouth daily.  DULoxetine (CYMBALTA) 20 mg capsule Take 1 Capsule by mouth daily.  metroNIDAZOLE (FLAGYL) 500 mg tablet Take 1 Tablet by mouth two (2) times a day.  amLODIPine (NORVASC) 5 mg tablet Take 1 Tablet by mouth daily.  losartan (COZAAR) 50 mg tablet Take 1 Tablet by mouth daily.  hydrOXYzine pamoate (VISTARIL) 25 mg capsule TAKE 1 CAPSULE BY MOUTH 4 TIMES DAILY AS NEEDED FOR ITCHING    pravastatin (PRAVACHOL) 20 mg tablet Take 1 Tablet by mouth nightly.  ProAir HFA 90 mcg/actuation inhaler Take 2 Puffs by inhalation every four (4) hours as needed for Wheezing.  insulin lispro (HumaLOG KwikPen Insulin) 100 unit/mL kwikpen 20 units twice daily with meals    triamcinolone acetonide (KENALOG) 0.1 % topical cream APPLY  CREAM EXTERNALLY TO AFFECTED AREA TWICE DAILY AS NEEDED FOR SKIN IRRITATION. USE THIN LAYER ON NECK RASH AND EXTERNAL EAR    glucosamine-chondroitin (ARTHX) 500-400 mg cap Take 1 Capsule by mouth daily.     omega-3 acid ethyl esters (LOVAZA) 1 gram capsule Take 2 g by mouth two (2) times a day.    insulin detemir U-100 (LEVEMIR FLEXTOUCH) 100 unit/mL (3 mL) inpn Inject 50 units am and 50 units HS  Indications: type 1 diabetes mellitus (Patient taking differently: Inject 45 units am and 45 units HS  Indications: type 1 diabetes mellitus)    diphenoxylate-atropine (LOMOTIL) 2.5-0.025 mg per tablet Take 1 Tab by mouth four (4) times daily. Max Daily Amount: 4 Tabs. (Patient not taking: Reported on 12/29/2021)    multivitamin, tx-iron-ca-min (THERA-M W/ IRON) 9 mg iron-400 mcg tab tablet Take 1 Tab by mouth daily.  cyanocobalamin 1,000 mcg tablet Take 1,000 mcg by mouth daily.  selenium sulfide (SELSUN BLUE) 1 % shampoo Apply to head, mustache area and left ear at least several times daily. No current facility-administered medications for this visit. REVIEW OF SYSTEMS  Per HPI        Visit Vitals  BP (!) 150/100   Pulse 96   Temp 98.3 °F (36.8 °C) (Oral)   Resp 16   Ht 5' 10\" (1.778 m)   Wt 232 lb 6.4 oz (105.4 kg)   SpO2 98%   BMI 33.35 kg/m²         General: Well-developed, well-nourished. In no distress. A&O x 3. Head: Normocephalic, atraumatic. Eyes: Conjunctiva clear. Skin: No rashes or lesions. Neurological: CN II-XII intact. Neurovasc: No edema appreciated. Dorsalis pedis pulses are 2+ on the right side, and 2+ on the left side. Posterior tibial pulses are 2+ on the right side, and 2+ on the left side. Musculoskeletal: Gait normal.   Psychiatric: Normal mood and affect. Behavior is normal.       No results found for any visits on 03/24/22. ASSESSMENT and PLAN  Diagnoses and all orders for this visit:    1. Urinary incontinence, unspecified type  Form completed    2. Diabetes mellitus type 1, uncontrolled, with complications ([facility]  Follows with Dr. Poonam Zambrano, overdue for appt    3.  Proliferative diabetic retinopathy of both eyes associated with type 1 diabetes mellitus, unspecified proliferative retinopathy type ([address]  Due for fu with José Miguel, asked to make appt    4. Diabetic polyneuropathy associated with type 1 diabetes mellitus (HCC)  -     pregabalin (LYRICA) 150 mg capsule; Take 1 Capsule by mouth two (2) times a day. Max Daily Amount: 300 mg.  checked    5. Major depressive disorder with single episode, in partial remission (HCC)  -     DULoxetine (Cymbalta) 30 mg capsule; Take 1 Capsule by mouth daily. INCREASE as not well controlled  Refer to counseling below    6. Demyelinating disease of central nervous system ([address]  Follows with Neuro    7. Essential hypertension  -     losartan (COZAAR) 100 mg tablet; Take 1 Tablet by mouth daily. INCREASE AS BP not controlled  Bring home machine to next appt in 1 month          Patient Instructions     Counselors    [address]   [phone]   [address]10 [facility], 301 [facility] [address] 100  [facility] [address] 600 [facility]  201 [facility], 365 [facility]  Phone:  [phone] [address] [facility] [address] [place] [address], 727 [facility], 400 [facility]  [address] Counseling  [address] [facility] , [address]  [phone]    [address] [facility] [address]   [place], [address] [facility]   T: [phone] [address] [place], [place]  [phone]           High Blood Pressure: Care Instructions  Overview     It's normal for blood pressure to go up and down throughout the day. But if it stays up, you have high blood pressure. Another name for high blood pressure is hypertension. Despite what a lot of people think, high blood pressure usually doesn't cause headaches or make you feel dizzy or lightheaded. It usually has no symptoms. But it does increase your risk of stroke, heart attack, and other problems. You and your doctor will talk about your risks of these problems based on your blood pressure.   Your doctor will give you a goal for your blood pressure. Your goal will be based on your health and your age. Lifestyle changes, such as eating healthy and being active, are always important to help lower blood pressure. You might also take medicine to reach your blood pressure goal.  Follow-up care is a key part of your treatment and safety. Be sure to make and go to all appointments, and call your doctor if you are having problems. It's also a good idea to know your test results and keep a list of the medicines you take. How can you care for yourself at home? Medical treatment  · If you stop taking your medicine, your blood pressure will go back up. You may take one or more types of medicine to lower your blood pressure. Be safe with medicines. Take your medicine exactly as prescribed. Call your doctor if you think you are having a problem with your medicine. · Talk to your doctor before you start taking aspirin every day. Aspirin can help certain people lower their risk of a heart attack or stroke. But taking aspirin isn't right for everyone, because it can cause serious bleeding. · See your doctor regularly. You may need to see the doctor more often at first or until your blood pressure comes down. · If you are taking blood pressure medicine, talk to your doctor before you take decongestants or anti-inflammatory medicine, such as ibuprofen. Some of these medicines can raise blood pressure. · Learn how to check your blood pressure at home. Lifestyle changes  · Stay at a healthy weight. This is especially important if you put on weight around the waist. Losing even 10 pounds can help you lower your blood pressure. · If your doctor recommends it, get more exercise. Walking is a good choice. Bit by bit, increase the amount you walk every day. Try for at least 30 minutes on most days of the week. You also may want to swim, bike, or do other activities. · Avoid or limit alcohol. Talk to your doctor about whether you can drink any alcohol.   · Try to limit how much sodium you eat to less than 2,300 milligrams (mg) a day. Your doctor may ask you to try to eat less than 1,500 mg a day. · Eat plenty of fruits (such as bananas and oranges), vegetables, legumes, whole grains, and low-fat dairy products. · Lower the amount of saturated fat in your diet. Saturated fat is found in animal products such as milk, cheese, and meat. Limiting these foods may help you lose weight and also lower your risk for heart disease. · Do not smoke. Smoking increases your risk for heart attack and stroke. If you need help quitting, talk to your doctor about stop-smoking programs and medicines. These can increase your chances of quitting for good. When should you call for help? Call 911  anytime you think you may need emergency care. This may mean having symptoms that suggest that your blood pressure is causing a serious heart or blood vessel problem. Your blood pressure may be over 180/120. For example, call 911 if:    · You have symptoms of a heart attack. These may include:  ? Chest pain or pressure, or a strange feeling in the chest.  ? Sweating. ? Shortness of breath. ? Nausea or vomiting. ? Pain, pressure, or a strange feeling in the back, neck, jaw, or upper belly or in one or both shoulders or arms. ? Lightheadedness or sudden weakness. ? A fast or irregular heartbeat.     · You have symptoms of a stroke. These may include:  ? Sudden numbness, tingling, weakness, or loss of movement in your face, arm, or leg, especially on only one side of your body. ? Sudden vision changes. ? Sudden trouble speaking. ? Sudden confusion or trouble understanding simple statements. ? Sudden problems with walking or balance. ? A sudden, severe headache that is different from past headaches.     · You have severe back or belly pain. Do not wait until your blood pressure comes down on its own. Get help right away.   Call your doctor now or seek immediate care if:    · Your blood pressure is much higher than normal (such as 180/120 or higher), but you don't have symptoms.     · You think high blood pressure is causing symptoms, such as:  ? Severe headache.  ? Blurry vision. Watch closely for changes in your health, and be sure to contact your doctor if:    · Your blood pressure measures higher than your doctor recommends at least 2 times. That means the top number is higher or the bottom number is higher, or both.     · You think you may be having side effects from your blood pressure medicine. Where can you learn more? Go to http://www.gray.com/  Enter [id number] in the search box to learn more about \"High Blood Pressure: Care Instructions. \"  Current as of: January 10, 2022               Content Version: 13.2  © 2006-2022 [employer]. Care instructions adapted under license by [employer] (which disclaims liability or warranty for this information). If you have questions about a medical condition or this instruction, always ask your healthcare professional. [place] [address] any warranty or liability for your use of this information. Recovering From Depression: Care Instructions  Your Care Instructions     Taking good care of yourself is important as you recover from depression. In time, your symptoms will fade as your treatment takes hold. Do not give up. Instead, focus your energy on getting better. Your mood will improve. It just takes some time. Focus on things that can help you feel better, such as being with friends and family, eating well, and getting enough rest. But take things slowly. Do not do too much too soon. You will begin to feel better gradually. Follow-up care is a key part of your treatment and safety. Be sure to make and go to all appointments, and call your doctor if you are having problems. It's also a good idea to know your test results and keep a list of the medicines you take.   How can you care for yourself at home? Be realistic  · If you have a large task to do, break it up into smaller steps you can handle, and just do what you can. · You may want to put off important decisions until your depression has lifted. If you have plans that will have a major impact on your life, such as marriage, divorce, or a job change, try to wait a bit. Talk it over with friends and loved ones who can help you look at the overall picture first.  · Reaching out to people for help is important. Do not isolate yourself. Let your family and friends help you. Find someone you can trust and confide in, and talk to that person. · Be patient, and be kind to yourself. Remember that depression is not your fault and is not something you can overcome with willpower alone. Treatment is important for depression, just like for any other illness. Feeling better takes time, and your mood will improve little by little. Stay active  · Stay busy and get outside. Take a walk, or try some other light exercise. · Talk with your doctor about an exercise program. Exercise can help with mild depression. · Go to a movie or concert. Take part in a Christian activity or other social gathering. Go to a ball game. · Ask a friend to have dinner with you. Take care of yourself  · Eat a balanced diet with plenty of fresh fruits and vegetables, whole grains, and lean protein. If you have lost your appetite, eat small snacks rather than large meals. · Avoid using illegal drugs or marijuana and drinking alcohol. Do not take medicines that have not been prescribed for you. They may interfere with medicines you may be taking for depression, or they may make your depression worse. · Take your medicines exactly as they are prescribed. You may start to feel better within 1 to 3 weeks of taking antidepressant medicine. But it can take as many as 6 to 8 weeks to see more improvement.  If you have questions or concerns about your medicines, or if you do not notice any improvement by 3 weeks, talk to your doctor. · Continue to take your medicine after your symptoms improve. Taking your medicine for at least 6 months after you feel better can help keep you from getting depressed again. If this isn't the first time you have been depressed, your doctor may recommend you to take medicine even longer. · If you have any side effects from your medicine, tell your doctor. Many side effects are mild and will go away on their own after you have been taking the medicine for a few weeks. Some may last longer. Talk to your doctor if side effects are bothering you too much. You might be able to try a different medicine. · Continue counseling. It may help prevent depression from returning, especially if you've had multiple episodes of depression. Talk with your counselor if you are having a hard time attending your sessions or you think the sessions aren't working. Don't just stop going. · Get enough sleep. Talk to your doctor if you are having problems sleeping. · Avoid sleeping pills unless they are prescribed by the doctor treating your depression. Sleeping pills may make you groggy during the day, and they may interact with other medicine you are taking. · If you have any other illnesses, such as diabetes, heart disease, or high blood pressure, make sure to continue with your treatment. Tell your doctor about all of the medicines you take, including those with or without a prescription. · If you or someone you know talks about suicide, self-harm, or feeling hopeless, get help right away. Call the [address] [place] [address] at 1-800-273-talk (4-211.939.8756) or text HOME to 876049 to access the Crisis Text Line. Consider saving these numbers in your phone. When should you call for help? Call 262 anytime you think you may need emergency care.  For example, call if:    · You feel like hurting yourself or someone else.     · Someone you know has depression and is about to attempt or is attempting suicide. Call your doctor now or seek immediate medical care if:    · You hear voices.     · Someone you know has depression and:  ? Starts to give away his or her possessions. ? Uses illegal drugs or drinks alcohol heavily. ? Talks or writes about death, including writing suicide notes or talking about guns, knives, or pills. ? Starts to spend a lot of time alone. ? Acts very aggressively or suddenly appears calm. Watch closely for changes in your health, and be sure to contact your doctor if:    · You do not get better as expected. Where can you learn more? Go to http://www.gray.com/  Enter N529 in the search box to learn more about \"Recovering From Depression: Care Instructions. \"  Current as of: June 16, 2021               Content Version: 13.2  © 1179-4231 [employer]. Care instructions adapted under license by [employer] (which disclaims liability or warranty for this information). If you have questions about a medical condition or this instruction, always ask your healthcare professional. [place] [address] any warranty or liability for your use of this information. Please keep your follow-up appointment with Haydee Hernandez NP. Health Maintenance Due   Topic Date Due    Hepatitis C Screening  Never done    Pneumococcal 0-64 years (1 of 2 - PPSV23) Never done    DTaP/Tdap/Td series (1 - Tdap) Never done    Colorectal Cancer Screening Combo  Never done    Shingrix Vaccine Age 50> (1 of 2) Never done    COVID-19 Vaccine (3 - Booster for Berton Gals series) 08/05/2021       I have discussed the diagnosis with the patient and the intended plan as seen in the above orders. Patient is in agreement. The patient has received an after-visit summary and questions were answered concerning future plans. I have discussed medication side effects and warnings with the patient as well. Warning signs for the above conditions were discussed including when to call our office or go to the emergency room. The nurse provided the patient and/or family with advanced directive information if needed and encouraged the patient to provide a copy to the office when available.

## 2022-03-24 ENCOUNTER — OFFICE VISIT (OUTPATIENT)
Dept: INTERNAL MEDICINE CLINIC | Age: 52
End: 2022-03-24
Payer: MEDICARE

## 2022-03-24 VITALS
HEART RATE: 96 BPM | WEIGHT: 232.4 LBS | OXYGEN SATURATION: 98 % | RESPIRATION RATE: 16 BRPM | SYSTOLIC BLOOD PRESSURE: 150 MMHG | BODY MASS INDEX: 33.27 KG/M2 | TEMPERATURE: 98.3 F | DIASTOLIC BLOOD PRESSURE: 100 MMHG | HEIGHT: 70 IN

## 2022-03-24 DIAGNOSIS — E10.42 DIABETIC POLYNEUROPATHY ASSOCIATED WITH TYPE 1 DIABETES MELLITUS (HCC): ICD-10-CM

## 2022-03-24 DIAGNOSIS — I10 ESSENTIAL HYPERTENSION: ICD-10-CM

## 2022-03-24 DIAGNOSIS — G37.9 DEMYELINATING DISEASE OF CENTRAL NERVOUS SYSTEM (HCC): ICD-10-CM

## 2022-03-24 DIAGNOSIS — F32.4 MAJOR DEPRESSIVE DISORDER WITH SINGLE EPISODE, IN PARTIAL REMISSION (HCC): ICD-10-CM

## 2022-03-24 DIAGNOSIS — E10.3593 PROLIFERATIVE DIABETIC RETINOPATHY OF BOTH EYES ASSOCIATED WITH TYPE 1 DIABETES MELLITUS, UNSPECIFIED PROLIFERATIVE RETINOPATHY TYPE (HCC): ICD-10-CM

## 2022-03-24 DIAGNOSIS — N39.42 URINARY INCONTINENCE WITHOUT SENSORY AWARENESS: Primary | ICD-10-CM

## 2022-03-24 PROCEDURE — 3017F COLORECTAL CA SCREEN DOC REV: CPT | Performed by: NURSE PRACTITIONER

## 2022-03-24 PROCEDURE — G8755 DIAS BP > OR = 90: HCPCS | Performed by: NURSE PRACTITIONER

## 2022-03-24 PROCEDURE — G8427 DOCREV CUR MEDS BY ELIG CLIN: HCPCS | Performed by: NURSE PRACTITIONER

## 2022-03-24 PROCEDURE — G8417 CALC BMI ABV UP PARAM F/U: HCPCS | Performed by: NURSE PRACTITIONER

## 2022-03-24 PROCEDURE — 2022F DILAT RTA XM EVC RTNOPTHY: CPT | Performed by: NURSE PRACTITIONER

## 2022-03-24 PROCEDURE — G8753 SYS BP > OR = 140: HCPCS | Performed by: NURSE PRACTITIONER

## 2022-03-24 PROCEDURE — 99214 OFFICE O/P EST MOD 30 MIN: CPT | Performed by: NURSE PRACTITIONER

## 2022-03-24 PROCEDURE — G9717 DOC PT DX DEP/BP F/U NT REQ: HCPCS | Performed by: NURSE PRACTITIONER

## 2022-03-24 PROCEDURE — 3046F HEMOGLOBIN A1C LEVEL >9.0%: CPT | Performed by: NURSE PRACTITIONER

## 2022-03-24 RX ORDER — DULOXETIN HYDROCHLORIDE 30 MG/1
30 CAPSULE, DELAYED RELEASE ORAL DAILY
Qty: 30 CAPSULE | Refills: 1 | Status: SHIPPED | OUTPATIENT
Start: 2022-03-24

## 2022-03-24 RX ORDER — LOSARTAN POTASSIUM 100 MG/1
100 TABLET ORAL DAILY
Qty: 30 TABLET | Refills: 1 | Status: SHIPPED | OUTPATIENT
Start: 2022-03-24 | End: 2022-05-19 | Stop reason: SDUPTHER

## 2022-03-24 RX ORDER — PREGABALIN 150 MG/1
150 CAPSULE ORAL 2 TIMES DAILY
Qty: 60 CAPSULE | Refills: 0 | Status: SHIPPED | OUTPATIENT
Start: 2022-03-24 | End: 2022-05-24

## 2022-03-24 NOTE — PROGRESS NOTES
Jenna Orozco  Identified pt with two pt identifiers(name and ). Chief Complaint   Patient presents with    Form Completion     Room 1B // incontience supplies       Reviewed record In preparation for visit and have obtained necessary documentation. 1. Have you been to the ER, urgent care clinic or hospitalized since your last visit? No     2. Have you seen or consulted any other health care providers outside of the [address] [place] [address] since your last visit? Include any pap smears or colon screening. No    Patient does not have an advance directive. Vitals reviewed with provider.     Health Maintenance reviewed:     Health Maintenance Due   Topic    Hepatitis C Screening     Colorectal Cancer Screening Combo           Wt Readings from Last 3 Encounters:   22 232 lb 6.4 oz (105.4 kg)   21 236 lb 8 oz (107.3 kg)   21 232 lb (105.2 kg)        Temp Readings from Last 3 Encounters:   22 98.3 °F (36.8 °C) (Oral)   21 98.4 °F (36.9 °C) (Oral)   21 98.2 °F (36.8 °C)        BP Readings from Last 3 Encounters:   22 (!) 172/105   21 (!) 151/92   21 (!) 168/81        Pulse Readings from Last 3 Encounters:   22 96   21 (!) 104   21 90        Vitals:    22 0929   BP: (!) 172/105   Pulse: 96   Resp: 16   Temp: 98.3 °F (36.8 °C)   TempSrc: Oral   SpO2: 98%   Weight: 232 lb 6.4 oz (105.4 kg)   Height: 5' 10\" (1.778 m)   PainSc:   7          Learning Assessment:   :       Learning Assessment 2020 3/29/2018 2017 2017 2017 2016 2015   PRIMARY LEARNER Patient Patient Patient Patient Patient Patient Patient   HIGHEST LEVEL OF EDUCATION - PRIMARY LEARNER  - - - - - 4 YEARS OF COLLEGE 4 YEARS OF COLLEGE   BARRIERS PRIMARY LEARNER - - - - - NONE NONE   CO-LEARNER CAREGIVER - - - - - No No   PRIMARY LANGUAGE ENGLISH ENGLISH ENGLISH ENGLISH ENGLISH ENGLISH ENGLISH   LEARNER PREFERENCE PRIMARY VIDEOS DEMONSTRATION DEMONSTRATION DEMONSTRATION DEMONSTRATION VIDEOS DEMONSTRATION   ANSWERED BY self patient patient patient patient Patient Patient   RELATIONSHIP SELF SELF SELF SELF SELF SELF SELF        Depression Screening:   :       3 most recent PHQ Screens 3/24/2022   PHQ Not Done -   Little interest or pleasure in doing things Nearly every day   Feeling down, depressed, irritable, or hopeless Nearly every day   Total Score PHQ 2 6   Trouble falling or staying asleep, or sleeping too much Nearly every day   Feeling tired or having little energy Nearly every day   Poor appetite, weight loss, or overeating Nearly every day   Feeling bad about yourself - or that you are a failure or have let yourself or your family down Several days   Trouble concentrating on things such as school, work, reading, or watching TV Nearly every day   Moving or speaking so slowly that other people could have noticed; or the opposite being so fidgety that others notice Nearly every day   Thoughts of being better off dead, or hurting yourself in some way Not at all   PHQ 9 Score 22   How difficult have these problems made it for you to do your work, take care of your home and get along with others -        Fall Risk Assessment:   :     No flowsheet data found. Abuse Screening:   :     No flowsheet data found.      ADL Screening:   :       ADL Assessment 12/29/2021   Feeding yourself No Help Needed   Getting from bed to chair No Help Needed   Getting dressed No Help Needed   Bathing or showering No Help Needed   Walk across the room (includes cane/walker) No Help Needed   Using the telphone No Help Needed   Taking your medications No Help Needed   Preparing meals No Help Needed   Managing money (expenses/bills) No Help Needed   Moderately strenuous housework (laundry) No Help Needed   Shopping for personal items (toiletries/medicines) No Help Needed   Shopping for groceries No Help Needed   Driving No Help Needed   Climbing a flight of stairs No Help Needed   Getting to places beyond walking distances No Help Needed

## 2022-03-24 NOTE — PATIENT INSTRUCTIONS
Counselors    [facility]   [phone]   [facility] Professional [address], [address]  [place], [address]      [address] [facility], [place]  [address] [facility] [address], [address] [place] [address] Road [address]  Phone:  [phone]    [address]  [address], [address] [facility]  [facility], [address] [facility]  [address] Counseling  [place] [address]   [place] , [address]  [phone]    [address]  [address] [address]   [facility], [address]   T: [phone] [address], [place]  [phone]           High Blood Pressure: Care Instructions  Overview     It's normal for blood pressure to go up and down throughout the day. But if it stays up, you have high blood pressure. Another name for high blood pressure is hypertension. Despite what a lot of people think, high blood pressure usually doesn't cause headaches or make you feel dizzy or lightheaded. It usually has no symptoms. But it does increase your risk of stroke, heart attack, and other problems. You and your doctor will talk about your risks of these problems based on your blood pressure. Your doctor will give you a goal for your blood pressure. Your goal will be based on your health and your age. Lifestyle changes, such as eating healthy and being active, are always important to help lower blood pressure. You might also take medicine to reach your blood pressure goal.  Follow-up care is a key part of your treatment and safety. Be sure to make and go to all appointments, and call your doctor if you are having problems. It's also a good idea to know your test results and keep a list of the medicines you take. How can you care for yourself at home? Medical treatment  · If you stop taking your medicine, your blood pressure will go back up. You may take one or more types of medicine to lower your blood pressure. Be safe with medicines. Take your medicine exactly as prescribed. Call your doctor if you think you are having a problem with your medicine. · Talk to your doctor before you start taking aspirin every day. Aspirin can help certain people lower their risk of a heart attack or stroke. But taking aspirin isn't right for everyone, because it can cause serious bleeding. · See your doctor regularly. You may need to see the doctor more often at first or until your blood pressure comes down. · If you are taking blood pressure medicine, talk to your doctor before you take decongestants or anti-inflammatory medicine, such as ibuprofen. Some of these medicines can raise blood pressure. · Learn how to check your blood pressure at home. Lifestyle changes  · Stay at a healthy weight. This is especially important if you put on weight around the waist. Losing even 10 pounds can help you lower your blood pressure. · If your doctor recommends it, get more exercise. Walking is a good choice. Bit by bit, increase the amount you walk every day. Try for at least 30 minutes on most days of the week. You also may want to swim, bike, or do other activities. · Avoid or limit alcohol. Talk to your doctor about whether you can drink any alcohol. · Try to limit how much sodium you eat to less than 2,300 milligrams (mg) a day. Your doctor may ask you to try to eat less than 1,500 mg a day. · Eat plenty of fruits (such as bananas and oranges), vegetables, legumes, whole grains, and low-fat dairy products. · Lower the amount of saturated fat in your diet. Saturated fat is found in animal products such as milk, cheese, and meat. Limiting these foods may help you lose weight and also lower your risk for heart disease. · Do not smoke. Smoking increases your risk for heart attack and stroke. If you need help quitting, talk to your doctor about stop-smoking programs and medicines. These can increase your chances of quitting for good.   When should you call for help?   Call 911  anytime you think you may need emergency care. This may mean having symptoms that suggest that your blood pressure is causing a serious heart or blood vessel problem. Your blood pressure may be over 180/120. For example, call 911 if:    · You have symptoms of a heart attack. These may include:  ? Chest pain or pressure, or a strange feeling in the chest.  ? Sweating. ? Shortness of breath. ? Nausea or vomiting. ? Pain, pressure, or a strange feeling in the back, neck, jaw, or upper belly or in one or both shoulders or arms. ? Lightheadedness or sudden weakness. ? A fast or irregular heartbeat.     · You have symptoms of a stroke. These may include:  ? Sudden numbness, tingling, weakness, or loss of movement in your face, arm, or leg, especially on only one side of your body. ? Sudden vision changes. ? Sudden trouble speaking. ? Sudden confusion or trouble understanding simple statements. ? Sudden problems with walking or balance. ? A sudden, severe headache that is different from past headaches.     · You have severe back or belly pain. Do not wait until your blood pressure comes down on its own. Get help right away. Call your doctor now or seek immediate care if:    · Your blood pressure is much higher than normal (such as 180/120 or higher), but you don't have symptoms.     · You think high blood pressure is causing symptoms, such as:  ? Severe headache.  ? Blurry vision. Watch closely for changes in your health, and be sure to contact your doctor if:    · Your blood pressure measures higher than your doctor recommends at least 2 times. That means the top number is higher or the bottom number is higher, or both.     · You think you may be having side effects from your blood pressure medicine. Where can you learn more? Go to http://www.gray.com/  Enter [id number] in the search box to learn more about \"High Blood Pressure: Care Instructions. \"  Current as of: January 10, 2022               Content Version: 13.2  © 2006-2022 [employer] Care instructions adapted under license by [employer] (which disclaims liability or warranty for this information). If you have questions about a medical condition or this instruction, always ask your healthcare professional. [address] any warranty or liability for your use of this information. Recovering From Depression: Care Instructions  Your Care Instructions     Taking good care of yourself is important as you recover from depression. In time, your symptoms will fade as your treatment takes hold. Do not give up. Instead, focus your energy on getting better. Your mood will improve. It just takes some time. Focus on things that can help you feel better, such as being with friends and family, eating well, and getting enough rest. But take things slowly. Do not do too much too soon. You will begin to feel better gradually. Follow-up care is a key part of your treatment and safety. Be sure to make and go to all appointments, and call your doctor if you are having problems. It's also a good idea to know your test results and keep a list of the medicines you take. How can you care for yourself at home? Be realistic  · If you have a large task to do, break it up into smaller steps you can handle, and just do what you can. · You may want to put off important decisions until your depression has lifted. If you have plans that will have a major impact on your life, such as marriage, divorce, or a job change, try to wait a bit. Talk it over with friends and loved ones who can help you look at the overall picture first.  · Reaching out to people for help is important. Do not isolate yourself. Let your family and friends help you. Find someone you can trust and confide in, and talk to that person. · Be patient, and be kind to yourself.  Remember that depression is not your fault and is not something you can overcome with willpower alone. Treatment is important for depression, just like for any other illness. Feeling better takes time, and your mood will improve little by little. Stay active  · Stay busy and get outside. Take a walk, or try some other light exercise. · Talk with your doctor about an exercise program. Exercise can help with mild depression. · Go to a movie or concert. Take part in a Quaker activity or other social gathering. Go to a ball game. · Ask a friend to have dinner with you. Take care of yourself  · Eat a balanced diet with plenty of fresh fruits and vegetables, whole grains, and lean protein. If you have lost your appetite, eat small snacks rather than large meals. · Avoid using illegal drugs or marijuana and drinking alcohol. Do not take medicines that have not been prescribed for you. They may interfere with medicines you may be taking for depression, or they may make your depression worse. · Take your medicines exactly as they are prescribed. You may start to feel better within 1 to 3 weeks of taking antidepressant medicine. But it can take as many as 6 to 8 weeks to see more improvement. If you have questions or concerns about your medicines, or if you do not notice any improvement by 3 weeks, talk to your doctor. · Continue to take your medicine after your symptoms improve. Taking your medicine for at least 6 months after you feel better can help keep you from getting depressed again. If this isn't the first time you have been depressed, your doctor may recommend you to take medicine even longer. · If you have any side effects from your medicine, tell your doctor. Many side effects are mild and will go away on their own after you have been taking the medicine for a few weeks. Some may last longer. Talk to your doctor if side effects are bothering you too much. You might be able to try a different medicine. · Continue counseling.  It may help prevent depression from returning, especially if you've had multiple episodes of depression. Talk with your counselor if you are having a hard time attending your sessions or you think the sessions aren't working. Don't just stop going. · Get enough sleep. Talk to your doctor if you are having problems sleeping. · Avoid sleeping pills unless they are prescribed by the doctor treating your depression. Sleeping pills may make you groggy during the day, and they may interact with other medicine you are taking. · If you have any other illnesses, such as diabetes, heart disease, or high blood pressure, make sure to continue with your treatment. Tell your doctor about all of the medicines you take, including those with or without a prescription. · If you or someone you know talks about suicide, self-harm, or feeling hopeless, get help right away. Call the [address] [place] [address] at 1-800-273-talk (0-710.739.2875) or text HOME to 562182 to access the [employer] Text Line. Consider saving these numbers in your phone. When should you call for help? Call 799 anytime you think you may need emergency care. For example, call if:    · You feel like hurting yourself or someone else.     · Someone you know has depression and is about to attempt or is attempting suicide. Call your doctor now or seek immediate medical care if:    · You hear voices.     · Someone you know has depression and:  ? Starts to give away his or her possessions. ? Uses illegal drugs or drinks alcohol heavily. ? Talks or writes about death, including writing suicide notes or talking about guns, knives, or pills. ? Starts to spend a lot of time alone. ? Acts very aggressively or suddenly appears calm. Watch closely for changes in your health, and be sure to contact your doctor if:    · You do not get better as expected. Where can you learn more?   Go to http://www.gray.com/  Enter N529 in the search box to learn more about \"Recovering From Depression: Care Instructions. \"  Current as of: June 16, 2021               Content Version: 13.2  © 8279-3318 [employer]. Care instructions adapted under license by [employer] (which disclaims liability or warranty for this information). If you have questions about a medical condition or this instruction, always ask your healthcare professional. [address] any warranty or liability for your use of this information.

## 2022-03-28 ENCOUNTER — TELEPHONE (OUTPATIENT)
Dept: INTERNAL MEDICINE CLINIC | Age: 52
End: 2022-03-28

## 2022-03-28 NOTE — TELEPHONE ENCOUNTER
[facility] delivery called and stated that they also need the medical records for the certificate that was faxed on 3/24

## 2022-04-01 RX ORDER — PRAVASTATIN SODIUM 20 MG/1
20 TABLET ORAL
Qty: 90 TABLET | Refills: 0 | Status: SHIPPED | OUTPATIENT
Start: 2022-04-01 | End: 2022-09-27 | Stop reason: SDUPTHER

## 2022-04-28 RX ORDER — ALBUTEROL SULFATE 90 UG/1
AEROSOL, METERED RESPIRATORY (INHALATION)
Qty: 9 G | Refills: 0 | Status: SHIPPED | OUTPATIENT
Start: 2022-04-28

## 2022-05-19 DIAGNOSIS — I10 ESSENTIAL HYPERTENSION: ICD-10-CM

## 2022-05-19 RX ORDER — LOSARTAN POTASSIUM 100 MG/1
100 TABLET ORAL DAILY
Qty: 90 TABLET | Refills: 0 | Status: SHIPPED | OUTPATIENT
Start: 2022-05-19 | End: 2022-07-02 | Stop reason: SDUPTHER

## 2022-05-19 NOTE — TELEPHONE ENCOUNTER
Requested Prescriptions     Pending Prescriptions Disp Refills    losartan (COZAAR) 100 mg tablet 30 Tablet 1     Sig: Take 1 Tablet by mouth daily. Pharmacy needs 90  days of RX.

## 2022-05-19 NOTE — TELEPHONE ENCOUNTER
PCP: Júnior Rodriguez NP     Last appt: 4/26/2022   Future Appointments   Date Time Provider Neil Brown   6/30/2022  8:30 AM Júnior Rodriguez NP [facility]        Requested Prescriptions     Pending Prescriptions Disp Refills    losartan (COZAAR) 100 mg tablet 90 Tablet 0     Sig: Take 1 Tablet by mouth daily.

## 2022-05-24 DIAGNOSIS — E10.42 DIABETIC POLYNEUROPATHY ASSOCIATED WITH TYPE 1 DIABETES MELLITUS (HCC): ICD-10-CM

## 2022-05-24 RX ORDER — HYDROXYZINE PAMOATE 25 MG/1
CAPSULE ORAL
Qty: 60 CAPSULE | Refills: 0 | Status: SHIPPED | OUTPATIENT
Start: 2022-05-24 | End: 2022-09-16

## 2022-05-24 RX ORDER — PREGABALIN 150 MG/1
CAPSULE ORAL
Qty: 60 CAPSULE | Refills: 0 | Status: SHIPPED | OUTPATIENT
Start: 2022-05-24 | End: 2022-06-30 | Stop reason: SDUPTHER

## 2022-06-29 NOTE — PROGRESS NOTES
HPI  Issac Newton is a 46y.o. year old male patient of Cheri Robert NP who presents with c/o   Chief Complaint   Patient presents with    Diabetes     Room 1A //        Pt has history of has Diabetic polyneuropathy associated with type 1 diabetes mellitus ([address] Demyelinating disease of central nervous system ([address] Neuromyelitis optica ([address] Diabetes mellitus type 1, uncontrolled, with complications, Major depressive disorder with single episode, in partial remission ([address] Essential hypertension, Anxiety about health, Hypercholesterolemia, Combined forms of age-related cataract of left eye, and Proliferative diabetic retinopathy of both eyes associated with type 1 diabetes mellitus ([address] on their problem list..    Pt here to fu on HTN and mood. At last visit we increased cymbalta and added losartan for BP. Prev following with Dr. Too House for DM type 1 but hasn't been seen in some time- asked him to make appt in March at last appt with me but he states he has had scheduling difficulties. Doesn't check blood sugars regularly. When he does its in 200s. Once it was 65 a month ago. Mood is better, still having some ups and downs. Having some occasional dizziness. Bp at home has been 140/80s. Denies chest pain pressure sob or rutledge.       Lab Results   Component Value Date/Time    Hemoglobin A1c 10.8 (H) 12/18/2020 11:09 AM    Hemoglobin A1c (POC) 8.9 10/18/2021 09:02 AM     Lab Results   Component Value Date/Time    Sodium 143 07/16/2021 03:50 PM    Potassium 4.5 07/16/2021 03:50 PM    Chloride 102 07/16/2021 03:50 PM    CO2 26 07/16/2021 03:50 PM    Anion gap 4 (L) 08/16/2018 02:02 PM    Glucose 310 (H) 07/16/2021 03:50 PM    BUN 23 07/16/2021 03:50 PM    Creatinine 1.20 07/16/2021 03:50 PM    BUN/Creatinine ratio 19 07/16/2021 03:50 PM    GFR est AA 81 07/16/2021 03:50 PM    GFR est non-AA 70 07/16/2021 03:50 PM    Calcium 9.6 07/16/2021 03:50 PM    Bilirubin, total 0.2 07/16/2021 03:50 PM Alk. phosphatase 101 07/16/2021 03:50 PM    Protein, total 7.0 07/16/2021 03:50 PM    Albumin 4.1 07/16/2021 03:50 PM    Globulin 3.6 08/16/2018 02:02 PM    A-G Ratio 1.4 07/16/2021 03:50 PM    ALT (SGPT) 23 07/16/2021 03:50 PM    AST (SGOT) 21 07/16/2021 03:50 PM         Health Maintenance Overdue  Health Maintenance Due   Topic Date Due    Hepatitis C Screening  Never done    Colorectal Cancer Screening Combo  Never done    COVID-19 Vaccine (3 - Booster for Moderna series) 08/05/2021    A1C test (Diabetic or Prediabetic)  04/18/2022    Foot Exam Q1  07/28/2022       Depression Screen  3 most recent PHQ Screens 6/30/2022   PHQ Not Done -   Little interest or pleasure in doing things Several days   Feeling down, depressed, irritable, or hopeless Several days   Total Score PHQ 2 2   Trouble falling or staying asleep, or sleeping too much -   Feeling tired or having little energy -   Poor appetite, weight loss, or overeating -   Feeling bad about yourself - or that you are a failure or have let yourself or your family down -   Trouble concentrating on things such as school, work, reading, or watching TV -   Moving or speaking so slowly that other people could have noticed; or the opposite being so fidgety that others notice -   Thoughts of being better off dead, or hurting yourself in some way -   PHQ 9 Score -   How difficult have these problems made it for you to do your work, take care of your home and get along with others -           Patient Active Problem List   Diagnosis Code    Diabetic polyneuropathy associated with type 1 diabetes mellitus ([facility] E10.42    Demyelinating disease of central nervous system ([facility] G37.9    Neuromyelitis optica ([facility] G36.0    Diabetes mellitus type 1, uncontrolled, with complications [id number]    Major depressive disorder with single episode, in partial remission ([facility] F32.4    Essential hypertension I10    Anxiety about health F41.8    Hypercholesterolemia E78.00    Combined forms of age-related cataract of left eye H25.812    Proliferative diabetic retinopathy of both eyes associated with type 1 diabetes mellitus ([facility] M32.7822     Past Medical History:   Diagnosis Date    Anxiety and depression     per pt on 10/21/2021    Asthma     GERD (gastroesophageal reflux disease)     per pt on 10/21/2021    Hypertension     per pt on 10/21/2021    MS (multiple sclerosis) ([facility]     per pt on 10/21/2021    Type 2 diabetes mellitus with peripheral neuropathy [facility])      Past Surgical History:   Procedure Laterality Date    HX CATARACT REMOVAL Right 10/2021    HX CATARACT REMOVAL Left 12/03/2021    HX HEENT      ear surgery(both)    HX TONSILLECTOMY      per pt on 10/21/2021     Social History     Socioeconomic History    Marital status: SINGLE   Tobacco Use    Smoking status: Never Smoker    Smokeless tobacco: Never Used   Vaping Use    Vaping Use: Never used   Substance and Sexual Activity    Alcohol use: No     Alcohol/week: 0.0 standard drinks    Drug use: No    Sexual activity: Not Currently     Family History   Problem Relation Age of Onset    Diabetes Father     Cancer Father         prostate    Hypertension Mother     Other Mother         Crohn's disease    Heart Disease Mother     Hypertension Maternal Grandmother     Thyroid Disease Sister     No Known Problems Brother      No Known Allergies    MEDICATIONS  Current Outpatient Medications   Medication Sig    hydrOXYzine pamoate (VISTARIL) 25 mg capsule TAKE 1 CAPSULE BY MOUTH 4 TIMES DAILY AS NEEDED FOR ITCHING    pregabalin (LYRICA) 150 mg capsule TAKE 1 CAPSULE BY MOUTH TWICE DAILY . DO NOT EXCEED 2 PER 24 HOURS    losartan (COZAAR) 100 mg tablet Take 1 Tablet by mouth daily.     ProAir HFA 90 mcg/actuation inhaler INHALE 2 PUFFS BY MOUTH EVERY 4 HOURS AS NEEDED FOR WHEEZING    pravastatin (PRAVACHOL) 20 mg tablet Take 1 tablet by mouth nightly    DULoxetine (Cymbalta) 30 mg capsule Take 1 Capsule by mouth daily.  Insulin Needles, Disposable, (BD Ultra-Fine Mini Pen Needle) 31 gauge x 3/16\" ndle USE WITH INSULIN    Insulin Needles, Disposable, (BD Ultra-Fine Short Pen Needle) 31 gauge x 5/16\" ndle USE WITH INSULIN    omeprazole (PRILOSEC) 40 mg capsule Take 1 Capsule by mouth daily.  amLODIPine (NORVASC) 5 mg tablet Take 1 Tablet by mouth daily.  insulin lispro (HumaLOG KwikPen Insulin) 100 unit/mL kwikpen 20 units twice daily with meals    triamcinolone acetonide (KENALOG) 0.1 % topical cream APPLY  CREAM EXTERNALLY TO AFFECTED AREA TWICE DAILY AS NEEDED FOR SKIN IRRITATION. USE THIN LAYER ON NECK RASH AND EXTERNAL EAR    glucosamine-chondroitin (ARTHX) 500-400 mg cap Take 1 Capsule by mouth daily.  omega-3 acid ethyl esters (LOVAZA) 1 gram capsule Take 2 g by mouth two (2) times a day.  insulin detemir U-100 (LEVEMIR FLEXTOUCH) 100 unit/mL (3 mL) inpn Inject 50 units am and 50 units HS  Indications: type 1 diabetes mellitus (Patient taking differently: Inject 45 units am and 45 units HS  Indications: type 1 diabetes mellitus)    diphenoxylate-atropine (LOMOTIL) 2.5-0.025 mg per tablet Take 1 Tab by mouth four (4) times daily. Max Daily Amount: 4 Tabs.  multivitamin, tx-iron-ca-min (THERA-M W/ IRON) 9 mg iron-400 mcg tab tablet Take 1 Tab by mouth daily.  cyanocobalamin 1,000 mcg tablet Take 1,000 mcg by mouth daily.  selenium sulfide (SELSUN BLUE) 1 % shampoo Apply to head, mustache area and left ear at least several times daily. No current facility-administered medications for this visit. REVIEW OF SYSTEMS  Per HPI        Visit Vitals  BP (!) 130/95   Pulse 88   Temp 97.6 °F (36.4 °C) (Oral)   Resp 16   Ht 5' 10\" (1.778 m)   Wt 231 lb 6.4 oz (105 kg)   SpO2 96%   BMI 33.20 kg/m²         General: Well-developed, well-nourished. In no distress. A&O x 3. Head: Normocephalic, atraumatic. Eyes: Conjunctiva clear.   Mouth/Throat: Lips, mucosa, and tongue normal. Oropharynx benign. Neck: Supple, symmetrical, trachea midline, no lymphadenopathy, no carotid bruits, no JVD, thyroid: not enlarged, symmetric, no tenderness/mass/nodules. Lungs: Clear to auscultation bilaterally. No crackles or wheezes. No use of accessory muscles. Speaks in full sentences without SOB. Chest Wall: No tenderness or deformity. Heart: RRR, normal S1 and S2, no murmur, click, rub, or gallop. Skin: No rashes or lesions. Neurovasc: No edema appreciated. Musculoskeletal: Gait normal.   Psychiatric: Normal mood and affect. Behavior is normal.       No results found for any visits on 06/30/22. ASSESSMENT and PLAN  Diagnoses and all orders for this visit:    1. Major depressive disorder with single episode, in partial remission (HCC)  Improved on cymbalta    2. Essential hypertension  -     METABOLIC PANEL, COMPREHENSIVE; Future  -     LIPID PANEL; Future  -     amLODIPine (NORVASC) 5 mg tablet; Take 1 Tablet by mouth daily. Home numbers much better controlled    3. Diabetic polyneuropathy associated with type 1 diabetes mellitus (HCC)  -     HEMOGLOBIN A1C WITH EAG; Future  -     LIPID PANEL; Future  -     MICROALBUMIN, UR, RAND W/ MICROALB/CREAT RATIO; Future  -     pregabalin (LYRICA) 150 mg capsule; TAKE 1 CAPSULE BY MOUTH TWICE DAILY . DO NOT EXCEED 2 PER 24 HOURS  Again stressed the importance of following up with Endocrine for his Type 1 DM, he will call Dr. Yolis Calero today, get labs interim as he is way overdue  He is non-compliant with checking his sugars    4. Proliferative diabetic retinopathy of both eyes associated with type 1 diabetes mellitus, unspecified proliferative retinopathy type [facility])  He has appt with Neuro in Sept 5. Gastroesophageal reflux disease, unspecified whether esophagitis present  -     omeprazole (PRILOSEC) 40 mg capsule; Take 1 Capsule by mouth daily.     Other orders  -     triamcinolone acetonide (KENALOG) 0.1 % topical cream; APPLY  CREAM EXTERNALLY TO AFFECTED AREA TWICE DAILY AS NEEDED FOR SKIN IRRITATION. USE THIN LAYER ON NECK RASH AND EXTERNAL EAR            Patient Instructions          High Blood Pressure: Care Instructions  Overview     It's normal for blood pressure to go up and down throughout the day. But if it stays up, you have high blood pressure. Another name for high blood pressure is hypertension. Despite what a lot of people think, high blood pressure usually doesn't cause headaches or make you feel dizzy or lightheaded. It usually has no symptoms. But it does increase your risk of stroke, heart attack, and other problems. You and your doctor will talk about your risks of these problems based on your blood pressure. Your doctor will give you a goal for your blood pressure. Your goal will be based on your health and your age. Lifestyle changes, such as eating healthy and being active, are always important to help lower blood pressure. You might also take medicine to reach your blood pressure goal.  Follow-up care is a key part of your treatment and safety. Be sure to make and go to all appointments, and call your doctor if you are having problems. It's also a good idea to know your test results and keep a list of the medicines you take. How can you care for yourself at home? Medical treatment  · If you stop taking your medicine, your blood pressure will go back up. You may take one or more types of medicine to lower your blood pressure. Be safe with medicines. Take your medicine exactly as prescribed. Call your doctor if you think you are having a problem with your medicine. · Talk to your doctor before you start taking aspirin every day. Aspirin can help certain people lower their risk of a heart attack or stroke. But taking aspirin isn't right for everyone, because it can cause serious bleeding. · See your doctor regularly. You may need to see the doctor more often at first or until your blood pressure comes down.   · If you are taking blood pressure medicine, talk to your doctor before you take decongestants or anti-inflammatory medicine, such as ibuprofen. Some of these medicines can raise blood pressure. · Learn how to check your blood pressure at home. Lifestyle changes  · Stay at a healthy weight. This is especially important if you put on weight around the waist. Losing even 10 pounds can help you lower your blood pressure. · If your doctor recommends it, get more exercise. Walking is a good choice. Bit by bit, increase the amount you walk every day. Try for at least 30 minutes on most days of the week. You also may want to swim, bike, or do other activities. · Avoid or limit alcohol. Talk to your doctor about whether you can drink any alcohol. · Try to limit how much sodium you eat to less than 2,300 milligrams (mg) a day. Your doctor may ask you to try to eat less than 1,500 mg a day. · Eat plenty of fruits (such as bananas and oranges), vegetables, legumes, whole grains, and low-fat dairy products. · Lower the amount of saturated fat in your diet. Saturated fat is found in animal products such as milk, cheese, and meat. Limiting these foods may help you lose weight and also lower your risk for heart disease. · Do not smoke. Smoking increases your risk for heart attack and stroke. If you need help quitting, talk to your doctor about stop-smoking programs and medicines. These can increase your chances of quitting for good. When should you call for help? Call 911  anytime you think you may need emergency care. This may mean having symptoms that suggest that your blood pressure is causing a serious heart or blood vessel problem. Your blood pressure may be over 180/120. For example, call 911 if:    · You have symptoms of a heart attack. These may include:  ? Chest pain or pressure, or a strange feeling in the chest.  ? Sweating. ? Shortness of breath. ? Nausea or vomiting.   ? Pain, pressure, or a strange feeling in the back, neck, jaw, or upper belly or in one or both shoulders or arms. ? Lightheadedness or sudden weakness. ? A fast or irregular heartbeat.     · You have symptoms of a stroke. These may include:  ? Sudden numbness, tingling, weakness, or loss of movement in your face, arm, or leg, especially on only one side of your body. ? Sudden vision changes. ? Sudden trouble speaking. ? Sudden confusion or trouble understanding simple statements. ? Sudden problems with walking or balance. ? A sudden, severe headache that is different from past headaches.     · You have severe back or belly pain. Do not wait until your blood pressure comes down on its own. Get help right away. Call your doctor now or seek immediate care if:    · Your blood pressure is much higher than normal (such as 180/120 or higher), but you don't have symptoms.     · You think high blood pressure is causing symptoms, such as:  ? Severe headache.  ? Blurry vision. Watch closely for changes in your health, and be sure to contact your doctor if:    · Your blood pressure measures higher than your doctor recommends at least 2 times. That means the top number is higher or the bottom number is higher, or both.     · You think you may be having side effects from your blood pressure medicine. Where can you learn more? Go to http://www.gray.com/  Enter [id number] in the search box to learn more about \"High Blood Pressure: Care Instructions. \"  Current as of: January 10, 2022               Content Version: 13.2  © 0456-0746 [employer]. Care instructions adapted under license by [employer] (which disclaims liability or warranty for this information). If you have questions about a medical condition or this instruction, always ask your healthcare professional. [place] [address] any warranty or liability for your use of this information.            Recovering From Depression: Care Instructions  Your Care Instructions Taking good care of yourself is important as you recover from depression. In time, your symptoms will fade as your treatment takes hold. Do not give up. Instead, focus your energy on getting better. Your mood will improve. It just takes some time. Focus on things that can help you feel better, such as being with friends and family, eating well, and getting enough rest. But take things slowly. Do not do too much too soon. You will begin to feel better gradually. Follow-up care is a key part of your treatment and safety. Be sure to make and go to all appointments, and call your doctor if you are having problems. It's also a good idea to know your test results and keep a list of the medicines you take. How can you care for yourself at home? Be realistic  · If you have a large task to do, break it up into smaller steps you can handle, and just do what you can. · You may want to put off important decisions until your depression has lifted. If you have plans that will have a major impact on your life, such as marriage, divorce, or a job change, try to wait a bit. Talk it over with friends and loved ones who can help you look at the overall picture first.  · Reaching out to people for help is important. Do not isolate yourself. Let your family and friends help you. Find someone you can trust and confide in, and talk to that person. · Be patient, and be kind to yourself. Remember that depression is not your fault and is not something you can overcome with willpower alone. Treatment is important for depression, just like for any other illness. Feeling better takes time, and your mood will improve little by little. Stay active  · Stay busy and get outside. Take a walk, or try some other light exercise. · Talk with your doctor about an exercise program. Exercise can help with mild depression. · Go to a movie or concert. Take part in a Cheondoism activity or other social gathering. Go to a ball game.   · Ask a friend to have dinner with you. Take care of yourself  · Eat a balanced diet with plenty of fresh fruits and vegetables, whole grains, and lean protein. If you have lost your appetite, eat small snacks rather than large meals. · Avoid using illegal drugs or marijuana and drinking alcohol. Do not take medicines that have not been prescribed for you. They may interfere with medicines you may be taking for depression, or they may make your depression worse. · Take your medicines exactly as they are prescribed. You may start to feel better within 1 to 3 weeks of taking antidepressant medicine. But it can take as many as 6 to 8 weeks to see more improvement. If you have questions or concerns about your medicines, or if you do not notice any improvement by 3 weeks, talk to your doctor. · Continue to take your medicine after your symptoms improve. Taking your medicine for at least 6 months after you feel better can help keep you from getting depressed again. If this isn't the first time you have been depressed, your doctor may recommend you to take medicine even longer. · If you have any side effects from your medicine, tell your doctor. Many side effects are mild and will go away on their own after you have been taking the medicine for a few weeks. Some may last longer. Talk to your doctor if side effects are bothering you too much. You might be able to try a different medicine. · Continue counseling. It may help prevent depression from returning, especially if you've had multiple episodes of depression. Talk with your counselor if you are having a hard time attending your sessions or you think the sessions aren't working. Don't just stop going. · Get enough sleep. Talk to your doctor if you are having problems sleeping. · Avoid sleeping pills unless they are prescribed by the doctor treating your depression. Sleeping pills may make you groggy during the day, and they may interact with other medicine you are taking.   · If you have any other illnesses, such as diabetes, heart disease, or high blood pressure, make sure to continue with your treatment. Tell your doctor about all of the medicines you take, including those with or without a prescription. · If you or someone you know talks about suicide, self-harm, or feeling hopeless, get help right away. Call the [address] [place] [address] at 1-515-803-DMLI (4-877.862.9614) or text HOME to 983780 to access the Crisis Text Line. Consider saving these numbers in your phone. When should you call for help? Call 911 anytime you think you may need emergency care. For example, call if:    · You feel like hurting yourself or someone else.     · Someone you know has depression and is about to attempt or is attempting suicide. Call your doctor now or seek immediate medical care if:    · You hear voices.     · Someone you know has depression and:  ? Starts to give away his or her possessions. ? Uses illegal drugs or drinks alcohol heavily. ? Talks or writes about death, including writing suicide notes or talking about guns, knives, or pills. ? Starts to spend a lot of time alone. ? Acts very aggressively or suddenly appears calm. Watch closely for changes in your health, and be sure to contact your doctor if:    · You do not get better as expected. Where can you learn more? Go to http://www.gray.com/  Enter N529 in the search box to learn more about \"Recovering From Depression: Care Instructions. \"  Current as of: June 16, 2021               Content Version: 13.2  © 4208-5188 [employer]. Care instructions adapted under license by [employer] (which disclaims liability or warranty for this information). If you have questions about a medical condition or this instruction, always ask your healthcare professional. [address] any warranty or liability for your use of this information.             Please keep your follow-up appointment with Prudence Gil NP. Health Maintenance Due   Topic Date Due    Hepatitis C Screening  Never done    Colorectal Cancer Screening Combo  Never done    COVID-19 Vaccine (3 - Booster for Moderna series) 08/05/2021    A1C test (Diabetic or Prediabetic)  04/18/2022    Foot Exam Q1  07/28/2022       I have discussed the diagnosis with the patient and the intended plan as seen in the above orders. Patient is in agreement. The patient has received an after-visit summary and questions were answered concerning future plans. I have discussed medication side effects and warnings with the patient as well. Warning signs for the above conditions were discussed including when to call our office or go to the emergency room. The nurse provided the patient and/or family with advanced directive information if needed and encouraged the patient to provide a copy to the office when available.

## 2022-06-30 ENCOUNTER — OFFICE VISIT (OUTPATIENT)
Dept: INTERNAL MEDICINE CLINIC | Age: 52
End: 2022-06-30
Payer: MEDICARE

## 2022-06-30 VITALS
DIASTOLIC BLOOD PRESSURE: 95 MMHG | SYSTOLIC BLOOD PRESSURE: 130 MMHG | OXYGEN SATURATION: 96 % | HEIGHT: 70 IN | RESPIRATION RATE: 16 BRPM | HEART RATE: 88 BPM | WEIGHT: 231.4 LBS | TEMPERATURE: 97.6 F | BODY MASS INDEX: 33.13 KG/M2

## 2022-06-30 DIAGNOSIS — F32.4 MAJOR DEPRESSIVE DISORDER WITH SINGLE EPISODE, IN PARTIAL REMISSION (HCC): Primary | ICD-10-CM

## 2022-06-30 DIAGNOSIS — I10 ESSENTIAL HYPERTENSION: ICD-10-CM

## 2022-06-30 DIAGNOSIS — K21.9 GASTROESOPHAGEAL REFLUX DISEASE, UNSPECIFIED WHETHER ESOPHAGITIS PRESENT: ICD-10-CM

## 2022-06-30 DIAGNOSIS — E10.42 DIABETIC POLYNEUROPATHY ASSOCIATED WITH TYPE 1 DIABETES MELLITUS (HCC): ICD-10-CM

## 2022-06-30 DIAGNOSIS — E10.3593 PROLIFERATIVE DIABETIC RETINOPATHY OF BOTH EYES ASSOCIATED WITH TYPE 1 DIABETES MELLITUS, UNSPECIFIED PROLIFERATIVE RETINOPATHY TYPE (HCC): ICD-10-CM

## 2022-06-30 PROCEDURE — 99213 OFFICE O/P EST LOW 20 MIN: CPT | Performed by: NURSE PRACTITIONER

## 2022-06-30 PROCEDURE — 3046F HEMOGLOBIN A1C LEVEL >9.0%: CPT | Performed by: NURSE PRACTITIONER

## 2022-06-30 PROCEDURE — G8755 DIAS BP > OR = 90: HCPCS | Performed by: NURSE PRACTITIONER

## 2022-06-30 PROCEDURE — G8417 CALC BMI ABV UP PARAM F/U: HCPCS | Performed by: NURSE PRACTITIONER

## 2022-06-30 PROCEDURE — 2022F DILAT RTA XM EVC RTNOPTHY: CPT | Performed by: NURSE PRACTITIONER

## 2022-06-30 PROCEDURE — 3017F COLORECTAL CA SCREEN DOC REV: CPT | Performed by: NURSE PRACTITIONER

## 2022-06-30 PROCEDURE — G8427 DOCREV CUR MEDS BY ELIG CLIN: HCPCS | Performed by: NURSE PRACTITIONER

## 2022-06-30 PROCEDURE — G9717 DOC PT DX DEP/BP F/U NT REQ: HCPCS | Performed by: NURSE PRACTITIONER

## 2022-06-30 PROCEDURE — G8752 SYS BP LESS 140: HCPCS | Performed by: NURSE PRACTITIONER

## 2022-06-30 RX ORDER — TRIAMCINOLONE ACETONIDE 1 MG/G
CREAM TOPICAL
Qty: 15 G | Refills: 0 | Status: SHIPPED | OUTPATIENT
Start: 2022-06-30 | End: 2022-10-24 | Stop reason: SDUPTHER

## 2022-06-30 RX ORDER — AMLODIPINE BESYLATE 5 MG/1
5 TABLET ORAL DAILY
Qty: 30 TABLET | Refills: 5 | Status: SHIPPED | OUTPATIENT
Start: 2022-06-30 | End: 2022-09-16

## 2022-06-30 RX ORDER — OMEPRAZOLE 40 MG/1
40 CAPSULE, DELAYED RELEASE ORAL DAILY
Qty: 30 CAPSULE | Refills: 5 | Status: SHIPPED | OUTPATIENT
Start: 2022-06-30

## 2022-06-30 RX ORDER — PREGABALIN 150 MG/1
CAPSULE ORAL
Qty: 60 CAPSULE | Refills: 0 | Status: SHIPPED | OUTPATIENT
Start: 2022-06-30 | End: 2022-08-22 | Stop reason: SDUPTHER

## 2022-06-30 NOTE — PROGRESS NOTES
Jenna Orozco  Identified pt with two pt identifiers(name and ). Chief Complaint   Patient presents with    Diabetes     Room 1A //        Reviewed record In preparation for visit and have obtained necessary documentation. 1. Have you been to the ER, urgent care clinic or hospitalized since your last visit? No     2. Have you seen or consulted any other health care providers outside of the [address] [place] [address] since your last visit? Include any pap smears or colon screening. No    Patient does not have an advance directive. Vitals reviewed with provider.     Health Maintenance reviewed:     Health Maintenance Due   Topic    Hepatitis C Screening     Colorectal Cancer Screening Combo     COVID-19 Vaccine (3 - Booster for Moderna series)    A1C test (Diabetic or Prediabetic)     Foot Exam Q1         Wt Readings from Last 3 Encounters:   22 231 lb 6.4 oz (105 kg)   22 232 lb 6.4 oz (105.4 kg)   21 236 lb 8 oz (107.3 kg)      Temp Readings from Last 3 Encounters:   22 98.3 °F (36.8 °C) (Oral)   21 98.4 °F (36.9 °C) (Oral)   21 98.2 °F (36.8 °C)      BP Readings from Last 3 Encounters:   22 (!) 150/100   21 (!) 151/92   21 (!) 168/81      Pulse Readings from Last 3 Encounters:   22 96   21 (!) 104   21 90      Vitals:    22 0823   Resp: 16   Weight: 231 lb 6.4 oz (105 kg)   Height: 5' 10\" (1.778 m)   PainSc:   7   PainLoc: Hand          Learning Assessment:   :     Learning Assessment 2020 3/29/2018 2017 2017 2017 2016 2015   PRIMARY LEARNER Patient Patient Patient Patient Patient Patient Patient   HIGHEST LEVEL OF EDUCATION - PRIMARY LEARNER  - - - - - 4 YEARS OF COLLEGE 4 YEARS OF COLLEGE   BARRIERS PRIMARY LEARNER - - - - - NONE NONE   CO-LEARNER CAREGIVER - - - - - No No   PRIMARY LANGUAGE ENGLISH ENGLISH ENGLISH ENGLISH ENGLISH ENGLISH ENGLISH   LEARNER PREFERENCE PRIMARY VIDEOS DEMONSTRATION DEMONSTRATION DEMONSTRATION DEMONSTRATION VIDEOS DEMONSTRATION   ANSWERED BY self patient patient patient patient Patient Patient   RELATIONSHIP SELF SELF SELF SELF SELF SELF SELF        Depression Screening:   :     3 most recent PHQ Screens 3/24/2022   PHQ Not Done -   Little interest or pleasure in doing things Nearly every day   Feeling down, depressed, irritable, or hopeless Nearly every day   Total Score PHQ 2 6   Trouble falling or staying asleep, or sleeping too much Nearly every day   Feeling tired or having little energy Nearly every day   Poor appetite, weight loss, or overeating Nearly every day   Feeling bad about yourself - or that you are a failure or have let yourself or your family down Several days   Trouble concentrating on things such as school, work, reading, or watching TV Nearly every day   Moving or speaking so slowly that other people could have noticed; or the opposite being so fidgety that others notice Nearly every day   Thoughts of being better off dead, or hurting yourself in some way Not at all   PHQ 9 Score 22   How difficult have these problems made it for you to do your work, take care of your home and get along with others -        Fall Risk Assessment:   :     No flowsheet data found. Abuse Screening:   :     No flowsheet data found.      ADL Screening:   :     ADL Assessment 12/29/2021   Feeding yourself No Help Needed   Getting from bed to chair No Help Needed   Getting dressed No Help Needed   Bathing or showering No Help Needed   Walk across the room (includes cane/walker) No Help Needed   Using the telphone No Help Needed   Taking your medications No Help Needed   Preparing meals No Help Needed   Managing money (expenses/bills) No Help Needed   Moderately strenuous housework (laundry) No Help Needed   Shopping for personal items (toiletries/medicines) No Help Needed   Shopping for groceries No Help Needed   Driving No Help Needed   Climbing a flight of stairs No Help Needed   Getting to places beyond walking distances No Help Needed

## 2022-06-30 NOTE — PATIENT INSTRUCTIONS
High Blood Pressure: Care Instructions  Overview     It's normal for blood pressure to go up and down throughout the day. But if it stays up, you have high blood pressure. Another name for high blood pressure is hypertension. Despite what a lot of people think, high blood pressure usually doesn't cause headaches or make you feel dizzy or lightheaded. It usually has no symptoms. But it does increase your risk of stroke, heart attack, and other problems. You and your doctor will talk about your risks of these problems based on your blood pressure. Your doctor will give you a goal for your blood pressure. Your goal will be based on your health and your age. Lifestyle changes, such as eating healthy and being active, are always important to help lower blood pressure. You might also take medicine to reach your blood pressure goal.  Follow-up care is a key part of your treatment and safety. Be sure to make and go to all appointments, and call your doctor if you are having problems. It's also a good idea to know your test results and keep a list of the medicines you take. How can you care for yourself at home? Medical treatment  · If you stop taking your medicine, your blood pressure will go back up. You may take one or more types of medicine to lower your blood pressure. Be safe with medicines. Take your medicine exactly as prescribed. Call your doctor if you think you are having a problem with your medicine. · Talk to your doctor before you start taking aspirin every day. Aspirin can help certain people lower their risk of a heart attack or stroke. But taking aspirin isn't right for everyone, because it can cause serious bleeding. · See your doctor regularly. You may need to see the doctor more often at first or until your blood pressure comes down. · If you are taking blood pressure medicine, talk to your doctor before you take decongestants or anti-inflammatory medicine, such as ibuprofen.  Some of these medicines can raise blood pressure. · Learn how to check your blood pressure at home. Lifestyle changes  · Stay at a healthy weight. This is especially important if you put on weight around the waist. Losing even 10 pounds can help you lower your blood pressure. · If your doctor recommends it, get more exercise. Walking is a good choice. Bit by bit, increase the amount you walk every day. Try for at least 30 minutes on most days of the week. You also may want to swim, bike, or do other activities. · Avoid or limit alcohol. Talk to your doctor about whether you can drink any alcohol. · Try to limit how much sodium you eat to less than 2,300 milligrams (mg) a day. Your doctor may ask you to try to eat less than 1,500 mg a day. · Eat plenty of fruits (such as bananas and oranges), vegetables, legumes, whole grains, and low-fat dairy products. · Lower the amount of saturated fat in your diet. Saturated fat is found in animal products such as milk, cheese, and meat. Limiting these foods may help you lose weight and also lower your risk for heart disease. · Do not smoke. Smoking increases your risk for heart attack and stroke. If you need help quitting, talk to your doctor about stop-smoking programs and medicines. These can increase your chances of quitting for good. When should you call for help? Call 911  anytime you think you may need emergency care. This may mean having symptoms that suggest that your blood pressure is causing a serious heart or blood vessel problem. Your blood pressure may be over 180/120. For example, call 911 if:    · You have symptoms of a heart attack. These may include:  ? Chest pain or pressure, or a strange feeling in the chest.  ? Sweating. ? Shortness of breath. ? Nausea or vomiting. ? Pain, pressure, or a strange feeling in the back, neck, jaw, or upper belly or in one or both shoulders or arms. ? Lightheadedness or sudden weakness.   ? A fast or irregular heartbeat.     · You have symptoms of a stroke. These may include:  ? Sudden numbness, tingling, weakness, or loss of movement in your face, arm, or leg, especially on only one side of your body. ? Sudden vision changes. ? Sudden trouble speaking. ? Sudden confusion or trouble understanding simple statements. ? Sudden problems with walking or balance. ? A sudden, severe headache that is different from past headaches.     · You have severe back or belly pain. Do not wait until your blood pressure comes down on its own. Get help right away. Call your doctor now or seek immediate care if:    · Your blood pressure is much higher than normal (such as 180/120 or higher), but you don't have symptoms.     · You think high blood pressure is causing symptoms, such as:  ? Severe headache.  ? Blurry vision. Watch closely for changes in your health, and be sure to contact your doctor if:    · Your blood pressure measures higher than your doctor recommends at least 2 times. That means the top number is higher or the bottom number is higher, or both.     · You think you may be having side effects from your blood pressure medicine. Where can you learn more? Go to http://www.gray.com/  Enter [id number] in the search box to learn more about \"High Blood Pressure: Care Instructions. \"  Current as of: January 10, 2022               Content Version: 13.2  © 2006-2022 [employer]. Care instructions adapted under license by [employer] (which disclaims liability or warranty for this information). If you have questions about a medical condition or this instruction, always ask your healthcare professional. [place] [address] any warranty or liability for your use of this information. Recovering From Depression: Care Instructions  Your Care Instructions     Taking good care of yourself is important as you recover from depression.  In time, your symptoms will fade as your treatment takes hold. Do not give up. Instead, focus your energy on getting better. Your mood will improve. It just takes some time. Focus on things that can help you feel better, such as being with friends and family, eating well, and getting enough rest. But take things slowly. Do not do too much too soon. You will begin to feel better gradually. Follow-up care is a key part of your treatment and safety. Be sure to make and go to all appointments, and call your doctor if you are having problems. It's also a good idea to know your test results and keep a list of the medicines you take. How can you care for yourself at home? Be realistic  · If you have a large task to do, break it up into smaller steps you can handle, and just do what you can. · You may want to put off important decisions until your depression has lifted. If you have plans that will have a major impact on your life, such as marriage, divorce, or a job change, try to wait a bit. Talk it over with friends and loved ones who can help you look at the overall picture first.  · Reaching out to people for help is important. Do not isolate yourself. Let your family and friends help you. Find someone you can trust and confide in, and talk to that person. · Be patient, and be kind to yourself. Remember that depression is not your fault and is not something you can overcome with willpower alone. Treatment is important for depression, just like for any other illness. Feeling better takes time, and your mood will improve little by little. Stay active  · Stay busy and get outside. Take a walk, or try some other light exercise. · Talk with your doctor about an exercise program. Exercise can help with mild depression. · Go to a movie or concert. Take part in a Confucianist activity or other social gathering. Go to a ball game. · Ask a friend to have dinner with you.   Take care of yourself  · Eat a balanced diet with plenty of fresh fruits and vegetables, whole grains, and lean protein. If you have lost your appetite, eat small snacks rather than large meals. · Avoid using illegal drugs or marijuana and drinking alcohol. Do not take medicines that have not been prescribed for you. They may interfere with medicines you may be taking for depression, or they may make your depression worse. · Take your medicines exactly as they are prescribed. You may start to feel better within 1 to 3 weeks of taking antidepressant medicine. But it can take as many as 6 to 8 weeks to see more improvement. If you have questions or concerns about your medicines, or if you do not notice any improvement by 3 weeks, talk to your doctor. · Continue to take your medicine after your symptoms improve. Taking your medicine for at least 6 months after you feel better can help keep you from getting depressed again. If this isn't the first time you have been depressed, your doctor may recommend you to take medicine even longer. · If you have any side effects from your medicine, tell your doctor. Many side effects are mild and will go away on their own after you have been taking the medicine for a few weeks. Some may last longer. Talk to your doctor if side effects are bothering you too much. You might be able to try a different medicine. · Continue counseling. It may help prevent depression from returning, especially if you've had multiple episodes of depression. Talk with your counselor if you are having a hard time attending your sessions or you think the sessions aren't working. Don't just stop going. · Get enough sleep. Talk to your doctor if you are having problems sleeping. · Avoid sleeping pills unless they are prescribed by the doctor treating your depression. Sleeping pills may make you groggy during the day, and they may interact with other medicine you are taking.   · If you have any other illnesses, such as diabetes, heart disease, or high blood pressure, make sure to continue with your treatment. Tell your doctor about all of the medicines you take, including those with or without a prescription. · If you or someone you know talks about suicide, self-harm, or feeling hopeless, get help right away. Call the [facility] at 7-265-894-VPXF (9-650.984.2556) or text HOME to 246212 to access the Crisis Text Line. Consider saving these numbers in your phone. When should you call for help? Call 911 anytime you think you may need emergency care. For example, call if:    · You feel like hurting yourself or someone else.     · Someone you know has depression and is about to attempt or is attempting suicide. Call your doctor now or seek immediate medical care if:    · You hear voices.     · Someone you know has depression and:  ? Starts to give away his or her possessions. ? Uses illegal drugs or drinks alcohol heavily. ? Talks or writes about death, including writing suicide notes or talking about guns, knives, or pills. ? Starts to spend a lot of time alone. ? Acts very aggressively or suddenly appears calm. Watch closely for changes in your health, and be sure to contact your doctor if:    · You do not get better as expected. Where can you learn more? Go to http://www.gray.com/  Enter N529 in the search box to learn more about \"Recovering From Depression: Care Instructions. \"  Current as of: June 16, 2021               Content Version: 13.2  © 8766-3403 [employer]. Care instructions adapted under license by [employer] (which disclaims liability or warranty for this information). If you have questions about a medical condition or this instruction, always ask your healthcare professional. [place] [address] any warranty or liability for your use of this information.

## 2022-07-01 DIAGNOSIS — I10 ESSENTIAL HYPERTENSION: ICD-10-CM

## 2022-07-02 RX ORDER — LOSARTAN POTASSIUM 100 MG/1
TABLET ORAL
Qty: 90 TABLET | Refills: 0 | Status: SHIPPED | OUTPATIENT
Start: 2022-07-02 | End: 2022-10-24 | Stop reason: SDUPTHER

## 2022-09-02 ENCOUNTER — OFFICE VISIT (OUTPATIENT)
Dept: NEUROLOGY | Age: 52
End: 2022-09-02
Payer: MEDICARE

## 2022-09-02 VITALS
TEMPERATURE: 97.6 F | RESPIRATION RATE: 16 BRPM | WEIGHT: 229.7 LBS | HEART RATE: 90 BPM | DIASTOLIC BLOOD PRESSURE: 80 MMHG | SYSTOLIC BLOOD PRESSURE: 140 MMHG | OXYGEN SATURATION: 99 % | BODY MASS INDEX: 32.88 KG/M2 | HEIGHT: 70 IN

## 2022-09-02 DIAGNOSIS — G37.9 DEMYELINATING DISEASE OF CENTRAL NERVOUS SYSTEM (HCC): ICD-10-CM

## 2022-09-02 DIAGNOSIS — R55 SYNCOPE AND COLLAPSE: ICD-10-CM

## 2022-09-02 DIAGNOSIS — F41.9 ANXIETY: ICD-10-CM

## 2022-09-02 DIAGNOSIS — G36.0 NEUROMYELITIS OPTICA (HCC): ICD-10-CM

## 2022-09-02 DIAGNOSIS — H53.9 VISUAL DISTURBANCE: ICD-10-CM

## 2022-09-02 DIAGNOSIS — E10.42 DIABETIC POLYNEUROPATHY ASSOCIATED WITH TYPE 1 DIABETES MELLITUS (HCC): ICD-10-CM

## 2022-09-02 DIAGNOSIS — G36.0 NEUROMYELITIS OPTICA (HCC): Primary | ICD-10-CM

## 2022-09-02 DIAGNOSIS — G82.20 PARAPARESIS OF BOTH LOWER LIMBS (HCC): ICD-10-CM

## 2022-09-02 DIAGNOSIS — G81.91 HEMIPARESIS OF RIGHT DOMINANT SIDE DUE TO NON-CEREBROVASCULAR ETIOLOGY (HCC): ICD-10-CM

## 2022-09-02 PROBLEM — F60.9 PERSONALITY DISORDER (HCC): Status: ACTIVE | Noted: 2022-09-02

## 2022-09-02 PROBLEM — F33.1 MODERATE RECURRENT MAJOR DEPRESSION (HCC): Status: ACTIVE | Noted: 2022-09-02

## 2022-09-02 PROBLEM — F43.10 POSTTRAUMATIC STRESS DISORDER: Status: ACTIVE | Noted: 2022-09-02

## 2022-09-02 PROBLEM — G81.10 SPASTIC HEMIPARESIS AFFECTING DOMINANT SIDE (HCC): Status: ACTIVE | Noted: 2022-09-02

## 2022-09-02 PROCEDURE — 99215 OFFICE O/P EST HI 40 MIN: CPT | Performed by: PSYCHIATRY & NEUROLOGY

## 2022-09-02 PROCEDURE — G8754 DIAS BP LESS 90: HCPCS | Performed by: PSYCHIATRY & NEUROLOGY

## 2022-09-02 PROCEDURE — 3017F COLORECTAL CA SCREEN DOC REV: CPT | Performed by: PSYCHIATRY & NEUROLOGY

## 2022-09-02 PROCEDURE — G8417 CALC BMI ABV UP PARAM F/U: HCPCS | Performed by: PSYCHIATRY & NEUROLOGY

## 2022-09-02 PROCEDURE — 3046F HEMOGLOBIN A1C LEVEL >9.0%: CPT | Performed by: PSYCHIATRY & NEUROLOGY

## 2022-09-02 PROCEDURE — G9717 DOC PT DX DEP/BP F/U NT REQ: HCPCS | Performed by: PSYCHIATRY & NEUROLOGY

## 2022-09-02 PROCEDURE — G8427 DOCREV CUR MEDS BY ELIG CLIN: HCPCS | Performed by: PSYCHIATRY & NEUROLOGY

## 2022-09-02 PROCEDURE — G8753 SYS BP > OR = 140: HCPCS | Performed by: PSYCHIATRY & NEUROLOGY

## 2022-09-02 PROCEDURE — 2022F DILAT RTA XM EVC RTNOPTHY: CPT | Performed by: PSYCHIATRY & NEUROLOGY

## 2022-09-02 RX ORDER — LORAZEPAM 0.5 MG/1
TABLET ORAL
Qty: 2 TABLET | Refills: 1 | Status: SHIPPED | OUTPATIENT
Start: 2022-09-02 | End: 2022-09-16 | Stop reason: SDUPTHER

## 2022-09-02 NOTE — ASSESSMENT & PLAN NOTE
Patient has reported several spells where he has blacked out for unknown causes feels a little dizzy and lightheaded prior to the event    We will check ANS testing pending those results we will determine if we need to do additional work-up

## 2022-09-02 NOTE — ASSESSMENT & PLAN NOTE
Chronic and longstanding  in nature to include C1 demyelinating lesion which is old and unchanged related to his transverse myelitis

## 2022-09-02 NOTE — ASSESSMENT & PLAN NOTE
Secondary to C1 demyelinating lesion which is chronic and unchanged  With limited functional ability and right hand  Increased tone in his right side both upper and lower extremity  Old and unchanged  Patient still ambulating independently

## 2022-09-02 NOTE — ASSESSMENT & PLAN NOTE
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

## 2022-09-02 NOTE — PROGRESS NOTES
Chief Complaint   Patient presents with    Follow-up     neuromyelitis optica       1. Have you been to the ER, urgent care clinic since your last visit? Hospitalized since your last visit? 2. Have you seen or consulted any other health care providers outside of the [address] [place] [address] since your last visit? Include any pap smears or colon screening. Patient C/O visual issues close up (blurry) aching in extremities and fatigue. Balance issues has had numerous falls.

## 2022-09-02 NOTE — ASSESSMENT & PLAN NOTE
Numbness and tingling in both lower extremities  Might need to look at EMGs to clarify basis but suspect related to diabetic polyneuropathy less likely from demyelination  Medications managed outside of our practice patient feels as though he is recently well controlled on the Lyrica and Cymbalta

## 2022-09-02 NOTE — ASSESSMENT & PLAN NOTE
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

## 2022-09-02 NOTE — Clinical Note
9/2/2022    Patient: Marion Valdez   YOB: 1970   Date of Visit: 9/2/2022     Carlita Murray NP  Via     Dear Carlita Murray NP,      Thank you for referring Mr. Marion Valdez to [address] [place] [address] for evaluation. My notes for this consultation are attached. If you have questions, please do not hesitate to call me. I look forward to following your patient along with you.       Sincerely,    Venice Long NP

## 2022-09-02 NOTE — PROGRESS NOTES
[address]  In Office FOLLOW-UP VISIT         Julian Mahoney is a 46 y.o. male who presents today for the following:  Chief Complaint   Patient presents with    Follow-up     neuromyelitis optica           ASSESSMENT AND PLAN  1. Neuromyelitis optica ([address]  Assessment & Plan: This is a working diagnosis only   Today AquaPorin-4 negative although clearly can still have neuromyelitis optica with negative antibodies  Orders:  -     MRI [facility] SPINE W WO CONT; Future  -     MRI [facility] SPINE W WO CONT; Future  -     MRI BRAIN W WO CONT; Future  -     AQUAPORIN-4 RECEPTOR AB; Future  2. Demyelinating disease of central nervous system [facility])  Assessment & Plan:  Working diagnosis historically has been NMO with negative AquaPorin-4 antibodies  History of transverse myelitis  Patient is on no medications due to negative AquaPorin-4 antibodies  We will repeat antibody levels  Due for surveillance MRI scans      3. Paraparesis of both lower limbs (HCC)  Assessment & Plan:  Chronic and longstanding  in nature to include C1 demyelinating lesion which is old and unchanged related to his transverse myelitis   4. Hemiparesis of right dominant side due to non-cerebrovascular etiology [facility])  Assessment & Plan:  Secondary to C1 demyelinating lesion which is chronic and unchanged  With limited functional ability and right hand  Increased tone in his right side both upper and lower extremity  Old and unchanged  Patient still ambulating independently  5. Diabetic polyneuropathy associated with type 1 diabetes mellitus (HCC)  Assessment & Plan:  Numbness and tingling in both lower extremities  Might need to look at EMGs to clarify basis but suspect related to diabetic polyneuropathy less likely from demyelination  Medications managed outside of our practice patient feels as though he is recently well controlled on the Lyrica and Cymbalta  6.  Syncope and collapse  Assessment & Plan:   Patient has reported several spells where he has blacked out for unknown causes feels a little dizzy and lightheaded prior to the event    We will check ANS testing pending those results we will determine if we need to do additional work-up  Orders:  -     REFERRAL TO NEUROLOGY  7. Anxiety  -     LORazepam (ATIVAN) 0.5 mg tablet; Take 1 tablet 30 to 60 minutes prior to MRI may repeat x1 if needed, Normal, Disp-2 Tablet, R-1  8. Visual disturbance  Assessment & Plan:   Patient reports much improved since cataracts removed bilaterally but still with some blurred vision        Patient and/or family was given time to ask questions and voice concerns. I believe all questions concerns were adequately addressed at this  office visit. Patient and/or family also verbalized agreement and understanding of the above-stated plan    Patient remains a complex patient secondary to polypharmacy, significant comorbid conditions, and use of high-risk medications which complicate the decision making process related to patient's neurologic diagnosis      ICD-10-CM ICD-9-CM    1. Neuromyelitis optica ([facility] [address]  G36.0 341.0 MRI CERV SPINE W WO CONT      MRI [facility] SPINE W WO CONT      MRI BRAIN W WO CONT      AQUAPORIN-4 RECEPTOR AB      CANCELED: AQUAPORIN-4 RECEPTOR AB      2. Demyelinating disease of central nervous system ([facility] [address]  G37.9 341.9       3. Paraparesis of both lower limbs (HCC)  G82.20 344.1       4. Hemiparesis of right dominant side due to non-cerebrovascular etiology (HCC)  G81.91 342.91       5. Diabetic polyneuropathy associated with type 1 diabetes mellitus (HCC)  E10.42 250.61      357.2       6. Syncope and collapse  R55 780.2 REFERRAL TO NEUROLOGY      7. Anxiety  F41.9 300.00 LORazepam (ATIVAN) 0.5 mg tablet      8.  Visual disturbance  H53.9 368.9               I attest that 40 minutes was spent on today's visit reviewing medical records and diagnostic testing deemed pertinent to this patient's care, along with direct time spent at patient's visit including the history, physical assessment and plan, discussing diagnosis and management along with documentation. HPI  Historical Data  This is a patient previously known to the practice. He has a diagnosis of NMO. Spinal tap June 2017 did not reveal any oligoclonal banding. However he had an increased CSF/serum albumin index, elevated IgG synthesis elevated IgG quantitative and elevated albumin.     AquaPorin-4  AquaPorin-4 was completed on March 2, 2020 through [facility] which is a threshold not a cellular count and was deemed negative as level was <1.5    Interim data:    NMO: Working diagnosis with a history of transverse myelitis C1 region  Has never been on any drug modifying therapy  Aqauporin 4 NEGATIVE to date      Baseline symptoms  Ambulatory difficulties   Related to balance and weakness and muscle spasticity   Continues to ambulate independently   No reported falls    Temperature sensitivity   Symptoms worse with elevated temperatures environmentally    Visual disturbance   Acuity, sensitivity, blurry vision   Waxes and wanes throughout the day   Has an appointment with Dr. Jami Herrera ophthalmology as set up by PCP due to poorly controlled blood sugar   OV 9/2/2022 has had bilateral cataract surgery completed and feels as though his vision is improved    Cognition   Slow processing, difficulty with recall, difficulty with word finding    Fatigue   New complaint as of 7/28/2021 patient states this is not a new problem but more recently it has become almost unbearable he is having to nap more during the day he does not describe any symptoms that would be consistent with his sleep disorder at night  OV 9/2/2022: Patient never started new rizzo no real complaints at today's office visit    Syncope and collapse  New complaint office visit 9/2/2022  Patient reports having several events since last seen  Starts with some dizziness and lightheadedness and then the next thing he knows he is waking up on the floor  No unusual movements have been reported to him when he has been with people where this is occurred there has been no tongue biting no loss of bowel or bladder control  No reports of postictal state    Other:  Patient drives rarely    Pertinent diagnostic data    Results from [facility] encounter on 12/18/20    MRI BRAIN W WO CONT    Impression  IMPRESSION:  1. Stable minimal tiny T2/FLAIR white matter hyperintensities. No evidence of  abnormal enhancement. MRI CERV SPINE W WO CONT    Impression  IMPRESSION:    1. Unchanged area of chronic myelomalacia in the cord at C1 likely related to  the patient's history of demyelinating disease. No new lesions. No abnormal  enhancement to suggest active disease. 2. No significant spinal stenosis or neural foraminal narrowing. MRI [facility] SPINE W WO CONT    Impression  IMPRESSION:    1. No evidence of demyelinating disease in the visualized cord. 2. Multinodular goiter. No visits with results within 1 Month(s) from this visit. Latest known visit with results is:   Office Visit on 12/29/2021   Component Date Value Ref Range Status    Creatinine, urine 12/29/2021 135.1  Not Estab. mg/dL Final    Microalbumin, urine 12/29/2021 365.1  Not Estab. ug/mL Final    Microalb/Creat ratio (ug/mg creat.) 12/29/2021 270 (A) 0 - 29 mg/g creat Final         No Known Allergies    Current Outpatient Medications   Medication Sig    LORazepam (ATIVAN) 0.5 mg tablet Take 1 tablet 30 to 60 minutes prior to MRI may repeat x1 if needed    pregabalin (LYRICA) 150 mg capsule TAKE 1 CAPSULE BY MOUTH TWICE DAILY . DO NOT EXCEED 2 PER 24 HOURS    losartan (COZAAR) 100 mg tablet Take 1 tablet by mouth once daily    amLODIPine (NORVASC) 5 mg tablet Take 1 Tablet by mouth daily. triamcinolone acetonide (KENALOG) 0.1 % topical cream APPLY  CREAM EXTERNALLY TO AFFECTED AREA TWICE DAILY AS NEEDED FOR SKIN IRRITATION. USE THIN LAYER ON NECK RASH AND EXTERNAL EAR omeprazole (PRILOSEC) 40 mg capsule Take 1 Capsule by mouth daily. hydrOXYzine pamoate (VISTARIL) 25 mg capsule TAKE 1 CAPSULE BY MOUTH 4 TIMES DAILY AS NEEDED FOR ITCHING    ProAir HFA 90 mcg/actuation inhaler INHALE 2 PUFFS BY MOUTH EVERY 4 HOURS AS NEEDED FOR WHEEZING    pravastatin (PRAVACHOL) 20 mg tablet Take 1 tablet by mouth nightly    DULoxetine (Cymbalta) 30 mg capsule Take 1 Capsule by mouth daily. Insulin Needles, Disposable, (BD Ultra-Fine Mini Pen Needle) 31 gauge x 3/16\" ndle USE WITH INSULIN    Insulin Needles, Disposable, (BD Ultra-Fine Short Pen Needle) 31 gauge x 5/16\" ndle USE WITH INSULIN    insulin lispro (HumaLOG KwikPen Insulin) 100 unit/mL kwikpen 20 units twice daily with meals    omega-3 acid ethyl esters (LOVAZA) 1 gram capsule Take 2 g by mouth two (2) times a day. insulin detemir U-100 (LEVEMIR FLEXTOUCH) 100 unit/mL (3 mL) inpn Inject 50 units am and 50 units HS  Indications: type 1 diabetes mellitus (Patient taking differently: Inject 45 units am and 45 units HS  Indications: type 1 diabetes mellitus)    diphenoxylate-atropine (LOMOTIL) 2.5-0.025 mg per tablet Take 1 Tab by mouth four (4) times daily. Max Daily Amount: 4 Tabs. multivitamin, tx-iron-ca-min (THERA-M W/ IRON) 9 mg iron-400 mcg tab tablet Take 1 Tab by mouth daily. cyanocobalamin 1,000 mcg tablet Take 1,000 mcg by mouth daily. selenium sulfide (SELSUN BLUE) 1 % shampoo Apply to head, mustache area and left ear at least several times daily. No current facility-administered medications for this visit.        Past medical history/surgical history, family history, and social history have been reviewed for today's visit      ROS    A ten system review of constitutional, cardiovascular, respiratory, musculoskeletal, endocrine, skin, SHEENT, genitourinary, psychiatric and neurologic systems was obtained and is unremarkable except as mentioned under HPI          EXAMINATION:     Visit Vitals  BP (!) 140/80 (BP 1 Location: Left upper arm, BP Patient Position: Sitting, BP Cuff Size: Large adult)   Pulse 90   Temp 97.6 °F (36.4 °C) (Temporal)   Resp 16   Ht 5' 10\" (1.778 m)   Wt 229 lb 11.2 oz (104.2 kg)   SpO2 99%   BMI 32.96 kg/m²         General appearance: Patient is well-developed and well-nourished in no apparent distress and well groomed. Psych/mental health:  Affect: Appropriate    PHQ  3 most recent PHQ Screens 9/2/2022   PHQ Not Done -   Little interest or pleasure in doing things Several days   Feeling down, depressed, irritable, or hopeless Several days   Total Score PHQ 2 2   Trouble falling or staying asleep, or sleeping too much -   Feeling tired or having little energy -   Poor appetite, weight loss, or overeating -   Feeling bad about yourself - or that you are a failure or have let yourself or your family down -   Trouble concentrating on things such as school, work, reading, or watching TV -   Moving or speaking so slowly that other people could have noticed; or the opposite being so fidgety that others notice -   Thoughts of being better off dead, or hurting yourself in some way -   PHQ 9 Score -   How difficult have these problems made it for you to do your work, take care of your home and get along with others -       HEENT:   Normocephalic  With evidence of trauma: No  Full range of motion head neck: Yes  Tenderness to palpation of the head neck region: No      Cardiovascular:     Extremities warm to touch: Yes  Extremity swelling: No  Discoloration: No  Evidence of PVD: No    Respiratory:   Dyspnea on exertion: No   Abnormal effort on casual observation: No   Use of portable oxygen: No   Evidence of cyanosis: No     Musculoskeletal:   Evidence of significant bone deformities: No   Spinal curvature: No     Integumentary:    Obvious bruising: No   Lacerations or discoloration on casual observation: No       Neurological Examination:   Mental Status:        MMSE  No flowsheet data found. Formal testing was not completed    Patient has some difficulty processing fast-moving information and does demonstrate some mild retention issues but can easily follow conversation and respond appropriately and can make concerns and needs known without difficulty  Does better with notes or other assistive devices  He is alert oriented and appropriate  No word finding difficulties  Sentence structure is appropriate      Cranial Nerves:    EOMs intact gaze is conjugate  No nystagmus is appreciated  Facial motor intact bilaterally  Hearing intact to conversation  Voice with normal projection, no evidence of secretion pooling  Shoulder shrug intact bilaterally  No tongue deviation appreciated     Motor:   Normal bulk  Increased tone lower extremities bilaterally; also right upper extremity  No tremor appreciated on today's exam  No abnormal movements appreciated on today's exam  Moves extremities spontaneously and with purpose  5/5 x 4; clumsy right hand and decreased finger tapping bilaterally right side greater than left      Sensation: Intact to light touch    Coordination/Cerebellar:   Grossly intact    Gait: Ambulates independently but with significant spastic ataxic gait; unsteady especially with turns    Reflexes: Brisk but symmetrical    Fall risk assessment  No flowsheet data found. Follow-up and Dispositions    Return in about 1 year (around 9/2/2023) for In office appointment.              Zara Rea MS, ANP-BC, [facility]

## 2022-09-02 NOTE — ASSESSMENT & PLAN NOTE
Patient reports much improved since cataracts removed bilaterally but still with some blurred vision

## 2022-09-02 NOTE — PATIENT INSTRUCTIONS
As per discussion    Overall you continue to do well  You are overdue for routine scanning so MRIs have been ordered  Central scheduling should be calling you to set up the test that have been ordered. If you do not hear from them you can reach out to them at [phone] to get that completed. I have also sent a prescription for Ativan to take prior to your MRI scans and that was sent to your local pharmacy [facility] on [facility] [address] [facility]    I have ordered some blood work as well    I will go over the results of your studies via [address] and anything additional that we might need to do based on those results      Office Policies      Appointments  Please make sure that you arrive for your next appointment at least 15 minutes prior to your appointment time. If for some reason you are going to be late please notify the office to determine if you need to be rescheduled or we can adjust your appointment time      Phone calls/patient messages:  Please allow up to 24 hours for someone in the office to contact you about your call or message. Be mindful your provider may be out of the office or your message may require further review. We encourage you to use [employer] for your messages as this is a faster, more efficient way to communicate with our office    Medication Refills:  Prescription medications require up to 48 business hours to process. We encourage you to use [employer] for your refills. For controlled medications: Please allow up to 72 business hours to process. Certain medications may require you to  a written prescription at our office. NO narcotic/controlled medications will be prescribed after 4pm Monday through Friday or on weekends    Form/Paperwork Completion:  We ask that you allow 7-14 business days. You may also download your forms to [employer] to have your doctor print off.

## 2022-09-07 LAB — AQP4 H2O CHANNEL IGG SERPL IA-ACNC: <1.5 U/ML (ref 0–3)

## 2022-09-16 DIAGNOSIS — F41.9 ANXIETY: ICD-10-CM

## 2022-09-16 DIAGNOSIS — E10.42 DIABETIC POLYNEUROPATHY ASSOCIATED WITH TYPE 1 DIABETES MELLITUS (HCC): ICD-10-CM

## 2022-09-16 DIAGNOSIS — E10.65 HYPERGLYCEMIA DUE TO TYPE 1 DIABETES MELLITUS (HCC): ICD-10-CM

## 2022-09-16 RX ORDER — LORAZEPAM 0.5 MG/1
TABLET ORAL
Qty: 2 TABLET | Refills: 1 | Status: SHIPPED | OUTPATIENT
Start: 2022-09-16

## 2022-09-16 RX ORDER — HYDROXYZINE PAMOATE 25 MG/1
CAPSULE ORAL
Qty: 60 CAPSULE | Refills: 0 | Status: SHIPPED | OUTPATIENT
Start: 2022-09-16 | End: 2022-10-23

## 2022-09-16 RX ORDER — PREGABALIN 150 MG/1
CAPSULE ORAL
Qty: 60 CAPSULE | Refills: 2 | Status: SHIPPED | OUTPATIENT
Start: 2022-09-16

## 2022-09-16 NOTE — TELEPHONE ENCOUNTER
PCP: Charly Santos NP     Last appt: 6/30/2022     Future Appointments   Date Time Provider Neil Brown   11/1/2022  8:30 AM SHARA Lucas AMB   9/5/2023  1:00 PM MARIAA Ruiz          Requested Prescriptions     Pending Prescriptions Disp Refills    pregabalin (LYRICA) 150 mg capsule 60 Capsule 2     Sig: TAKE 1 CAPSULE BY MOUTH TWICE DAILY .  DO NOT EXCEED 2 PER 24 HOURS

## 2022-09-17 RX ORDER — INSULIN LISPRO 100 [IU]/ML
INJECTION, SOLUTION INTRAVENOUS; SUBCUTANEOUS
Qty: 45 ML | Refills: 3 | OUTPATIENT
Start: 2022-09-17

## 2022-09-19 RX ORDER — INSULIN LISPRO 100 [IU]/ML
INJECTION, SOLUTION INTRAVENOUS; SUBCUTANEOUS
Qty: 45 ML | Refills: 3 | Status: SHIPPED | OUTPATIENT
Start: 2022-09-19 | End: 2022-11-01 | Stop reason: SDUPTHER

## 2022-09-27 RX ORDER — PRAVASTATIN SODIUM 20 MG/1
20 TABLET ORAL
Qty: 90 TABLET | Refills: 0 | Status: SHIPPED | OUTPATIENT
Start: 2022-09-27

## 2022-09-27 NOTE — TELEPHONE ENCOUNTER
PCP: Nolvia Reeder PA-C     Last appt: 6/30/2022     Future Appointments   Date Time Provider Neil Brown   11/1/2022  8:30 AM SHARA Carson   9/5/2023  1:00 PM MARIAA Reynolds AMB          Requested Prescriptions     Pending Prescriptions Disp Refills    pravastatin (PRAVACHOL) 20 mg tablet 90 Tablet 0     Sig: Take 1 Tablet by mouth nightly.

## 2022-10-02 NOTE — PROGRESS NOTES
Patient seen for a NV for PAP insulin pickup. Per the note from Dr. Ebony Iyer on his PAP log sheet, all of his insulin may be released to him. The last order received for him was in May, 2017 and several vials at a time have been given out since. Today he picked up 2 vials of Humalog and 11 vials of Levemir. Per chart review, he saw his endocrinologist, Dr. Negar Sutton, on  08-07-17. His prescription stickers for the Humalog and Levemir were updated per Dr. Lon Gibbs note: Humalog 15 units with each of his 2 daily meals (BID) ; Levemir 30 units each AM and 26 units each PM. The patient expressed understanding and was given the phone number for Ginny Holguin, [facility]. He understands to call her to request insulin refills when he has one month of insulin left.  Florence Lombardi RN room air

## 2022-10-23 RX ORDER — HYDROXYZINE PAMOATE 25 MG/1
CAPSULE ORAL
Qty: 60 CAPSULE | Refills: 0 | Status: SHIPPED | OUTPATIENT
Start: 2022-10-23

## 2022-10-24 DIAGNOSIS — I10 ESSENTIAL HYPERTENSION: ICD-10-CM

## 2022-10-24 RX ORDER — LOSARTAN POTASSIUM 100 MG/1
100 TABLET ORAL DAILY
Qty: 90 TABLET | Refills: 0 | Status: SHIPPED | OUTPATIENT
Start: 2022-10-24 | End: 2022-11-24 | Stop reason: SDUPTHER

## 2022-10-24 RX ORDER — PEN NEEDLE, DIABETIC 30 GX 1/3"
NEEDLE, DISPOSABLE MISCELLANEOUS
Qty: 100 PEN NEEDLE | Refills: 4 | Status: SHIPPED | OUTPATIENT
Start: 2022-10-24 | End: 2022-11-01 | Stop reason: RX

## 2022-10-24 RX ORDER — TRIAMCINOLONE ACETONIDE 1 MG/G
CREAM TOPICAL
Qty: 15 G | Refills: 0 | Status: SHIPPED | OUTPATIENT
Start: 2022-10-24 | End: 2022-11-24 | Stop reason: SDUPTHER

## 2022-10-24 NOTE — TELEPHONE ENCOUNTER
PCP: Beni Dorsey PA-C     Last appt: 6/30/2022     Future Appointments   Date Time Provider Neil Brown   11/1/2022  8:30 AM Beni Dorsey PA-C [facility] BS AMB   11/8/2022 10:00 AM [facility]   11/8/2022 11:00 AM [facility] OPEN [facility]   11/8/2022 12:00 PM [facility] OPEN MRI [facility]   1/6/2023  8:00 AM Haim Blank MD [facility] BS AMB   9/5/2023  1:00 PM Edgardo Al NP NEU BS AMB          Requested Prescriptions     Pending Prescriptions Disp Refills    triamcinolone acetonide (KENALOG) 0.1 % topical cream 15 g 0     Sig: APPLY  CREAM EXTERNALLY TO AFFECTED AREA TWICE DAILY AS NEEDED FOR SKIN IRRITATION. USE THIN LAYER ON NECK RASH AND EXTERNAL EAR    losartan (COZAAR) 100 mg tablet 90 Tablet 0     Sig: Take 1 Tablet by mouth daily.     pen needle, diabetic (NovoTwist) 32 gauge x 1/5\" ndle 100 Pen Needle 4     Sig: USE WITH INSULIN

## 2022-10-27 ENCOUNTER — TELEPHONE (OUTPATIENT)
Dept: INTERNAL MEDICINE CLINIC | Age: 52
End: 2022-10-27

## 2022-10-27 DIAGNOSIS — E10.42 DIABETIC POLYNEUROPATHY ASSOCIATED WITH TYPE 1 DIABETES MELLITUS (HCC): Primary | ICD-10-CM

## 2022-10-27 NOTE — TELEPHONE ENCOUNTER
[facility] called and stated that [facility] is no longer available. They just need a generic prescription for pen needles.

## 2022-10-31 ENCOUNTER — TELEPHONE (OUTPATIENT)
Dept: INTERNAL MEDICINE CLINIC | Age: 52
End: 2022-10-31

## 2022-10-31 NOTE — TELEPHONE ENCOUNTER
Notes: This is unavailable, could we get a prescription for 32G5MM OEN needles?     DRUG: NOVOTWIST [id number] MIS

## 2022-10-31 NOTE — TELEPHONE ENCOUNTER
After a verbal from oTnya Whyte, I call the pharmacy and change the pen needles to the preferred brand. They stated understanding and had no further questions.

## 2022-11-01 ENCOUNTER — OFFICE VISIT (OUTPATIENT)
Dept: INTERNAL MEDICINE CLINIC | Age: 52
End: 2022-11-01
Payer: MEDICARE

## 2022-11-01 VITALS
HEART RATE: 100 BPM | OXYGEN SATURATION: 95 % | SYSTOLIC BLOOD PRESSURE: 150 MMHG | WEIGHT: 238.4 LBS | BODY MASS INDEX: 34.13 KG/M2 | HEIGHT: 70 IN | DIASTOLIC BLOOD PRESSURE: 75 MMHG | TEMPERATURE: 98.1 F | RESPIRATION RATE: 16 BRPM

## 2022-11-01 DIAGNOSIS — E10.65 HYPERGLYCEMIA DUE TO TYPE 1 DIABETES MELLITUS (HCC): ICD-10-CM

## 2022-11-01 DIAGNOSIS — I10 ESSENTIAL HYPERTENSION: Primary | ICD-10-CM

## 2022-11-01 DIAGNOSIS — F33.1 MODERATE RECURRENT MAJOR DEPRESSION (HCC): ICD-10-CM

## 2022-11-01 DIAGNOSIS — E78.00 HYPERCHOLESTEROLEMIA: ICD-10-CM

## 2022-11-01 DIAGNOSIS — G81.10 SPASTIC HEMIPARESIS AFFECTING DOMINANT SIDE (HCC): ICD-10-CM

## 2022-11-01 DIAGNOSIS — E10.3593 PROLIFERATIVE DIABETIC RETINOPATHY OF BOTH EYES ASSOCIATED WITH TYPE 1 DIABETES MELLITUS, UNSPECIFIED PROLIFERATIVE RETINOPATHY TYPE (HCC): ICD-10-CM

## 2022-11-01 DIAGNOSIS — G37.9 DEMYELINATING DISEASE OF CENTRAL NERVOUS SYSTEM (HCC): ICD-10-CM

## 2022-11-01 DIAGNOSIS — E10.42 DIABETIC POLYNEUROPATHY ASSOCIATED WITH TYPE 1 DIABETES MELLITUS (HCC): ICD-10-CM

## 2022-11-01 DIAGNOSIS — Z11.59 ENCOUNTER FOR HEPATITIS C SCREENING TEST FOR LOW RISK PATIENT: ICD-10-CM

## 2022-11-01 DIAGNOSIS — Z23 NEEDS FLU SHOT: ICD-10-CM

## 2022-11-01 DIAGNOSIS — F39 MOOD DISORDER (HCC): ICD-10-CM

## 2022-11-01 PROCEDURE — G0008 ADMIN INFLUENZA VIRUS VAC: HCPCS | Performed by: PHYSICIAN ASSISTANT

## 2022-11-01 PROCEDURE — 3046F HEMOGLOBIN A1C LEVEL >9.0%: CPT | Performed by: PHYSICIAN ASSISTANT

## 2022-11-01 PROCEDURE — 2022F DILAT RTA XM EVC RTNOPTHY: CPT | Performed by: PHYSICIAN ASSISTANT

## 2022-11-01 PROCEDURE — 90686 IIV4 VACC NO PRSV 0.5 ML IM: CPT | Performed by: PHYSICIAN ASSISTANT

## 2022-11-01 PROCEDURE — 3074F SYST BP LT 130 MM HG: CPT | Performed by: PHYSICIAN ASSISTANT

## 2022-11-01 PROCEDURE — 99215 OFFICE O/P EST HI 40 MIN: CPT | Performed by: PHYSICIAN ASSISTANT

## 2022-11-01 PROCEDURE — 3017F COLORECTAL CA SCREEN DOC REV: CPT | Performed by: PHYSICIAN ASSISTANT

## 2022-11-01 PROCEDURE — G8754 DIAS BP LESS 90: HCPCS | Performed by: PHYSICIAN ASSISTANT

## 2022-11-01 PROCEDURE — G8427 DOCREV CUR MEDS BY ELIG CLIN: HCPCS | Performed by: PHYSICIAN ASSISTANT

## 2022-11-01 PROCEDURE — 3078F DIAST BP <80 MM HG: CPT | Performed by: PHYSICIAN ASSISTANT

## 2022-11-01 PROCEDURE — G8417 CALC BMI ABV UP PARAM F/U: HCPCS | Performed by: PHYSICIAN ASSISTANT

## 2022-11-01 PROCEDURE — G8753 SYS BP > OR = 140: HCPCS | Performed by: PHYSICIAN ASSISTANT

## 2022-11-01 PROCEDURE — G9717 DOC PT DX DEP/BP F/U NT REQ: HCPCS | Performed by: PHYSICIAN ASSISTANT

## 2022-11-01 RX ORDER — AMLODIPINE BESYLATE 2.5 MG/1
TABLET ORAL
Qty: 90 TABLET | Refills: 3 | Status: SHIPPED | OUTPATIENT
Start: 2022-11-01 | End: 2022-11-24 | Stop reason: SDUPTHER

## 2022-11-01 RX ORDER — INSULIN LISPRO 100 [IU]/ML
INJECTION, SOLUTION INTRAVENOUS; SUBCUTANEOUS
Qty: 45 ML | Refills: 3 | Status: SHIPPED | OUTPATIENT
Start: 2022-11-01 | End: 2022-11-24 | Stop reason: SDUPTHER

## 2022-11-01 NOTE — LETTER
12/7/2022 7:48 AM    Mr. Elodia Walker [facility] [id number]    Results for orders placed or performed in visit on 90/23/90   METABOLIC PANEL, COMPREHENSIVE   Result Value Ref Range    Glucose 202 (H) 70 - 99 mg/dL    BUN 23 6 - 24 mg/dL    Creatinine 1.53 (H) 0.76 - 1.27 mg/dL    eGFR 54 (L) >59 mL/min/1.73    BUN/Creatinine ratio 15 9 - 20    Sodium 145 (H) 134 - 144 mmol/L    Potassium 4.8 3.5 - 5.2 mmol/L    Chloride 109 (H) 96 - 106 mmol/L    CO2 24 20 - 29 mmol/L    Calcium 9.3 8.7 - 10.2 mg/dL    Protein, total 7.3 6.0 - 8.5 g/dL    Albumin 4.2 3.8 - 4.9 g/dL    GLOBULIN, TOTAL 3.1 1.5 - 4.5 g/dL    A-G Ratio 1.4 1.2 - 2.2    Bilirubin, total 0.3 0.0 - 1.2 mg/dL    Alk. phosphatase 125 (H) 44 - 121 IU/L    AST (SGOT) 29 0 - 40 IU/L    ALT (SGPT) 27 0 - 44 IU/L   LIPID PANEL   Result Value Ref Range    Cholesterol, total 171 100 - 199 mg/dL    Triglyceride 117 0 - 149 mg/dL    HDL Cholesterol 46 >39 mg/dL    VLDL, calculated 21 5 - 40 mg/dL    LDL, calculated 104 (H) 0 - 99 mg/dL   MICROALBUMIN, UR, RAND W/ MICROALB/CREAT RATIO   Result Value Ref Range    Creatinine, urine random 154.6 Not Estab. mg/dL    Microalbumin, urine 762.2 Not Estab. ug/mL    Microalb/Creat ratio (ug/mg creat.) 493 (H) 0 - 29 mg/g creat   HEMOGLOBIN A1C WITH EAG   Result Value Ref Range    Hemoglobin A1c 8.5 (H) 4.8 - 5.6 %    Estimated average glucose 197 mg/dL   HEPATITIS C AB   Result Value Ref Range    Hep C Virus Ab 0.3 0.0 - 0.9 s/co ratio     RECOMMENDATIONS  Rasheam,     Labs look okay. A1C is in the mid 8's. Need to get you back into Dr. Leanna CHAHAL for better control of this. Your sugars fasting are in low 200's. Kidney function has taken a hit which may be from combo of higher blood pressures and blood sugars.   I would like you to avoid any NSAID products like ibuprofen, aleve, advil and drink plenty of water.     Follow up with me closely for blood pressure with out change in medicine and check blood pressures at home so we can see how your doing      Cholesterol levels are at goal.          Sincerely,      Chapo Diego PA-C

## 2022-11-01 NOTE — PROGRESS NOTES
Ag Tamez is a 46y.o. year old male seen in clinic today for   Chief Complaint   Patient presents with    New Patient     Room 4B //     Hypertension    Other     Mood     Cholesterol Problem    Immunization/Injection       he is here today to follow up for HTN, Depression, type 1 diabetes, Neuromyelitis optica    Hx of Neuromyelitis optica/form of MS. Follows with Neurology. No current treatment, does not qualify for any treatments at this time. Gets VERY fatigued at times, often cant understand why he gets so tired but keeps moving forward. Sometimes feels numb to situation, feels the cymbalta and lyrica make him more tired. Has some weakness in his hands when trying to  at times. DM1: No recent A1C. Has been struggling with getting enough levemir and humalog due to supply issues at pharmacy and sending in enough. Had run out of test strips. Normally BG runs between 200-300. No hypoglycemia. Has been trying to get back in with Dr. Anyi Perez for Endocrinology but has had scheduling difficulties. HTN: Taking Losartan 100 mg and amlodipine 5 mg. At home running [id number] systolic over 90. Mood stable. Feels he would benefit from a counselor. Has used before but had issues keeping one. Mood is stable, no SI/HI. he specifically denies any CP, SOB, HA. Dizziness, fevers, chills, N/V/D, urinary symptoms or other bowel changes. Current Outpatient Medications on File Prior to Visit   Medication Sig Dispense Refill    pen needle, diabetic 32 gauge x 1/5\" ndle USE WITH INSULIN 100 Pen Needle 4    triamcinolone acetonide (KENALOG) 0.1 % topical cream APPLY  CREAM EXTERNALLY TO AFFECTED AREA TWICE DAILY AS NEEDED FOR SKIN IRRITATION. USE THIN LAYER ON NECK RASH AND EXTERNAL EAR 15 g 0    losartan (COZAAR) 100 mg tablet Take 1 Tablet by mouth daily.  90 Tablet 0    hydrOXYzine pamoate (VISTARIL) 25 mg capsule TAKE 1 CAPSULE BY MOUTH 4 TIMES DAILY AS NEEDED FOR ITCHING 60 Capsule 0    pravastatin (PRAVACHOL) 20 mg tablet Take 1 Tablet by mouth nightly. 90 Tablet 0    pregabalin (LYRICA) 150 mg capsule TAKE 1 CAPSULE BY MOUTH TWICE DAILY . DO NOT EXCEED 2 PER 24 HOURS 60 Capsule 2    LORazepam (ATIVAN) 0.5 mg tablet Take 1 tablet 30 to 60 minutes prior to MRI may repeat x1 if needed 2 Tablet 1    amLODIPine (NORVASC) 5 mg tablet Take 1 tablet by mouth once daily 90 Tablet 0    omeprazole (PRILOSEC) 40 mg capsule Take 1 Capsule by mouth daily. 30 Capsule 5    ProAir HFA 90 mcg/actuation inhaler INHALE 2 PUFFS BY MOUTH EVERY 4 HOURS AS NEEDED FOR WHEEZING 9 g 0    DULoxetine (Cymbalta) 30 mg capsule Take 1 Capsule by mouth daily. 30 Capsule 1    Insulin Needles, Disposable, (BD Ultra-Fine Mini Pen Needle) 31 gauge x 3/16\" ndle USE WITH INSULIN 400 Pen Needle 0    Insulin Needles, Disposable, (BD Ultra-Fine Short Pen Needle) 31 gauge x 5/16\" ndle USE WITH INSULIN 400 Each 0    omega-3 acid ethyl esters (LOVAZA) 1 gram capsule Take 2 g by mouth two (2) times a day. diphenoxylate-atropine (LOMOTIL) 2.5-0.025 mg per tablet Take 1 Tab by mouth four (4) times daily. Max Daily Amount: 4 Tabs. 120 Tab 2    multivitamin, tx-iron-ca-min (THERA-M W/ IRON) 9 mg iron-400 mcg tab tablet Take 1 Tab by mouth daily. cyanocobalamin 1,000 mcg tablet Take 1,000 mcg by mouth daily. selenium sulfide (SELSUN BLUE) 1 % shampoo Apply to head, mustache area and left ear at least several times daily. 1 Bottle 11    [DISCONTINUED] pen needle, diabetic (NovoTwist) 32 gauge x 1/5\" ndle USE WITH INSULIN (Patient not taking: Reported on 11/1/2022) 100 Pen Needle 4    [DISCONTINUED] insulin detemir U-100 (LEVEMIR FLEXTOUCH) 100 unit/mL (3 mL) inpn Inject 45 units am and 45 units HS  Indications: type 1 diabetes mellitus 15 Pen 10    [DISCONTINUED] insulin lispro (HumaLOG KwikPen Insulin) 100 unit/mL kwikpen 20 units twice daily with meals 45 mL 3     No current facility-administered medications on file prior to visit.          No Known Allergies  Past Medical History:   Diagnosis Date    Anxiety and depression     per pt on 10/21/2021    Asthma     GERD (gastroesophageal reflux disease)     per pt on 10/21/2021    Hypertension     per pt on 10/21/2021    MS (multiple sclerosis) ([facility]     per pt on 10/21/2021    Type 2 diabetes mellitus with peripheral neuropathy [facility])       Past Surgical History:   Procedure Laterality Date    HX CATARACT REMOVAL Right 10/2021    HX CATARACT REMOVAL Left 12/03/2021    HX HEENT      ear surgery(both)    HX TONSILLECTOMY      per pt on 10/21/2021        Family History   Problem Relation Age of Onset    Diabetes Father     Cancer Father         prostate    Hypertension Mother     Other Mother         Crohn's disease    Heart Disease Mother     Hypertension Maternal Grandmother     Thyroid Disease Sister     No Known Problems Brother         Social History     Socioeconomic History    Marital status: SINGLE     Spouse name: Not on file    Number of children: Not on file    Years of education: Not on file    Highest education level: Not on file   Occupational History    Not on file   Tobacco Use    Smoking status: Never    Smokeless tobacco: Never   Vaping Use    Vaping Use: Never used   Substance and Sexual Activity    Alcohol use: No     Alcohol/week: 0.0 standard drinks    Drug use: No    Sexual activity: Not Currently   Other Topics Concern    Not on file   Social History Narrative    Not on file     Social Determinants of Health     Financial Resource Strain: Not on file   Food Insecurity: Not on file   Transportation Needs: Not on file   Physical Activity: Not on file   Stress: Not on file   Social Connections: Not on file   Intimate Partner Violence: Not on file   Housing Stability: Not on file           Visit Vitals  BP (!) 150/75 (BP 1 Location: Right arm, BP Patient Position: Sitting, BP Cuff Size: Adult)   Pulse 100   Temp 98.1 °F (36.7 °C) (Oral)   Resp 16   Ht 5' 10\" (1.778 m)   Wt 238 lb 6.4 oz (108.1 kg)   SpO2 95%   BMI 34.21 kg/m²       Review of Systems   Constitutional:  Positive for malaise/fatigue. Negative for chills, fever and weight loss. Respiratory:  Negative for cough, shortness of breath and wheezing. Cardiovascular:  Negative for chest pain, palpitations and leg swelling. Gastrointestinal:  Negative for abdominal pain, blood in stool, constipation, diarrhea, heartburn, melena, nausea and vomiting. Genitourinary:  Negative for dysuria and frequency. Musculoskeletal:  Negative for myalgias. Skin:  Negative for rash. Neurological:  Negative for dizziness, weakness and headaches. Endo/Heme/Allergies:  Does not bruise/bleed easily. Psychiatric/Behavioral:  Negative for depression. All other systems reviewed and are negative. Physical Exam  Vitals and nursing note reviewed. Constitutional:       Appearance: Normal appearance. He is obese. HENT:      Head: Normocephalic and atraumatic. Right Ear: Tympanic membrane, ear canal and external ear normal.      Left Ear: Tympanic membrane, ear canal and external ear normal.      Nose: Nose normal.      Mouth/Throat:      Mouth: Mucous membranes are moist.      Pharynx: Oropharynx is clear. No oropharyngeal exudate or posterior oropharyngeal erythema. Eyes:      Conjunctiva/sclera: Conjunctivae normal.      Pupils: Pupils are equal, round, and reactive to light. Neck:      Vascular: No carotid bruit. Cardiovascular:      Rate and Rhythm: Normal rate and regular rhythm. Pulses: Normal pulses. Heart sounds: Normal heart sounds. No murmur heard. Pulmonary:      Effort: Pulmonary effort is normal.      Breath sounds: Normal breath sounds. No stridor. No wheezing, rhonchi or rales. Abdominal:      General: Abdomen is flat. Bowel sounds are normal. There is no distension. Tenderness: There is no abdominal tenderness. There is no right CVA tenderness, left CVA tenderness or guarding.    Musculoskeletal: General: Normal range of motion. Cervical back: Normal range of motion and neck supple. No rigidity. Right lower leg: No edema. Left lower leg: No edema. Skin:     General: Skin is dry. Capillary Refill: Capillary refill takes less than 2 seconds. Neurological:      General: No focal deficit present. Mental Status: He is alert and oriented to person, place, and time. Mental status is at baseline. Sensory: Sensory deficit present. Psychiatric:         Mood and Affect: Mood normal.      Diabetic foot exam:     Left Foot:   Visual Exam: normal    Pulse DP: 2+ (normal)   Filament test: 4/6   Vibratory sensation: diminished      Right Foot:   Visual Exam: normal    Pulse DP: 2+ (normal)   Filament test: 5/6   Vibratory sensation: diminished    No results found for this or any previous visit (from the past 24 hour(s)). ASSESSMENT AND PLAN  Diagnoses and all orders for this visit:    1. Essential hypertension  -     amLODIPine (NORVASC) 2.5 mg tablet; Take one tab daily in addition to 5 mg  -     LIPID PANEL; Future  -     METABOLIC PANEL, COMPREHENSIVE; Future  Increase amlodipine to 7.5 mg daily. High at home and in office. 2. Diabetic polyneuropathy associated with type 1 diabetes mellitus (HCC)  -     MICROALBUMIN, UR, RAND W/ MICROALB/CREAT RATIO; Future  -     HEMOGLOBIN A1C WITH EAG; Future  -     REFERRAL TO ENDOCRINOLOGY  Continue Neurology follow up. Uses Lyrica 150 mg bid but makes him very tired  3. Proliferative diabetic retinopathy of both eyes associated with type 1 diabetes mellitus, unspecified proliferative retinopathy type ([address]  -      DIABETES FOOT EXAM  -     REFERRAL TO ENDOCRINOLOGY  Needs eye appt this year, encouraged to make new appt  4. Spastic hemiparesis affecting dominant side ([facility])  Mild weakness  5. Demyelinating disease of central nervous system [facility])  Following with Neurology  6. Moderate recurrent major depression (HCC)  Stable today. Encouraged patient to continue cymbalta and to seek out new therapist, one more in tune with chronic conditions/health  7. Hypercholesterolemia  Needs labs redrawn ASAP, continue pravastatin  8. Needs flu shot  -     INFLUENZA, FLUARIX, FLULAVAL, FLUZONE (AGE 6 MO+), AFLURIA(AGE 3Y+) IM, PF, 0.5 ML    9. Hyperglycemia due to type 1 diabetes mellitus (HCC)  -     insulin lispro (HumaLOG KwikPen Insulin) 100 unit/mL kwikpen; 20 units twice daily with meals  -      DIABETES FOOT EXAM  -     insulin detemir U-100 (LEVEMIR FLEXTOUCH) 100 unit/mL (3 mL) inpn; Inject 45 units am and 45 units HS  Indications: type 1 diabetes mellitus  Having issues getting consistently enough humalog and levemir, BG higher in evening after bigger meals. Encouraged him to follow up with Dr. Jude Frank or seek new endo that will work better in his schedule   10. Encounter for hepatitis C screening test for low risk patient  -     HEPATITIS C AB; Future    11. Mood disorder ([facility]  -     REFERRAL TO PSYCHOLOGY         I have discussed the diagnosis with the patient and the intended plan as seen in the above orders. Patient is in agreement. The patient has received an after-visit summary and questions were answered concerning future plans. I have discussed medication side effects and warnings with the patient as well.     Iliana Madera PA-C

## 2022-11-01 NOTE — PATIENT INSTRUCTIONS
Vaccine Information Statement    Influenza (Flu) Vaccine (Inactivated or Recombinant): What You Need to Know    Many vaccine information statements are available in Italian and other languages. See www.immunize.org/vis. Hojas de información sobre vacunas están disponibles en español y en muchos otros idiomas. Visite www.immunize.org/vis. 1. Why get vaccinated? Influenza vaccine can prevent influenza (flu). Flu is a contagious disease that spreads around the [facility] every year, usually between October and May. Anyone can get the flu, but it is more dangerous for some people. Infants and young children, people 72 years and older, pregnant people, and people with certain health conditions or a weakened immune system are at greatest risk of flu complications. Pneumonia, bronchitis, sinus infections, and ear infections are examples of flu-related complications. If you have a medical condition, such as heart disease, cancer, or diabetes, flu can make it worse. Flu can cause fever and chills, sore throat, muscle aches, fatigue, cough, headache, and runny or stuffy nose. Some people may have vomiting and diarrhea, though this is more common in children than adults. In an average year, thousands of people in the [facility] die from flu, and many more are hospitalized. Flu vaccine prevents millions of illnesses and flu-related visits to the doctor each year. 2. Influenza vaccines     CDC recommends everyone 6 months and older get vaccinated every flu season. Children 6 months through 6years of age may need 2 doses during a single flu season. Everyone else needs only 1 dose each flu season. It takes about 2 weeks for protection to develop after vaccination. There are many flu viruses, and they are always changing. Each year a new flu vaccine is made to protect against the influenza viruses believed to be likely to cause disease in the upcoming flu season.  Even when the vaccine doesnt exactly match these viruses, it may still provide some protection. Influenza vaccine does not cause flu. Influenza vaccine may be given at the same time as other vaccines. 3. Talk with your health care provider    Tell your vaccination provider if the person getting the vaccine:  Has had an allergic reaction after a previous dose of influenza vaccine, or has any severe, life-threatening allergies   Has ever had Guillain-Barré Syndrome (also called GBS)    In some cases, your health care provider may decide to postpone influenza vaccination until a future visit. Influenza vaccine can be administered at any time during pregnancy. People who are or will be pregnant during influenza season should receive inactivated influenza vaccine. People with minor illnesses, such as a cold, may be vaccinated. People who are moderately or severely ill should usually wait until they recover before getting influenza vaccine. Your health care provider can give you more information. 4. Risks of a vaccine reaction    Soreness, redness, and swelling where the shot is given, fever, muscle aches, and headache can happen after influenza vaccination. There may be a very small increased risk of Guillain-Barré Syndrome (GBS) after inactivated influenza vaccine (the flu shot). Sharon Ritchie children who get the flu shot along with pneumococcal vaccine (PCV13) and/or DTaP vaccine at the same time might be slightly more likely to have a seizure caused by fever. Tell your health care provider if a child who is getting flu vaccine has ever had a seizure. People sometimes faint after medical procedures, including vaccination. Tell your provider if you feel dizzy or have vision changes or ringing in the ears. As with any medicine, there is a very remote chance of a vaccine causing a severe allergic reaction, other serious injury, or death. 5. What if there is a serious problem?     An allergic reaction could occur after the vaccinated person leaves the clinic. If you see signs of a severe allergic reaction (hives, swelling of the face and throat, difficulty breathing, a fast heartbeat, dizziness, or weakness), call 9-1-1 and get the person to the nearest hospital.    For other signs that concern you, call your health care provider. Adverse reactions should be reported to the Vaccine Adverse Event Reporting System (VAERS). Your health care provider will usually file this report, or you can do it yourself. Visit the VAERS website at www.vaers. [facility].gov or call 5-548.557.1097. VAERS is only for reporting reactions, and VAERS staff members do not give medical advice. 6. The National Vaccine Injury Compensation Program    The [facility] Vaccine Injury Compensation Program (VICP) is a federal program that was created to compensate people who may have been injured by certain vaccines. Claims regarding alleged injury or death due to vaccination have a time limit for filing, which may be as short as two years. Visit the VICP website at www.[facility].gov/vaccinecompensation or call 1-967.658.8938 to learn about the program and about filing a claim. 7. How can I learn more? Ask your health care provider. Call your local or state health department. Visit the website of the Food and Drug Administration (FDA) for vaccine package inserts and additional information at www.fda.gov/vaccines-blood-biologics/vaccines. Contact the Centers for Disease Control and Prevention (CDC): Call 1-356.967.5736 (1-800-CDC-INFO) or  Visit CDCs influenza website at www.cdc.gov/flu. Vaccine Information Statement   Inactivated Influenza Vaccine   8/6/2021  [address]. Charlie Runner [id number]     Department of Health and Human Services  Centers for Disease Control and Prevention    Office Use Only

## 2022-11-01 NOTE — PROGRESS NOTES
Jenna Orozco  Identified pt with two pt identifiers(name and ). Chief Complaint   Patient presents with    New Patient     Room 4B //     Hypertension    Other     Mood     Cholesterol Problem       Reviewed record In preparation for visit and have obtained necessary documentation. 1. Have you been to the ER, urgent care clinic or hospitalized since your last visit? No     2. Have you seen or consulted any other health care providers outside of the [address] [place] [address] since your last visit? Include any pap smears or colon screening. No    Patient does not have an advance directive. Vitals reviewed with provider. Health Maintenance reviewed: After verbal order read back of Cameron Denise PA-C, patient received Flu Shot in left deltoid. [address]: [id number] Lot: [id number] Exp 23. Patient tolerated procedure without complaints and received VIS.     Health Maintenance Due   Topic    Hepatitis C Screening     Hepatitis B Vaccine (1 of 3 - Risk 3-dose series)    Colorectal Cancer Screening Combo     COVID-19 Vaccine (3 - Booster for Moderna series)    A1C test (Diabetic or Prediabetic)     Lipid Screen     Foot Exam Q1     Flu Vaccine (1)    Eye Exam Retinal or Dilated           Wt Readings from Last 3 Encounters:   22 238 lb 6.4 oz (108.1 kg)   22 229 lb 11.2 oz (104.2 kg)   22 231 lb 6.4 oz (105 kg)        Temp Readings from Last 3 Encounters:   22 97.6 °F (36.4 °C) (Temporal)   22 97.6 °F (36.4 °C) (Oral)   22 98.3 °F (36.8 °C) (Oral)        BP Readings from Last 3 Encounters:   22 (!) 140/80   22 (!) 130/95   22 (!) 150/100        Pulse Readings from Last 3 Encounters:   22 90   22 88   22 96        Vitals:    22 0836   Resp: 16   Weight: 238 lb 6.4 oz (108.1 kg)   Height: 5' 10\" (1.778 m)   PainSc:   6   PainLoc: Generalized          Learning Assessment:   :       Learning Assessment 2020 3/29/2018 11/27/2017 7/7/2017 6/8/2017 12/29/2016 12/14/2015   PRIMARY LEARNER Patient Patient Patient Patient Patient Patient Patient   HIGHEST LEVEL OF EDUCATION - PRIMARY LEARNER  - - - - - 4 YEARS OF COLLEGE 4 YEARS OF COLLEGE   BARRIERS PRIMARY LEARNER - - - - - NONE NONE   CO-LEARNER CAREGIVER - - - - - No No   PRIMARY LANGUAGE ENGLISH ENGLISH ENGLISH ENGLISH ENGLISH ENGLISH ENGLISH   LEARNER PREFERENCE PRIMARY VIDEOS DEMONSTRATION DEMONSTRATION DEMONSTRATION DEMONSTRATION VIDEOS DEMONSTRATION   ANSWERED BY self patient patient patient patient Patient Patient   RELATIONSHIP SELF SELF SELF SELF SELF SELF SELF        Depression Screening:   :       3 most recent PHQ Screens 9/2/2022   PHQ Not Done -   Little interest or pleasure in doing things Several days   Feeling down, depressed, irritable, or hopeless Several days   Total Score PHQ 2 2   Trouble falling or staying asleep, or sleeping too much -   Feeling tired or having little energy -   Poor appetite, weight loss, or overeating -   Feeling bad about yourself - or that you are a failure or have let yourself or your family down -   Trouble concentrating on things such as school, work, reading, or watching TV -   Moving or speaking so slowly that other people could have noticed; or the opposite being so fidgety that others notice -   Thoughts of being better off dead, or hurting yourself in some way -   PHQ 9 Score -   How difficult have these problems made it for you to do your work, take care of your home and get along with others -        Fall Risk Assessment:   :     No flowsheet data found. Abuse Screening:   :     No flowsheet data found.      ADL Screening:   :       ADL Assessment 12/29/2021   Feeding yourself No Help Needed   Getting from bed to chair No Help Needed   Getting dressed No Help Needed   Bathing or showering No Help Needed   Walk across the room (includes cane/walker) No Help Needed   Using the telphone No Help Needed   Taking your medications No Help Needed   Preparing meals No Help Needed   Managing money (expenses/bills) No Help Needed   Moderately strenuous housework (laundry) No Help Needed   Shopping for personal items (toiletries/medicines) No Help Needed   Shopping for groceries No Help Needed   Driving No Help Needed   Climbing a flight of stairs No Help Needed   Getting to places beyond walking distances No Help Needed

## 2022-11-24 DIAGNOSIS — E10.65 HYPERGLYCEMIA DUE TO TYPE 1 DIABETES MELLITUS (HCC): ICD-10-CM

## 2022-11-24 DIAGNOSIS — E10.42 DIABETIC POLYNEUROPATHY ASSOCIATED WITH TYPE 1 DIABETES MELLITUS (HCC): ICD-10-CM

## 2022-11-24 DIAGNOSIS — I10 ESSENTIAL HYPERTENSION: ICD-10-CM

## 2022-11-24 DIAGNOSIS — F41.9 ANXIETY: ICD-10-CM

## 2022-11-24 RX ORDER — LORAZEPAM 0.5 MG/1
TABLET ORAL
Qty: 2 TABLET | Refills: 1 | Status: CANCELLED | OUTPATIENT
Start: 2022-11-24

## 2022-11-25 DIAGNOSIS — F41.9 ANXIETY: ICD-10-CM

## 2022-11-25 RX ORDER — HYDROXYZINE PAMOATE 25 MG/1
CAPSULE ORAL
Qty: 60 CAPSULE | Refills: 0 | Status: SHIPPED | OUTPATIENT
Start: 2022-11-25

## 2022-11-25 RX ORDER — LORAZEPAM 0.5 MG/1
TABLET ORAL
Qty: 2 TABLET | Refills: 1 | Status: CANCELLED | OUTPATIENT
Start: 2022-11-25

## 2022-11-25 RX ORDER — AMLODIPINE BESYLATE 2.5 MG/1
TABLET ORAL
Qty: 90 TABLET | Refills: 3 | Status: SHIPPED | OUTPATIENT
Start: 2022-11-25

## 2022-11-25 RX ORDER — LOSARTAN POTASSIUM 100 MG/1
100 TABLET ORAL DAILY
Qty: 90 TABLET | Refills: 0 | Status: SHIPPED | OUTPATIENT
Start: 2022-11-25

## 2022-11-25 RX ORDER — PREGABALIN 150 MG/1
CAPSULE ORAL
Qty: 60 CAPSULE | Refills: 2 | Status: SHIPPED | OUTPATIENT
Start: 2022-11-25

## 2022-11-25 RX ORDER — INSULIN LISPRO 100 [IU]/ML
INJECTION, SOLUTION INTRAVENOUS; SUBCUTANEOUS
Qty: 45 ML | Refills: 3 | Status: SHIPPED | OUTPATIENT
Start: 2022-11-25

## 2022-11-25 RX ORDER — TRIAMCINOLONE ACETONIDE 1 MG/G
CREAM TOPICAL
Qty: 15 G | Refills: 0 | Status: SHIPPED | OUTPATIENT
Start: 2022-11-25

## 2022-11-25 RX ORDER — AMLODIPINE BESYLATE 5 MG/1
5 TABLET ORAL DAILY
Qty: 90 TABLET | Refills: 0 | Status: SHIPPED | OUTPATIENT
Start: 2022-11-25

## 2022-11-25 RX ORDER — PRAVASTATIN SODIUM 20 MG/1
20 TABLET ORAL
Qty: 90 TABLET | Refills: 0 | Status: SHIPPED | OUTPATIENT
Start: 2022-11-25

## 2022-11-25 NOTE — TELEPHONE ENCOUNTER
Patient called stating he needs his Lorazepam filled before 11/29 because he needs it for his MRI that is on 11/29. Confirmed pharmacy as [facility] in [place].  [phone]

## 2022-11-25 NOTE — TELEPHONE ENCOUNTER
PCP: Daniel Nava PA-C     Last appt: 11/1/2022     Future Appointments   Date Time Provider Neil Brown   11/29/2022  2:00 PM [facility] 2 [facility] REG   11/29/2022  2:45 PM [facility] MRI 2 [facility] REG   11/29/2022  3:30 PM [facility] MRI 2 [facility] REG   1/6/2023  8:00 AM Tamia Palomino  S [facility] BS AMB   9/5/2023  1:00 PM Patricia Jay, [facility] BS AMB          Requested Prescriptions     Pending Prescriptions Disp Refills    hydrOXYzine pamoate (VISTARIL) 25 mg capsule 60 Capsule 0     Sig: TAKE 1 CAPSULE BY MOUTH 4 TIMES DAILY AS NEEDED FOR ITCHING

## 2022-11-25 NOTE — TELEPHONE ENCOUNTER
PCP: Milena Kaur PA-C     Last appt: 2022     Future Appointments   Date Time Provider Neil Brown   2022  2:00 PM [id number] MRI 2 [facility] REG   2022  2:45 PM [facility] MRI 2 [facility] REG   2022  3:30 PM [facility] MRI 2 [facility] REG   2023  8:00 AM Shoshana Cervantes  S [facility] BS AMB   2023  1:00 PM Speedy Jay, [facility] BS AMB          Requested Prescriptions     Pending Prescriptions Disp Refills    pregabalin (LYRICA) 150 mg capsule 60 Capsule 2     Sig: TAKE 1 CAPSULE BY MOUTH TWICE DAILY . DO NOT EXCEED 2 PER 24 HOURS    amLODIPine (NORVASC) 5 mg tablet 90 Tablet 0     Sig: Take 1 Tablet by mouth daily. pravastatin (PRAVACHOL) 20 mg tablet 90 Tablet 0     Sig: Take 1 Tablet by mouth nightly. triamcinolone acetonide (KENALOG) 0.1 % topical cream 15 g 0     Sig: APPLY  CREAM EXTERNALLY TO AFFECTED AREA TWICE DAILY AS NEEDED FOR SKIN IRRITATION. USE THIN LAYER ON NECK RASH AND EXTERNAL EAR    losartan (COZAAR) 100 mg tablet 90 Tablet 0     Sig: Take 1 Tablet by mouth daily.     insulin lispro (HumaLOG KwikPen Insulin) 100 unit/mL kwikpen 45 mL 3     Si units twice daily with meals    amLODIPine (NORVASC) 2.5 mg tablet 90 Tablet 3     Sig: Take one tab daily in addition to 5 mg

## 2022-11-28 RX ORDER — LORAZEPAM 0.5 MG/1
TABLET ORAL
Qty: 2 TABLET | Refills: 1 | Status: SHIPPED | OUTPATIENT
Start: 2022-11-28

## 2022-11-29 ENCOUNTER — HOSPITAL ENCOUNTER (OUTPATIENT)
Dept: MRI IMAGING | Age: 52
Discharge: HOME | End: 2022-11-29
Payer: MEDICARE

## 2022-11-29 DIAGNOSIS — G36.0 NEUROMYELITIS OPTICA (HCC): ICD-10-CM

## 2022-11-29 PROCEDURE — 74011250636 HC RX REV CODE- 250/636

## 2022-11-29 PROCEDURE — 72156 MRI NECK SPINE W/O & W/DYE: CPT

## 2022-11-29 PROCEDURE — 70553 MRI BRAIN STEM W/O & W/DYE: CPT

## 2022-11-29 PROCEDURE — 72157 MRI CHEST SPINE W/O & W/DYE: CPT

## 2022-11-29 PROCEDURE — A9576 INJ PROHANCE MULTIPACK: HCPCS

## 2022-11-29 RX ADMIN — GADOTERIDOL 20 ML: 279.3 INJECTION, SOLUTION INTRAVENOUS at 16:09

## 2022-11-30 NOTE — PROGRESS NOTES
Results of the tests were reviewed in MyChart with the patient as follows:    Good news the results of your MRI scan of your brain does not show any new or changing lesions things are stable

## 2022-12-02 NOTE — PROGRESS NOTES
Results of the tests were reviewed in MyChart with the patient as follows:    Good news, there is no new activity on your cervical spine.   There is evidence of the old lesion but nothing new nothing changed :) [facility]

## 2022-12-02 NOTE — PROGRESS NOTES
Jenna,    Labs look okay. A1C is in the mid 8's. Need to get you back into Dr. Susanne CHAHAL for better control of this. Your sugars fasting are in low 200's. Kidney function has taken a hit which may be from combo of higher blood pressures and blood sugars. I would like you to avoid any NSAID products like ibuprofen, aleve, advil and drink plenty of water.     Follow up with me closely for blood pressure with out change in medicine and check blood pressures at home so we can see how your doing     Cholesterol levels are at goal.

## 2022-12-20 DIAGNOSIS — I10 ESSENTIAL HYPERTENSION: ICD-10-CM

## 2022-12-20 RX ORDER — TRIAMCINOLONE ACETONIDE 1 MG/G
CREAM TOPICAL
Qty: 15 G | Refills: 0 | Status: SHIPPED | OUTPATIENT
Start: 2022-12-20

## 2022-12-20 RX ORDER — LOSARTAN POTASSIUM 100 MG/1
TABLET ORAL
Qty: 90 TABLET | Refills: 3 | Status: SHIPPED | OUTPATIENT
Start: 2022-12-20

## 2022-12-20 RX ORDER — HYDROXYZINE PAMOATE 25 MG/1
CAPSULE ORAL
Qty: 60 CAPSULE | Refills: 0 | OUTPATIENT
Start: 2022-12-20

## 2022-12-20 RX ORDER — HYDROXYZINE PAMOATE 25 MG/1
CAPSULE ORAL
Qty: 60 CAPSULE | Refills: 0 | Status: SHIPPED | OUTPATIENT
Start: 2022-12-20

## 2022-12-23 RX ORDER — PRAVASTATIN SODIUM 20 MG/1
20 TABLET ORAL
Qty: 90 TABLET | Refills: 0 | Status: SHIPPED | OUTPATIENT
Start: 2022-12-23

## 2022-12-23 RX ORDER — ALBUTEROL SULFATE 90 UG/1
2 AEROSOL, METERED RESPIRATORY (INHALATION)
Qty: 9 G | Refills: 0 | Status: SHIPPED | OUTPATIENT
Start: 2022-12-23

## 2022-12-23 NOTE — TELEPHONE ENCOUNTER
PCP: Eric Hammonds PA-C     Last appt: 11/1/2022     Future Appointments   Date Time Provider eNil Brown   1/6/2023  8:00 AM Kalyn Cummings  S [facility] BS AMB   9/5/2023  1:00 PM Ki Jay Ace, ANP-C [facility] BS AMB          Requested Prescriptions     Pending Prescriptions Disp Refills    ProAir HFA 90 mcg/actuation inhaler 9 g 0     Sig: Take 2 Puffs by inhalation every four (4) hours as needed for Wheezing. pravastatin (PRAVACHOL) 20 mg tablet 90 Tablet 0     Sig: Take 1 Tablet by mouth nightly.

## 2023-01-19 ENCOUNTER — OFFICE VISIT (OUTPATIENT)
Dept: NEUROLOGY | Age: 53
End: 2023-01-19
Payer: MEDICARE

## 2023-01-19 DIAGNOSIS — R55 SYNCOPE AND COLLAPSE: Primary | ICD-10-CM

## 2023-01-19 PROCEDURE — 95924 ANS PARASYMP & SYMP W/TILT: CPT | Performed by: PSYCHIATRY & NEUROLOGY

## 2023-01-19 PROCEDURE — 95923 AUTONOMIC NRV SYST FUNJ TEST: CPT | Performed by: PSYCHIATRY & NEUROLOGY

## 2023-01-19 NOTE — PROCEDURES
[facility]onomic [facility]  [facility] [address] [facility], [address] [place], [address] [facility]  Phone: [phone]  FAX: [phone]     Clinical Autonomic Testing Report     Patient ID:  Maira Cotto  [id number]  46 y.o.  1970     REFERRED BY: Tip Watson NP  PCP: Bharath Roland PA-C    Date of Testin2023      Indication/History:     Since , patient noted  blacking out, increasing falls and numbness. (+) uncontrolled diabetes    Patient is coming for syncope/autonomic dysfunction evaluation. Medications taken 48 hrs before the test: Vit B12, MVI     Procedure: This Autonomic Nervous System (ANS) testing is performed by utilizing [address] [place] [address] [employer] Autonomic System, with established protocol. Multiple procedures performed: Heart rate response to deep breathing (HRDB), Valsalva ratio, Heart rate and BP response to head up tilt (HUT), and Quantitative sudomotor axon reflex testing (QSWEAT) . Result:  Heart response to deep  breathing (HRDB): 2 series of 6 cycles were performed and the mean of 6 consecutive cycles was obtained. Average HR difference was reduced (2.69; Normal > 7) and E:I ratio was also reduced (10.3; Normal > 1.09). Heart rate response to Valsalva maneuver:  Tdhw-ie-ntsb BP to Valsalva was measured. There is attenuation of the late phase 2 BP response. Heart response was decreased. ( VR = 1.19; Normal > 1.39 )  HUT (head-up tilt) : Rrci-zp-wcie BP and HR were measured, up to 15 minutes post tilt. No significant BP reduction or HR acceleration/deceleration. SUDOMOTOR: QSWEAT response showed reduced sweat production in proximal leg, distal leg, and foot, comparing patient to age group. Impression:   ABNORMAL    Reduced heart rate response to both deep breathing and valsalva maneuver suggestive of cardiovagal (parasympathetic) dysfunction. Late phase 2 attenuation during Valsalva maneuver suggestive of an adrenergic (sympathetic) dysfunction.  These findings can be seen in diabetic cardiac autonomic neuropathy. Reduced sweat production in proximal leg, distal leg, and foot, suggestive of an underlying length dependent small fiber neuropathy.          Croinna Bueno MD  Diplomate, American Board of Psychiatry and Neurology  Diplomate, Neuromuscular Medicine  Diplomate, American Board of Electrodiagnostic Medicine    Note: Raw Data will be scanned separately in Media

## 2023-01-20 DIAGNOSIS — E11.43 AUTONOMIC DYSFUNCTION WITH TYPE 2 DIABETES MELLITUS (HCC): Primary | ICD-10-CM

## 2023-01-20 RX ORDER — MIDODRINE HYDROCHLORIDE 2.5 MG/1
TABLET ORAL
Qty: 180 TABLET | Refills: 1 | Status: SHIPPED | OUTPATIENT
Start: 2023-01-20

## 2023-01-20 NOTE — PROGRESS NOTES
ANS testing supportive of diabetic dysautonomic function  Patient was having falls and syncope    Will start ProAmatine 2.5 mg 1 at breakfast 1 at lunch  Patient is not to lie flat for 1 hour after each dose  He can lie down but head of bed needs to be elevated at least 30 degrees

## 2023-01-23 ENCOUNTER — TELEPHONE (OUTPATIENT)
Dept: NEUROLOGY | Age: 53
End: 2023-01-23

## 2023-01-23 NOTE — TELEPHONE ENCOUNTER
LVM for patient that need return call and will send letter with results and recommendations from his ANS testing.

## 2023-03-03 ENCOUNTER — TELEPHONE (OUTPATIENT)
Dept: INTERNAL MEDICINE CLINIC | Age: 53
End: 2023-03-03

## 2023-03-03 NOTE — TELEPHONE ENCOUNTER
I called Keshia and she stated that the patient needs a cane due to being wobbly in the mornings when getting out of bed. I informed her that I will check with Cam but we have not seen him in a couple of months so we may have to wait until the appointment. She stated understanding and will contact the patient. Keshia will have him call for an appointment. She is not able to see who to send the order to, so she will have him bring it to the appointment.      If the order is approved prior to the appointment:   Grant Torres

## 2023-03-03 NOTE — TELEPHONE ENCOUNTER
Keshia is calling from [employer] and pt needs a cane. Pt needs a script for that. She is unsure who his medicare provider is.      Phone: [phone]

## 2023-05-16 RX ORDER — PREGABALIN 150 MG/1
CAPSULE ORAL
COMMUNITY
Start: 2023-03-17 | End: 2023-06-09 | Stop reason: DRUGHIGH

## 2023-05-16 RX ORDER — HYDROXYZINE PAMOATE 25 MG/1
CAPSULE ORAL
COMMUNITY
Start: 2023-02-21 | End: 2023-05-19

## 2023-05-16 RX ORDER — PRAVASTATIN SODIUM 20 MG
1 TABLET ORAL NIGHTLY
COMMUNITY
Start: 2022-12-23 | End: 2023-05-19

## 2023-05-16 RX ORDER — DIPHENOXYLATE HYDROCHLORIDE AND ATROPINE SULFATE 2.5; .025 MG/1; MG/1
1 TABLET ORAL 4 TIMES DAILY
COMMUNITY
Start: 2021-01-18

## 2023-05-16 RX ORDER — INSULIN LISPRO 100 [IU]/ML
INJECTION, SOLUTION INTRAVENOUS; SUBCUTANEOUS
COMMUNITY
Start: 2022-11-25

## 2023-05-16 RX ORDER — ALBUTEROL SULFATE 90 UG/1
2 AEROSOL, METERED RESPIRATORY (INHALATION) EVERY 4 HOURS PRN
COMMUNITY
Start: 2022-12-23 | End: 2023-05-19

## 2023-05-16 RX ORDER — AMLODIPINE BESYLATE 5 MG/1
5 TABLET ORAL DAILY
COMMUNITY
Start: 2022-11-25 | End: 2023-06-09 | Stop reason: DRUGHIGH

## 2023-05-16 RX ORDER — MIDODRINE HYDROCHLORIDE 2.5 MG/1
TABLET ORAL
COMMUNITY
Start: 2023-01-20 | End: 2023-05-23

## 2023-05-16 RX ORDER — AMLODIPINE BESYLATE 2.5 MG/1
TABLET ORAL
COMMUNITY
Start: 2022-11-25 | End: 2023-06-09 | Stop reason: DRUGHIGH

## 2023-05-16 RX ORDER — TRIAMCINOLONE ACETONIDE 1 MG/G
CREAM TOPICAL
COMMUNITY
Start: 2022-12-20

## 2023-05-16 RX ORDER — LOSARTAN POTASSIUM 100 MG/1
1 TABLET ORAL DAILY
COMMUNITY
Start: 2022-12-20

## 2023-05-16 RX ORDER — DULOXETIN HYDROCHLORIDE 30 MG/1
30 CAPSULE, DELAYED RELEASE ORAL DAILY
COMMUNITY
Start: 2022-03-24

## 2023-05-16 RX ORDER — LORAZEPAM 0.5 MG/1
TABLET ORAL
COMMUNITY
Start: 2022-11-28

## 2023-05-16 RX ORDER — OMEPRAZOLE 40 MG/1
40 CAPSULE, DELAYED RELEASE ORAL DAILY
COMMUNITY
Start: 2022-06-30

## 2023-05-16 RX ORDER — CHLORAL HYDRATE 500 MG
2 CAPSULE ORAL 2 TIMES DAILY
COMMUNITY
End: 2023-06-09 | Stop reason: ALTCHOICE

## 2023-05-19 RX ORDER — PRAVASTATIN SODIUM 20 MG
TABLET ORAL NIGHTLY
Qty: 90 TABLET | Refills: 0 | Status: SHIPPED | OUTPATIENT
Start: 2023-05-19

## 2023-05-19 RX ORDER — HYDROXYZINE PAMOATE 25 MG/1
CAPSULE ORAL
Qty: 60 CAPSULE | Refills: 0 | Status: SHIPPED | OUTPATIENT
Start: 2023-05-19

## 2023-05-19 RX ORDER — PRAVASTATIN SODIUM 20 MG
TABLET ORAL NIGHTLY
Qty: 90 TABLET | Refills: 1 | Status: SHIPPED | OUTPATIENT
Start: 2023-05-19 | End: 2023-06-09 | Stop reason: SDUPTHER

## 2023-05-19 RX ORDER — ALBUTEROL SULFATE 90 UG/1
AEROSOL, METERED RESPIRATORY (INHALATION)
Qty: 9 G | Refills: 0 | Status: SHIPPED | OUTPATIENT
Start: 2023-05-19

## 2023-05-19 NOTE — TELEPHONE ENCOUNTER
PCP: Ana Maria Boggs PA-C     Last appt:  11/1/2022      Future Appointments   Date Time Provider Rabia Aguilar   6/9/2023 10:00 AM CALLUM Patel   9/5/2023  1:00 PM JORGE Delacruz BS [facility]          Requested Prescriptions     Pending Prescriptions Disp Refills    pravastatin (PRAVACHOL) 20 MG tablet [Pharmacy Med Name: Pravastatin Sodium 20 MG Oral Tablet] 90 tablet 0     Sig: Take 1 tablet by mouth nightly    hydrOXYzine pamoate (VISTARIL) 25 MG capsule [Pharmacy Med Name: hydrOXYzine Pamoate 25 MG Oral Capsule] 60 capsule 0     Sig: TAKE 1 CAPSULE BY MOUTH 4 TIMES DAILY AS NEEDED FOR ITCHING    albuterol sulfate HFA (PROVENTIL;VENTOLIN;PROAIR) 108 (90 Base) MCG/ACT inhaler [Pharmacy Med Name: Albuterol Sulfate  (90 Base) MCG/ACT Inhalation Aerosol Solution] 9 g 0     Sig: INHALE 2 PUFFS BY MOUTH EVERY 4 HOURS AS NEEDED FOR WHEEZING

## 2023-05-23 RX ORDER — MIDODRINE HYDROCHLORIDE 2.5 MG/1
TABLET ORAL
Qty: 180 TABLET | Refills: 0 | Status: SHIPPED | OUTPATIENT
Start: 2023-05-23

## 2023-06-09 ENCOUNTER — OFFICE VISIT (OUTPATIENT)
Facility: CLINIC | Age: 53
End: 2023-06-09
Payer: MEDICARE

## 2023-06-09 VITALS
TEMPERATURE: 98.1 F | SYSTOLIC BLOOD PRESSURE: 148 MMHG | OXYGEN SATURATION: 98 % | HEART RATE: 99 BPM | WEIGHT: 230.4 LBS | HEIGHT: 70 IN | BODY MASS INDEX: 32.99 KG/M2 | RESPIRATION RATE: 16 BRPM | DIASTOLIC BLOOD PRESSURE: 68 MMHG

## 2023-06-09 DIAGNOSIS — E10.42 DIABETIC POLYNEUROPATHY ASSOCIATED WITH TYPE 1 DIABETES MELLITUS (HCC): Primary | ICD-10-CM

## 2023-06-09 DIAGNOSIS — F41.9 ANXIETY: ICD-10-CM

## 2023-06-09 DIAGNOSIS — G36.0 NEUROMYELITIS OPTICA (HCC): ICD-10-CM

## 2023-06-09 DIAGNOSIS — Z12.11 COLON CANCER SCREENING: ICD-10-CM

## 2023-06-09 DIAGNOSIS — F43.10 POSTTRAUMATIC STRESS DISORDER: ICD-10-CM

## 2023-06-09 DIAGNOSIS — F33.1 MAJOR DEPRESSIVE DISORDER, RECURRENT, MODERATE (HCC): ICD-10-CM

## 2023-06-09 DIAGNOSIS — F32.4 MAJOR DEPRESSIVE DISORDER WITH SINGLE EPISODE, IN PARTIAL REMISSION (HCC): ICD-10-CM

## 2023-06-09 DIAGNOSIS — E78.00 HYPERCHOLESTEROLEMIA: ICD-10-CM

## 2023-06-09 DIAGNOSIS — G81.10 SPASTIC HEMIPARESIS AFFECTING DOMINANT SIDE (HCC): ICD-10-CM

## 2023-06-09 DIAGNOSIS — G37.9 DEMYELINATING DISEASE OF CENTRAL NERVOUS SYSTEM, UNSPECIFIED (HCC): ICD-10-CM

## 2023-06-09 DIAGNOSIS — G37.9 DEMYELINATING DISEASE OF CENTRAL NERVOUS SYSTEM (HCC): ICD-10-CM

## 2023-06-09 DIAGNOSIS — I10 ESSENTIAL HYPERTENSION: ICD-10-CM

## 2023-06-09 DIAGNOSIS — E10.3593 TYPE 1 DIABETES MELLITUS WITH PROLIFERATIVE DIABETIC RETINOPATHY WITHOUT MACULAR EDEMA, BILATERAL (HCC): ICD-10-CM

## 2023-06-09 LAB — HBA1C MFR BLD: 8.8 %

## 2023-06-09 PROCEDURE — 3017F COLORECTAL CA SCREEN DOC REV: CPT | Performed by: PHYSICIAN ASSISTANT

## 2023-06-09 PROCEDURE — 83036 HEMOGLOBIN GLYCOSYLATED A1C: CPT | Performed by: PHYSICIAN ASSISTANT

## 2023-06-09 PROCEDURE — 99214 OFFICE O/P EST MOD 30 MIN: CPT | Performed by: PHYSICIAN ASSISTANT

## 2023-06-09 PROCEDURE — G8417 CALC BMI ABV UP PARAM F/U: HCPCS | Performed by: PHYSICIAN ASSISTANT

## 2023-06-09 PROCEDURE — 3078F DIAST BP <80 MM HG: CPT | Performed by: PHYSICIAN ASSISTANT

## 2023-06-09 PROCEDURE — 2022F DILAT RTA XM EVC RTNOPTHY: CPT | Performed by: PHYSICIAN ASSISTANT

## 2023-06-09 PROCEDURE — 3046F HEMOGLOBIN A1C LEVEL >9.0%: CPT | Performed by: PHYSICIAN ASSISTANT

## 2023-06-09 PROCEDURE — 1036F TOBACCO NON-USER: CPT | Performed by: PHYSICIAN ASSISTANT

## 2023-06-09 PROCEDURE — G8427 DOCREV CUR MEDS BY ELIG CLIN: HCPCS | Performed by: PHYSICIAN ASSISTANT

## 2023-06-09 PROCEDURE — 3077F SYST BP >= 140 MM HG: CPT | Performed by: PHYSICIAN ASSISTANT

## 2023-06-09 RX ORDER — PREGABALIN 200 MG/1
200 CAPSULE ORAL 2 TIMES DAILY
Qty: 60 CAPSULE | Refills: 2 | Status: SHIPPED | OUTPATIENT
Start: 2023-06-09 | End: 2023-09-07

## 2023-06-09 RX ORDER — AMLODIPINE BESYLATE 10 MG/1
10 TABLET ORAL DAILY
Qty: 90 TABLET | Refills: 1 | Status: SHIPPED | OUTPATIENT
Start: 2023-06-09

## 2023-06-09 SDOH — ECONOMIC STABILITY: HOUSING INSECURITY
IN THE LAST 12 MONTHS, WAS THERE A TIME WHEN YOU DID NOT HAVE A STEADY PLACE TO SLEEP OR SLEPT IN A SHELTER (INCLUDING NOW)?: NO

## 2023-06-09 SDOH — ECONOMIC STABILITY: FOOD INSECURITY: WITHIN THE PAST 12 MONTHS, THE FOOD YOU BOUGHT JUST DIDN'T LAST AND YOU DIDN'T HAVE MONEY TO GET MORE.: OFTEN TRUE

## 2023-06-09 SDOH — ECONOMIC STABILITY: FOOD INSECURITY: WITHIN THE PAST 12 MONTHS, YOU WORRIED THAT YOUR FOOD WOULD RUN OUT BEFORE YOU GOT MONEY TO BUY MORE.: SOMETIMES TRUE

## 2023-06-09 SDOH — ECONOMIC STABILITY: INCOME INSECURITY: HOW HARD IS IT FOR YOU TO PAY FOR THE VERY BASICS LIKE FOOD, HOUSING, MEDICAL CARE, AND HEATING?: PATIENT DECLINED

## 2023-06-09 ASSESSMENT — PATIENT HEALTH QUESTIONNAIRE - PHQ9
1. LITTLE INTEREST OR PLEASURE IN DOING THINGS: 0
SUM OF ALL RESPONSES TO PHQ QUESTIONS 1-9: 0
SUM OF ALL RESPONSES TO PHQ9 QUESTIONS 1 & 2: 0
2. FEELING DOWN, DEPRESSED OR HOPELESS: 0

## 2023-06-09 ASSESSMENT — ENCOUNTER SYMPTOMS
VOMITING: 0
SHORTNESS OF BREATH: 0
ABDOMINAL PAIN: 0
CONSTIPATION: 0
NAUSEA: 0
BLOOD IN STOOL: 0
DIARRHEA: 0

## 2023-06-09 NOTE — PROGRESS NOTES
Feeding yourself No Help Needed   Getting from bed to chair No Help Needed   Getting dressed No Help Needed   Bathing or showering No Help Needed   Walk across the room (includes cane/walker) No Help Needed   Using the telphone No Help Needed   Taking your medications No Help Needed   Preparing meals No Help Needed   Managing money (expenses/bills) No Help Needed   Moderately strenuous housework (laundry) No Help Needed   Shopping for personal items (toiletries/medicines) No Help Needed   Shopping for groceries No Help Needed   Driving Help Needed   Climbing a flight of stairs No Help Needed   Getting to places beyond walking distances No Help Needed
Mood and Affect: Mood is depressed. Behavior: Behavior normal.         Thought Content: Thought content normal.        Recent Results (from the past 24 hour(s))   AMB POC HEMOGLOBIN A1C    Collection Time: 06/09/23 10:20 AM   Result Value Ref Range    Hemoglobin A1C, POC 8.8 %        ASSESSMENT AND PLAN  Tom was seen today for hypertension, diabetes and depression. Diagnoses and all orders for this visit:    Diabetic polyneuropathy associated with type 1 diabetes mellitus (HCC)  -     pregabalin (LYRICA) 200 MG capsule; Take 1 capsule by mouth 2 times daily for 90 days. Max Daily Amount: 400 mg  Increase Lyrica to 200 mg twice daily for neuropathy pains  Demyelinating disease of central nervous system, unspecified (HCC)  -     pregabalin (LYRICA) 200 MG capsule; Take 1 capsule by mouth 2 times daily for 90 days. Max Daily Amount: 400 mg  MS  Major depressive disorder, recurrent, moderate (HCC)  -     [facility] - Kemal Peters NP, Behavioral Health, [place]  Related to chronic health conditions. Would like him to see psychiatry, it is recurrent and may need additional management. 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 Max Daily Amount: 400 mg  -     Luann Santos MD, Endocrinology, [place]  Will await endocrine eval. May be wise to use differing medications like longer acting insulin along with super quick acting insulin given type one.  Will try and get patient CGM for better BG management  Neuromyelitis optica ([facility] [address]  Needs eye exam  Demyelinating disease of central nervous system [facility])  Following with Neurology  Spastic hemiparesis affecting dominant side (HCC)  Chronic weakness to BL UE/LE  Hypercholesterolemia  Stable on 20 mg pravachol  Major depressive

## 2023-06-20 RX ORDER — HYDROXYZINE PAMOATE 25 MG/1
CAPSULE ORAL
Qty: 60 CAPSULE | Refills: 0 | Status: SHIPPED | OUTPATIENT
Start: 2023-06-20

## 2023-06-20 RX ORDER — PRAVASTATIN SODIUM 20 MG
TABLET ORAL NIGHTLY
Qty: 90 TABLET | Refills: 0 | Status: SHIPPED | OUTPATIENT
Start: 2023-06-20

## 2023-06-20 NOTE — TELEPHONE ENCOUNTER
PCP: Alicia Ramos PA-C     Last appt:  6/9/2023      Future Appointments   Date Time Provider Rabia Aguilar   9/5/2023  1:00 PM Kolby Garcia, JORGE KUO BS [facility]   12/19/2023  8:30 AM Alicia Ramos PA-C [facility]          Requested Prescriptions     Pending Prescriptions Disp Refills    hydrOXYzine pamoate (VISTARIL) 25 MG capsule [Pharmacy Med Name: hydrOXYzine Pamoate 25 MG Oral Capsule] 60 capsule 0     Sig: TAKE 1 CAPSULE BY MOUTH 4 TIMES DAILY AS NEEDED FOR ITCHING    pravastatin (PRAVACHOL) 20 MG tablet [Pharmacy Med Name: Pravastatin Sodium 20 MG Oral Tablet] 90 tablet 0     Sig: Take 1 tablet by mouth nightly

## 2023-07-17 LAB — NONINV COLON CA DNA+OCC BLD SCRN STL QL: NEGATIVE

## 2023-07-17 RX ORDER — MIDODRINE HYDROCHLORIDE 2.5 MG/1
TABLET ORAL
Qty: 180 TABLET | Refills: 3 | Status: ON HOLD | OUTPATIENT
Start: 2023-07-17 | End: 2023-08-28 | Stop reason: ALTCHOICE

## 2023-07-20 ENCOUNTER — OFFICE VISIT (OUTPATIENT)
Age: 53
End: 2023-07-20
Payer: MEDICARE

## 2023-07-20 VITALS
WEIGHT: 231 LBS | SYSTOLIC BLOOD PRESSURE: 152 MMHG | HEART RATE: 98 BPM | BODY MASS INDEX: 33.07 KG/M2 | HEIGHT: 70 IN | DIASTOLIC BLOOD PRESSURE: 89 MMHG

## 2023-07-20 DIAGNOSIS — E78.2 MIXED HYPERLIPIDEMIA: ICD-10-CM

## 2023-07-20 DIAGNOSIS — E10.42 TYPE 1 DIABETES MELLITUS WITH DIABETIC POLYNEUROPATHY (HCC): Primary | ICD-10-CM

## 2023-07-20 DIAGNOSIS — E66.9 OBESITY (BMI 30-39.9): ICD-10-CM

## 2023-07-20 PROCEDURE — 3077F SYST BP >= 140 MM HG: CPT | Performed by: GENERAL ACUTE CARE HOSPITAL

## 2023-07-20 PROCEDURE — 1036F TOBACCO NON-USER: CPT | Performed by: GENERAL ACUTE CARE HOSPITAL

## 2023-07-20 PROCEDURE — G8427 DOCREV CUR MEDS BY ELIG CLIN: HCPCS | Performed by: GENERAL ACUTE CARE HOSPITAL

## 2023-07-20 PROCEDURE — 2022F DILAT RTA XM EVC RTNOPTHY: CPT | Performed by: GENERAL ACUTE CARE HOSPITAL

## 2023-07-20 PROCEDURE — 99214 OFFICE O/P EST MOD 30 MIN: CPT | Performed by: GENERAL ACUTE CARE HOSPITAL

## 2023-07-20 PROCEDURE — 3017F COLORECTAL CA SCREEN DOC REV: CPT | Performed by: GENERAL ACUTE CARE HOSPITAL

## 2023-07-20 PROCEDURE — G8417 CALC BMI ABV UP PARAM F/U: HCPCS | Performed by: GENERAL ACUTE CARE HOSPITAL

## 2023-07-20 PROCEDURE — 3079F DIAST BP 80-89 MM HG: CPT | Performed by: GENERAL ACUTE CARE HOSPITAL

## 2023-07-20 PROCEDURE — 3046F HEMOGLOBIN A1C LEVEL >9.0%: CPT | Performed by: GENERAL ACUTE CARE HOSPITAL

## 2023-07-20 RX ORDER — INSULIN DETEMIR 100 [IU]/ML
INJECTION, SOLUTION SUBCUTANEOUS
Qty: 45 ML | Refills: 3 | Status: SHIPPED | OUTPATIENT
Start: 2023-07-20

## 2023-07-20 RX ORDER — INSULIN ASPART 100 [IU]/ML
INJECTION, SOLUTION INTRAVENOUS; SUBCUTANEOUS
Qty: 45 ML | Refills: 3 | Status: SHIPPED | OUTPATIENT
Start: 2023-07-20

## 2023-07-20 NOTE — PATIENT INSTRUCTIONS
PLAN FOR TODAY    We will plan to make the following changes to your diabetes medications:  Levemir 52 units BID  Novolog 20 units with sliding scale + Correction scale  Correction Scale:  1 unit for every 25 above 150    IF GLUCOSE IS:                 THEN TAKE:      0   Extra Unit  151-175   1   Extra Unit  176-200   2   Extra Units  201-225   3   Extra Units  226-250   4   Extra Units  251-275   5   Extra Units  276-300   6   Extra Units  301-325   7   Extra Units    Example: My planned insulin dose:    ____ Units    +    ____ Extra Correction Units  =  ____ total units to take together as one injection. It will be important to continue checking your glucose just as you did previously. I would like you at the very least to check you glucose during:     + AM fasting before breakfast   + Dinner time   + Bedtime   + Any other time that you are not feeling well. Always provide a glucose log that is completed at every visit so that we can review the results of your home glucose together. Without this, it is not possible to make accurate changes to your diabetes regimen. Lyla Diaz MD  [facility]     Diabetes Education referral made: Call them for Appt  [facility] for [address] [facility], [address]  Phone [phone]  Fax [phone]     Diabetes and Meal Planning    Meal planning can be a key part of managing diabetes. Planning meals and snacks with the right balance of carbohydrate, protein, and fat can help you keep your blood sugar at the target level. You don't have to eat special foods. You can eat what your family eats, including sweets once in a while. But you do have to pay attention to how often you eat and how much you eat of certain foods. Your plate  The plate format is a simple way to help you manage how you eat. You plan meals by learning how much space each food should take on a plate.  It can

## 2023-07-20 NOTE — PROGRESS NOTES
Relation Age of Onset    No Known Problems Brother     Thyroid Disease Sister     Hypertension Maternal Grandmother     Heart Disease Mother     Other Mother         Crohn's disease    Hypertension Mother     Diabetes Father     Cancer Father         prostate     Social History     Socioeconomic History    Marital status: Single     Spouse name: Not on file    Number of children: Not on file    Years of education: Not on file    Highest education level: Not on file   Occupational History    Not on file   Tobacco Use    Smoking status: Never    Smokeless tobacco: Never   Vaping Use    Vaping Use: Never used   Substance and Sexual Activity    Alcohol use: No     Alcohol/week: 0.0 standard drinks    Drug use: No    Sexual activity: Not on file   Other Topics Concern    Not on file   Social History Narrative    Not on file     Social Determinants of Health     Financial Resource Strain: Unknown    Difficulty of Paying Living Expenses: Patient refused   Food Insecurity: Food Insecurity Present    Worried About [place] in the Last Year: Sometimes true    Ran Out of Food in the Last Year: Often true   Transportation Needs: Unmet Transportation Needs    Lack of Transportation (Medical):  Not on file    Lack of Transportation (Non-Medical): Yes   Physical Activity: Not on file   Stress: Not on file   Social Connections: Not on file   Intimate Partner Violence: Not on file   Housing Stability: Unknown    Unable to Pay for Housing in the Last Year: Not on file    Number of [address] in the Last Year: Not on file    Unstable Housing in the Last Year: No         Review of Systems:  Constitutional Symptoms: no fevers or chills, weight loss  Eyes: no blurry vision or double vision  Cardiovascular: no chest pain or palpitations  Respiratory: no cough or shortness of breath  Gastrointestinal: no dysphagia or abdominal pain  Musculoskeletal: no joint pains or weakness  Integumentary: no rashes  Neurological: no

## 2023-07-21 ENCOUNTER — TELEPHONE (OUTPATIENT)
Age: 53
End: 2023-07-21

## 2023-07-25 RX ORDER — INSULIN LISPRO 100 [IU]/ML
INJECTION, SOLUTION INTRAVENOUS; SUBCUTANEOUS
Qty: 30 ML | Refills: 3 | Status: SHIPPED | OUTPATIENT
Start: 2023-07-25

## 2023-08-14 ENCOUNTER — NURSE ONLY (OUTPATIENT)
Age: 53
End: 2023-08-14
Payer: MEDICARE

## 2023-08-14 DIAGNOSIS — E10.42 DIABETIC POLYNEUROPATHY ASSOCIATED WITH TYPE 1 DIABETES MELLITUS (HCC): Primary | ICD-10-CM

## 2023-08-14 PROCEDURE — G0108 DIAB MANAGE TRN  PER INDIV: HCPCS | Performed by: DIETITIAN, REGISTERED

## 2023-08-14 NOTE — PROGRESS NOTES
report their current PA practices;  C) identify PA they would consider incorporating in their lives; &  D) develop an implementation plan. Monitoring  The participant can  A) operate their blood glucose meter; &  B) describe how they log their blood glucoses to share with their provider. Taking Medications  The participant can  A) name their diabetes medications;  B) state the purpose and dose;  C) note side effects; &  D) describe proper storage, disposal & transport (if appropriate). Healthy Coping  The participant can    A) describe their response to diabetes diagnosis; B) describe their specific coping mechanisms;  C) identify supportive people and/or other resources that positively support their diabetes self-care and health. Reducing Risks  The participant can describe the preventive measures used by providers to promote health and prevent diabetes complications. Problem Solving  The participant can   A) identify signs, symptoms & treatment of hypoglycemia;    B) identify signs, symptoms & treatment of hyperglycemia;  C) describe their sick day plan; &  D) identify BG patterns to discuss with their provider.        No  Yes  No        Yes  No        Yes  No  No  No        No  No        Yes  Yes  Yes  Yes        Yes  No  Yes        No          No  No  No  Yes     Characteristics to Learning   Barriers to Learning      None     Favorite Ways to Learn   [] Lecture  [] Slides  [x] Reading [x] Video-Internet  [] Cassettes/CDs/MP3's  [] Interactive Small Groups [] Other       Behavioral Assessment  Current self-care practices  Educator assessment (8/14/2023)   Healthy Eating   Current practices    24-hour Dietary Recall:  Breakfast: did not have  Lunch: did not have  Dinner: [employer] baconator with french fries at 4 pm  Snacks: no  Beverages: only when he eats he drinks  Alcohol: none       Would benefit from [facility] related to Healthy Eating: Yes    Eats a carbohydrate controlled diet: No     Stage

## 2023-08-24 ENCOUNTER — APPOINTMENT (OUTPATIENT)
Facility: HOSPITAL | Age: 53
DRG: 193 | End: 2023-08-24
Payer: MEDICARE

## 2023-08-24 ENCOUNTER — APPOINTMENT (OUTPATIENT)
Facility: HOSPITAL | Age: 53
DRG: 193 | End: 2023-08-24
Attending: HOSPITALIST
Payer: MEDICARE

## 2023-08-24 ENCOUNTER — HOSPITAL ENCOUNTER (INPATIENT)
Facility: HOSPITAL | Age: 53
LOS: 7 days | Discharge: HOME HEALTH | DRG: 193 | End: 2023-08-31
Attending: STUDENT IN AN ORGANIZED HEALTH CARE EDUCATION/TRAINING PROGRAM | Admitting: HOSPITALIST
Payer: MEDICARE

## 2023-08-24 DIAGNOSIS — R79.89 ELEVATED D-DIMER: ICD-10-CM

## 2023-08-24 DIAGNOSIS — R79.89 ELEVATED SERUM CREATININE: ICD-10-CM

## 2023-08-24 DIAGNOSIS — I50.9 ACUTE CONGESTIVE HEART FAILURE, UNSPECIFIED HEART FAILURE TYPE (HCC): Primary | ICD-10-CM

## 2023-08-24 DIAGNOSIS — I50.9 ACUTE HEART FAILURE, UNSPECIFIED HEART FAILURE TYPE (HCC): ICD-10-CM

## 2023-08-24 DIAGNOSIS — R09.02 HYPOXIA: ICD-10-CM

## 2023-08-24 LAB
ALBUMIN SERPL-MCNC: 3 G/DL (ref 3.5–5)
ALBUMIN/GLOB SERPL: 0.8 (ref 1.1–2.2)
ALP SERPL-CCNC: 121 U/L (ref 45–117)
ALT SERPL-CCNC: 2145 U/L (ref 12–78)
ANION GAP BLD CALC-SCNC: 9 (ref 10–20)
ANION GAP SERPL CALC-SCNC: 10 MMOL/L (ref 5–15)
APTT PPP: 27.6 SEC (ref 22.1–31)
AST SERPL-CCNC: >2000 U/L (ref 15–37)
B PERT DNA SPEC QL NAA+PROBE: NOT DETECTED
BASE DEFICIT BLD-SCNC: 3.2 MMOL/L
BASOPHILS # BLD: 0 K/UL (ref 0–0.1)
BASOPHILS NFR BLD: 0 % (ref 0–1)
BILIRUB SERPL-MCNC: 0.5 MG/DL (ref 0.2–1)
BORDETELLA PARAPERTUSSIS BY PCR: NOT DETECTED
BUN SERPL-MCNC: 62 MG/DL (ref 6–20)
BUN/CREAT SERPL: 16 (ref 12–20)
C PNEUM DNA SPEC QL NAA+PROBE: NOT DETECTED
CA-I BLD-MCNC: 1.13 MMOL/L (ref 1.12–1.32)
CALCIUM SERPL-MCNC: 8.2 MG/DL (ref 8.5–10.1)
CHLORIDE BLD-SCNC: 109 MMOL/L (ref 100–108)
CHLORIDE SERPL-SCNC: 106 MMOL/L (ref 97–108)
CO2 BLD-SCNC: 22 MMOL/L (ref 19–24)
CO2 SERPL-SCNC: 23 MMOL/L (ref 21–32)
COMMENT:: NORMAL
CREAT SERPL-MCNC: 3.83 MG/DL (ref 0.7–1.3)
CREAT UR-MCNC: 3.7 MG/DL (ref 0.6–1.3)
D DIMER PPP FEU-MCNC: 20.71 MG/L FEU (ref 0–0.65)
DIFFERENTIAL METHOD BLD: ABNORMAL
ECHO AO ROOT DIAM: 2.9 CM
ECHO AO ROOT INDEX: 1.31 CM/M2
ECHO AV AREA PEAK VELOCITY: 2.7 CM2
ECHO AV AREA/BSA PEAK VELOCITY: 1.2 CM2/M2
ECHO AV PEAK GRADIENT: 7 MMHG
ECHO AV PEAK VELOCITY: 1.3 M/S
ECHO AV VELOCITY RATIO: 0.69
ECHO BSA: 2.27 M2
ECHO EST RA PRESSURE: 10 MMHG
ECHO LA DIAMETER INDEX: 1.31 CM/M2
ECHO LA DIAMETER: 2.9 CM
ECHO LA TO AORTIC ROOT RATIO: 1
ECHO LV E' SEPTAL VELOCITY: 7 CM/S
ECHO LV FRACTIONAL SHORTENING: 22 % (ref 28–44)
ECHO LV INTERNAL DIMENSION DIASTOLE INDEX: 1.85 CM/M2
ECHO LV INTERNAL DIMENSION DIASTOLIC: 4.1 CM (ref 4.2–5.9)
ECHO LV INTERNAL DIMENSION SYSTOLIC INDEX: 1.44 CM/M2
ECHO LV INTERNAL DIMENSION SYSTOLIC: 3.2 CM
ECHO LV IVSD: 0.9 CM (ref 0.6–1)
ECHO LV MASS 2D: 114.1 G (ref 88–224)
ECHO LV MASS INDEX 2D: 51.4 G/M2 (ref 49–115)
ECHO LV POSTERIOR WALL DIASTOLIC: 0.9 CM (ref 0.6–1)
ECHO LV RELATIVE WALL THICKNESS RATIO: 0.44
ECHO LVOT AREA: 3.8 CM2
ECHO LVOT DIAM: 2.2 CM
ECHO LVOT PEAK GRADIENT: 3 MMHG
ECHO LVOT PEAK VELOCITY: 0.9 M/S
ECHO MV A VELOCITY: 0.63 M/S
ECHO MV E DECELERATION TIME (DT): 82 MS
ECHO MV E VELOCITY: 0.57 M/S
ECHO MV E/A RATIO: 0.9
ECHO MV E/E' SEPTAL: 8.14
ECHO PV MAX VELOCITY: 0.7 M/S
ECHO PV PEAK GRADIENT: 2 MMHG
ECHO RIGHT VENTRICULAR SYSTOLIC PRESSURE (RVSP): 35 MMHG
ECHO RV INTERNAL DIMENSION: 3.9 CM
ECHO RV TAPSE: 1.4 CM (ref 1.7–?)
ECHO TV REGURGITANT MAX VELOCITY: 2.48 M/S
ECHO TV REGURGITANT PEAK GRADIENT: 25 MMHG
EOSINOPHIL # BLD: 0 K/UL (ref 0–0.4)
EOSINOPHIL NFR BLD: 0 % (ref 0–7)
ERYTHROCYTE [DISTWIDTH] IN BLOOD BY AUTOMATED COUNT: 15.4 % (ref 11.5–14.5)
EST. AVERAGE GLUCOSE BLD GHB EST-MCNC: 157 MG/DL
FLUAV SUBTYP SPEC NAA+PROBE: NOT DETECTED
FLUBV RNA SPEC QL NAA+PROBE: NOT DETECTED
GLOBULIN SER CALC-MCNC: 4 G/DL (ref 2–4)
GLUCOSE BLD STRIP.AUTO-MCNC: 257 MG/DL (ref 65–117)
GLUCOSE BLD STRIP.AUTO-MCNC: 374 MG/DL (ref 65–117)
GLUCOSE BLD STRIP.AUTO-MCNC: 378 MG/DL (ref 65–117)
GLUCOSE BLD STRIP.AUTO-MCNC: 400 MG/DL (ref 74–106)
GLUCOSE SERPL-MCNC: 416 MG/DL (ref 65–100)
HADV DNA SPEC QL NAA+PROBE: NOT DETECTED
HBA1C MFR BLD: 7.1 % (ref 4–5.6)
HCO3 BLDA-SCNC: 22 MMOL/L
HCOV 229E RNA SPEC QL NAA+PROBE: NOT DETECTED
HCOV HKU1 RNA SPEC QL NAA+PROBE: NOT DETECTED
HCOV NL63 RNA SPEC QL NAA+PROBE: NOT DETECTED
HCOV OC43 RNA SPEC QL NAA+PROBE: NOT DETECTED
HCT VFR BLD AUTO: 30.9 % (ref 36.6–50.3)
HGB BLD-MCNC: 10.4 G/DL (ref 12.1–17)
HMPV RNA SPEC QL NAA+PROBE: NOT DETECTED
HPIV1 RNA SPEC QL NAA+PROBE: NOT DETECTED
HPIV2 RNA SPEC QL NAA+PROBE: NOT DETECTED
HPIV3 RNA SPEC QL NAA+PROBE: NOT DETECTED
HPIV4 RNA SPEC QL NAA+PROBE: NOT DETECTED
IMM GRANULOCYTES # BLD AUTO: 0.3 K/UL (ref 0–0.04)
IMM GRANULOCYTES NFR BLD AUTO: 1 % (ref 0–0.5)
INR PPP: 1.3 (ref 0.9–1.1)
LACTATE BLD-SCNC: 2.69 MMOL/L (ref 0.4–2)
LACTATE SERPL-SCNC: 1.4 MMOL/L (ref 0.4–2)
LYMPHOCYTES # BLD: 1.8 K/UL (ref 0.8–3.5)
LYMPHOCYTES NFR BLD: 10 % (ref 12–49)
M PNEUMO DNA SPEC QL NAA+PROBE: NOT DETECTED
MCH RBC QN AUTO: 28.9 PG (ref 26–34)
MCHC RBC AUTO-ENTMCNC: 33.7 G/DL (ref 30–36.5)
MCV RBC AUTO: 85.8 FL (ref 80–99)
MONOCYTES # BLD: 1.3 K/UL (ref 0–1)
MONOCYTES NFR BLD: 7 % (ref 5–13)
NEUTS SEG # BLD: 15.5 K/UL (ref 1.8–8)
NEUTS SEG NFR BLD: 82 % (ref 32–75)
NRBC # BLD: 0 K/UL (ref 0–0.01)
NRBC BLD-RTO: 0 PER 100 WBC
NT PRO BNP: ABNORMAL PG/ML
PCO2 BLDV: 41 MMHG (ref 41–51)
PH BLDV: 7.34 (ref 7.32–7.42)
PLATELET # BLD AUTO: 271 K/UL (ref 150–400)
PMV BLD AUTO: 10.2 FL (ref 8.9–12.9)
PO2 BLDV: 37 MMHG (ref 25–40)
POTASSIUM BLD-SCNC: 4.9 MMOL/L (ref 3.5–5.5)
POTASSIUM SERPL-SCNC: 4.9 MMOL/L (ref 3.5–5.1)
PROCALCITONIN SERPL-MCNC: 22.48 NG/ML
PROT SERPL-MCNC: 7 G/DL (ref 6.4–8.2)
PROTHROMBIN TIME: 13.2 SEC (ref 9–11.1)
RBC # BLD AUTO: 3.6 M/UL (ref 4.1–5.7)
RSV RNA SPEC QL NAA+PROBE: NOT DETECTED
RV+EV RNA SPEC QL NAA+PROBE: NOT DETECTED
SAO2 % BLD: 67 %
SARS-COV-2 RDRP RESP QL NAA+PROBE: NOT DETECTED
SARS-COV-2 RNA RESP QL NAA+PROBE: NOT DETECTED
SERVICE CMNT-IMP: ABNORMAL
SODIUM BLD-SCNC: 140 MMOL/L (ref 136–145)
SODIUM SERPL-SCNC: 139 MMOL/L (ref 136–145)
SOURCE: NORMAL
SPECIMEN HOLD: NORMAL
SPECIMEN SITE: ABNORMAL
THERAPEUTIC RANGE: NORMAL SECS (ref 58–77)
TROPONIN I SERPL HS-MCNC: 287 NG/L (ref 0–76)
TROPONIN I SERPL HS-MCNC: 318 NG/L (ref 0–76)
TROPONIN I SERPL HS-MCNC: 323 NG/L (ref 0–76)
TROPONIN I SERPL HS-MCNC: 349 NG/L (ref 0–76)
UFH PPP CHRO-ACNC: 0.86 IU/ML
UFH PPP CHRO-ACNC: <0.1 IU/ML
WBC # BLD AUTO: 18.9 K/UL (ref 4.1–11.1)

## 2023-08-24 PROCEDURE — 36415 COLL VENOUS BLD VENIPUNCTURE: CPT

## 2023-08-24 PROCEDURE — 84132 ASSAY OF SERUM POTASSIUM: CPT

## 2023-08-24 PROCEDURE — 85520 HEPARIN ASSAY: CPT

## 2023-08-24 PROCEDURE — 87899 AGENT NOS ASSAY W/OPTIC: CPT

## 2023-08-24 PROCEDURE — 2580000003 HC RX 258: Performed by: STUDENT IN AN ORGANIZED HEALTH CARE EDUCATION/TRAINING PROGRAM

## 2023-08-24 PROCEDURE — 2580000003 HC RX 258: Performed by: HOSPITALIST

## 2023-08-24 PROCEDURE — 99285 EMERGENCY DEPT VISIT HI MDM: CPT

## 2023-08-24 PROCEDURE — 6360000002 HC RX W HCPCS

## 2023-08-24 PROCEDURE — 0202U NFCT DS 22 TRGT SARS-COV-2: CPT

## 2023-08-24 PROCEDURE — 96368 THER/DIAG CONCURRENT INF: CPT

## 2023-08-24 PROCEDURE — 5A0945A ASSISTANCE WITH RESPIRATORY VENTILATION, 24-96 CONSECUTIVE HOURS, HIGH NASAL FLOW/VELOCITY: ICD-10-PCS | Performed by: INTERNAL MEDICINE

## 2023-08-24 PROCEDURE — 82962 GLUCOSE BLOOD TEST: CPT

## 2023-08-24 PROCEDURE — 87635 SARS-COV-2 COVID-19 AMP PRB: CPT

## 2023-08-24 PROCEDURE — 6360000004 HC RX CONTRAST MEDICATION: Performed by: HOSPITALIST

## 2023-08-24 PROCEDURE — 2000000000 HC ICU R&B

## 2023-08-24 PROCEDURE — 80053 COMPREHEN METABOLIC PANEL: CPT

## 2023-08-24 PROCEDURE — 82803 BLOOD GASES ANY COMBINATION: CPT

## 2023-08-24 PROCEDURE — 87040 BLOOD CULTURE FOR BACTERIA: CPT

## 2023-08-24 PROCEDURE — 83605 ASSAY OF LACTIC ACID: CPT

## 2023-08-24 PROCEDURE — 6370000000 HC RX 637 (ALT 250 FOR IP)

## 2023-08-24 PROCEDURE — 2500000003 HC RX 250 WO HCPCS: Performed by: HOSPITALIST

## 2023-08-24 PROCEDURE — 96365 THER/PROPH/DIAG IV INF INIT: CPT

## 2023-08-24 PROCEDURE — 85730 THROMBOPLASTIN TIME PARTIAL: CPT

## 2023-08-24 PROCEDURE — 87449 NOS EACH ORGANISM AG IA: CPT

## 2023-08-24 PROCEDURE — 84484 ASSAY OF TROPONIN QUANT: CPT

## 2023-08-24 PROCEDURE — 83036 HEMOGLOBIN GLYCOSYLATED A1C: CPT

## 2023-08-24 PROCEDURE — 96375 TX/PRO/DX INJ NEW DRUG ADDON: CPT

## 2023-08-24 PROCEDURE — 6360000002 HC RX W HCPCS: Performed by: STUDENT IN AN ORGANIZED HEALTH CARE EDUCATION/TRAINING PROGRAM

## 2023-08-24 PROCEDURE — 84295 ASSAY OF SERUM SODIUM: CPT

## 2023-08-24 PROCEDURE — 82947 ASSAY GLUCOSE BLOOD QUANT: CPT

## 2023-08-24 PROCEDURE — 85610 PROTHROMBIN TIME: CPT

## 2023-08-24 PROCEDURE — 93005 ELECTROCARDIOGRAM TRACING: CPT | Performed by: STUDENT IN AN ORGANIZED HEALTH CARE EDUCATION/TRAINING PROGRAM

## 2023-08-24 PROCEDURE — 96366 THER/PROPH/DIAG IV INF ADDON: CPT

## 2023-08-24 PROCEDURE — 83880 ASSAY OF NATRIURETIC PEPTIDE: CPT

## 2023-08-24 PROCEDURE — 85025 COMPLETE CBC W/AUTO DIFF WBC: CPT

## 2023-08-24 PROCEDURE — C8929 TTE W OR WO FOL WCON,DOPPLER: HCPCS

## 2023-08-24 PROCEDURE — 2700000000 HC OXYGEN THERAPY PER DAY

## 2023-08-24 PROCEDURE — 6370000000 HC RX 637 (ALT 250 FOR IP): Performed by: HOSPITALIST

## 2023-08-24 PROCEDURE — 85379 FIBRIN DEGRADATION QUANT: CPT

## 2023-08-24 PROCEDURE — 82330 ASSAY OF CALCIUM: CPT

## 2023-08-24 PROCEDURE — 84145 PROCALCITONIN (PCT): CPT

## 2023-08-24 PROCEDURE — 71045 X-RAY EXAM CHEST 1 VIEW: CPT

## 2023-08-24 RX ORDER — ASPIRIN 325 MG
325 TABLET ORAL
Status: COMPLETED | OUTPATIENT
Start: 2023-08-24 | End: 2023-08-24

## 2023-08-24 RX ORDER — DEXTROSE MONOHYDRATE 100 MG/ML
INJECTION, SOLUTION INTRAVENOUS CONTINUOUS PRN
Status: DISCONTINUED | OUTPATIENT
Start: 2023-08-24 | End: 2023-08-31 | Stop reason: HOSPADM

## 2023-08-24 RX ORDER — INSULIN LISPRO 100 [IU]/ML
0-4 INJECTION, SOLUTION INTRAVENOUS; SUBCUTANEOUS NIGHTLY
Status: DISCONTINUED | OUTPATIENT
Start: 2023-08-24 | End: 2023-08-25

## 2023-08-24 RX ORDER — FUROSEMIDE 10 MG/ML
40 INJECTION INTRAMUSCULAR; INTRAVENOUS 3 TIMES DAILY
Status: DISCONTINUED | OUTPATIENT
Start: 2023-08-24 | End: 2023-08-24

## 2023-08-24 RX ORDER — SODIUM CHLORIDE 0.9 % (FLUSH) 0.9 %
5-40 SYRINGE (ML) INJECTION EVERY 12 HOURS SCHEDULED
Status: DISCONTINUED | OUTPATIENT
Start: 2023-08-24 | End: 2023-08-31 | Stop reason: HOSPADM

## 2023-08-24 RX ORDER — INSULIN LISPRO 100 [IU]/ML
0.05 INJECTION, SOLUTION INTRAVENOUS; SUBCUTANEOUS
Status: DISCONTINUED | OUTPATIENT
Start: 2023-08-24 | End: 2023-08-25

## 2023-08-24 RX ORDER — POLYETHYLENE GLYCOL 3350 17 G/17G
17 POWDER, FOR SOLUTION ORAL DAILY PRN
Status: DISCONTINUED | OUTPATIENT
Start: 2023-08-24 | End: 2023-08-31 | Stop reason: HOSPADM

## 2023-08-24 RX ORDER — HEPARIN SODIUM 1000 [USP'U]/ML
40 INJECTION, SOLUTION INTRAVENOUS; SUBCUTANEOUS PRN
Status: DISCONTINUED | OUTPATIENT
Start: 2023-08-24 | End: 2023-08-26

## 2023-08-24 RX ORDER — FUROSEMIDE 10 MG/ML
40 INJECTION INTRAMUSCULAR; INTRAVENOUS ONCE
Status: COMPLETED | OUTPATIENT
Start: 2023-08-24 | End: 2023-08-24

## 2023-08-24 RX ORDER — INSULIN GLARGINE 100 [IU]/ML
0.15 INJECTION, SOLUTION SUBCUTANEOUS DAILY
Status: DISCONTINUED | OUTPATIENT
Start: 2023-08-25 | End: 2023-08-25

## 2023-08-24 RX ORDER — HEPARIN SODIUM 1000 [USP'U]/ML
80 INJECTION, SOLUTION INTRAVENOUS; SUBCUTANEOUS ONCE
Status: COMPLETED | OUTPATIENT
Start: 2023-08-24 | End: 2023-08-24

## 2023-08-24 RX ORDER — ONDANSETRON 4 MG/1
4 TABLET, ORALLY DISINTEGRATING ORAL EVERY 8 HOURS PRN
Status: DISCONTINUED | OUTPATIENT
Start: 2023-08-24 | End: 2023-08-31 | Stop reason: HOSPADM

## 2023-08-24 RX ORDER — ACETAMINOPHEN 650 MG/1
650 SUPPOSITORY RECTAL EVERY 6 HOURS PRN
Status: DISCONTINUED | OUTPATIENT
Start: 2023-08-24 | End: 2023-08-31 | Stop reason: HOSPADM

## 2023-08-24 RX ORDER — ACETAMINOPHEN 325 MG/1
650 TABLET ORAL EVERY 6 HOURS PRN
Status: DISCONTINUED | OUTPATIENT
Start: 2023-08-24 | End: 2023-08-31 | Stop reason: HOSPADM

## 2023-08-24 RX ORDER — SODIUM CHLORIDE 9 MG/ML
INJECTION, SOLUTION INTRAVENOUS PRN
Status: DISCONTINUED | OUTPATIENT
Start: 2023-08-24 | End: 2023-08-31 | Stop reason: HOSPADM

## 2023-08-24 RX ORDER — HEPARIN SODIUM 1000 [USP'U]/ML
80 INJECTION, SOLUTION INTRAVENOUS; SUBCUTANEOUS PRN
Status: DISCONTINUED | OUTPATIENT
Start: 2023-08-24 | End: 2023-08-26

## 2023-08-24 RX ORDER — SODIUM CHLORIDE 0.9 % (FLUSH) 0.9 %
5-40 SYRINGE (ML) INJECTION PRN
Status: DISCONTINUED | OUTPATIENT
Start: 2023-08-24 | End: 2023-08-31 | Stop reason: HOSPADM

## 2023-08-24 RX ORDER — ONDANSETRON 2 MG/ML
4 INJECTION INTRAMUSCULAR; INTRAVENOUS EVERY 6 HOURS PRN
Status: DISCONTINUED | OUTPATIENT
Start: 2023-08-24 | End: 2023-08-31 | Stop reason: HOSPADM

## 2023-08-24 RX ORDER — BUMETANIDE 0.25 MG/ML
2 INJECTION INTRAMUSCULAR; INTRAVENOUS 2 TIMES DAILY
Status: DISCONTINUED | OUTPATIENT
Start: 2023-08-24 | End: 2023-08-26

## 2023-08-24 RX ORDER — HEPARIN SODIUM 10000 [USP'U]/100ML
5-30 INJECTION, SOLUTION INTRAVENOUS CONTINUOUS
Status: DISCONTINUED | OUTPATIENT
Start: 2023-08-24 | End: 2023-08-26

## 2023-08-24 RX ORDER — INSULIN GLARGINE 100 [IU]/ML
0.15 INJECTION, SOLUTION SUBCUTANEOUS NIGHTLY
Status: DISCONTINUED | OUTPATIENT
Start: 2023-08-24 | End: 2023-08-24

## 2023-08-24 RX ORDER — INSULIN LISPRO 100 [IU]/ML
0-8 INJECTION, SOLUTION INTRAVENOUS; SUBCUTANEOUS
Status: DISCONTINUED | OUTPATIENT
Start: 2023-08-24 | End: 2023-08-25

## 2023-08-24 RX ORDER — HEPARIN SODIUM 5000 [USP'U]/ML
5000 INJECTION, SOLUTION INTRAVENOUS; SUBCUTANEOUS EVERY 8 HOURS SCHEDULED
Status: DISCONTINUED | OUTPATIENT
Start: 2023-08-24 | End: 2023-08-24

## 2023-08-24 RX ORDER — ONDANSETRON 2 MG/ML
4 INJECTION INTRAMUSCULAR; INTRAVENOUS ONCE
Status: COMPLETED | OUTPATIENT
Start: 2023-08-24 | End: 2023-08-24

## 2023-08-24 RX ADMIN — ONDANSETRON 4 MG: 2 INJECTION INTRAMUSCULAR; INTRAVENOUS at 12:39

## 2023-08-24 RX ADMIN — HEPARIN SODIUM AND DEXTROSE 18 UNITS/KG/HR: 10000; 5 INJECTION INTRAVENOUS at 14:27

## 2023-08-24 RX ADMIN — BUMETANIDE 2 MG: 0.25 INJECTION INTRAMUSCULAR; INTRAVENOUS at 19:40

## 2023-08-24 RX ADMIN — FUROSEMIDE 40 MG: 10 INJECTION, SOLUTION INTRAMUSCULAR; INTRAVENOUS at 14:28

## 2023-08-24 RX ADMIN — PERFLUTREN 1.5 ML: 6.52 INJECTION, SUSPENSION INTRAVENOUS at 18:05

## 2023-08-24 RX ADMIN — Medication 8 UNITS: at 18:37

## 2023-08-24 RX ADMIN — SODIUM CHLORIDE 1000 MG: 900 INJECTION INTRAVENOUS at 12:06

## 2023-08-24 RX ADMIN — HEPARIN SODIUM 8340 UNITS: 1000 INJECTION INTRAVENOUS; SUBCUTANEOUS at 14:26

## 2023-08-24 RX ADMIN — ASPIRIN 325 MG: 325 TABLET ORAL at 14:27

## 2023-08-24 RX ADMIN — Medication 10 UNITS: at 18:36

## 2023-08-24 RX ADMIN — Medication 5 UNITS: at 18:37

## 2023-08-24 RX ADMIN — SODIUM CHLORIDE, PRESERVATIVE FREE 10 ML: 5 INJECTION INTRAVENOUS at 19:41

## 2023-08-24 RX ADMIN — AZITHROMYCIN MONOHYDRATE 500 MG: 500 INJECTION, POWDER, LYOPHILIZED, FOR SOLUTION INTRAVENOUS at 12:14

## 2023-08-24 ASSESSMENT — PAIN SCALES - GENERAL
PAINLEVEL_OUTOF10: 8
PAINLEVEL_OUTOF10: 0
PAINLEVEL_OUTOF10: 0

## 2023-08-24 ASSESSMENT — ENCOUNTER SYMPTOMS
COUGH: 1
GASTROINTESTINAL NEGATIVE: 1
SHORTNESS OF BREATH: 1

## 2023-08-24 NOTE — ED PROVIDER NOTES
[facility] EMERGENCY DEPT  EMERGENCY DEPARTMENT ENCOUNTER       Pt Name: Mi Byrne  MRN: [id number]  [address] [facility] 1970  Date of Evaluation: 8/24/2023  Provider: ELISA Hrenandez NP   PCP: Jessica Balderrama PA-C  Note Started: 3:00 PM 8/24/23     CHIEF COMPLAINT       Chief Complaint   Patient presents with    Fatigue     Pt presents to ED via EMS with c/o weakness, dizziness, and SOB that started yesterday; pt also c/o shoulder, leg, and hip pain. EMS reports BG of 497. H/o MS, HTN, and Type I Diabetes. Shortness of Breath        HISTORY OF PRESENT ILLNESS: 1 or more elements      History From: Patient  HPI Limitations None     Tom Birch is a 46 y.o. male history of MS, hypertension, and diabetes who presents to the ED today for complaints of shortness of breath, elevated blood glucose, and weakness x2 days. Patient has not been able to eat anything the past 2 days. Has been drinking cranberry juice and water. Has been taking his diabetes medicines as prescribed. Had some intermittent chest pain that has been to the ED feeling. No abdominal pain. Is been having shoulder pain, back pain, left hip pain-denies injuries. Patient initially had cough. No fevers, chills, sore throat, ear pain, nausea, vomiting, diarrhea. Nursing Notes were all reviewed and agreed with or any disagreements were addressed in the HPI. REVIEW OF SYSTEMS      Review of Systems     Positives and Pertinent negatives as per HPI.     PAST HISTORY     Past Medical History:  Past Medical History:   Diagnosis Date    Anxiety and depression     per pt on 10/21/2021    Asthma     GERD (gastroesophageal reflux disease)     per pt on 10/21/2021    Hypertension     per pt on 10/21/2021    MS (multiple sclerosis) ([address])     per pt on 10/21/2021    Type 2 diabetes mellitus with peripheral neuropathy [facility])        Past Surgical History:  Past Surgical History:   Procedure Laterality Date    CATARACT REMOVAL Left 12/03/2021

## 2023-08-24 NOTE — H&P
[facility] H[facility]. Name: Melissa Raya   : 1970   MRN: [id number]   Date: 2023      Chief Complaint   Patient presents with    Fatigue     Pt presents to ED via EMS with c/o weakness, dizziness, and SOB that started yesterday; pt also c/o shoulder, leg, and hip pain. EMS reports BG of 497. H/o MS, HTN, and Type I Diabetes. Shortness of Breath       HPI:     Patient has PMH significant for HTN, DM and MS diagnosed in 2018 and he is on disability. He denies any cardiac or pulmonary medical problems diagnosed in the past including sleep apnea. He started having upper respiratory symptoms on Monday that he describes as nasal congestion, sneezing and some SOB on exertion. He also started noticing leg swellings. He progressively became more and more weak in next 2 days and yesterday fell at home without loosing consciousness or serious head injury. He was not able to get out pf bed so came in ER today. Also reports orthopnea but no PND. No fever or chills. No leg pains. Has intermittent chest pain for past 2 days. He denies any recent weight gain or leg swellings prior to Monday. In ER, initial vitals .79, afebrile, tachypnic in mid 20s and . Started on [facility]. CXR showed BL airspace disease. Lactate 2.69. Trop and BNP elevated along with dimer. EKG with sinus tachycardia and RAD. Labs also suggestive of LUPILLO and ALI. Patient given CTX and Azithromycin. Covid negative. AT time of my evaluation, patient on HHFNO and sating in low 90s. He becomes more SOB on talking and changing position for exam like sitting forward. ICU Problem List     # Acute hypoxemic respiratory failure, 2/2 cardiogenic pulmonary edema vs pneumonia. -Reported no history of cardio-pulmonary dis or diagnosed KAYLA  -Obesity with BMI > 33  Plan  - Continue HHFNO for goal > 92%.    - BiPAP if worsened resp status  - Treat pulmonary edema and pneumonia as below    # Acute decompensated Biventricular

## 2023-08-24 NOTE — ED NOTES
Pt transferred to [facility]. Denies needs. Family brought up with patient, aware of admission plan of care.         Bashir Ceja RN  08/24/23 8862

## 2023-08-25 ENCOUNTER — APPOINTMENT (OUTPATIENT)
Facility: HOSPITAL | Age: 53
DRG: 193 | End: 2023-08-25
Payer: MEDICARE

## 2023-08-25 PROBLEM — G35 MS (MULTIPLE SCLEROSIS) (HCC): Status: ACTIVE | Noted: 2017-07-07

## 2023-08-25 PROBLEM — R79.89 ELEVATED SERUM CREATININE: Status: ACTIVE | Noted: 2023-08-25

## 2023-08-25 PROBLEM — R79.89 ELEVATED D-DIMER: Status: ACTIVE | Noted: 2023-08-25

## 2023-08-25 PROBLEM — E10.9 TYPE 1 DIABETES MELLITUS WITHOUT COMPLICATION (HCC): Status: ACTIVE | Noted: 2019-05-23

## 2023-08-25 PROBLEM — R53.1 GENERALIZED WEAKNESS: Status: ACTIVE | Noted: 2023-08-25

## 2023-08-25 LAB
ANION GAP SERPL CALC-SCNC: 7 MMOL/L (ref 5–15)
APPEARANCE UR: ABNORMAL
BACTERIA URNS QL MICRO: NEGATIVE /HPF
BASOPHILS # BLD: 0.1 K/UL (ref 0–0.1)
BASOPHILS NFR BLD: 1 % (ref 0–1)
BILIRUB UR QL: NEGATIVE
BUN SERPL-MCNC: 71 MG/DL (ref 6–20)
BUN/CREAT SERPL: 19 (ref 12–20)
CALCIUM SERPL-MCNC: 7.9 MG/DL (ref 8.5–10.1)
CHLORIDE SERPL-SCNC: 111 MMOL/L (ref 97–108)
CO2 SERPL-SCNC: 25 MMOL/L (ref 21–32)
COLOR UR: ABNORMAL
CREAT SERPL-MCNC: 3.73 MG/DL (ref 0.7–1.3)
DIFFERENTIAL METHOD BLD: ABNORMAL
ECHO BSA: 2.27 M2
EKG ATRIAL RATE: 104 BPM
EKG DIAGNOSIS: NORMAL
EKG P AXIS: 54 DEGREES
EKG P-R INTERVAL: 148 MS
EKG Q-T INTERVAL: 342 MS
EKG QRS DURATION: 82 MS
EKG QTC CALCULATION (BAZETT): 449 MS
EKG R AXIS: 101 DEGREES
EKG T AXIS: 36 DEGREES
EKG VENTRICULAR RATE: 104 BPM
EOSINOPHIL # BLD: 0.5 K/UL (ref 0–0.4)
EOSINOPHIL NFR BLD: 4 % (ref 0–7)
EPITH CASTS URNS QL MICRO: ABNORMAL /LPF
ERYTHROCYTE [DISTWIDTH] IN BLOOD BY AUTOMATED COUNT: 15.4 % (ref 11.5–14.5)
FERRITIN SERPL-MCNC: 1944 NG/ML (ref 26–388)
FOLATE SERPL-MCNC: 57.3 NG/ML (ref 5–21)
GLUCOSE BLD STRIP.AUTO-MCNC: 104 MG/DL (ref 65–117)
GLUCOSE BLD STRIP.AUTO-MCNC: 173 MG/DL (ref 65–117)
GLUCOSE BLD STRIP.AUTO-MCNC: 189 MG/DL (ref 65–117)
GLUCOSE BLD STRIP.AUTO-MCNC: 84 MG/DL (ref 65–117)
GLUCOSE SERPL-MCNC: 99 MG/DL (ref 65–100)
GLUCOSE UR STRIP.AUTO-MCNC: NEGATIVE MG/DL
GRAN CASTS URNS QL MICRO: ABNORMAL /LPF
HCT VFR BLD AUTO: 29.8 % (ref 36.6–50.3)
HGB BLD-MCNC: 9.7 G/DL (ref 12.1–17)
HGB UR QL STRIP: ABNORMAL
HYALINE CASTS URNS QL MICRO: ABNORMAL /LPF (ref 0–5)
IMM GRANULOCYTES # BLD AUTO: 0 K/UL (ref 0–0.04)
IMM GRANULOCYTES NFR BLD AUTO: 0 % (ref 0–0.5)
IRON SATN MFR SERPL: 19 % (ref 20–50)
IRON SERPL-MCNC: 45 UG/DL (ref 35–150)
KETONES UR QL STRIP.AUTO: NEGATIVE MG/DL
LEUKOCYTE ESTERASE UR QL STRIP.AUTO: NEGATIVE
LYMPHOCYTES # BLD: 2.8 K/UL (ref 0.8–3.5)
LYMPHOCYTES NFR BLD: 21 % (ref 12–49)
MAGNESIUM SERPL-MCNC: 2.2 MG/DL (ref 1.6–2.4)
MCH RBC QN AUTO: 28.4 PG (ref 26–34)
MCHC RBC AUTO-ENTMCNC: 32.6 G/DL (ref 30–36.5)
MCV RBC AUTO: 87.1 FL (ref 80–99)
MONOCYTES # BLD: 0.5 K/UL (ref 0–1)
MONOCYTES NFR BLD: 4 % (ref 5–13)
MYELOCYTES NFR BLD MANUAL: 2 %
NEUTS SEG # BLD: 9.1 K/UL (ref 1.8–8)
NEUTS SEG NFR BLD: 68 % (ref 32–75)
NITRITE UR QL STRIP.AUTO: NEGATIVE
NRBC # BLD: 0.02 K/UL (ref 0–0.01)
NRBC BLD-RTO: 0.1 PER 100 WBC
PH UR STRIP: 5 (ref 5–8)
PHOSPHATE SERPL-MCNC: 3.9 MG/DL (ref 2.6–4.7)
PLATELET # BLD AUTO: 244 K/UL (ref 150–400)
PMV BLD AUTO: 10.8 FL (ref 8.9–12.9)
POTASSIUM SERPL-SCNC: 4 MMOL/L (ref 3.5–5.1)
PROT UR STRIP-MCNC: 30 MG/DL
RBC # BLD AUTO: 3.42 M/UL (ref 4.1–5.7)
RBC #/AREA URNS HPF: ABNORMAL /HPF (ref 0–5)
RBC MORPH BLD: ABNORMAL
SERVICE CMNT-IMP: ABNORMAL
SERVICE CMNT-IMP: ABNORMAL
SERVICE CMNT-IMP: NORMAL
SERVICE CMNT-IMP: NORMAL
SODIUM SERPL-SCNC: 143 MMOL/L (ref 136–145)
SP GR UR REFRACTOMETRY: 1.01
SPERM URNS QL MICRO: PRESENT
TIBC SERPL-MCNC: 231 UG/DL (ref 250–450)
UFH PPP CHRO-ACNC: 0.57 IU/ML
UFH PPP CHRO-ACNC: 0.62 IU/ML
UFH PPP CHRO-ACNC: 0.82 IU/ML
URINE CULTURE IF INDICATED: ABNORMAL
UROBILINOGEN UR QL STRIP.AUTO: 1 EU/DL (ref 0.2–1)
VIT B12 SERPL-MCNC: >2000 PG/ML (ref 193–986)
WAXY CASTS URNS QL MICRO: ABNORMAL /LPF
WBC # BLD AUTO: 13.4 K/UL (ref 4.1–11.1)
WBC MORPH BLD: ABNORMAL
WBC URNS QL MICRO: ABNORMAL /HPF (ref 0–4)

## 2023-08-25 PROCEDURE — 71045 X-RAY EXAM CHEST 1 VIEW: CPT

## 2023-08-25 PROCEDURE — 97166 OT EVAL MOD COMPLEX 45 MIN: CPT

## 2023-08-25 PROCEDURE — 6370000000 HC RX 637 (ALT 250 FOR IP): Performed by: HOSPITALIST

## 2023-08-25 PROCEDURE — 36415 COLL VENOUS BLD VENIPUNCTURE: CPT

## 2023-08-25 PROCEDURE — 6370000000 HC RX 637 (ALT 250 FOR IP): Performed by: INTERNAL MEDICINE

## 2023-08-25 PROCEDURE — 85025 COMPLETE CBC W/AUTO DIFF WBC: CPT

## 2023-08-25 PROCEDURE — 6370000000 HC RX 637 (ALT 250 FOR IP): Performed by: CLINICAL NURSE SPECIALIST

## 2023-08-25 PROCEDURE — 83735 ASSAY OF MAGNESIUM: CPT

## 2023-08-25 PROCEDURE — 6360000002 HC RX W HCPCS: Performed by: HOSPITALIST

## 2023-08-25 PROCEDURE — 84100 ASSAY OF PHOSPHORUS: CPT

## 2023-08-25 PROCEDURE — 2000000000 HC ICU R&B

## 2023-08-25 PROCEDURE — 99222 1ST HOSP IP/OBS MODERATE 55: CPT | Performed by: PSYCHIATRY & NEUROLOGY

## 2023-08-25 PROCEDURE — 97535 SELF CARE MNGMENT TRAINING: CPT

## 2023-08-25 PROCEDURE — 82962 GLUCOSE BLOOD TEST: CPT

## 2023-08-25 PROCEDURE — 82746 ASSAY OF FOLIC ACID SERUM: CPT

## 2023-08-25 PROCEDURE — 83550 IRON BINDING TEST: CPT

## 2023-08-25 PROCEDURE — 83540 ASSAY OF IRON: CPT

## 2023-08-25 PROCEDURE — 6360000002 HC RX W HCPCS

## 2023-08-25 PROCEDURE — 97162 PT EVAL MOD COMPLEX 30 MIN: CPT

## 2023-08-25 PROCEDURE — 80048 BASIC METABOLIC PNL TOTAL CA: CPT

## 2023-08-25 PROCEDURE — 93970 EXTREMITY STUDY: CPT

## 2023-08-25 PROCEDURE — 97530 THERAPEUTIC ACTIVITIES: CPT

## 2023-08-25 PROCEDURE — 99221 1ST HOSP IP/OBS SF/LOW 40: CPT | Performed by: CLINICAL NURSE SPECIALIST

## 2023-08-25 PROCEDURE — 87086 URINE CULTURE/COLONY COUNT: CPT

## 2023-08-25 PROCEDURE — 82728 ASSAY OF FERRITIN: CPT

## 2023-08-25 PROCEDURE — 87449 NOS EACH ORGANISM AG IA: CPT

## 2023-08-25 PROCEDURE — 85520 HEPARIN ASSAY: CPT

## 2023-08-25 PROCEDURE — 2700000000 HC OXYGEN THERAPY PER DAY

## 2023-08-25 PROCEDURE — 2580000003 HC RX 258: Performed by: HOSPITALIST

## 2023-08-25 PROCEDURE — 81001 URINALYSIS AUTO W/SCOPE: CPT

## 2023-08-25 PROCEDURE — 87899 AGENT NOS ASSAY W/OPTIC: CPT

## 2023-08-25 PROCEDURE — 2500000003 HC RX 250 WO HCPCS: Performed by: HOSPITALIST

## 2023-08-25 PROCEDURE — 82607 VITAMIN B-12: CPT

## 2023-08-25 RX ORDER — INSULIN GLARGINE 100 [IU]/ML
30 INJECTION, SOLUTION SUBCUTANEOUS DAILY
Status: DISCONTINUED | OUTPATIENT
Start: 2023-08-26 | End: 2023-08-26

## 2023-08-25 RX ORDER — GUAIFENESIN 600 MG/1
600 TABLET, EXTENDED RELEASE ORAL 2 TIMES DAILY
Status: DISCONTINUED | OUTPATIENT
Start: 2023-08-25 | End: 2023-08-31 | Stop reason: HOSPADM

## 2023-08-25 RX ORDER — DEXTROSE MONOHYDRATE 100 MG/ML
INJECTION, SOLUTION INTRAVENOUS CONTINUOUS PRN
Status: DISCONTINUED | OUTPATIENT
Start: 2023-08-25 | End: 2023-08-31 | Stop reason: HOSPADM

## 2023-08-25 RX ORDER — INSULIN LISPRO 100 [IU]/ML
10 INJECTION, SOLUTION INTRAVENOUS; SUBCUTANEOUS
Status: DISCONTINUED | OUTPATIENT
Start: 2023-08-25 | End: 2023-08-28

## 2023-08-25 RX ORDER — INSULIN LISPRO 100 [IU]/ML
0-4 INJECTION, SOLUTION INTRAVENOUS; SUBCUTANEOUS NIGHTLY
Status: DISCONTINUED | OUTPATIENT
Start: 2023-08-25 | End: 2023-08-31 | Stop reason: HOSPADM

## 2023-08-25 RX ORDER — INSULIN LISPRO 100 [IU]/ML
0-8 INJECTION, SOLUTION INTRAVENOUS; SUBCUTANEOUS
Status: DISCONTINUED | OUTPATIENT
Start: 2023-08-25 | End: 2023-08-31 | Stop reason: HOSPADM

## 2023-08-25 RX ADMIN — HEPARIN SODIUM AND DEXTROSE 16 UNITS/KG/HR: 10000; 5 INJECTION INTRAVENOUS at 04:52

## 2023-08-25 RX ADMIN — SODIUM CHLORIDE, PRESERVATIVE FREE 10 ML: 5 INJECTION INTRAVENOUS at 21:03

## 2023-08-25 RX ADMIN — AZITHROMYCIN MONOHYDRATE 500 MG: 500 INJECTION, POWDER, LYOPHILIZED, FOR SOLUTION INTRAVENOUS at 11:07

## 2023-08-25 RX ADMIN — INSULIN GLARGINE 16 UNITS: 100 INJECTION, SOLUTION SUBCUTANEOUS at 08:46

## 2023-08-25 RX ADMIN — SODIUM CHLORIDE, PRESERVATIVE FREE 10 ML: 5 INJECTION INTRAVENOUS at 08:47

## 2023-08-25 RX ADMIN — BUMETANIDE 2 MG: 0.25 INJECTION INTRAMUSCULAR; INTRAVENOUS at 21:01

## 2023-08-25 RX ADMIN — HEPARIN SODIUM AND DEXTROSE 14 UNITS/KG/HR: 10000; 5 INJECTION INTRAVENOUS at 22:22

## 2023-08-25 RX ADMIN — Medication 10 UNITS: at 18:28

## 2023-08-25 RX ADMIN — Medication 10 UNITS: at 13:25

## 2023-08-25 RX ADMIN — GUAIFENESIN 600 MG: 600 TABLET, EXTENDED RELEASE ORAL at 21:00

## 2023-08-25 RX ADMIN — BUMETANIDE 2 MG: 0.25 INJECTION INTRAMUSCULAR; INTRAVENOUS at 08:47

## 2023-08-25 RX ADMIN — CEFTRIAXONE SODIUM 2000 MG: 2 INJECTION, POWDER, FOR SOLUTION INTRAMUSCULAR; INTRAVENOUS at 12:31

## 2023-08-25 RX ADMIN — GUAIFENESIN 600 MG: 600 TABLET, EXTENDED RELEASE ORAL at 14:06

## 2023-08-25 ASSESSMENT — PAIN SCALES - GENERAL
PAINLEVEL_OUTOF10: 0

## 2023-08-25 NOTE — INTERDISCIPLINARY ROUNDS
Critical care interdisciplinary rounds today. Following members present: Case Management, Clinical Lead, Diabetes Treatment, Nursing, Nutrition, Pharmacy, and Physician. Plan of care discussed. See clinical pathway for plan of care and interventions and desired outcomes.

## 2023-08-25 NOTE — CARE COORDINATION
Care Management Initial Assessment       RUR: 13% \"low risk\"  Readmission? No  1st IM letter given? Yes   1st  letter given: No     Initial note: Chart reviewed for updates. CM met with pt at bedside, introduced role, confirmed demographics were up-to date and discussed d/c planning. Pt lives with his uncle and Aunt in a 2 level home with 3 steps before getting into the front door. Pt reports 13 steps to go up the stairs to the 2nd floor. Pt is independent with ADL's at home and does not use ant DME at home. Pt uses [facility] in [place], [place]. Pt was actively driving before this admission. PT/OT has been consulted pending recommendations. Pt's Uncle or Aunt will provide transport at d/c. Complete assessment is below:     08/25/23 1402   Service Assessment   Patient Orientation Alert and Oriented   Cognition Alert   History Provided By Patient   Primary Caregiver Self   Accompanied By/Relationship N/A   Support Systems Family Members; Children   Patient's Healthcare Decision Maker is: Legal Next of Kin  (Pt's daughter Mi Jeffery [phone])   PCP Verified by CM Yes   Last Visit to PCP Within last 3 months   Prior Functional Level Independent in ADLs/IADLs   Current Functional Level Assistance with the following:;Bathing;Dressing; Toileting;Mobility   Can patient return to prior living arrangement Yes   Ability to make needs known: Good   Family able to assist with home care needs: Yes   Would you like for me to discuss the discharge plan with any other family members/significant others, and if so, who?  Yes  (Daughter)   Financial Resources Medicare   Community Resources None   Social/Functional History   Lives With Family   Type of [address]   Ambulation Assistance Independent   Transfer Assistance Independent   Active  Yes   Discharge Planning   Type of [address] [facility]

## 2023-08-26 LAB
ANION GAP SERPL CALC-SCNC: 5 MMOL/L (ref 5–15)
BACTERIA SPEC CULT: NORMAL
BUN SERPL-MCNC: 63 MG/DL (ref 6–20)
BUN/CREAT SERPL: 25 (ref 12–20)
CALCIUM SERPL-MCNC: 8.7 MG/DL (ref 8.5–10.1)
CHLORIDE SERPL-SCNC: 110 MMOL/L (ref 97–108)
CO2 SERPL-SCNC: 28 MMOL/L (ref 21–32)
COMMENT:: NORMAL
CREAT SERPL-MCNC: 2.48 MG/DL (ref 0.7–1.3)
ERYTHROCYTE [DISTWIDTH] IN BLOOD BY AUTOMATED COUNT: 15 % (ref 11.5–14.5)
GLUCOSE BLD STRIP.AUTO-MCNC: 136 MG/DL (ref 65–117)
GLUCOSE BLD STRIP.AUTO-MCNC: 223 MG/DL (ref 65–117)
GLUCOSE BLD STRIP.AUTO-MCNC: 234 MG/DL (ref 65–117)
GLUCOSE BLD STRIP.AUTO-MCNC: 91 MG/DL (ref 65–117)
GLUCOSE SERPL-MCNC: 239 MG/DL (ref 65–100)
HCT VFR BLD AUTO: 30.3 % (ref 36.6–50.3)
HGB BLD-MCNC: 9.9 G/DL (ref 12.1–17)
MAGNESIUM SERPL-MCNC: 2 MG/DL (ref 1.6–2.4)
MCH RBC QN AUTO: 28.4 PG (ref 26–34)
MCHC RBC AUTO-ENTMCNC: 32.7 G/DL (ref 30–36.5)
MCV RBC AUTO: 87.1 FL (ref 80–99)
NRBC # BLD: 0.03 K/UL (ref 0–0.01)
NRBC BLD-RTO: 0.3 PER 100 WBC
PHOSPHATE SERPL-MCNC: 3.6 MG/DL (ref 2.6–4.7)
PLATELET # BLD AUTO: 283 K/UL (ref 150–400)
PMV BLD AUTO: 10.5 FL (ref 8.9–12.9)
POTASSIUM SERPL-SCNC: 4.1 MMOL/L (ref 3.5–5.1)
PROCALCITONIN SERPL-MCNC: 8.51 NG/ML
RBC # BLD AUTO: 3.48 M/UL (ref 4.1–5.7)
SERVICE CMNT-IMP: ABNORMAL
SERVICE CMNT-IMP: NORMAL
SERVICE CMNT-IMP: NORMAL
SODIUM SERPL-SCNC: 143 MMOL/L (ref 136–145)
SPECIMEN HOLD: NORMAL
UFH PPP CHRO-ACNC: 0.42 IU/ML
WBC # BLD AUTO: 11.9 K/UL (ref 4.1–11.1)

## 2023-08-26 PROCEDURE — 80048 BASIC METABOLIC PNL TOTAL CA: CPT

## 2023-08-26 PROCEDURE — 6370000000 HC RX 637 (ALT 250 FOR IP): Performed by: INTERNAL MEDICINE

## 2023-08-26 PROCEDURE — 6360000002 HC RX W HCPCS: Performed by: INTERNAL MEDICINE

## 2023-08-26 PROCEDURE — 94664 DEMO&/EVAL PT USE INHALER: CPT

## 2023-08-26 PROCEDURE — 84145 PROCALCITONIN (PCT): CPT

## 2023-08-26 PROCEDURE — 6360000002 HC RX W HCPCS: Performed by: HOSPITALIST

## 2023-08-26 PROCEDURE — 82962 GLUCOSE BLOOD TEST: CPT

## 2023-08-26 PROCEDURE — 2000000000 HC ICU R&B

## 2023-08-26 PROCEDURE — 6370000000 HC RX 637 (ALT 250 FOR IP): Performed by: CLINICAL NURSE SPECIALIST

## 2023-08-26 PROCEDURE — 85027 COMPLETE CBC AUTOMATED: CPT

## 2023-08-26 PROCEDURE — 85520 HEPARIN ASSAY: CPT

## 2023-08-26 PROCEDURE — 2700000000 HC OXYGEN THERAPY PER DAY

## 2023-08-26 PROCEDURE — 2500000003 HC RX 250 WO HCPCS: Performed by: HOSPITALIST

## 2023-08-26 PROCEDURE — 2500000003 HC RX 250 WO HCPCS: Performed by: INTERNAL MEDICINE

## 2023-08-26 PROCEDURE — 2580000003 HC RX 258: Performed by: HOSPITALIST

## 2023-08-26 PROCEDURE — 83735 ASSAY OF MAGNESIUM: CPT

## 2023-08-26 PROCEDURE — 36415 COLL VENOUS BLD VENIPUNCTURE: CPT

## 2023-08-26 PROCEDURE — 6360000002 HC RX W HCPCS: Performed by: NURSE PRACTITIONER

## 2023-08-26 PROCEDURE — 84100 ASSAY OF PHOSPHORUS: CPT

## 2023-08-26 PROCEDURE — 94640 AIRWAY INHALATION TREATMENT: CPT

## 2023-08-26 RX ORDER — HEPARIN SODIUM 5000 [USP'U]/ML
5000 INJECTION, SOLUTION INTRAVENOUS; SUBCUTANEOUS EVERY 8 HOURS SCHEDULED
Status: DISCONTINUED | OUTPATIENT
Start: 2023-08-26 | End: 2023-08-31 | Stop reason: HOSPADM

## 2023-08-26 RX ORDER — AMLODIPINE BESYLATE 5 MG/1
10 TABLET ORAL DAILY
Status: DISCONTINUED | OUTPATIENT
Start: 2023-08-26 | End: 2023-08-31 | Stop reason: HOSPADM

## 2023-08-26 RX ORDER — BUMETANIDE 0.25 MG/ML
1 INJECTION INTRAMUSCULAR; INTRAVENOUS 2 TIMES DAILY
Status: DISCONTINUED | OUTPATIENT
Start: 2023-08-26 | End: 2023-08-31

## 2023-08-26 RX ORDER — ALBUTEROL SULFATE 2.5 MG/3ML
2.5 SOLUTION RESPIRATORY (INHALATION) EVERY 6 HOURS PRN
Status: DISCONTINUED | OUTPATIENT
Start: 2023-08-26 | End: 2023-08-31 | Stop reason: HOSPADM

## 2023-08-26 RX ORDER — INSULIN GLARGINE 100 [IU]/ML
35 INJECTION, SOLUTION SUBCUTANEOUS DAILY
Status: DISCONTINUED | OUTPATIENT
Start: 2023-08-27 | End: 2023-08-28

## 2023-08-26 RX ADMIN — Medication 10 UNITS: at 08:39

## 2023-08-26 RX ADMIN — HEPARIN SODIUM 5000 UNITS: 5000 INJECTION INTRAVENOUS; SUBCUTANEOUS at 21:47

## 2023-08-26 RX ADMIN — SODIUM CHLORIDE, PRESERVATIVE FREE 10 ML: 5 INJECTION INTRAVENOUS at 20:49

## 2023-08-26 RX ADMIN — AZITHROMYCIN MONOHYDRATE 500 MG: 500 INJECTION, POWDER, LYOPHILIZED, FOR SOLUTION INTRAVENOUS at 11:07

## 2023-08-26 RX ADMIN — AMLODIPINE BESYLATE 10 MG: 5 TABLET ORAL at 17:13

## 2023-08-26 RX ADMIN — SODIUM CHLORIDE, PRESERVATIVE FREE 10 ML: 5 INJECTION INTRAVENOUS at 08:40

## 2023-08-26 RX ADMIN — INSULIN GLARGINE 30 UNITS: 100 INJECTION, SOLUTION SUBCUTANEOUS at 08:40

## 2023-08-26 RX ADMIN — CEFTRIAXONE SODIUM 2000 MG: 2 INJECTION, POWDER, FOR SOLUTION INTRAMUSCULAR; INTRAVENOUS at 11:08

## 2023-08-26 RX ADMIN — Medication 2 UNITS: at 11:07

## 2023-08-26 RX ADMIN — BUMETANIDE 2 MG: 0.25 INJECTION INTRAMUSCULAR; INTRAVENOUS at 08:39

## 2023-08-26 RX ADMIN — GUAIFENESIN 600 MG: 600 TABLET, EXTENDED RELEASE ORAL at 20:49

## 2023-08-26 RX ADMIN — GUAIFENESIN 600 MG: 600 TABLET, EXTENDED RELEASE ORAL at 08:40

## 2023-08-26 RX ADMIN — ALBUTEROL SULFATE 2.5 MG: 2.5 SOLUTION RESPIRATORY (INHALATION) at 22:43

## 2023-08-26 RX ADMIN — HEPARIN SODIUM 5000 UNITS: 5000 INJECTION INTRAVENOUS; SUBCUTANEOUS at 15:37

## 2023-08-26 RX ADMIN — Medication 2 UNITS: at 08:39

## 2023-08-26 RX ADMIN — Medication 10 UNITS: at 11:06

## 2023-08-26 RX ADMIN — BUMETANIDE 1 MG: 0.25 INJECTION, SOLUTION INTRAMUSCULAR; INTRAVENOUS at 20:47

## 2023-08-26 ASSESSMENT — PAIN SCALES - GENERAL: PAINLEVEL_OUTOF10: 0

## 2023-08-27 LAB
ALBUMIN SERPL-MCNC: 2.5 G/DL (ref 3.5–5)
ALBUMIN/GLOB SERPL: 0.5 (ref 1.1–2.2)
ALP SERPL-CCNC: 99 U/L (ref 45–117)
ALT SERPL-CCNC: 1334 U/L (ref 12–78)
ANION GAP SERPL CALC-SCNC: 6 MMOL/L (ref 5–15)
AST SERPL-CCNC: 185 U/L (ref 15–37)
BASOPHILS # BLD: 0 K/UL (ref 0–0.1)
BASOPHILS NFR BLD: 0 % (ref 0–1)
BILIRUB DIRECT SERPL-MCNC: 0.1 MG/DL (ref 0–0.2)
BILIRUB SERPL-MCNC: 0.6 MG/DL (ref 0.2–1)
BUN SERPL-MCNC: 56 MG/DL (ref 6–20)
BUN/CREAT SERPL: 28 (ref 12–20)
CALCIUM SERPL-MCNC: 8.7 MG/DL (ref 8.5–10.1)
CHLORIDE SERPL-SCNC: 108 MMOL/L (ref 97–108)
CO2 SERPL-SCNC: 28 MMOL/L (ref 21–32)
CREAT SERPL-MCNC: 2 MG/DL (ref 0.7–1.3)
DIFFERENTIAL METHOD BLD: ABNORMAL
EOSINOPHIL # BLD: 0.4 K/UL (ref 0–0.4)
EOSINOPHIL NFR BLD: 4 % (ref 0–7)
ERYTHROCYTE [DISTWIDTH] IN BLOOD BY AUTOMATED COUNT: 14.6 % (ref 11.5–14.5)
GLOBULIN SER CALC-MCNC: 4.6 G/DL (ref 2–4)
GLUCOSE BLD STRIP.AUTO-MCNC: 131 MG/DL (ref 65–117)
GLUCOSE BLD STRIP.AUTO-MCNC: 179 MG/DL (ref 65–117)
GLUCOSE BLD STRIP.AUTO-MCNC: 228 MG/DL (ref 65–117)
GLUCOSE BLD STRIP.AUTO-MCNC: 232 MG/DL (ref 65–117)
GLUCOSE SERPL-MCNC: 234 MG/DL (ref 65–100)
HCT VFR BLD AUTO: 31.2 % (ref 36.6–50.3)
HGB BLD-MCNC: 10.2 G/DL (ref 12.1–17)
IMM GRANULOCYTES # BLD AUTO: 0 K/UL (ref 0–0.04)
IMM GRANULOCYTES NFR BLD AUTO: 0 % (ref 0–0.5)
INR PPP: 1.1 (ref 0.9–1.1)
LYMPHOCYTES # BLD: 1.7 K/UL (ref 0.8–3.5)
LYMPHOCYTES NFR BLD: 17 % (ref 12–49)
MAGNESIUM SERPL-MCNC: 2 MG/DL (ref 1.6–2.4)
MCH RBC QN AUTO: 28.4 PG (ref 26–34)
MCHC RBC AUTO-ENTMCNC: 32.7 G/DL (ref 30–36.5)
MCV RBC AUTO: 86.9 FL (ref 80–99)
METAMYELOCYTES NFR BLD MANUAL: 3 %
MONOCYTES # BLD: 0.6 K/UL (ref 0–1)
MONOCYTES NFR BLD: 6 % (ref 5–13)
NEUTS SEG # BLD: 6.9 K/UL (ref 1.8–8)
NEUTS SEG NFR BLD: 70 % (ref 32–75)
NRBC # BLD: 0.03 K/UL (ref 0–0.01)
NRBC BLD-RTO: 0.3 PER 100 WBC
PHOSPHATE SERPL-MCNC: 4.8 MG/DL (ref 2.6–4.7)
PLATELET # BLD AUTO: 278 K/UL (ref 150–400)
PMV BLD AUTO: 10.6 FL (ref 8.9–12.9)
POTASSIUM SERPL-SCNC: 4.1 MMOL/L (ref 3.5–5.1)
PROT SERPL-MCNC: 7.1 G/DL (ref 6.4–8.2)
PROTHROMBIN TIME: 11.4 SEC (ref 9–11.1)
RBC # BLD AUTO: 3.59 M/UL (ref 4.1–5.7)
RBC MORPH BLD: ABNORMAL
SERVICE CMNT-IMP: ABNORMAL
SODIUM SERPL-SCNC: 142 MMOL/L (ref 136–145)
UFH PPP CHRO-ACNC: <0.1 IU/ML
WBC # BLD AUTO: 9.9 K/UL (ref 4.1–11.1)

## 2023-08-27 PROCEDURE — 85520 HEPARIN ASSAY: CPT

## 2023-08-27 PROCEDURE — 94640 AIRWAY INHALATION TREATMENT: CPT

## 2023-08-27 PROCEDURE — 6370000000 HC RX 637 (ALT 250 FOR IP): Performed by: INTERNAL MEDICINE

## 2023-08-27 PROCEDURE — 80048 BASIC METABOLIC PNL TOTAL CA: CPT

## 2023-08-27 PROCEDURE — 2000000000 HC ICU R&B

## 2023-08-27 PROCEDURE — 6370000000 HC RX 637 (ALT 250 FOR IP): Performed by: HOSPITALIST

## 2023-08-27 PROCEDURE — 82962 GLUCOSE BLOOD TEST: CPT

## 2023-08-27 PROCEDURE — 6370000000 HC RX 637 (ALT 250 FOR IP): Performed by: CLINICAL NURSE SPECIALIST

## 2023-08-27 PROCEDURE — 6360000002 HC RX W HCPCS: Performed by: HOSPITALIST

## 2023-08-27 PROCEDURE — 2580000003 HC RX 258: Performed by: HOSPITALIST

## 2023-08-27 PROCEDURE — 6360000002 HC RX W HCPCS: Performed by: INTERNAL MEDICINE

## 2023-08-27 PROCEDURE — 6360000002 HC RX W HCPCS: Performed by: NURSE PRACTITIONER

## 2023-08-27 PROCEDURE — 36415 COLL VENOUS BLD VENIPUNCTURE: CPT

## 2023-08-27 PROCEDURE — 80076 HEPATIC FUNCTION PANEL: CPT

## 2023-08-27 PROCEDURE — 83735 ASSAY OF MAGNESIUM: CPT

## 2023-08-27 PROCEDURE — 85025 COMPLETE CBC W/AUTO DIFF WBC: CPT

## 2023-08-27 PROCEDURE — 85610 PROTHROMBIN TIME: CPT

## 2023-08-27 PROCEDURE — 2700000000 HC OXYGEN THERAPY PER DAY

## 2023-08-27 PROCEDURE — 84100 ASSAY OF PHOSPHORUS: CPT

## 2023-08-27 PROCEDURE — 2500000003 HC RX 250 WO HCPCS: Performed by: INTERNAL MEDICINE

## 2023-08-27 RX ADMIN — Medication 10 UNITS: at 12:37

## 2023-08-27 RX ADMIN — BUMETANIDE 1 MG: 0.25 INJECTION, SOLUTION INTRAMUSCULAR; INTRAVENOUS at 20:50

## 2023-08-27 RX ADMIN — INSULIN GLARGINE 35 UNITS: 100 INJECTION, SOLUTION SUBCUTANEOUS at 09:20

## 2023-08-27 RX ADMIN — SODIUM CHLORIDE, PRESERVATIVE FREE 10 ML: 5 INJECTION INTRAVENOUS at 09:21

## 2023-08-27 RX ADMIN — ACETAMINOPHEN 650 MG: 325 TABLET ORAL at 09:25

## 2023-08-27 RX ADMIN — CEFTRIAXONE SODIUM 2000 MG: 2 INJECTION, POWDER, FOR SOLUTION INTRAMUSCULAR; INTRAVENOUS at 12:34

## 2023-08-27 RX ADMIN — HEPARIN SODIUM 5000 UNITS: 5000 INJECTION INTRAVENOUS; SUBCUTANEOUS at 14:25

## 2023-08-27 RX ADMIN — AMLODIPINE BESYLATE 10 MG: 5 TABLET ORAL at 09:20

## 2023-08-27 RX ADMIN — GUAIFENESIN 600 MG: 600 TABLET, EXTENDED RELEASE ORAL at 09:21

## 2023-08-27 RX ADMIN — ALBUTEROL SULFATE 2.5 MG: 2.5 SOLUTION RESPIRATORY (INHALATION) at 05:52

## 2023-08-27 RX ADMIN — HEPARIN SODIUM 5000 UNITS: 5000 INJECTION INTRAVENOUS; SUBCUTANEOUS at 06:11

## 2023-08-27 RX ADMIN — AZITHROMYCIN MONOHYDRATE 500 MG: 500 INJECTION, POWDER, LYOPHILIZED, FOR SOLUTION INTRAVENOUS at 12:34

## 2023-08-27 RX ADMIN — Medication 2 UNITS: at 12:37

## 2023-08-27 RX ADMIN — Medication 2 UNITS: at 07:55

## 2023-08-27 RX ADMIN — Medication 10 UNITS: at 17:33

## 2023-08-27 RX ADMIN — HEPARIN SODIUM 5000 UNITS: 5000 INJECTION INTRAVENOUS; SUBCUTANEOUS at 22:04

## 2023-08-27 RX ADMIN — GUAIFENESIN 600 MG: 600 TABLET, EXTENDED RELEASE ORAL at 20:54

## 2023-08-27 RX ADMIN — ONDANSETRON 4 MG: 2 INJECTION INTRAMUSCULAR; INTRAVENOUS at 17:58

## 2023-08-27 RX ADMIN — SODIUM CHLORIDE, PRESERVATIVE FREE 10 ML: 5 INJECTION INTRAVENOUS at 20:54

## 2023-08-27 RX ADMIN — Medication 10 UNITS: at 07:55

## 2023-08-27 RX ADMIN — BUMETANIDE 1 MG: 0.25 INJECTION, SOLUTION INTRAMUSCULAR; INTRAVENOUS at 09:20

## 2023-08-27 ASSESSMENT — PAIN DESCRIPTION - DESCRIPTORS: DESCRIPTORS: ACHING

## 2023-08-27 ASSESSMENT — PAIN DESCRIPTION - LOCATION: LOCATION: HEAD

## 2023-08-27 ASSESSMENT — PAIN SCALES - GENERAL: PAINLEVEL_OUTOF10: 4

## 2023-08-28 ENCOUNTER — APPOINTMENT (OUTPATIENT)
Facility: HOSPITAL | Age: 53
DRG: 193 | End: 2023-08-28
Payer: MEDICARE

## 2023-08-28 LAB
ALBUMIN SERPL-MCNC: 2.7 G/DL (ref 3.5–5)
ALBUMIN/GLOB SERPL: 0.6 (ref 1.1–2.2)
ALP SERPL-CCNC: 96 U/L (ref 45–117)
ALT SERPL-CCNC: 870 U/L (ref 12–78)
ANION GAP SERPL CALC-SCNC: 5 MMOL/L (ref 5–15)
AST SERPL-CCNC: 81 U/L (ref 15–37)
BASOPHILS # BLD: 0.1 K/UL (ref 0–0.1)
BASOPHILS NFR BLD: 1 % (ref 0–1)
BILIRUB SERPL-MCNC: 0.4 MG/DL (ref 0.2–1)
BUN SERPL-MCNC: 50 MG/DL (ref 6–20)
BUN/CREAT SERPL: 31 (ref 12–20)
CALCIUM SERPL-MCNC: 9 MG/DL (ref 8.5–10.1)
CHLORIDE SERPL-SCNC: 110 MMOL/L (ref 97–108)
CO2 SERPL-SCNC: 29 MMOL/L (ref 21–32)
CREAT SERPL-MCNC: 1.63 MG/DL (ref 0.7–1.3)
DIFFERENTIAL METHOD BLD: ABNORMAL
EOSINOPHIL # BLD: 0.6 K/UL (ref 0–0.4)
EOSINOPHIL NFR BLD: 6 % (ref 0–7)
ERYTHROCYTE [DISTWIDTH] IN BLOOD BY AUTOMATED COUNT: 14.4 % (ref 11.5–14.5)
FLUID CULTURE: NORMAL
FLUID CULTURE: NORMAL
GLOBULIN SER CALC-MCNC: 4.3 G/DL (ref 2–4)
GLUCOSE BLD STRIP.AUTO-MCNC: 165 MG/DL (ref 65–117)
GLUCOSE BLD STRIP.AUTO-MCNC: 175 MG/DL (ref 65–117)
GLUCOSE BLD STRIP.AUTO-MCNC: 221 MG/DL (ref 65–117)
GLUCOSE BLD STRIP.AUTO-MCNC: 96 MG/DL (ref 65–117)
GLUCOSE SERPL-MCNC: 222 MG/DL (ref 65–100)
HCT VFR BLD AUTO: 31.8 % (ref 36.6–50.3)
HGB BLD-MCNC: 10.3 G/DL (ref 12.1–17)
IMM GRANULOCYTES # BLD AUTO: 0 K/UL (ref 0–0.04)
IMM GRANULOCYTES NFR BLD AUTO: 0 % (ref 0–0.5)
INR PPP: 1.1 (ref 0.9–1.1)
L PNEUMO1 AG UR QL IA: NEGATIVE
L PNEUMO1 AG UR QL IA: NEGATIVE
LYMPHOCYTES # BLD: 2 K/UL (ref 0.8–3.5)
LYMPHOCYTES NFR BLD: 20 % (ref 12–49)
Lab: NORMAL
Lab: NORMAL
MCH RBC QN AUTO: 28.1 PG (ref 26–34)
MCHC RBC AUTO-ENTMCNC: 32.4 G/DL (ref 30–36.5)
MCV RBC AUTO: 86.6 FL (ref 80–99)
METAMYELOCYTES NFR BLD MANUAL: 1 %
MONOCYTES # BLD: 0.5 K/UL (ref 0–1)
MONOCYTES NFR BLD: 5 % (ref 5–13)
MYELOCYTES NFR BLD MANUAL: 1 %
NEUTS SEG # BLD: 6.7 K/UL (ref 1.8–8)
NEUTS SEG NFR BLD: 66 % (ref 32–75)
NRBC # BLD: 0.02 K/UL (ref 0–0.01)
NRBC BLD-RTO: 0.2 PER 100 WBC
ORGANISM ID: NORMAL
ORGANISM ID: NORMAL
PLATELET # BLD AUTO: 287 K/UL (ref 150–400)
PMV BLD AUTO: 10.2 FL (ref 8.9–12.9)
POTASSIUM SERPL-SCNC: 4 MMOL/L (ref 3.5–5.1)
PROCALCITONIN SERPL-MCNC: 2.31 NG/ML
PROT SERPL-MCNC: 7 G/DL (ref 6.4–8.2)
PROTHROMBIN TIME: 11.1 SEC (ref 9–11.1)
RBC # BLD AUTO: 3.67 M/UL (ref 4.1–5.7)
RBC MORPH BLD: ABNORMAL
S PNEUM AG SPEC QL LA: NEGATIVE
S PNEUM AG SPEC QL LA: NEGATIVE
SERVICE CMNT-IMP: ABNORMAL
SERVICE CMNT-IMP: NORMAL
SODIUM SERPL-SCNC: 144 MMOL/L (ref 136–145)
SPECIMEN SOURCE: NORMAL
SPECIMEN: NORMAL
SPECIMEN: NORMAL
WBC # BLD AUTO: 10.2 K/UL (ref 4.1–11.1)

## 2023-08-28 PROCEDURE — 85610 PROTHROMBIN TIME: CPT

## 2023-08-28 PROCEDURE — 2580000003 HC RX 258: Performed by: INTERNAL MEDICINE

## 2023-08-28 PROCEDURE — 6370000000 HC RX 637 (ALT 250 FOR IP): Performed by: INTERNAL MEDICINE

## 2023-08-28 PROCEDURE — 2500000003 HC RX 250 WO HCPCS: Performed by: INTERNAL MEDICINE

## 2023-08-28 PROCEDURE — 80053 COMPREHEN METABOLIC PANEL: CPT

## 2023-08-28 PROCEDURE — 97116 GAIT TRAINING THERAPY: CPT

## 2023-08-28 PROCEDURE — 6370000000 HC RX 637 (ALT 250 FOR IP): Performed by: CLINICAL NURSE SPECIALIST

## 2023-08-28 PROCEDURE — 6370000000 HC RX 637 (ALT 250 FOR IP): Performed by: HOSPITALIST

## 2023-08-28 PROCEDURE — 1100000003 HC PRIVATE W/ TELEMETRY

## 2023-08-28 PROCEDURE — 6360000002 HC RX W HCPCS: Performed by: INTERNAL MEDICINE

## 2023-08-28 PROCEDURE — 84145 PROCALCITONIN (PCT): CPT

## 2023-08-28 PROCEDURE — 82962 GLUCOSE BLOOD TEST: CPT

## 2023-08-28 PROCEDURE — 2580000003 HC RX 258: Performed by: HOSPITALIST

## 2023-08-28 PROCEDURE — 97535 SELF CARE MNGMENT TRAINING: CPT

## 2023-08-28 PROCEDURE — 6360000002 HC RX W HCPCS: Performed by: HOSPITALIST

## 2023-08-28 PROCEDURE — 36415 COLL VENOUS BLD VENIPUNCTURE: CPT

## 2023-08-28 PROCEDURE — 2700000000 HC OXYGEN THERAPY PER DAY

## 2023-08-28 PROCEDURE — 71045 X-RAY EXAM CHEST 1 VIEW: CPT

## 2023-08-28 PROCEDURE — 6370000000 HC RX 637 (ALT 250 FOR IP): Performed by: NURSE PRACTITIONER

## 2023-08-28 PROCEDURE — 99232 SBSQ HOSP IP/OBS MODERATE 35: CPT | Performed by: CLINICAL NURSE SPECIALIST

## 2023-08-28 PROCEDURE — 85025 COMPLETE CBC W/AUTO DIFF WBC: CPT

## 2023-08-28 RX ORDER — INSULIN LISPRO 100 [IU]/ML
12 INJECTION, SOLUTION INTRAVENOUS; SUBCUTANEOUS
Status: DISCONTINUED | OUTPATIENT
Start: 2023-08-28 | End: 2023-08-31 | Stop reason: HOSPADM

## 2023-08-28 RX ORDER — INSULIN GLARGINE 100 [IU]/ML
45 INJECTION, SOLUTION SUBCUTANEOUS DAILY
Status: DISCONTINUED | OUTPATIENT
Start: 2023-08-29 | End: 2023-08-31

## 2023-08-28 RX ORDER — PREGABALIN 25 MG/1
75 CAPSULE ORAL 2 TIMES DAILY
Status: DISCONTINUED | OUTPATIENT
Start: 2023-08-28 | End: 2023-08-31 | Stop reason: HOSPADM

## 2023-08-28 RX ADMIN — AZITHROMYCIN MONOHYDRATE 500 MG: 500 INJECTION, POWDER, LYOPHILIZED, FOR SOLUTION INTRAVENOUS at 12:54

## 2023-08-28 RX ADMIN — PREGABALIN 75 MG: 75 CAPSULE ORAL at 11:04

## 2023-08-28 RX ADMIN — CEFTRIAXONE SODIUM 2000 MG: 2 INJECTION, POWDER, FOR SOLUTION INTRAMUSCULAR; INTRAVENOUS at 12:18

## 2023-08-28 RX ADMIN — Medication 1 LOZENGE: at 04:40

## 2023-08-28 RX ADMIN — SODIUM CHLORIDE, PRESERVATIVE FREE 10 ML: 5 INJECTION INTRAVENOUS at 22:12

## 2023-08-28 RX ADMIN — GUAIFENESIN 600 MG: 600 TABLET, EXTENDED RELEASE ORAL at 22:11

## 2023-08-28 RX ADMIN — BUMETANIDE 1 MG: 0.25 INJECTION, SOLUTION INTRAMUSCULAR; INTRAVENOUS at 09:11

## 2023-08-28 RX ADMIN — POLYETHYLENE GLYCOL 3350 17 G: 17 POWDER, FOR SOLUTION ORAL at 09:16

## 2023-08-28 RX ADMIN — ONDANSETRON 4 MG: 2 INJECTION INTRAMUSCULAR; INTRAVENOUS at 08:11

## 2023-08-28 RX ADMIN — AMLODIPINE BESYLATE 10 MG: 5 TABLET ORAL at 09:15

## 2023-08-28 RX ADMIN — Medication 10 UNITS: at 12:29

## 2023-08-28 RX ADMIN — HEPARIN SODIUM 5000 UNITS: 5000 INJECTION INTRAVENOUS; SUBCUTANEOUS at 14:06

## 2023-08-28 RX ADMIN — Medication 10 UNITS: at 09:09

## 2023-08-28 RX ADMIN — HEPARIN SODIUM 5000 UNITS: 5000 INJECTION INTRAVENOUS; SUBCUTANEOUS at 22:12

## 2023-08-28 RX ADMIN — PREGABALIN 75 MG: 75 CAPSULE ORAL at 22:10

## 2023-08-28 RX ADMIN — INSULIN GLARGINE 35 UNITS: 100 INJECTION, SOLUTION SUBCUTANEOUS at 09:10

## 2023-08-28 RX ADMIN — SODIUM CHLORIDE, PRESERVATIVE FREE 10 ML: 5 INJECTION INTRAVENOUS at 09:17

## 2023-08-28 RX ADMIN — HEPARIN SODIUM 5000 UNITS: 5000 INJECTION INTRAVENOUS; SUBCUTANEOUS at 05:51

## 2023-08-28 RX ADMIN — GUAIFENESIN 600 MG: 600 TABLET, EXTENDED RELEASE ORAL at 09:16

## 2023-08-28 RX ADMIN — Medication 2 UNITS: at 09:16

## 2023-08-28 RX ADMIN — BUMETANIDE 1 MG: 0.25 INJECTION, SOLUTION INTRAMUSCULAR; INTRAVENOUS at 22:13

## 2023-08-28 ASSESSMENT — PAIN DESCRIPTION - DESCRIPTORS
DESCRIPTORS: SHARP
DESCRIPTORS: SHARP
DESCRIPTORS: ACHING

## 2023-08-28 ASSESSMENT — PAIN DESCRIPTION - LOCATION
LOCATION: GENERALIZED
LOCATION: ARM;LEG

## 2023-08-28 ASSESSMENT — PAIN - FUNCTIONAL ASSESSMENT: PAIN_FUNCTIONAL_ASSESSMENT: ACTIVITIES ARE NOT PREVENTED

## 2023-08-28 ASSESSMENT — PAIN SCALES - GENERAL
PAINLEVEL_OUTOF10: 7
PAINLEVEL_OUTOF10: 5
PAINLEVEL_OUTOF10: 7

## 2023-08-28 NOTE — INTERDISCIPLINARY ROUNDS
Critical care interdisciplinary rounds today. Following members present: Case Management,  , Diabetes Treatment, Nursing, Nutrition, Pharmacy, and Physician. Plan of care discussed. See clinical pathway for plan of care and interventions and desired outcomes.

## 2023-08-29 LAB
ANION GAP SERPL CALC-SCNC: 5 MMOL/L (ref 5–15)
BUN SERPL-MCNC: 46 MG/DL (ref 6–20)
BUN/CREAT SERPL: 27 (ref 12–20)
CALCIUM SERPL-MCNC: 8.8 MG/DL (ref 8.5–10.1)
CHLORIDE SERPL-SCNC: 108 MMOL/L (ref 97–108)
CO2 SERPL-SCNC: 27 MMOL/L (ref 21–32)
CREAT SERPL-MCNC: 1.69 MG/DL (ref 0.7–1.3)
GLUCOSE BLD STRIP.AUTO-MCNC: 150 MG/DL (ref 65–117)
GLUCOSE BLD STRIP.AUTO-MCNC: 175 MG/DL (ref 65–117)
GLUCOSE BLD STRIP.AUTO-MCNC: 197 MG/DL (ref 65–117)
GLUCOSE BLD STRIP.AUTO-MCNC: 203 MG/DL (ref 65–117)
GLUCOSE BLD STRIP.AUTO-MCNC: 56 MG/DL (ref 65–117)
GLUCOSE BLD STRIP.AUTO-MCNC: 65 MG/DL (ref 65–117)
GLUCOSE BLD STRIP.AUTO-MCNC: 67 MG/DL (ref 65–117)
GLUCOSE BLD STRIP.AUTO-MCNC: 67 MG/DL (ref 65–117)
GLUCOSE SERPL-MCNC: 198 MG/DL (ref 65–100)
POTASSIUM SERPL-SCNC: 4.1 MMOL/L (ref 3.5–5.1)
SERVICE CMNT-IMP: ABNORMAL
SERVICE CMNT-IMP: NORMAL
SODIUM SERPL-SCNC: 140 MMOL/L (ref 136–145)

## 2023-08-29 PROCEDURE — 97535 SELF CARE MNGMENT TRAINING: CPT

## 2023-08-29 PROCEDURE — 1100000003 HC PRIVATE W/ TELEMETRY

## 2023-08-29 PROCEDURE — 80048 BASIC METABOLIC PNL TOTAL CA: CPT

## 2023-08-29 PROCEDURE — 2580000003 HC RX 258: Performed by: HOSPITALIST

## 2023-08-29 PROCEDURE — 2580000003 HC RX 258: Performed by: CLINICAL NURSE SPECIALIST

## 2023-08-29 PROCEDURE — 6370000000 HC RX 637 (ALT 250 FOR IP): Performed by: NURSE PRACTITIONER

## 2023-08-29 PROCEDURE — 6370000000 HC RX 637 (ALT 250 FOR IP): Performed by: INTERNAL MEDICINE

## 2023-08-29 PROCEDURE — 99232 SBSQ HOSP IP/OBS MODERATE 35: CPT | Performed by: CLINICAL NURSE SPECIALIST

## 2023-08-29 PROCEDURE — 2700000000 HC OXYGEN THERAPY PER DAY

## 2023-08-29 PROCEDURE — 36415 COLL VENOUS BLD VENIPUNCTURE: CPT

## 2023-08-29 PROCEDURE — 6370000000 HC RX 637 (ALT 250 FOR IP): Performed by: CLINICAL NURSE SPECIALIST

## 2023-08-29 PROCEDURE — 6360000002 HC RX W HCPCS: Performed by: INTERNAL MEDICINE

## 2023-08-29 PROCEDURE — 82962 GLUCOSE BLOOD TEST: CPT

## 2023-08-29 PROCEDURE — 2580000003 HC RX 258: Performed by: INTERNAL MEDICINE

## 2023-08-29 PROCEDURE — 2500000003 HC RX 250 WO HCPCS: Performed by: INTERNAL MEDICINE

## 2023-08-29 PROCEDURE — 6370000000 HC RX 637 (ALT 250 FOR IP)

## 2023-08-29 RX ORDER — PREGABALIN 75 MG/1
CAPSULE ORAL
Status: COMPLETED
Start: 2023-08-29 | End: 2023-08-29

## 2023-08-29 RX ORDER — FAMOTIDINE 20 MG/1
20 TABLET, FILM COATED ORAL 2 TIMES DAILY
Status: DISCONTINUED | OUTPATIENT
Start: 2023-08-29 | End: 2023-08-31 | Stop reason: HOSPADM

## 2023-08-29 RX ORDER — ALUMINA, MAGNESIA, AND SIMETHICONE 2400; 2400; 240 MG/30ML; MG/30ML; MG/30ML
15 SUSPENSION ORAL EVERY 6 HOURS PRN
Status: DISCONTINUED | OUTPATIENT
Start: 2023-08-29 | End: 2023-08-31 | Stop reason: HOSPADM

## 2023-08-29 RX ADMIN — PREGABALIN 75 MG: 75 CAPSULE ORAL at 20:54

## 2023-08-29 RX ADMIN — BUMETANIDE 1 MG: 0.25 INJECTION, SOLUTION INTRAMUSCULAR; INTRAVENOUS at 20:52

## 2023-08-29 RX ADMIN — HEPARIN SODIUM 5000 UNITS: 5000 INJECTION INTRAVENOUS; SUBCUTANEOUS at 06:05

## 2023-08-29 RX ADMIN — CEFTRIAXONE SODIUM 2000 MG: 2 INJECTION, POWDER, FOR SOLUTION INTRAMUSCULAR; INTRAVENOUS at 12:29

## 2023-08-29 RX ADMIN — HEPARIN SODIUM 5000 UNITS: 5000 INJECTION INTRAVENOUS; SUBCUTANEOUS at 21:36

## 2023-08-29 RX ADMIN — SODIUM CHLORIDE, PRESERVATIVE FREE 10 ML: 5 INJECTION INTRAVENOUS at 08:41

## 2023-08-29 RX ADMIN — AMLODIPINE BESYLATE 10 MG: 5 TABLET ORAL at 08:40

## 2023-08-29 RX ADMIN — FAMOTIDINE 20 MG: 20 TABLET ORAL at 21:35

## 2023-08-29 RX ADMIN — GUAIFENESIN 600 MG: 600 TABLET, EXTENDED RELEASE ORAL at 08:40

## 2023-08-29 RX ADMIN — DEXTROSE MONOHYDRATE 125 ML: 100 INJECTION, SOLUTION INTRAVENOUS at 17:51

## 2023-08-29 RX ADMIN — PREGABALIN 75 MG: 75 CAPSULE ORAL at 08:40

## 2023-08-29 RX ADMIN — GUAIFENESIN 600 MG: 600 TABLET, EXTENDED RELEASE ORAL at 20:54

## 2023-08-29 RX ADMIN — HEPARIN SODIUM 5000 UNITS: 5000 INJECTION INTRAVENOUS; SUBCUTANEOUS at 14:44

## 2023-08-29 RX ADMIN — INSULIN GLARGINE 45 UNITS: 100 INJECTION, SOLUTION SUBCUTANEOUS at 08:39

## 2023-08-29 RX ADMIN — BUMETANIDE 1 MG: 0.25 INJECTION, SOLUTION INTRAMUSCULAR; INTRAVENOUS at 08:39

## 2023-08-29 RX ADMIN — Medication 16 G: at 17:30

## 2023-08-29 RX ADMIN — Medication 12 UNITS: at 12:29

## 2023-08-29 RX ADMIN — Medication 12 UNITS: at 08:45

## 2023-08-29 RX ADMIN — SODIUM CHLORIDE, PRESERVATIVE FREE 10 ML: 5 INJECTION INTRAVENOUS at 20:51

## 2023-08-29 ASSESSMENT — PAIN SCALES - GENERAL
PAINLEVEL_OUTOF10: 8
PAINLEVEL_OUTOF10: 4
PAINLEVEL_OUTOF10: 0
PAINLEVEL_OUTOF10: 4
PAINLEVEL_OUTOF10: 0

## 2023-08-29 ASSESSMENT — PAIN DESCRIPTION - LOCATION
LOCATION: ARM;LEG
LOCATION: ARM;LEG
LOCATION: ABDOMEN

## 2023-08-29 ASSESSMENT — PAIN DESCRIPTION - DESCRIPTORS
DESCRIPTORS: DISCOMFORT
DESCRIPTORS: ACHING
DESCRIPTORS: ACHING

## 2023-08-29 ASSESSMENT — PAIN DESCRIPTION - PAIN TYPE: TYPE: ACUTE PAIN

## 2023-08-29 ASSESSMENT — PAIN DESCRIPTION - ONSET: ONSET: GRADUAL

## 2023-08-29 ASSESSMENT — PAIN - FUNCTIONAL ASSESSMENT
PAIN_FUNCTIONAL_ASSESSMENT: ACTIVITIES ARE NOT PREVENTED

## 2023-08-29 ASSESSMENT — PAIN DESCRIPTION - ORIENTATION: ORIENTATION: MID

## 2023-08-29 ASSESSMENT — PAIN DESCRIPTION - FREQUENCY: FREQUENCY: INTERMITTENT

## 2023-08-30 ENCOUNTER — APPOINTMENT (OUTPATIENT)
Facility: HOSPITAL | Age: 53
DRG: 193 | End: 2023-08-30
Payer: MEDICARE

## 2023-08-30 LAB
ANION GAP SERPL CALC-SCNC: 5 MMOL/L (ref 5–15)
BUN SERPL-MCNC: 41 MG/DL (ref 6–20)
BUN/CREAT SERPL: 26 (ref 12–20)
CALCIUM SERPL-MCNC: 8.9 MG/DL (ref 8.5–10.1)
CHLORIDE SERPL-SCNC: 108 MMOL/L (ref 97–108)
CO2 SERPL-SCNC: 28 MMOL/L (ref 21–32)
CREAT SERPL-MCNC: 1.56 MG/DL (ref 0.7–1.3)
ERYTHROCYTE [DISTWIDTH] IN BLOOD BY AUTOMATED COUNT: 14.8 % (ref 11.5–14.5)
GLUCOSE BLD STRIP.AUTO-MCNC: 105 MG/DL (ref 65–117)
GLUCOSE BLD STRIP.AUTO-MCNC: 143 MG/DL (ref 65–117)
GLUCOSE BLD STRIP.AUTO-MCNC: 65 MG/DL (ref 65–117)
GLUCOSE SERPL-MCNC: 180 MG/DL (ref 65–100)
HCT VFR BLD AUTO: 32.2 % (ref 36.6–50.3)
HGB BLD-MCNC: 10.5 G/DL (ref 12.1–17)
MCH RBC QN AUTO: 28.5 PG (ref 26–34)
MCHC RBC AUTO-ENTMCNC: 32.6 G/DL (ref 30–36.5)
MCV RBC AUTO: 87.3 FL (ref 80–99)
NRBC # BLD: 0 K/UL (ref 0–0.01)
NRBC BLD-RTO: 0 PER 100 WBC
PLATELET # BLD AUTO: 270 K/UL (ref 150–400)
PMV BLD AUTO: 10.4 FL (ref 8.9–12.9)
POTASSIUM SERPL-SCNC: 3.9 MMOL/L (ref 3.5–5.1)
RBC # BLD AUTO: 3.69 M/UL (ref 4.1–5.7)
SERVICE CMNT-IMP: ABNORMAL
SERVICE CMNT-IMP: NORMAL
SERVICE CMNT-IMP: NORMAL
SODIUM SERPL-SCNC: 141 MMOL/L (ref 136–145)
WBC # BLD AUTO: 9.6 K/UL (ref 4.1–11.1)

## 2023-08-30 PROCEDURE — 71275 CT ANGIOGRAPHY CHEST: CPT

## 2023-08-30 PROCEDURE — 6360000002 HC RX W HCPCS: Performed by: INTERNAL MEDICINE

## 2023-08-30 PROCEDURE — 2700000000 HC OXYGEN THERAPY PER DAY

## 2023-08-30 PROCEDURE — 80048 BASIC METABOLIC PNL TOTAL CA: CPT

## 2023-08-30 PROCEDURE — 82962 GLUCOSE BLOOD TEST: CPT

## 2023-08-30 PROCEDURE — 2580000003 HC RX 258: Performed by: INTERNAL MEDICINE

## 2023-08-30 PROCEDURE — 6370000000 HC RX 637 (ALT 250 FOR IP): Performed by: NURSE PRACTITIONER

## 2023-08-30 PROCEDURE — 36415 COLL VENOUS BLD VENIPUNCTURE: CPT

## 2023-08-30 PROCEDURE — 6360000004 HC RX CONTRAST MEDICATION: Performed by: PHYSICIAN ASSISTANT

## 2023-08-30 PROCEDURE — 85027 COMPLETE CBC AUTOMATED: CPT

## 2023-08-30 PROCEDURE — 6370000000 HC RX 637 (ALT 250 FOR IP): Performed by: INTERNAL MEDICINE

## 2023-08-30 PROCEDURE — 1100000003 HC PRIVATE W/ TELEMETRY

## 2023-08-30 PROCEDURE — 2500000003 HC RX 250 WO HCPCS: Performed by: INTERNAL MEDICINE

## 2023-08-30 PROCEDURE — 2580000003 HC RX 258: Performed by: HOSPITALIST

## 2023-08-30 PROCEDURE — 99232 SBSQ HOSP IP/OBS MODERATE 35: CPT | Performed by: CLINICAL NURSE SPECIALIST

## 2023-08-30 PROCEDURE — 6360000002 HC RX W HCPCS: Performed by: HOSPITALIST

## 2023-08-30 PROCEDURE — 97530 THERAPEUTIC ACTIVITIES: CPT

## 2023-08-30 PROCEDURE — 97116 GAIT TRAINING THERAPY: CPT

## 2023-08-30 PROCEDURE — 6370000000 HC RX 637 (ALT 250 FOR IP): Performed by: CLINICAL NURSE SPECIALIST

## 2023-08-30 RX ADMIN — GUAIFENESIN 600 MG: 600 TABLET, EXTENDED RELEASE ORAL at 08:14

## 2023-08-30 RX ADMIN — IOPAMIDOL 100 ML: 755 INJECTION, SOLUTION INTRAVENOUS at 15:32

## 2023-08-30 RX ADMIN — INSULIN GLARGINE 45 UNITS: 100 INJECTION, SOLUTION SUBCUTANEOUS at 08:15

## 2023-08-30 RX ADMIN — HEPARIN SODIUM 5000 UNITS: 5000 INJECTION INTRAVENOUS; SUBCUTANEOUS at 06:27

## 2023-08-30 RX ADMIN — SODIUM CHLORIDE, PRESERVATIVE FREE 10 ML: 5 INJECTION INTRAVENOUS at 21:02

## 2023-08-30 RX ADMIN — FAMOTIDINE 20 MG: 20 TABLET ORAL at 08:14

## 2023-08-30 RX ADMIN — PREGABALIN 75 MG: 75 CAPSULE ORAL at 08:14

## 2023-08-30 RX ADMIN — PREGABALIN 75 MG: 75 CAPSULE ORAL at 21:01

## 2023-08-30 RX ADMIN — AMLODIPINE BESYLATE 10 MG: 5 TABLET ORAL at 08:14

## 2023-08-30 RX ADMIN — Medication 12 UNITS: at 11:06

## 2023-08-30 RX ADMIN — BUMETANIDE 1 MG: 0.25 INJECTION, SOLUTION INTRAMUSCULAR; INTRAVENOUS at 08:14

## 2023-08-30 RX ADMIN — GUAIFENESIN 600 MG: 600 TABLET, EXTENDED RELEASE ORAL at 21:01

## 2023-08-30 RX ADMIN — BUMETANIDE 1 MG: 0.25 INJECTION, SOLUTION INTRAMUSCULAR; INTRAVENOUS at 21:00

## 2023-08-30 RX ADMIN — HEPARIN SODIUM 5000 UNITS: 5000 INJECTION INTRAVENOUS; SUBCUTANEOUS at 21:01

## 2023-08-30 RX ADMIN — HEPARIN SODIUM 5000 UNITS: 5000 INJECTION INTRAVENOUS; SUBCUTANEOUS at 13:11

## 2023-08-30 RX ADMIN — CEFTRIAXONE SODIUM 2000 MG: 2 INJECTION, POWDER, FOR SOLUTION INTRAMUSCULAR; INTRAVENOUS at 12:02

## 2023-08-30 RX ADMIN — SODIUM CHLORIDE, PRESERVATIVE FREE 10 ML: 5 INJECTION INTRAVENOUS at 08:15

## 2023-08-30 RX ADMIN — Medication 12 UNITS: at 08:15

## 2023-08-30 RX ADMIN — ONDANSETRON 4 MG: 2 INJECTION INTRAMUSCULAR; INTRAVENOUS at 11:01

## 2023-08-30 RX ADMIN — FAMOTIDINE 20 MG: 20 TABLET ORAL at 21:01

## 2023-08-30 ASSESSMENT — PAIN SCALES - GENERAL
PAINLEVEL_OUTOF10: 0
PAINLEVEL_OUTOF10: 0

## 2023-08-31 VITALS
WEIGHT: 251.54 LBS | HEART RATE: 94 BPM | SYSTOLIC BLOOD PRESSURE: 113 MMHG | DIASTOLIC BLOOD PRESSURE: 74 MMHG | BODY MASS INDEX: 36.01 KG/M2 | TEMPERATURE: 98.9 F | HEIGHT: 70 IN | RESPIRATION RATE: 16 BRPM | OXYGEN SATURATION: 93 %

## 2023-08-31 LAB
BACTERIA SPEC CULT: NORMAL
BACTERIA SPEC CULT: NORMAL
GLUCOSE BLD STRIP.AUTO-MCNC: 109 MG/DL (ref 65–117)
GLUCOSE BLD STRIP.AUTO-MCNC: 172 MG/DL (ref 65–117)
GLUCOSE BLD STRIP.AUTO-MCNC: 79 MG/DL (ref 65–117)
SERVICE CMNT-IMP: ABNORMAL
SERVICE CMNT-IMP: NORMAL

## 2023-08-31 PROCEDURE — 2580000003 HC RX 258: Performed by: HOSPITALIST

## 2023-08-31 PROCEDURE — 6370000000 HC RX 637 (ALT 250 FOR IP): Performed by: INTERNAL MEDICINE

## 2023-08-31 PROCEDURE — 6370000000 HC RX 637 (ALT 250 FOR IP): Performed by: CLINICAL NURSE SPECIALIST

## 2023-08-31 PROCEDURE — 6370000000 HC RX 637 (ALT 250 FOR IP): Performed by: NURSE PRACTITIONER

## 2023-08-31 PROCEDURE — 6360000002 HC RX W HCPCS: Performed by: INTERNAL MEDICINE

## 2023-08-31 PROCEDURE — 6360000002 HC RX W HCPCS: Performed by: HOSPITALIST

## 2023-08-31 PROCEDURE — 82962 GLUCOSE BLOOD TEST: CPT

## 2023-08-31 RX ORDER — INSULIN DETEMIR 100 [IU]/ML
INJECTION, SOLUTION SUBCUTANEOUS
Qty: 45 ML | Refills: 0 | Status: SHIPPED
Start: 2023-08-31

## 2023-08-31 RX ORDER — BUMETANIDE 1 MG/1
1 TABLET ORAL DAILY
Qty: 30 TABLET | Refills: 3 | Status: SHIPPED | OUTPATIENT
Start: 2023-08-31

## 2023-08-31 RX ORDER — INSULIN GLARGINE 100 [IU]/ML
42 INJECTION, SOLUTION SUBCUTANEOUS DAILY
Status: DISCONTINUED | OUTPATIENT
Start: 2023-09-01 | End: 2023-08-31 | Stop reason: HOSPADM

## 2023-08-31 RX ORDER — BUMETANIDE 1 MG/1
1 TABLET ORAL DAILY
Status: DISCONTINUED | OUTPATIENT
Start: 2023-09-01 | End: 2023-08-31 | Stop reason: HOSPADM

## 2023-08-31 RX ADMIN — ONDANSETRON 4 MG: 2 INJECTION INTRAMUSCULAR; INTRAVENOUS at 08:57

## 2023-08-31 RX ADMIN — GUAIFENESIN 600 MG: 600 TABLET, EXTENDED RELEASE ORAL at 08:57

## 2023-08-31 RX ADMIN — HEPARIN SODIUM 5000 UNITS: 5000 INJECTION INTRAVENOUS; SUBCUTANEOUS at 05:22

## 2023-08-31 RX ADMIN — Medication 12 UNITS: at 12:53

## 2023-08-31 RX ADMIN — INSULIN GLARGINE 45 UNITS: 100 INJECTION, SOLUTION SUBCUTANEOUS at 08:57

## 2023-08-31 RX ADMIN — SODIUM CHLORIDE, PRESERVATIVE FREE 10 ML: 5 INJECTION INTRAVENOUS at 08:57

## 2023-08-31 RX ADMIN — AMLODIPINE BESYLATE 10 MG: 5 TABLET ORAL at 08:57

## 2023-08-31 RX ADMIN — PREGABALIN 75 MG: 75 CAPSULE ORAL at 08:57

## 2023-08-31 RX ADMIN — FAMOTIDINE 20 MG: 20 TABLET ORAL at 08:57

## 2023-08-31 NOTE — DISCHARGE INSTRUCTIONS
HOSPITALIST DISCHARGE INSTRUCTIONS    NAME: Betina Serra   :  1970   MRN:  [id number]     Date/Time:  2023 11:42 AM    ADMIT DATE: 2023   DISCHARGE DATE: 2023     Pneumonia vs cardiogenic pulmonary edema  Acute decompensated heart failure  Hypertension  LUPILLO on CKD    It is important that you take the medication exactly as they are prescribed. Keep your medication in the bottles provided by the pharmacist and keep a list of the medication names, dosages, and times to be taken in your wallet. Do not take other medications without consulting your doctor. What to do at Home    Recommended diet:  diabetic diet    Recommended activity: activity as tolerated      If you have questions regarding the hospital related prescriptions or hospital related issues please call [address] [place] [address] office at [phone]. You can always direct your questions to your primary care doctor if you are unable to reach your hospital physician; your PCP works as an extension of your hospital doctor just like your hospital doctor is an extension of your PCP for your time at the hospital [facility])    If you experience any of the following symptoms then please call your primary care physician or return to the emergency room if you cannot get hold of your doctor:    Fever, chills, nausea, vomiting, or persistent diarrhea  Worsening weakness or new problems with your speech or balance  Dark stools or visible blood in your stools  New Leg swelling or shortness of breath as these could be signs of a clot    Additional Instructions:      Bring these papers with you to your follow up appointments. The papers will help your doctors be sure to continue the care plan from the hospital.              Information obtained by :  I understand that if any problems occur once I am at home I am to contact my physician. I understand and acknowledge receipt of the instructions indicated above.

## 2023-08-31 NOTE — DIABETES MGMT
[address] [facility]  DIABETES MANAGEMENT CONSULT    Consulted by Provider for advanced nursing evaluation and care for inpatient blood glucose management. Evaluation and Action Plan   This 46year old AA male was admitted 8/24/23 from the ER with acute hypoxemic respiratory failure secondary to either PE or pneumonia, accompanied by acute kidney & liver injury. On HHF/BiPap. HF treated with diuresis. Treatment of potential pneumonia with Abx, and potential PE with heparin. As for BG control, patient has Type 1 diabetes and will need both basal and mealtime insulin. Did eat dinner last evening but nothing afterwards. Received a total of 23 units of insulin yesterday resulting in FBS of 84 in setting of LUPILLO. Already given 16 units of Lantus insulin this morning. Of significance, uses 100 units of basal insulin at home; will not start with that much here. Will begin with weight-based dosing and add a mealtime insulin dose as well. Management Rationale Action Plan   Medication   Basal needs Use 0.3 units/kg/D based on weight, BMI & BG pattern Lantus insulin 30 units daily   Nutritional needs Based on inpatient diet plan Humalog insulin 10 units with each meal   Corrective insulin Use Medium corrective approach based on sensitivity    Additional orders        Diabetes Discharge Plan   Medication  TBD   Referral  []        Outpatient diabetes education   Additional orders            Initial Presentation   Shae Euceda is a 46 y.o. male admitted 8/24/23 from ER after experiencing chest pain, dyspnea & dizzy X3 days. Denied fevers. Afebrile. Tachycardic. Hypertensive. 02 sats 96%  ER exam: Is ill-appearing, tachypneic, respirations mildly labored, inspiratory crackles at bilateral bases, he is tachycardic, abdomen nontender, 2+ nontender bilateral lower extremity pitting edema. LAB: WBC 18.9. Low H&H and normal platelets. NT pro-BNP 15,495. COVID negative. Troponin 318. D-Dimer 20.71.
[address] [facility]  DIABETES MANAGEMENT CONSULT    Consulted by Provider for advanced nursing evaluation and care for inpatient blood glucose management. Evaluation and Action Plan   This 46year old AA male was admitted 8/24/23 from the ER with acute hypoxemic respiratory failure secondary to either PE or pneumonia, accompanied by acute kidney & liver injury. Did require HHF/BiPap, now on 02NC. HF treated with diuresis. Treatment of pneumonia with Abx, and potential PE with heparin. As for BG control, patient has Type 1 diabetes and will need both basal and mealtime insulin. Is eating now. Using approximately 60-70 units of total insulin per day without achieving BG targets 80% of the time. Insulin dosing adjusted. Bgs in target on less insulin than PTA. Management Rationale Action Plan   Medication   Basal needs Based on BG pattern Continue Lantus insulin 45 units daily   Nutritional needs Based on inpatient diet plan Continue Humalog insulin 12 units with each meal   Corrective insulin Use Medium corrective approach based on sensitivity    Additional orders   In light of his ongoing nausea and co-morbidity of MS, would benefit from gastric emptying study in th near future     Diabetes Discharge Plan   Medication  Lantus insulin 45 units D  Humalog insulin 12 units with each consumed meal   BG monitoring QID; share readings with endocrine provider in 10-14 days     Referral  []        Outpatient diabetes education   Additional orders            Initial Presentation   Idalia Ravi is a 46 y.o. male admitted 8/24/23 from ER after experiencing chest pain, dyspnea & dizzy X3 days. Denied fevers. Afebrile. Tachycardic. Hypertensive. 02 sats 96%  ER exam: Is ill-appearing, tachypneic, respirations mildly labored, inspiratory crackles at bilateral bases, he is tachycardic, abdomen nontender, 2+ nontender bilateral lower extremity pitting edema. LAB: WBC 18.9.  Low H&H and normal
[address]  DIABETES MANAGEMENT CONSULT    Consulted by Provider for advanced nursing evaluation and care for inpatient blood glucose management. Evaluation and Action Plan   This 46year old AA male was admitted 8/24/23 from the ER with acute hypoxemic respiratory failure secondary to either PE or pneumonia, accompanied by acute kidney & liver injury. Did require HHF/BiPap, now on 02NC. HF treated with diuresis. Treatment of pneumonia with Abx, and potential PE with heparin. As for BG control, patient has Type 1 diabetes and will need both basal and mealtime insulin. Is eating but not a lot at this point due to ongoing nausea. Using approximately 60-70 units of total insulin per day without achieving BG targets 80% of the time. Will adjust dosing of both basal & mealtime insulin doses. Management Rationale Action Plan   Medication   Basal needs Based on BG pattern Increase Lantus insulin 45 units daily   Nutritional needs Based on inpatient diet plan Humalog insulin 12 units with each meal   Corrective insulin Use Medium corrective approach based on sensitivity    Additional orders   In light of his ongoing nausea and co-morbidity of MS, would benefit from gastric emptying study in th near future     Diabetes Discharge Plan   Medication  TBD   Referral  []        Outpatient diabetes education   Additional orders            Initial Presentation   Floridalma Cleveland is a 46 y.o. male admitted 8/24/23 from ER after experiencing chest pain, dyspnea & dizzy X3 days. Denied fevers. Afebrile. Tachycardic. Hypertensive. 02 sats 96%  ER exam: Is ill-appearing, tachypneic, respirations mildly labored, inspiratory crackles at bilateral bases, he is tachycardic, abdomen nontender, 2+ nontender bilateral lower extremity pitting edema. LAB: WBC 18.9. Low H&H and normal platelets. NT pro-BNP 15,495. COVID negative. Troponin 318. D-Dimer 20.71. Procal 22.48.  wo AG.  Elevated liver
[facility]  DIABETES MANAGEMENT    DISCHARGE PLAN    Evaluation and Action Plan   This 46year old AA male was admitted 8/24/23 from the ER with acute hypoxemic respiratory failure secondary to either PE or pneumonia, accompanied by acute kidney & liver injury. Did require oxygen therapy, now off. HF treated with diuresis. Treatment of pneumonia with Abx, and potential PE with heparin. As for BG control, patient has Type 1 diabetes and will need both basal and mealtime insulin. Is eating now, but is experiencing infrequent nausea with vomiting shortly after meal consumption. FBG in range. Management Rationale Action Plan   Medication   Basal needs Based on BG pattern Decrease Lantus insulin 42 units daily   Nutritional needs Based on inpatient diet plan Continue Humalog insulin 12 units with each meal  (Make sure he has eaten the meal and it stays down)     Corrective insulin Use Medium corrective approach based on sensitivity    Additional orders   In light of his ongoing nausea and co-morbidity of MS, would benefit from gastric emptying study in th near future    Spiritual care consult for sadness       Diabetes Discharge Plan   Medication  Lantus insulin 42 units D  Humalog insulin 12 units with each consumed meal (and not vomiting)  BG monitoring QID; share readings with endocrine provider in 10-14 days     Referral  []        Outpatient diabetes education   Additional orders            Initial Presentation   Barber Zaragoza is a 46 y.o. male admitted 8/24/23 from ER after experiencing chest pain, dyspnea & dizzy X3 days. Denied fevers. Afebrile. Tachycardic. Hypertensive. 02 sats 96%  ER exam: Is ill-appearing, tachypneic, respirations mildly labored, inspiratory crackles at bilateral bases, he is tachycardic, abdomen nontender, 2+ nontender bilateral lower extremity pitting edema. LAB: WBC 18.9. Low H&H and normal platelets. NT pro-BNP 15,495. COVID negative.  Troponin
and spontaneous flow. Left Lower Venous  No evidence of deep vein thrombosis in the left lower extremity. No evidence of deep vein or superficial vein thrombosis. The common femoral, saphenofemoral junction, profunda femoral, femoral, popliteal, greater saphenous, and small saphenous veins were imaged in the transverse view and showed normal compressibility. The common femoral, popliteal, and middle femoral veins were imaged in the longitudinal view and showed normal color filling and normal phasic and spontaneous flow. 8/28/23 CXR: Improved aeration with decreasing interstitial and airspace opacities.  Continued follow-up is suggested  8/29/23 Much improved  8/30/23 Sad => IP consult to [facility]    Diabetes History   Type 1 diabetes diagnosed 2016 treated with Levemir & Humalog insulins  Family history of diabetes in father  Sees Dr. Paul Goel (endocrinologist)  A1c 7.1%    Diabetes-related Medical History  Neurological complications  Peripheral neuropathy  Microvascular disease  Nephropathy  Other associated conditions     Depression  Cataract removal     Diabetes Medication History  Drug class Currently in use   Basal insulin [] Degludec Min Cagey)  [x] Detemir (Levemir)  [] Glargine (Basaglar)   [] Glargine (Lantus)    [] Glargine (Toujeo)    [] Glargine-yfgn (Semglee)  [] Relion Novolin NPH   Bolus insulin [] Afreeza  [] Aspart (Novolog)  [] Glulisine (Apidra)   [x] Humalog (Humalog)  [] Lispro (Admelog)  [] Relion Novolin Regular     Diabetes self-management practices:   Eating pattern   [x] Not eating a carbohydrate-controlled meal plan  [] Breakfast    [x] Lunch   Steak with vegetables  [x] Dinner   Crackers for allay nausea  Physical activity pattern   [x] Not employing a physical activity program to control BG  Monitoring pattern - Uses [employer] CGM system   [x] Testing BGs sufficiently to inform self-management

## 2023-08-31 NOTE — CARE COORDINATION
08/31/23 [facility] Discharge   Transition of Care Consult (CM Consult) Discharge Planning   Services [address] Discharge Home Health  ([address])   Mode of Transport at Discharge Other (see comment)  (family transport)   Confirm Follow Up Transport Family   Condition of Participation: Discharge Planning   The Plan for Transition of Care is related to the following treatment goals: home with home health: [employer]   The Patient and/or Patient Representative was provided with a Choice of Provider? Patient   The Patient and/Or Patient Representative agree with the Discharge Plan? Yes   Freedom of Choice list was provided with basic dialogue that supports the patient's individualized plan of care/goals, treatment preferences, and shares the quality data associated with the providers?   Yes     Christian Quiles, [facility], [address] [facility],[place] [address] Manager

## 2023-09-01 ENCOUNTER — HOME HEALTH ADMISSION (OUTPATIENT)
Dept: HOME HEALTH SERVICES | Facility: HOME HEALTH | Age: 53
End: 2023-09-01
Payer: MEDICAID

## 2023-09-05 ENCOUNTER — OFFICE VISIT (OUTPATIENT)
Age: 53
End: 2023-09-05
Payer: MEDICARE

## 2023-09-05 VITALS
SYSTOLIC BLOOD PRESSURE: 124 MMHG | OXYGEN SATURATION: 96 % | HEART RATE: 96 BPM | HEIGHT: 70 IN | RESPIRATION RATE: 16 BRPM | WEIGHT: 240 LBS | DIASTOLIC BLOOD PRESSURE: 72 MMHG | TEMPERATURE: 98.1 F | BODY MASS INDEX: 34.36 KG/M2

## 2023-09-05 DIAGNOSIS — M79.601 RIGHT ARM PAIN: ICD-10-CM

## 2023-09-05 DIAGNOSIS — G82.20 PARAPARESIS OF BOTH LOWER LIMBS (HCC): ICD-10-CM

## 2023-09-05 DIAGNOSIS — R55 SYNCOPE AND COLLAPSE: ICD-10-CM

## 2023-09-05 DIAGNOSIS — G37.9 DEMYELINATING DISEASE OF CENTRAL NERVOUS SYSTEM, UNSPECIFIED (HCC): Primary | ICD-10-CM

## 2023-09-05 DIAGNOSIS — G81.10 SPASTIC HEMIPARESIS AFFECTING DOMINANT SIDE (HCC): ICD-10-CM

## 2023-09-05 DIAGNOSIS — E10.3593 PROLIFERATIVE DIABETIC RETINOPATHY OF BOTH EYES ASSOCIATED WITH TYPE 1 DIABETES MELLITUS, UNSPECIFIED PROLIFERATIVE RETINOPATHY TYPE (HCC): ICD-10-CM

## 2023-09-05 PROCEDURE — G8427 DOCREV CUR MEDS BY ELIG CLIN: HCPCS | Performed by: PSYCHIATRY & NEUROLOGY

## 2023-09-05 PROCEDURE — 2022F DILAT RTA XM EVC RTNOPTHY: CPT | Performed by: PSYCHIATRY & NEUROLOGY

## 2023-09-05 PROCEDURE — 3051F HG A1C>EQUAL 7.0%<8.0%: CPT | Performed by: PSYCHIATRY & NEUROLOGY

## 2023-09-05 PROCEDURE — 3017F COLORECTAL CA SCREEN DOC REV: CPT | Performed by: PSYCHIATRY & NEUROLOGY

## 2023-09-05 PROCEDURE — 1111F DSCHRG MED/CURRENT MED MERGE: CPT | Performed by: PSYCHIATRY & NEUROLOGY

## 2023-09-05 PROCEDURE — 3074F SYST BP LT 130 MM HG: CPT | Performed by: PSYCHIATRY & NEUROLOGY

## 2023-09-05 PROCEDURE — 3078F DIAST BP <80 MM HG: CPT | Performed by: PSYCHIATRY & NEUROLOGY

## 2023-09-05 PROCEDURE — 99215 OFFICE O/P EST HI 40 MIN: CPT | Performed by: PSYCHIATRY & NEUROLOGY

## 2023-09-05 PROCEDURE — G8417 CALC BMI ABV UP PARAM F/U: HCPCS | Performed by: PSYCHIATRY & NEUROLOGY

## 2023-09-05 PROCEDURE — 1036F TOBACCO NON-USER: CPT | Performed by: PSYCHIATRY & NEUROLOGY

## 2023-09-05 ASSESSMENT — PATIENT HEALTH QUESTIONNAIRE - PHQ9
SUM OF ALL RESPONSES TO PHQ QUESTIONS 1-9: 2
SUM OF ALL RESPONSES TO PHQ9 QUESTIONS 1 & 2: 2
1. LITTLE INTEREST OR PLEASURE IN DOING THINGS: 1
SUM OF ALL RESPONSES TO PHQ QUESTIONS 1-9: 2
2. FEELING DOWN, DEPRESSED OR HOPELESS: 1

## 2023-09-05 NOTE — ASSESSMENT & PLAN NOTE
Stable and unchanged at this time patient still able to ambulate independently spastic hemiplegic gait

## 2023-09-05 NOTE — ASSESSMENT & PLAN NOTE
Suspect patient had transverse myelitis in the absence of repeat AquaPorin-4 being negative on both a threshold and cellular level [the cellular level was done through [facility] July 2021]    Over the years he has had no changes on MRI scan    But due to recent events I do think it is appropriate to repeat his scans to make sure there is nothing changing from a demyelinating perspective

## 2023-09-05 NOTE — ASSESSMENT & PLAN NOTE
ANS testing abnormal suggestive cardiovagal [parasympathetic] dysfunction.   Late phase to attenuation during Valsalva maneuver suggestive of an adrenergic [sympathetic] dysfunction     Patient does have an appointment with cardiology status post hospital follow-up  He has been given a copy of the ANS testing and I have asked him to show this to cardiology to see if they want to do any type of active intervention I think under the circumstances patient should probably be treated and managed by cardiology

## 2023-09-05 NOTE — ASSESSMENT & PLAN NOTE
Patient complaining of blurred vision since discharge from the hospital have asked him to contact his ophthalmologist and follow-up with them closely    MRI of the brain has been ordered with optic nerve cuts

## 2023-09-05 NOTE — PATIENT INSTRUCTIONS
As per discussion    I am glad you are doing better since you are in the hospital give yourself time to recover    I do want you to get your eyes checked  I will give you a copy of the ANS testing I wanted to get back to cardiology they should be following you and managing those symptoms   We did the ANS testing secondary to blackout spells and dizziness    I agree with getting physical therapy    Follow-up with cardiology once an appointment is made    As we talked about your NMO titers continue to come back negative including that from [facility] some more than likely it was more an issue related to transverse myelitis but we will continue to monitor        Office Policies    Phone calls/patient messages:  Please allow up to 24 hours for someone in the office to contact you about your call or message. Be mindful your provider may be out of the office or your message may require further review. We encourage you to use [employer] for your messages as this is a faster, more efficient way to communicate with our office    Medication Refills:  Prescription medications require up to 48 business hours to process. We encourage you to use [employer] for your refills. For controlled medications: Please allow up to 72 business hours to process. Certain medications may require you to  a written prescription at our office. NO narcotic/controlled medications will be prescribed after 4pm Monday through Friday or on weekends    Form/Paperwork Completion:  We ask that you allow 7-14 business days. You may also download your forms to [employer] to have your doctor print off.       And he is ready to be discharged home to go back to do my virtual thank you

## 2023-09-05 NOTE — PROGRESS NOTES
[address]  In Office FOLLOW-UP VISIT         Zamzam Argueta is a 46 y.o.  male who presents today for the following:  Chief Complaint   Patient presents with    Follow-up     Follow up and states that eyesite has gotten hazy since leaving hospital last week. ASSESSMENT AND PLAN  1. Demyelinating disease of central nervous system, unspecified ([address])  Assessment & Plan:   Suspect patient had transverse myelitis in the absence of repeat AquaPorin-4 being negative on both a threshold and cellular level [the cellular level was done through [facility] July 2021]    Over the years he has had no changes on MRI scan    But due to recent events I do think it is appropriate to repeat his scans to make sure there is nothing changing from a demyelinating perspective    Orders:  -      [address]; Future  -     MRI CERVICAL SPINE W WO CONTRAST; Future  -     MRI BRAIN W WO CONTRAST; Future  2. Right arm pain  Assessment & Plan:   Seems to be associated with movement  No tenderness to palpation  Check EMG pending results will determine next course of action    Patient is also getting MRI scans for demyelinating process and this will also help us to determine if there is a radiculopathy or other compressive issue contributing to his symptoms  Orders:  -     EMG; Future  3. Syncope and collapse  Assessment & Plan:   ANS testing abnormal suggestive cardiovagal [parasympathetic] dysfunction. Late phase to attenuation during Valsalva maneuver suggestive of an adrenergic [sympathetic] dysfunction     Patient does have an appointment with cardiology status post hospital follow-up  He has been given a copy of the ANS testing and I have asked him to show this to cardiology to see if they want to do any type of active intervention I think under the circumstances patient should probably be treated and managed by cardiology    4.  Spastic hemiparesis affecting dominant side

## 2023-09-05 NOTE — ASSESSMENT & PLAN NOTE
Seems to be associated with movement  No tenderness to palpation  Check EMG pending results will determine next course of action    Patient is also getting MRI scans for demyelinating process and this will also help us to determine if there is a radiculopathy or other compressive issue contributing to his symptoms

## 2023-09-06 ENCOUNTER — HOME CARE VISIT (OUTPATIENT)
Facility: HOME HEALTH | Age: 53
End: 2023-09-06
Payer: MEDICAID

## 2023-09-06 VITALS
HEART RATE: 98 BPM | HEIGHT: 70 IN | TEMPERATURE: 97.5 F | WEIGHT: 242.6 LBS | RESPIRATION RATE: 16 BRPM | SYSTOLIC BLOOD PRESSURE: 142 MMHG | OXYGEN SATURATION: 93 % | BODY MASS INDEX: 34.73 KG/M2 | DIASTOLIC BLOOD PRESSURE: 82 MMHG

## 2023-09-06 PROCEDURE — G0299 HHS/HOSPICE OF RN EA 15 MIN: HCPCS

## 2023-09-06 PROCEDURE — G0151 HHCP-SERV OF PT,EA 15 MIN: HCPCS

## 2023-09-06 ASSESSMENT — ENCOUNTER SYMPTOMS
PAIN LOCATION - PAIN QUALITY: NUMBNESS
CONTUSION: 1
DYSPNEA ACTIVITY LEVEL: AT REST
HEMOPTYSIS: 0

## 2023-09-06 NOTE — HOME HEALTH
written medication list, necessary supplies and folder if you relocate in the event of an emergency, if possible. Call agency if you relocate so we can contact you. Patient/Caregiver verbalize knowledge of above through teach back with 30percent accuracy.

## 2023-09-08 ENCOUNTER — HOME CARE VISIT (OUTPATIENT)
Facility: HOME HEALTH | Age: 53
End: 2023-09-08
Payer: MEDICAID

## 2023-09-08 VITALS
OXYGEN SATURATION: 96 % | DIASTOLIC BLOOD PRESSURE: 78 MMHG | TEMPERATURE: 98 F | RESPIRATION RATE: 17 BRPM | HEART RATE: 89 BPM | SYSTOLIC BLOOD PRESSURE: 132 MMHG

## 2023-09-08 PROCEDURE — G0152 HHCP-SERV OF OT,EA 15 MIN: HCPCS

## 2023-09-08 PROCEDURE — G0300 HHS/HOSPICE OF LPN EA 15 MIN: HCPCS

## 2023-09-08 NOTE — HOME HEALTH
Skilled reason for admission/summary of clinical condition: CHF exac (new onset). Diagnosis: acute systolic CHF   Subjective:\" I am only 25% of what I was before\"   Caregiver: uncle and aunt Caregiver assists with: Meals, ADL, IADL, Transportation and Housekeeping Caregiver unable to assist with: nothing of note at this itme. Caregiver is available 24 hours/day Caregiver is present at this visit and did participate with clinician. Medications reconciled and all medications are available in the home this visit. The following education was provided regarding medications: medication interactions and look alike medications: no issues with look a like medications. Patient/CG able to demonstrate knowledge through teach back with 75% percent accuracy. Medications are effective at this time. Home health supplies by type and quantity ordered/delivered this visit include: NA   Patient/caregiver instructed on plan of care and are agreeable to plan of care at this time. Clinician reviewed orientation to home health booklet with patient/caregiver including agency phone number, agency complaint process, state hotline number, as well as joint [facility]'s quality hotline number. Consent forms signed. Patient at risk for falls Yes:   Recommended requesting PT/OT orders due to fall risk N/A: as PT and OT already ordered   Patient response to recommended requesting of PT/OT orders: NA   Discharge planning discussed with patient and caregiver. Discharge planning as follows: Is no longer homebound, Per physician order, Will discharge when the patient has reached their maximum functional potential and maximum safety in their home and When goals are met Patient/caregiver did verbalize agreement with discharge planning. Clinical Assessment (What this means for the patient overall and need for ongoing skilled care): Pleasant 47 yo male admitted to the hospital due to SOB, weakness, edema. Pt with new dx of CHF.  Completed

## 2023-09-09 VITALS
HEIGHT: 70 IN | TEMPERATURE: 97.5 F | SYSTOLIC BLOOD PRESSURE: 142 MMHG | WEIGHT: 242.6 LBS | OXYGEN SATURATION: 93 % | HEART RATE: 98 BPM | RESPIRATION RATE: 16 BRPM | BODY MASS INDEX: 34.73 KG/M2 | DIASTOLIC BLOOD PRESSURE: 82 MMHG

## 2023-09-09 ASSESSMENT — ENCOUNTER SYMPTOMS
TROUBLE SWALLOWING: 1
DYSPNEA ACTIVITY LEVEL: AT REST
PAIN LOCATION - PAIN QUALITY: NUMB

## 2023-09-10 VITALS
HEART RATE: 78 BPM | RESPIRATION RATE: 18 BRPM | DIASTOLIC BLOOD PRESSURE: 73 MMHG | BODY MASS INDEX: 33.92 KG/M2 | TEMPERATURE: 98.1 F | OXYGEN SATURATION: 98 % | SYSTOLIC BLOOD PRESSURE: 133 MMHG | WEIGHT: 236.4 LBS

## 2023-09-10 ASSESSMENT — ENCOUNTER SYMPTOMS: HEMOPTYSIS: 0

## 2023-09-11 ENCOUNTER — NURSE ONLY (OUTPATIENT)
Age: 53
End: 2023-09-11

## 2023-09-11 ENCOUNTER — OFFICE VISIT (OUTPATIENT)
Facility: CLINIC | Age: 53
End: 2023-09-11

## 2023-09-11 VITALS
HEART RATE: 96 BPM | SYSTOLIC BLOOD PRESSURE: 142 MMHG | HEIGHT: 70 IN | TEMPERATURE: 97.9 F | BODY MASS INDEX: 33.5 KG/M2 | DIASTOLIC BLOOD PRESSURE: 84 MMHG | WEIGHT: 234 LBS | OXYGEN SATURATION: 95 % | RESPIRATION RATE: 16 BRPM

## 2023-09-11 DIAGNOSIS — I10 ESSENTIAL HYPERTENSION: ICD-10-CM

## 2023-09-11 DIAGNOSIS — Z09 HOSPITAL DISCHARGE FOLLOW-UP: Primary | ICD-10-CM

## 2023-09-11 DIAGNOSIS — E10.42 DIABETIC POLYNEUROPATHY ASSOCIATED WITH TYPE 1 DIABETES MELLITUS (HCC): Primary | ICD-10-CM

## 2023-09-11 DIAGNOSIS — E10.42 DIABETIC POLYNEUROPATHY ASSOCIATED WITH TYPE 1 DIABETES MELLITUS (HCC): ICD-10-CM

## 2023-09-11 DIAGNOSIS — I50.9 CONGESTIVE HEART FAILURE, UNSPECIFIED HF CHRONICITY, UNSPECIFIED HEART FAILURE TYPE (HCC): ICD-10-CM

## 2023-09-11 DIAGNOSIS — D64.9 ANEMIA, UNSPECIFIED TYPE: ICD-10-CM

## 2023-09-11 DIAGNOSIS — G37.9 DEMYELINATING DISEASE OF CENTRAL NERVOUS SYSTEM, UNSPECIFIED (HCC): ICD-10-CM

## 2023-09-11 DIAGNOSIS — Z87.19 HISTORY OF LIVER FAILURE: ICD-10-CM

## 2023-09-11 DIAGNOSIS — N17.9 AKI (ACUTE KIDNEY INJURY) (HCC): ICD-10-CM

## 2023-09-11 RX ORDER — GLUCOSAMINE HCL/CHONDROITIN SU 500-400 MG
CAPSULE ORAL
Qty: 100 STRIP | Refills: 5 | Status: SHIPPED | OUTPATIENT
Start: 2023-09-11

## 2023-09-11 RX ORDER — BLOOD-GLUCOSE METER
1 KIT MISCELLANEOUS DAILY
Qty: 1 KIT | Refills: 0 | Status: SHIPPED | OUTPATIENT
Start: 2023-09-11

## 2023-09-11 RX ORDER — LANCETS 30 GAUGE
1 EACH MISCELLANEOUS DAILY
Qty: 100 EACH | Refills: 5 | Status: SHIPPED | OUTPATIENT
Start: 2023-09-11

## 2023-09-11 NOTE — PROGRESS NOTES
Post-Discharge Transitional Care  Follow Up      Tom Birch   YOB: 1970    Date of Office Visit:  9/11/2023  Date of Hospital Admission: 8/24/23  Date of Hospital Discharge: 8/31/23  Risk of hospital readmission (high >=14%. Medium >=10%) :Readmission Risk Score: 9      Care management risk score Rising risk (score 2-5) and Complex Care (Scores >=6): No Risk Score On File     Non face to face  following discharge, date last encounter closed (first attempt may have been earlier): 09/01/2023    Call initiated 2 business days of discharge: Yes    ASSESSMENT/PLAN:   Hospital discharge follow-up  -     [place] DISCHARGE MEDS RECONCILED W/ CURRENT OUTPATIENT MED LIST  LUPILLO (acute kidney injury) ([address])  -     José Manuel Ricardo MD, Nephrology, [facility]  -     Comprehensive Metabolic Panel; Future  Recheck LFTs and creatinine as outpatient, needs nephrology outpatient, given referral  History of liver failure  Improved, suspected due to CHF. Will hold on hepatology unless chronically elevated    Congestive heart failure, unspecified HF chronicity, unspecified heart failure type (HCC)--send to Cardiology, appt Friday. Continue bumex. Anemia, unspecified type  -     CBC with Auto Differential; Future  Recheck labs, suspect due to CHF/LUPILLO    Essential hypertension-borderline, goal, on midrodrine for neuro    Diabetic polyneuropathy associated with type 1 diabetes mellitus ([address])  Demyelinating disease of central nervous system, unspecified ([address])  Followed by neuro, transverse myelitis of C1    Medical Decision Making: moderate complexity  No follow-ups on file. On this date 9/11/2023 I have spent 60 minutes reviewing previous notes, test results and face to face with the patient discussing the diagnosis and importance of compliance with the treatment plan as well as documenting on the day of the visit.        Subjective:   HPI:  Follow up of Hospital problems/diagnosis(es): acute congestive heart

## 2023-09-11 NOTE — PROGRESS NOTES
[employer] [facility]  Diabetes Self-Management Education & Support Program  Encounter Note      SUMMARY  Diabetes self-care management training was completed related to monitoring and taking medications. The participant will return on September 18 to continue DSMES related to healthy eating. The participant did identify SMART Goal(s) and will practice knowledge and skills related to monitoring and medications to improve diabetes self-management. EVALUATION:  Since his hospitalization, Mr Stephen Du is trying to eat more than 1x/day. He is not checking glucose manually because he reports he needs a new meter, but we put on his FreeStyle today along with extra adhesive in hopes it will stick better/longer than 1-2 days. His PCP, Kristal Sellers, is going to send in a rx for a glucometer for him. We discussed how not knowing his blood sugars or what he is eating does not help us know if he is taking the right amount of insulin for the food he is eating. Since he is on a fixed amount of insulin for now, it is more important to be consistent with carbohydrate intake, which is what we will work on at our next visit when we know what foods he is eating and how much insulin he takes. He was taking 20 U with meals, but is going to take 12 U as instructed at hospital since he may be trending low. He drinks cran-apple juice almost every day so we discussed how that is good for treating low blood sugars, but may not beneficial to his blood sugars to have 12 oz at his meals. Also discussed how his different insulins are working. He may benefit from longer acting insulin than Levemir, but his insurance would be the deciding factor in that. He verbalized understanding and did not have any more questions.     RECOMMENDATIONS:  Take insulin consistently and eat more consistently     TOPICS DISCUSSED TODAY:  HOW CAN BLOOD GLUCOSE MONITORING HELP ME? 27  HOW DO MY DIABETES MEDICATIONS WORK? 30      Next

## 2023-09-12 ENCOUNTER — HOME CARE VISIT (OUTPATIENT)
Facility: HOME HEALTH | Age: 53
End: 2023-09-12
Payer: MEDICAID

## 2023-09-12 VITALS
OXYGEN SATURATION: 95 % | HEART RATE: 101 BPM | DIASTOLIC BLOOD PRESSURE: 72 MMHG | SYSTOLIC BLOOD PRESSURE: 126 MMHG | BODY MASS INDEX: 33.53 KG/M2 | TEMPERATURE: 97.4 F | WEIGHT: 233.7 LBS

## 2023-09-12 VITALS
HEART RATE: 101 BPM | BODY MASS INDEX: 33.53 KG/M2 | WEIGHT: 233.7 LBS | SYSTOLIC BLOOD PRESSURE: 126 MMHG | OXYGEN SATURATION: 95 % | RESPIRATION RATE: 18 BRPM | DIASTOLIC BLOOD PRESSURE: 72 MMHG | TEMPERATURE: 97.4 F

## 2023-09-12 PROBLEM — E04.1 THYROID NODULE: Status: ACTIVE | Noted: 2023-09-12

## 2023-09-12 PROCEDURE — G0158 HHC OT ASSISTANT EA 15: HCPCS

## 2023-09-12 PROCEDURE — G0157 HHC PT ASSISTANT EA 15: HCPCS

## 2023-09-12 PROCEDURE — G0299 HHS/HOSPICE OF RN EA 15 MIN: HCPCS

## 2023-09-13 VITALS
SYSTOLIC BLOOD PRESSURE: 120 MMHG | OXYGEN SATURATION: 98 % | RESPIRATION RATE: 18 BRPM | TEMPERATURE: 98.7 F | HEART RATE: 91 BPM | DIASTOLIC BLOOD PRESSURE: 70 MMHG

## 2023-09-13 LAB
ALBUMIN SERPL-MCNC: 3.9 G/DL (ref 3.8–4.9)
ALBUMIN/GLOB SERPL: 1.2 {RATIO} (ref 1.2–2.2)
ALP SERPL-CCNC: 89 IU/L (ref 44–121)
ALT SERPL-CCNC: 31 IU/L (ref 0–44)
AST SERPL-CCNC: 25 IU/L (ref 0–40)
BASOPHILS # BLD AUTO: 0.1 X10E3/UL (ref 0–0.2)
BASOPHILS NFR BLD AUTO: 1 %
BILIRUB SERPL-MCNC: ABNORMAL MG/DL (ref 0–1.2)
BUN SERPL-MCNC: 34 MG/DL (ref 6–24)
BUN/CREAT SERPL: 16 (ref 9–20)
CALCIUM SERPL-MCNC: 9.1 MG/DL (ref 8.7–10.2)
CHLORIDE SERPL-SCNC: 107 MMOL/L (ref 96–106)
CO2 SERPL-SCNC: 25 MMOL/L (ref 20–29)
CREAT SERPL-MCNC: 2.16 MG/DL (ref 0.76–1.27)
EGFRCR SERPLBLD CKD-EPI 2021: 36 ML/MIN/1.73
EOSINOPHIL # BLD AUTO: 0.2 X10E3/UL (ref 0–0.4)
EOSINOPHIL NFR BLD AUTO: 3 %
ERYTHROCYTE [DISTWIDTH] IN BLOOD BY AUTOMATED COUNT: 14.1 % (ref 11.6–15.4)
GLOBULIN SER CALC-MCNC: 3.3 G/DL (ref 1.5–4.5)
GLUCOSE SERPL-MCNC: 270 MG/DL (ref 70–99)
HCT VFR BLD AUTO: 34.5 % (ref 37.5–51)
HGB BLD-MCNC: 11 G/DL (ref 13–17.7)
IMM GRANULOCYTES NFR BLD AUTO: 0 %
LYMPHOCYTES # BLD AUTO: 2.2 X10E3/UL (ref 0.7–3.1)
LYMPHOCYTES NFR BLD AUTO: 27 %
MCH RBC QN AUTO: 27.5 PG (ref 26.6–33)
MCHC RBC AUTO-ENTMCNC: 31.9 G/DL (ref 31.5–35.7)
MCV RBC AUTO: 86 FL (ref 79–97)
MONOCYTES # BLD AUTO: 0.7 X10E3/UL (ref 0.1–0.9)
MONOCYTES NFR BLD AUTO: 8 %
NEUTROPHILS # BLD AUTO: 5.1 X10E3/UL (ref 1.4–7)
NEUTROPHILS NFR BLD AUTO: 61 %
PLATELET # BLD AUTO: 291 X10E3/UL (ref 150–450)
POTASSIUM SERPL-SCNC: 5 MMOL/L (ref 3.5–5.2)
PROT SERPL-MCNC: 7.2 G/DL (ref 6–8.5)
RBC # BLD AUTO: 4 X10E6/UL (ref 4.14–5.8)
SODIUM SERPL-SCNC: 146 MMOL/L (ref 134–144)
WBC # BLD AUTO: 8.3 X10E3/UL (ref 3.4–10.8)

## 2023-09-13 ASSESSMENT — ENCOUNTER SYMPTOMS: PAIN LOCATION - PAIN QUALITY: ACHING

## 2023-09-13 NOTE — HOME HEALTH
Subjective: Patient states that it has been rough  Falls since last visit No(if yes complete the Fall Tracking Form and include bsrifallreport):   Caregiver involvement changes: no  Home health supplies by type and quantity ordered/delivered this visit include: no    Clinician asked if patient has had any physician contact since last home care visit and patient states: N/A  Clinician asked if patient has any new or changed medications and patient states:  N/A   If Yes, were medications reconciled? N/A   Was the certifying physician notified of changes in medications? N/A     Clinical assessment (what this visit means for the patient overall and need for ongoing skilled care) and progress or lack of progress towards SPECIFIC goals: Patient is very confused about his diagnosis. He doesnt understand how this could happen. Patient has been educated on CHF and has a bit of better understanding. Patient does have an appt with a cardiologist in the near future. Patient also has a [place] to check BG levels. SN needed for further eval and treatment. .     Written Teaching Material Utilized: N/A    Interdisciplinary communication with: N/A     Discharge planning as follows:  When goals are met    Specific plan for next visit: Delmi Lepe in safety issues regarding Diet

## 2023-09-13 NOTE — HOME HEALTH
Subjective: Pt reported increased dizziness when he stands. Falls since last visit No(if yes complete the Fall Tracking Form and include bsrifallreport):   Caregiver involvement changes: none  Home health supplies by type and quantity ordered/delivered this visit include: none. Clinician asked if patient has had any physician contact since last home care visit and patient states: NO  Clinician asked if patient has any new or changed medications and patient states:  NO   If Yes, were medications reconciled? N/A   Was the certifying physician notified of changes in medications? N/A     Clinical assessment (what this visit means for the patient overall and need for ongoing skilled care) and progress or lack of progress towards SPECIFIC goals: This visit provided the pt instruction during gait and stair training to decrease fall risk. Gait , balance and stair goals not met. Written Teaching Material Utilized: N/A    Interdisciplinary communication with: N/A     Discharge planning as follows: When goals are met    Specific plan for next visit: Re-instruct patient/caregiver on gait and stair training.

## 2023-09-14 ENCOUNTER — HOME CARE VISIT (OUTPATIENT)
Facility: HOME HEALTH | Age: 53
End: 2023-09-14
Payer: MEDICAID

## 2023-09-14 VITALS
TEMPERATURE: 98.1 F | HEART RATE: 70 BPM | OXYGEN SATURATION: 98 % | DIASTOLIC BLOOD PRESSURE: 70 MMHG | SYSTOLIC BLOOD PRESSURE: 125 MMHG | RESPIRATION RATE: 17 BRPM

## 2023-09-14 VITALS
OXYGEN SATURATION: 93 % | SYSTOLIC BLOOD PRESSURE: 132 MMHG | DIASTOLIC BLOOD PRESSURE: 74 MMHG | BODY MASS INDEX: 34.01 KG/M2 | TEMPERATURE: 98 F | WEIGHT: 237 LBS | HEART RATE: 91 BPM

## 2023-09-14 PROCEDURE — G0299 HHS/HOSPICE OF RN EA 15 MIN: HCPCS

## 2023-09-14 PROCEDURE — G0151 HHCP-SERV OF PT,EA 15 MIN: HCPCS

## 2023-09-14 PROCEDURE — G0153 HHCP-SVS OF S/L PATH,EA 15MN: HCPCS

## 2023-09-14 PROCEDURE — G0158 HHC OT ASSISTANT EA 15: HCPCS

## 2023-09-14 RX ORDER — BLOOD SUGAR DIAGNOSTIC
1 STRIP MISCELLANEOUS 3 TIMES DAILY
Qty: 100 EACH | Refills: 5 | Status: SHIPPED | OUTPATIENT
Start: 2023-09-14

## 2023-09-14 ASSESSMENT — ENCOUNTER SYMPTOMS
CONSTIPATION: 1
HEMOPTYSIS: 0

## 2023-09-14 NOTE — TELEPHONE ENCOUNTER
PCP: Callie Tidwell PA-C     Last appt:  9/11/2023      Future Appointments   Date Time Provider [address]   9/14/2023 12:30 PM Arturo Case, SLP [address] [place] [address]   9/14/2023  2:30 PM Emelyn Gallegos, PT [address] [place] [address]   9/18/2023  2:30 PM Randy Singh RD [facility] BS AMB   9/19/2023 11:00 AM Chris Poisson, PTA [address] [place] [address]   9/19/2023 To Be Determined Fantasma Greener, [address] [place]   9/20/2023 To Be Determined Cleopatra Pond, RN [address] [place] [address]   9/21/2023 To Be Determined Chris Poisson, PTA [address] [place] [address]   9/21/2023 To Be Determined Fantasma Greener, [address] [place]   9/22/2023  9:30 AM Jacinto Hoyt MD RDE [address] BS AMB   9/26/2023 To Be Determined Chris Poisson, PTA [address] [place] [address]   9/26/2023 To Be Determined Fantasma Greener, [address] [place]   9/27/2023 To Be Determined Cleopatra Pond, RN [address] [place] [address]   9/28/2023 To Be Determined Fantasma Greener, [address] [place]   9/28/2023  3:00 PM [id number] BS AMB   9/29/2023 To Be Determined Chris Poisson, PTA [address] [place] [address]   10/3/2023 To Be Determined Fantasma Amatoer, [address] [place]   10/3/2023 To Be Determined Emelyn Gallegos, PT [address] [place] [address]   10/4/2023 To Be Determined Cleopatra Pond, RN [address] [place] [address]   10/5/2023 To Be Determined Chris Poisson, PTA [address] [place] [address]   10/5/2023 To Be Determined Nicci Shine, OT [address] [place] [address]   10/10/2023 To Be Determined Chris Poisson, PTA [address] [place] [address]   10/12/2023 To Be Determined Chris Poisson, PTA [address] [place] [address]   10/17/2023 To Be Determined Chris Poisson, [address]   10/19/2023 To Be Determined Chris Poisson, [address]   10/24/2023 To Be Determined Chris Poisson, [address]   10/26/2023 [place] [address] [address]   11/7/2023 11:10 AM Jacinto Hoyt MD RDE [address] [facility] AMB   12/19/2023  8:30 AM Jayro Barney

## 2023-09-14 NOTE — HOME HEALTH
Subjective: Pt states: \" I get tired quickly. \"    Falls since last visit No(if yes complete the Fall Tracking Form and include bsrifallreport):     Caregiver involvement changes: Pts uncle is available to maximo pt with care. Home health supplies by type and quantity ordered/delivered this visit include: none      Clinician asked if patient has had any physician contact since last home care visit and patient states: NO    Clinician asked if patient has any new or changed medications and patient states:  NO     If Yes, were medications reconciled? NO     Was the certifying physician notified of changes in medications? NO      Clinical assessment (what this visit means for the patient overall and need for ongoing skilled care) and progress or lack of progress towards SPECIFIC goals: OT tx focus was on ADL setup; safety; stair management; fall prevention; and DME needs. Initiated UE HEP with UE balloon toss for 2 min x 2 sets. Pt has pain which limits B UE ROM; and muscle weakness more noted in R UE. Continue UE AROM for B UE for all planes, directions, and joints. Continue UE HEP for increased UE shoulder flexion, abduction, adduction, bicep curls, triceps strengthening, supination and pronation of forearm all in prep for increased performance with ADL task, sit to stands for toileting, and improved activity tolerance for improved safety with ADL's. Spoke with nsg due to pt needing lab work per MD request at pt's appointment yesterday and nurse is getting labs drawn and sent off. Pt's bedroom and shower on second level of home and continue to instruct in stair management and EC due to fatigue and risk of falls. Instruct in pharmacologic medicate on regular schedule and no pharmacologic techniques including frequent position changes and use of heat or ice. Recommended additional railing on wall for stair management and safety. see interventions for details.     Written Teaching Material Utilized: N/A

## 2023-09-14 NOTE — HOME HEALTH
Subjective: Patient states he is hurting today  Falls since last visit No(if yes complete the Fall Tracking Form and include bsrifallreport):   Caregiver involvement changes: no  Home health supplies by type and quantity ordered/delivered this visit include: no    Clinician asked if patient has had any physician contact since last home care visit and patient states: N/A  Clinician asked if patient has any new or changed medications and patient states:  N/A   If Yes, were medications reconciled? N/A   Was the certifying physician notified of changes in medications? N/A     Clinical assessment (what this visit means for the patient overall and need for ongoing skilled care) and progress or lack of progress towards SPECIFIC goals: SN educated pt on HTN, Depression, and diabetic management today. Patient does need further education and treatment from [facility]    Written Teaching Material Utilized: N/A    Interdisciplinary communication with: N/A     Discharge planning as follows:  When goals are met    Specific plan for next visit: Educate on MS disease process

## 2023-09-14 NOTE — HOME HEALTH
Subjective: Patient reports that he is tired from already having OT today  Falls since last visit no  Caregiver involvement changes: no  Home health supplies by type and quantity ordered/delivered this visit include: no    Clinician asked if patient has had any physician contact since last home care visit and patient states: no  Clinician asked if patient has any new or changed medications and patient states:  no  If Yes, were medications reconciled? no  Was the certifying physician notified of changes in medications? yes    Clinical assessment (what this visit means for the patient overall and need for ongoing skilled care) and progress or lack of progress towards SPECIFIC goals: Todays treatment focused on progression of exercises, patient education on energy conservation and fall prevention. Patient was able to make progress towards goals but require frequent rest breaks throughout the session and was unsteady with transfers and gait after muscle fatigue.  patient will benefit from continued PT in order to improve balance and works towards [employer] gait and balance goals     Written Teaching Material Utilized: written copy of exercises given to patient     Interdisciplinary communication with: OT    Discharge planning as follows: reassess at the end of the orders    Specific plan for next visit: progress gait, transfers and exercises

## 2023-09-15 VITALS
SYSTOLIC BLOOD PRESSURE: 122 MMHG | RESPIRATION RATE: 18 BRPM | DIASTOLIC BLOOD PRESSURE: 80 MMHG | TEMPERATURE: 97.1 F | HEART RATE: 81 BPM | OXYGEN SATURATION: 97 %

## 2023-09-15 VITALS — HEART RATE: 81 BPM | SYSTOLIC BLOOD PRESSURE: 122 MMHG | OXYGEN SATURATION: 97 % | DIASTOLIC BLOOD PRESSURE: 80 MMHG

## 2023-09-15 ASSESSMENT — ENCOUNTER SYMPTOMS
INDIGESTION: 1
TROUBLE SWALLOWING: 1
SNORING: 1

## 2023-09-15 NOTE — HOME HEALTH
Reason for referral, [facility] SUMMARY of clinical condition: MS, CHF admitted to home care for. Speech Language Pathology needed for cognitive deficits affecting safety, recall and independence. Clinical Assessment/Skilled reason for admission to home health (What this means for the patient overall and need for ongoing skilled care): Pt exhibiting moderate deficits in memory recall, ability to keep track of dialy tasks and appointments indpendently. SLP to assist with safety techniques utilizing memory aids and promoting independence in home environment      Subjective (statement from pt/cg that is relative to why you are there): \"I hope you can helpme with my memory. There is no reason I can't remember these things. \"    Caregiver: relative. Caregiver assists with: Medications, IADL and Transportation Caregiver unable to assist with: Bathing and ADL. Caregiver is available 24 hours/day Caregiver is  present at this visit and did participate with clinician. Medications reconciled and all medications are available in the home this visit. The following education was provided regarding medications: medication interactions and look alike medications. Patient/CG able to demonstrate knowledge through teach back with 100% percent accuracy. A list of reconciled medications has been given to the patient/caregiver .     High risk med teaching was not performed on evaluation by SLP but performed by other skilled discipline    Patient at risk for falls Yes:   Recommended requesting PT/OT orders due to fall risk YES:   Patient response to recommended requesting of PT/OT orders: agreeable    Interdisciplinary communication with:  LPTA and  OT for the purpose of POC collaboration    Written Teaching Material Utilized: N/A    Clinician reviewed orientation to home health booklet with patient/caregiver including agency phone number, agency complaint process, state hotline number, as well as [employer] Commission's quality hotline

## 2023-09-18 ENCOUNTER — NURSE ONLY (OUTPATIENT)
Age: 53
End: 2023-09-18

## 2023-09-18 DIAGNOSIS — E10.42 DIABETIC POLYNEUROPATHY ASSOCIATED WITH TYPE 1 DIABETES MELLITUS (HCC): Primary | ICD-10-CM

## 2023-09-18 DIAGNOSIS — I50.9 CONGESTIVE HEART FAILURE, UNSPECIFIED HF CHRONICITY, UNSPECIFIED HEART FAILURE TYPE (HCC): Primary | ICD-10-CM

## 2023-09-18 RX ORDER — BUMETANIDE 1 MG/1
1 TABLET ORAL DAILY
Qty: 30 TABLET | Refills: 3 | Status: SHIPPED | OUTPATIENT
Start: 2023-09-18

## 2023-09-18 NOTE — PROGRESS NOTES
blood sugars  Eat more balanced meals more consistently (work in progress) to be able to take insulin appropriately  Continue communicating with providers     TOPICS DISCUSSED TODAY:  WHAT CAN I EAT? 30  HOW DO I FIGURE OUT WHAT'S INFLUENCING MY BLOOD GLUCOSES? 30      Next provider visit is scheduled for 10/3/23 with me       SMART GOAL(S)   Take pictures of food/meals for 3/4 days before next visit  ACHIEVEMENT OF GOAL(S) : 0-24%    2. Practice Rule of 15 when experiencing hypoglycemia over the next week. ACHIEVEMENT OF GOAL(S) :  0-24%  Will evaluate both next week       DATE DSMES TOPIC EVALUATION     9/18/2023 WHAT CAN I EAT? General principles   Determining a healthy weight   Nutritional terms & tools   Healthy Plate method   Carbohydrate Counting   Reading food labels   Free apps   Pregnancy recommendations      The participant   Uses Healthy Plate principles in constructing meals: Yes  Reads food labels in choosing acceptable foods: Yes    The participant needs to address using both of these more often. DATE DSMES TOPIC EVALUATION     9/18/2023 HOW DO I FIGURE OUT WHAT'S INFLUENCING MY BLOOD GLUCOSES? Problem solving using SOAR   Set goals   Sort options   Arrive at a plan   Reaffirm plan   Common problems in diabetes self-care   Hypoglycemia   Hyperglycemia   Sick days   Pattern management   Disaster preparedness plan        The participant has an action plan for   Hypoglycemia: Yes  Hyperglycemia: Yes  Sick days: No  During disasters: No    The participant needs to address see above - will finish the rest at another visit. Violette Reveles RD on 9/18/2023 at 3:24 PM    I have personally reviewed the health record, including provider notes, laboratory data and current medications before making these care and education recommendations. The time spent in this effort is included in the total time.   Total minutes: 60

## 2023-09-19 ENCOUNTER — HOME CARE VISIT (OUTPATIENT)
Facility: HOME HEALTH | Age: 53
End: 2023-09-19
Payer: MEDICAID

## 2023-09-19 VITALS
TEMPERATURE: 98.1 F | DIASTOLIC BLOOD PRESSURE: 91 MMHG | SYSTOLIC BLOOD PRESSURE: 154 MMHG | HEART RATE: 91 BPM | OXYGEN SATURATION: 95 %

## 2023-09-19 PROCEDURE — G0153 HHCP-SVS OF S/L PATH,EA 15MN: HCPCS

## 2023-09-19 PROCEDURE — G0157 HHC PT ASSISTANT EA 15: HCPCS

## 2023-09-19 ASSESSMENT — ENCOUNTER SYMPTOMS: PAIN LOCATION - PAIN QUALITY: TINGLING

## 2023-09-19 NOTE — HOME HEALTH
Subjective: \"I am feeling better but my brain is definately not back to normal.\"  Falls since last visit No(if yes complete the Fall Tracking Form and include bsrifallreport):   Caregiver involvement changes: none  Home health supplies by type and quantity ordered/delivered this visit include: none    Clinician asked if patient has had any physician contact since last home care visit and patient states: NO  Clinician asked if patient has any new or changed medications and patient states:  NO   If Yes, were medications reconciled? N/A   Was the certifying physician notified of changes in medications? N/A     Clinical assessment (what this visit means for the patient overall and need for ongoing skilled care) and progress or lack of progress towards SPECIFIC goals: Pt provided with congitive activities to promote safety, STM and functional tasks in home environment. Pt provided w/ independent practice to assist with carryover. Written Teaching Material Utilized: problem solving and STM activities provided    Interdisciplinary communication with: N/A     Discharge planning as follows:  Will discharge when the patient has reached their maximum functional potential and maximum safety in their home    Specific plan for next visit: Instruct caregiver/patient in cognitive strategies to promote independence and safety in home environment

## 2023-09-20 ENCOUNTER — HOME CARE VISIT (OUTPATIENT)
Facility: HOME HEALTH | Age: 53
End: 2023-09-20
Payer: MEDICAID

## 2023-09-20 ENCOUNTER — HOSPITAL ENCOUNTER (OUTPATIENT)
Facility: HOSPITAL | Age: 53
Discharge: HOME | End: 2023-09-23
Attending: GENERAL ACUTE CARE HOSPITAL
Payer: MEDICARE

## 2023-09-20 VITALS
TEMPERATURE: 97.6 F | DIASTOLIC BLOOD PRESSURE: 73 MMHG | OXYGEN SATURATION: 92 % | WEIGHT: 235 LBS | BODY MASS INDEX: 33.72 KG/M2 | HEART RATE: 90 BPM | SYSTOLIC BLOOD PRESSURE: 134 MMHG

## 2023-09-20 DIAGNOSIS — E04.1 THYROID NODULE: ICD-10-CM

## 2023-09-20 PROCEDURE — 76536 US EXAM OF HEAD AND NECK: CPT

## 2023-09-20 PROCEDURE — G0299 HHS/HOSPICE OF RN EA 15 MIN: HCPCS

## 2023-09-20 PROCEDURE — G0158 HHC OT ASSISTANT EA 15: HCPCS

## 2023-09-20 NOTE — HOME HEALTH
Subjective:Patient states that he is weak and just tired. Having alot of pain this afternoon. Falls since last visit No(if yes complete the Fall Tracking Form and include bsrifallreport):   Caregiver involvement changes: no  Home health supplies by type and quantity ordered/delivered this visit include: no    Clinician asked if patient has had any physician contact since last home care visit and patient states: N/A  Clinician asked if patient has any new or changed medications and patient states:  N/A   If Yes, were medications reconciled? N/A   Was the certifying physician notified of changes in medications? N/A     Clinical assessment (what this visit means for the patient overall and need for ongoing skilled care) and progress or lack of progress towards SPECIFIC goals: Patient is c/o increase in pain today, he states because he has been out and away from home most of the day at the doctors office. Pt has several up coming dr visits and he has been educated on most things in the plan of care so we discussed possible DC for next week. He agrees. SN needed for further eval.    Written Teaching Material Utilized: N/A    Interdisciplinary communication with: N/A     Discharge planning as follows:  When goals are met    Specific plan for next visit: Educate on home medication

## 2023-09-21 ENCOUNTER — HOME CARE VISIT (OUTPATIENT)
Dept: HOME HEALTH SERVICES | Facility: HOME HEALTH | Age: 53
End: 2023-09-21
Payer: MEDICAID

## 2023-09-21 ENCOUNTER — HOME CARE VISIT (OUTPATIENT)
Facility: HOME HEALTH | Age: 53
End: 2023-09-21
Payer: MEDICAID

## 2023-09-21 VITALS
RESPIRATION RATE: 18 BRPM | HEART RATE: 97 BPM | SYSTOLIC BLOOD PRESSURE: 104 MMHG | WEIGHT: 235 LBS | TEMPERATURE: 97.3 F | DIASTOLIC BLOOD PRESSURE: 68 MMHG | OXYGEN SATURATION: 93 % | BODY MASS INDEX: 33.72 KG/M2

## 2023-09-21 VITALS
DIASTOLIC BLOOD PRESSURE: 72 MMHG | OXYGEN SATURATION: 97 % | SYSTOLIC BLOOD PRESSURE: 160 MMHG | RESPIRATION RATE: 18 BRPM | HEART RATE: 96 BPM | TEMPERATURE: 97.7 F

## 2023-09-21 PROCEDURE — G2168 SVS BY PT IN HOME HEALTH: HCPCS

## 2023-09-21 NOTE — HOME HEALTH
Subjective: Pt reported increased overall endurance this week compared to last week. Falls since last visit No(if yes complete the Fall Tracking Form and include bsrifallreport):   Caregiver involvement changes: none. Home health supplies by type and quantity ordered/delivered this visit include: none. Clinician asked if patient has had any physician contact since last home care visit and patient states: NO  Clinician asked if patient has any new or changed medications and patient states:  NO   If Yes, were medications reconciled? N/A   Was the certifying physician notified of changes in medications? N/A     Clinical assessment (what this visit means for the patient overall and need for ongoing skilled care) and progress or lack of progress towards SPECIFIC goals: Today's visit allowed pt to begin balance training and to see the importance of using an A. D. when his balance is poor to decrease fall risk. Gait and balance goals have not been met. Written Teaching Material Utilized: N/A    Interdisciplinary communication with: N/A     Discharge planning as follows: When goals are met    Specific plan for next visit: Instruct caregiver/patient in balance act training.

## 2023-09-21 NOTE — HOME HEALTH
Subjective: Pt states: \" I see the cardiologist tomorrow. \"         Falls since last visit No(if yes complete the Fall Tracking Form and include bsrifallreport):          Caregiver involvement changes: Pts uncle is available to maximo pt with care. Home health supplies by type and quantity ordered/delivered this visit include: none            Clinician asked if patient has had any physician contact since last home care visit and patient states: NO          Clinician asked if patient has any new or changed medications and patient states:  NO           If Yes, were medications reconciled? NO           Was the certifying physician notified of changes in medications? NO            Clinical assessment (what this visit means for the patient overall and need for ongoing skilled care) and progress or lack of progress towards SPECIFIC goals: Pt's BS [id number]. OT tx focus was on ADL setup; safety; stair management; fall prevention; and DME needs. Continue to upgrade UE HEP with light resistant theraband HEP for all planes, directions, and joints with rest brakes between exercises. Issued pt theraputty and hand strengthening HEP. Today brought out TTB and setup for shower safety. Instructed pt in transfers in and out of tub and recommended HHS. Continue to instruct in [facility] with frequent rest brakes, seated dressing; and use of AE to minimize energy expenditure. Pt has pain which limits B UE ROM; and muscle weakness more noted in R UE. Continue UE AROM for B UE for all planes, directions, and joints. Instruct in pharmacologic medicate on regular schedule and no pharmacologic techniques including frequent position changes and use of heat or ice.  Continue to teach and train pt/cg on fall risk prevention strategies, including using reacher instead of bending to the floor, ensure ADL DME/equipment is maintained correctly; wear non slip footwear, keep environment well lit, monitor medication that may alter mental status, slow

## 2023-09-22 ENCOUNTER — OFFICE VISIT (OUTPATIENT)
Age: 53
End: 2023-09-22
Payer: MEDICAID

## 2023-09-22 VITALS
DIASTOLIC BLOOD PRESSURE: 88 MMHG | SYSTOLIC BLOOD PRESSURE: 153 MMHG | HEART RATE: 101 BPM | BODY MASS INDEX: 34.26 KG/M2 | HEIGHT: 70 IN | WEIGHT: 239.3 LBS

## 2023-09-22 DIAGNOSIS — E04.1 THYROID NODULE: ICD-10-CM

## 2023-09-22 DIAGNOSIS — E10.42 TYPE 1 DIABETES MELLITUS WITH DIABETIC POLYNEUROPATHY (HCC): Primary | ICD-10-CM

## 2023-09-22 DIAGNOSIS — I10 ESSENTIAL HYPERTENSION: ICD-10-CM

## 2023-09-22 LAB — HBA1C MFR BLD: 7.4 %

## 2023-09-22 PROCEDURE — 3078F DIAST BP <80 MM HG: CPT | Performed by: GENERAL ACUTE CARE HOSPITAL

## 2023-09-22 PROCEDURE — 99214 OFFICE O/P EST MOD 30 MIN: CPT | Performed by: GENERAL ACUTE CARE HOSPITAL

## 2023-09-22 PROCEDURE — 83036 HEMOGLOBIN GLYCOSYLATED A1C: CPT | Performed by: GENERAL ACUTE CARE HOSPITAL

## 2023-09-22 PROCEDURE — G8427 DOCREV CUR MEDS BY ELIG CLIN: HCPCS | Performed by: GENERAL ACUTE CARE HOSPITAL

## 2023-09-22 PROCEDURE — 1036F TOBACCO NON-USER: CPT | Performed by: GENERAL ACUTE CARE HOSPITAL

## 2023-09-22 PROCEDURE — 3051F HG A1C>EQUAL 7.0%<8.0%: CPT | Performed by: GENERAL ACUTE CARE HOSPITAL

## 2023-09-22 PROCEDURE — 3017F COLORECTAL CA SCREEN DOC REV: CPT | Performed by: GENERAL ACUTE CARE HOSPITAL

## 2023-09-22 PROCEDURE — 3074F SYST BP LT 130 MM HG: CPT | Performed by: GENERAL ACUTE CARE HOSPITAL

## 2023-09-22 PROCEDURE — 1111F DSCHRG MED/CURRENT MED MERGE: CPT | Performed by: GENERAL ACUTE CARE HOSPITAL

## 2023-09-22 PROCEDURE — G8417 CALC BMI ABV UP PARAM F/U: HCPCS | Performed by: GENERAL ACUTE CARE HOSPITAL

## 2023-09-22 PROCEDURE — 2022F DILAT RTA XM EVC RTNOPTHY: CPT | Performed by: GENERAL ACUTE CARE HOSPITAL

## 2023-09-22 RX ORDER — INSULIN GLARGINE 100 [IU]/ML
INJECTION, SOLUTION SUBCUTANEOUS
Qty: 45 ML | Refills: 5 | Status: SHIPPED | OUTPATIENT
Start: 2023-09-22 | End: 2023-11-07 | Stop reason: SDUPTHER

## 2023-09-22 RX ORDER — INSULIN LISPRO 100 [IU]/ML
INJECTION, SOLUTION INTRAVENOUS; SUBCUTANEOUS
Qty: 30 ML | Refills: 3 | Status: SHIPPED | OUTPATIENT
Start: 2023-09-22

## 2023-09-22 NOTE — PROGRESS NOTES
nontender, nondistended, no masses, no hepatosplenomegaly  Musculoskeletal: no proximal muscle weakness in upper or lower extremities  Integumentary: no acanthosis nigricans, no abdominal striae, no rashes, no edema, no foot ulcers  Neurological: see foot exam  Psychiatric: normal mood and affect    Diabetic foot exam 07/2023:     Left Foot:   Visual Exam: normal    Pulse DP: 2+ (normal)   Filament test: reduced sensation    Vibratory sensation: absent      Right Foot:   Visual Exam: normal    Pulse DP: 2+ (normal)   Filament test: reduced sensation    Vibratory sensation: absent      REVIEW OF LABORATORY AND RADIOLOGY DATA:   Labs and documentation have been reviewed as described above. ASSESSMENT AND PLAN:     Type 1 diabetes Uncontrolled  Hyperlipidemia  Hypertension    Indicates he wants to continue current insulins he is taking and he is not interested in insulin pump yet, has Freestyle sam 2 and needs help with knowing how to use it, referred to DM education for that. Discussed importance of monitoring his BS and adjusting his Novolog accordingly, he indicates understanding    PLAN  Type 1 Diabetes  Goal <7%  Medications:   Stop Levemir  Lantus 42 units daily  Humalog  -Brunch: 26 to 28 units + correction scale   -Dinner: 22 units + correction scale   Correction Scale:   1 unit for every 30 above 150      Advised to check glucose 3 times daily , would benefit from CGM on Freestyle sam 2    Referred for DM education and attended classes  Annual Opthtalm he is due  CKD stable, advised importance of blood sugar and blood pressure control  Regular foot self checks, he needs to establish with podiatry    HTN: BP elevated on current medications, DASH diet, will monitor, no changes today.   Lab Results   Component Value Date    LABCREA 28.80 11/07/2023    LABCREA 154.6 12/01/2022    LABCREA 135.1 12/29/2021    LABALBU 3.6 11/07/2023    LABALBU 3.2 (L) 09/25/2023    LABALBU 3.9 09/12/2023    MALBCR 310 (H)

## 2023-09-22 NOTE — PATIENT INSTRUCTIONS
PLAN FOR TODAY    We will plan to make the following changes to your diabetes medications:  Stop Levemir  Lantus 42 units daily  Humalog  -Brunch: 26 to 28 units + correction scale   -Dinner: 22 units + correction scale   Correction Scale:   1 unit for every 30 above 150    IF GLUCOSE IS:                 THEN TAKE:      0   Extra Unit  151-180   1   Extra Unit  181-210   2   Extra Units  211-240   3   Extra Units  241-270   4   Extra Units  271-300   5   Extra Units  >300    6   Extra Units    Example: My planned insulin dose:    ____ Units    +   ____ Extra Correction Units  =  ____ total units to take together as one injection. It will be important to continue checking your glucose just as you did previously. I would like you at the very least to check you glucose during:     + AM fasting before breakfast   + Before Lunch   + Dinner time   + Bedtime   + Any other time that you are not feeling well. Always provide a glucose log that is completed at every visit so that we can review the results of your home glucose together. Without this, it is not possible to make accurate changes to your diabetes regimen. Clara Hamilton MD  [facility]     Hypoglycemia:    Hypoglycemia means that your blood sugar is low and your body is not getting enough fuel. Some people get low blood sugar from not eating often enough. Some medicines to treat diabetes can cause low blood sugar. People who have had surgery on their stomachs or intestines may get hypoglycemia. Problems with the pancreas, kidneys, or liver also can cause low blood sugar. A snack or drink with sugar in it will raise your blood sugar and should ease your symptoms right away. How can you care for yourself at home? Learn your signs of low blood sugar. They are different for everyone. Some common early signs include:  Nausea. Hunger. Feeling nervous, irritable, or shaky. Cold, clammy skin.   Sweating (when you're not

## 2023-09-23 VITALS
DIASTOLIC BLOOD PRESSURE: 62 MMHG | RESPIRATION RATE: 18 BRPM | OXYGEN SATURATION: 94 % | TEMPERATURE: 98.8 F | SYSTOLIC BLOOD PRESSURE: 90 MMHG | HEART RATE: 100 BPM

## 2023-09-23 NOTE — HOME HEALTH
Subjective: Pt reported increased balance during gait training. Falls since last visit No(if yes complete the Fall Tracking Form and include bsrifallreport):   Caregiver involvement changes: none. Home health supplies by type and quantity ordered/delivered this visit include: none. Clinician asked if patient has had any physician contact since last home care visit and patient states: NO  Clinician asked if patient has any new or changed medications and patient states:  NO   If Yes, were medications reconciled? N/A   Was the certifying physician notified of changes in medications? N/A     Clinical assessment (what this visit means for the patient overall and need for ongoing skilled care) and progress or lack of progress towards SPECIFIC goals: Today's visit allowed pt to recognize with instruction from PT the need for him to decrease speed during gait training and to begin using a SPC outdoors to decrease fall risk. Gait and balance goals have not been met. Written Teaching Material Utilized: N/A    Interdisciplinary communication with: PT and OT for the purpose of POC collaboration    Discharge planning as follows: Cont until goals have been met. Specific plan for next visit: Instruct caregiver/patient in gait training and balance training.

## 2023-09-25 ENCOUNTER — TELEPHONE (OUTPATIENT)
Facility: CLINIC | Age: 53
End: 2023-09-25

## 2023-09-25 ENCOUNTER — HOME CARE VISIT (OUTPATIENT)
Facility: HOME HEALTH | Age: 53
End: 2023-09-25
Payer: MEDICAID

## 2023-09-25 ENCOUNTER — HOSPITAL ENCOUNTER (EMERGENCY)
Facility: HOSPITAL | Age: 53
Discharge: HOME | End: 2023-09-25
Attending: STUDENT IN AN ORGANIZED HEALTH CARE EDUCATION/TRAINING PROGRAM
Payer: MEDICARE

## 2023-09-25 VITALS
BODY MASS INDEX: 33.95 KG/M2 | DIASTOLIC BLOOD PRESSURE: 78 MMHG | RESPIRATION RATE: 18 BRPM | TEMPERATURE: 97.4 F | SYSTOLIC BLOOD PRESSURE: 136 MMHG | WEIGHT: 236.6 LBS | OXYGEN SATURATION: 93 %

## 2023-09-25 VITALS
SYSTOLIC BLOOD PRESSURE: 152 MMHG | TEMPERATURE: 97.9 F | DIASTOLIC BLOOD PRESSURE: 87 MMHG | BODY MASS INDEX: 33.64 KG/M2 | OXYGEN SATURATION: 95 % | HEART RATE: 98 BPM | HEIGHT: 70 IN | WEIGHT: 235 LBS | RESPIRATION RATE: 14 BRPM

## 2023-09-25 DIAGNOSIS — E11.65 HYPERGLYCEMIA DUE TO DIABETES MELLITUS (HCC): ICD-10-CM

## 2023-09-25 DIAGNOSIS — L97.219 VENOUS STASIS ULCER OF RIGHT CALF WITHOUT VARICOSE VEINS, UNSPECIFIED ULCER STAGE (HCC): Primary | ICD-10-CM

## 2023-09-25 DIAGNOSIS — L03.115 CELLULITIS OF RIGHT LOWER EXTREMITY: ICD-10-CM

## 2023-09-25 DIAGNOSIS — I87.2 VENOUS STASIS ULCER OF RIGHT CALF WITHOUT VARICOSE VEINS, UNSPECIFIED ULCER STAGE (HCC): Primary | ICD-10-CM

## 2023-09-25 LAB
ALBUMIN SERPL-MCNC: 3.2 G/DL (ref 3.5–5)
ALBUMIN/GLOB SERPL: 0.7 (ref 1.1–2.2)
ALP SERPL-CCNC: 89 U/L (ref 45–117)
ALT SERPL-CCNC: 27 U/L (ref 12–78)
ANION GAP SERPL CALC-SCNC: 4 MMOL/L (ref 5–15)
AST SERPL-CCNC: 19 U/L (ref 15–37)
BASOPHILS # BLD: 0.1 K/UL (ref 0–0.1)
BASOPHILS NFR BLD: 1 % (ref 0–1)
BILIRUB SERPL-MCNC: 0.2 MG/DL (ref 0.2–1)
BUN SERPL-MCNC: 40 MG/DL (ref 6–20)
BUN/CREAT SERPL: 19 (ref 12–20)
CALCIUM SERPL-MCNC: 9.2 MG/DL (ref 8.5–10.1)
CHLORIDE SERPL-SCNC: 110 MMOL/L (ref 97–108)
CO2 SERPL-SCNC: 28 MMOL/L (ref 21–32)
CREAT SERPL-MCNC: 2.12 MG/DL (ref 0.7–1.3)
DIFFERENTIAL METHOD BLD: ABNORMAL
EOSINOPHIL # BLD: 0.4 K/UL (ref 0–0.4)
EOSINOPHIL NFR BLD: 5 % (ref 0–7)
ERYTHROCYTE [DISTWIDTH] IN BLOOD BY AUTOMATED COUNT: 13.7 % (ref 11.5–14.5)
GLOBULIN SER CALC-MCNC: 4.5 G/DL (ref 2–4)
GLUCOSE BLD STRIP.AUTO-MCNC: 231 MG/DL (ref 65–117)
GLUCOSE SERPL-MCNC: 227 MG/DL (ref 65–100)
HCT VFR BLD AUTO: 35.3 % (ref 36.6–50.3)
HGB BLD-MCNC: 11.4 G/DL (ref 12.1–17)
IMM GRANULOCYTES # BLD AUTO: 0 K/UL (ref 0–0.04)
IMM GRANULOCYTES NFR BLD AUTO: 1 % (ref 0–0.5)
LYMPHOCYTES # BLD: 2.4 K/UL (ref 0.8–3.5)
LYMPHOCYTES NFR BLD: 29 % (ref 12–49)
MCH RBC QN AUTO: 28 PG (ref 26–34)
MCHC RBC AUTO-ENTMCNC: 32.3 G/DL (ref 30–36.5)
MCV RBC AUTO: 86.7 FL (ref 80–99)
MONOCYTES # BLD: 0.9 K/UL (ref 0–1)
MONOCYTES NFR BLD: 11 % (ref 5–13)
NEUTS SEG # BLD: 4.4 K/UL (ref 1.8–8)
NEUTS SEG NFR BLD: 53 % (ref 32–75)
NRBC # BLD: 0 K/UL (ref 0–0.01)
NRBC BLD-RTO: 0 PER 100 WBC
PLATELET # BLD AUTO: 255 K/UL (ref 150–400)
PMV BLD AUTO: 11.4 FL (ref 8.9–12.9)
POTASSIUM SERPL-SCNC: 4.2 MMOL/L (ref 3.5–5.1)
PROT SERPL-MCNC: 7.7 G/DL (ref 6.4–8.2)
RBC # BLD AUTO: 4.07 M/UL (ref 4.1–5.7)
SERVICE CMNT-IMP: ABNORMAL
SODIUM SERPL-SCNC: 142 MMOL/L (ref 136–145)
WBC # BLD AUTO: 8.3 K/UL (ref 4.1–11.1)

## 2023-09-25 PROCEDURE — G0158 HHC OT ASSISTANT EA 15: HCPCS

## 2023-09-25 PROCEDURE — 82962 GLUCOSE BLOOD TEST: CPT

## 2023-09-25 PROCEDURE — 80053 COMPREHEN METABOLIC PANEL: CPT

## 2023-09-25 PROCEDURE — 36415 COLL VENOUS BLD VENIPUNCTURE: CPT

## 2023-09-25 PROCEDURE — 85025 COMPLETE CBC W/AUTO DIFF WBC: CPT

## 2023-09-25 PROCEDURE — 99283 EMERGENCY DEPT VISIT LOW MDM: CPT

## 2023-09-25 RX ORDER — CEPHALEXIN 500 MG/1
500 CAPSULE ORAL 4 TIMES DAILY
Qty: 28 CAPSULE | Refills: 0 | Status: SHIPPED | OUTPATIENT
Start: 2023-09-25 | End: 2023-10-02

## 2023-09-25 ASSESSMENT — PAIN SCALES - GENERAL: PAINLEVEL_OUTOF10: 8

## 2023-09-25 NOTE — TELEPHONE ENCOUNTER
Per Cam, I called Jodee Tomas to inform her that they will need to contact his endocrinologist due to us not handling his insulin. She stated that the patient is on the way to the ER because she found a wound on his lower calf.

## 2023-09-25 NOTE — ED PROVIDER NOTES
[facility] EMERGENCY DEPT  EMERGENCY DEPARTMENT ENCOUNTER       Pt Name: Jacquelyn Bermudez  MRN: [id number]  [address] [facility] 1970  Date of evaluation: 9/25/2023  Provider: Nancy Strickland MD   PCP: Charla Baum PA-C  Note Started: 7:10 PM EDT 9/25/23     CHIEF COMPLAINT       Chief Complaint   Patient presents with    Hyperglycemia     Pt was receiving OT at home, they checked his blood sugar-it resulted 314, and then 357 and 378 all within an hour. He took his 30 units Humalog at 1230. Wound Check     Circular wound on right medial lower leg, ulcer it blistered four days ago. Bp 161/80 by rescue. HISTORY OF PRESENT ILLNESS: 1 or more elements      History From: patient, History limited by: none     Jacquelyn Bermudez is a 46 y.o. male presenting with hyperglycemia and right leg wound. Notes that wounds been there for 3 or 4 to 5 days. Does not hurt. Notes it did blister up and then popped it was draining a clear fluid. Denies any fever, nausea, vomiting. Notes his sugar was high today. Recently decreased some of his insulin dosings. Please See MDM for Additional Details of the HPI/PMH  Nursing Notes were all reviewed and agreed with or any disagreements were addressed in the HPI. REVIEW OF SYSTEMS        Positives and Pertinent negatives as per HPI.     PAST HISTORY     Past Medical History:  Past Medical History:   Diagnosis Date    Anxiety and depression     per pt on 10/21/2021    Asthma     GERD (gastroesophageal reflux disease)     per pt on 10/21/2021    Hypertension     per pt on 10/21/2021    MS (multiple sclerosis) ([address])     per pt on 10/21/2021    Type 2 diabetes mellitus with peripheral neuropathy [facility])        Past Surgical History:  Past Surgical History:   Procedure Laterality Date    CATARACT REMOVAL Left 12/03/2021    CATARACT REMOVAL Right 10/2021    HEENT      ear surgery(both)    TONSILLECTOMY      per pt on 10/21/2021       Family History:  Family History   Problem Relation

## 2023-09-25 NOTE — TELEPHONE ENCOUNTER
Susie with [employer] [facility] called and stated that the patients blood sugars is continuously creeping up. At arrival blood sugar was 314, he took 30 units of Humalog at 12:30, drinking water and went for a walk but it is still going up. Number are 444, [phone] & 574. She would like to know what he should do.      Callback: [phone]

## 2023-09-26 ENCOUNTER — HOME CARE VISIT (OUTPATIENT)
Facility: HOME HEALTH | Age: 53
End: 2023-09-26
Payer: MEDICAID

## 2023-09-26 PROCEDURE — G0153 HHCP-SVS OF S/L PATH,EA 15MN: HCPCS

## 2023-09-26 PROCEDURE — G0157 HHC PT ASSISTANT EA 15: HCPCS

## 2023-09-26 NOTE — HOME HEALTH
focus was on ADL setup; safety; fall prevention; and DME needs. Continue to instruct in [facility] with frequent rest brakes, seated dressing; and use of AE to minimize energy expenditure. Pt has pain which limits B UE ROM; and muscle weakness more noted in R UE. Continue to teach and train pt/cg on fall risk prevention strategies, including using reacher instead of bending to the floor, ensure ADL DME/equipment is maintained correctly; wear non slip footwear, keep environment well lit, monitor medication that may alter mental status, slow down when turning, maintain wide base of support when turning and remove clutter. see interventions for details. Written Teaching Material Utilized: UE HEP      Interdisciplinary communication with: MD office; Lesly Connell, and Sydnee Angeles due to pt having uncontrolled BS and new wound. Discharge planning as follows: Is no longer homebound or goals reached      Specific plan for next visit: will f/u from ER visit.

## 2023-09-27 ENCOUNTER — TELEPHONE (OUTPATIENT)
Age: 53
End: 2023-09-27

## 2023-09-27 VITALS
OXYGEN SATURATION: 97 % | SYSTOLIC BLOOD PRESSURE: 140 MMHG | DIASTOLIC BLOOD PRESSURE: 72 MMHG | HEART RATE: 96 BPM | TEMPERATURE: 97.8 F | RESPIRATION RATE: 18 BRPM

## 2023-09-27 VITALS
HEART RATE: 98 BPM | SYSTOLIC BLOOD PRESSURE: 143 MMHG | TEMPERATURE: 98 F | DIASTOLIC BLOOD PRESSURE: 94 MMHG | OXYGEN SATURATION: 95 %

## 2023-09-27 DIAGNOSIS — E04.1 THYROID NODULE: Primary | ICD-10-CM

## 2023-09-27 DIAGNOSIS — F41.9 ANXIETY: Primary | ICD-10-CM

## 2023-09-27 RX ORDER — LORAZEPAM 0.5 MG/1
TABLET ORAL
Qty: 2 TABLET | Refills: 0 | Status: SHIPPED | OUTPATIENT
Start: 2023-09-27 | End: 2023-10-27

## 2023-09-27 ASSESSMENT — ENCOUNTER SYMPTOMS
PAIN LOCATION - PAIN QUALITY: ACHING
PAIN LOCATION - PAIN QUALITY: NUMBING

## 2023-09-27 NOTE — TELEPHONE ENCOUNTER
Patient called stating that he has 3 MRI's scheduled for 10/16 and wants to know if Carrie Gonzalez will call in something for his anxiety to his pharmacy.     [facility] on [address]  ([phone])

## 2023-09-27 NOTE — HOME HEALTH
Subjective: \"this will be really good for me and I can complete on my tablet. \"  Falls since last visit No(if yes complete the Fall Tracking Form and include bsrifallreport):   Caregiver involvement changes: none  Home health supplies by type and quantity ordered/delivered this visit include: none    Clinician asked if patient has had any physician contact since last home care visit and patient states: NO  Clinician asked if patient has any new or changed medications and patient states:  NO   If Yes, were medications reconciled? N/A   Was the certifying physician notified of changes in medications? N/A     Clinical assessment (what this visit means for the patient overall and need for ongoing skilled care) and progress or lack of progress towards SPECIFIC goals: Educated patient on online apps to support cognitive function. Downloaded cognitive activity and performed w/ guidance and skilled intervention. Pt directed to complete daily cognitive practice for improved cognitive strength. Pt compliant. Written Teaching Material Utilized: used tablet to download online cognitive activities    Interdisciplinary communication with: N/A for the purpose of none for this episode    Discharge planning as follows: Will discharge when the patient has reached their maximum functional potential and maximum safety in their home    Specific plan for next visit: Instruct caregiver/patient in independent practice w/ focus on cognitive deficits. Pt educated to perform daily.  Pt compliant

## 2023-09-27 NOTE — TELEPHONE ENCOUNTER
We received a call from scheduling stating that the patient was not able to scheduled his biopsy today because it was ordered under the wrong code. They stated that it was ordered without imaging and it needs to be ordered with imaging. Please submit a new order using code [id number]. Patient also called and left a message regarding the message noted.

## 2023-09-28 ENCOUNTER — HOME CARE VISIT (OUTPATIENT)
Facility: HOME HEALTH | Age: 53
End: 2023-09-28
Payer: MEDICAID

## 2023-09-28 VITALS
SYSTOLIC BLOOD PRESSURE: 130 MMHG | HEART RATE: 96 BPM | DIASTOLIC BLOOD PRESSURE: 72 MMHG | TEMPERATURE: 97.8 F | RESPIRATION RATE: 18 BRPM | OXYGEN SATURATION: 95 %

## 2023-09-28 PROCEDURE — G0157 HHC PT ASSISTANT EA 15: HCPCS

## 2023-09-28 ASSESSMENT — ENCOUNTER SYMPTOMS: PAIN LOCATION - PAIN QUALITY: ACHING

## 2023-09-28 NOTE — TELEPHONE ENCOUNTER
Spoke with patient. Verified patient with two patient identifiers. Advised Rx sent to [facility], pre MRI. Patient verbalized understanding.         Shae Goodman, ANP  You 23 hours ago (4:45 PM)     =  Prescription for Ativan sent to his local pharmacy to take prior to his MRI scan directions are on the bottle

## 2023-09-29 ENCOUNTER — HOME CARE VISIT (OUTPATIENT)
Facility: HOME HEALTH | Age: 53
End: 2023-09-29
Payer: MEDICAID

## 2023-09-29 ENCOUNTER — HOME CARE VISIT (OUTPATIENT)
Dept: HOME HEALTH SERVICES | Facility: HOME HEALTH | Age: 53
End: 2023-09-29
Payer: MEDICAID

## 2023-09-29 NOTE — HOME HEALTH
Subjective: Pt reported increased discomfort in R L LE due to ED visit /cellutis. Falls since last visit No(if yes complete the Fall Tracking Form and include bsrifallreport):   Caregiver involvement changes: none. Home health supplies by type and quantity ordered/delivered this visit include: none. Clinician asked if patient has had any physician contact since last home care visit and patient states: NO  Clinician asked if patient has any new or changed medications and patient states:  NO   If Yes, were medications reconciled? N/A   Was the certifying physician notified of changes in medications? N/A     Clinical assessment (what this visit means for the patient overall and need for ongoing skilled care) and progress or lack of progress towards SPECIFIC goals: Today's visit provided pt with education to decrease act level to allow L LE to heal and decrease pain. Gait and balance goals have not been met. Written Teaching Material Utilized: N/A    Interdisciplinary communication with:  PT for the purpose of POC collaboration    Discharge planning as follows: When goals are met    Specific plan for next visit: Instruct caregiver/patient in balance training.

## 2023-09-29 NOTE — TELEPHONE ENCOUNTER
Patient notified of the message noted per Dr. Lea Restrepo and voiced understanding. Patient offered no more questions at this time.

## 2023-10-02 ENCOUNTER — HOME CARE VISIT (OUTPATIENT)
Facility: HOME HEALTH | Age: 53
End: 2023-10-02
Payer: MEDICAID

## 2023-10-02 PROCEDURE — G0151 HHCP-SERV OF PT,EA 15 MIN: HCPCS

## 2023-10-02 RX ORDER — PRAVASTATIN SODIUM 20 MG
TABLET ORAL NIGHTLY
Qty: 90 TABLET | Refills: 1 | Status: SHIPPED | OUTPATIENT
Start: 2023-10-02

## 2023-10-02 ASSESSMENT — ENCOUNTER SYMPTOMS: HEMOPTYSIS: 0

## 2023-10-02 NOTE — TELEPHONE ENCOUNTER
PCP: Sree Brewster MD     Last appt: 7/29/2019   Future Appointments   Date Time Provider Neil Brown   8/20/2019 12:00 PM [facility]   8/20/2019  1:00 PM [facility] 1 [facility] [email or web address] [facility]   8/20/2019  2:00 PM [facility] 1 [facility] [email or web address] [facility]   8/20/2019  4:00 PM [facility] 1 [facility]   9/24/2019  9:15 AM Sree Brewster MD [address] [place] [address]   10/11/2019  2:30 PM Katie Walsh MD R[facility] JUAN A [address] [facility]        Requested Prescriptions     Pending Prescriptions Disp Refills    Insulin Needles, Disposable, 31 gauge x 5/16\" ndle 450 Pen Needle 3     Sig: Use with insulin electronic

## 2023-10-03 ENCOUNTER — OFFICE VISIT (OUTPATIENT)
Age: 53
End: 2023-10-03
Payer: MEDICARE

## 2023-10-03 DIAGNOSIS — E10.42 DIABETIC POLYNEUROPATHY ASSOCIATED WITH TYPE 1 DIABETES MELLITUS (HCC): Primary | ICD-10-CM

## 2023-10-03 PROCEDURE — G0108 DIAB MANAGE TRN  PER INDIV: HCPCS | Performed by: DIETITIAN, REGISTERED

## 2023-10-03 NOTE — HOME HEALTH
Subjective: Patient reports that he has been tired today  Falls since last visit - no  Caregiver involvement changes: no  Home health supplies by type and quantity ordered/delivered this visit include: no    Clinician asked if patient has had any physician contact since last home care visit and patient states: no  Clinician asked if patient has any new or changed medications and patient states:  no  If Yes, were medications reconciled? na  Was the certifying physician notified of changes in medications? yes    Clinical assessment (what this visit means for the patient overall and need for ongoing skilled care) and progress or lack of progress towards SPECIFIC goals: Patient has been seen by PT for the past 30 days. treatment sessions have included progression of HEP, ther ex, transfers, gait training, fall prevention, bed mobility and patient and caregiver education. Patient continues to make progress towards goals and is now independent for transfers and is able to ambulate x 75 feet with hands on assist for safety. Patient's tinetti score has increased to 15 indicating that patient has gone from high to medium fall risk. Patient will benefit from contiued PT in order to achieve gait goals and continue to progress balance ther ex so that he is able to improve to low fall risk.      Written Teaching Material Utilized: none    Interdisciplinary communication with: PTA to follow    Discharge planning as follows: PT plans to discharge at the end of the orders    Specific plan for next visit: continue to increase ambulation distance, increase strength, and progress balance ther ex

## 2023-10-03 NOTE — PROGRESS NOTES
[facility] Program for Diabetes Health  Diabetes Self-Management Education & Support Program  Encounter Note      SUMMARY  Diabetes self-care management training was completed related to healthy eating. The participant will return on October 09 to continue DSMES related to reducing risks. The participant did identify SMART Goal(s) and will practice knowledge and skills related to healthy eating and monitoring, medications, and problem solving to improve diabetes self-management. EVALUATION:  Mr Hazel Berg brought his [employer] reader with him today. His time in range is as follows (in target, above target, below target, respectively)  7 day: 61% in target, 34% above, 5% low, average of 158  14 day: 52% in target, 40% above, 8% low, average of 166  30 day: 53% in target, 40% above, 7% low, average of 169    He reports the wound on his calf is healing nicely. Some days he is extremely fatigued and cannot do much, which he is not sure how much of this has to do with his MS or high BG. After his insulin dose changed, he was dropping low almost every night and staying there. In the last 7 days or so, he is not going low every night but consistently goes low after eating his first meal of the day or he drops very quickly. Even when he has not eaten, his BG goes up. He is confused and frustrated by this. We discussed how eating a small meal to start his morning, like a piece of bread with PB or a small [place] yogurt, could help (taking a correction factor and probably 1/4 of his regular dose of insulin) prevent the spike in the morning. When discussing his other meal choices and reading labels together, he tends to be missing protein, so we discussed ways to get more lean/low fat protein at his meal that is easy to include. Looking at his BG, he tends to drop around 6 hours or later after his meal. He does have some symptoms of gastroparesis so he may benefit from getting a study done.  He is going to ask his

## 2023-10-04 ENCOUNTER — HOME CARE VISIT (OUTPATIENT)
Facility: HOME HEALTH | Age: 53
End: 2023-10-04
Payer: MEDICAID

## 2023-10-04 VITALS
WEIGHT: 239.5 LBS | BODY MASS INDEX: 34.36 KG/M2 | OXYGEN SATURATION: 94 % | TEMPERATURE: 97.9 F | HEART RATE: 95 BPM | SYSTOLIC BLOOD PRESSURE: 124 MMHG | RESPIRATION RATE: 16 BRPM | DIASTOLIC BLOOD PRESSURE: 78 MMHG

## 2023-10-04 PROCEDURE — G0299 HHS/HOSPICE OF RN EA 15 MIN: HCPCS

## 2023-10-04 ASSESSMENT — ENCOUNTER SYMPTOMS: DIARRHEA: 1

## 2023-10-04 NOTE — HOME HEALTH
Subjective: Patient states his pain is like needles  Falls since last visit No(if yes complete the Fall Tracking Form and include bsrifallreport):   Caregiver involvement changes: no  Home health supplies by type and quantity ordered/delivered this visit include: no    Clinician asked if patient has had any physician contact since last home care visit and patient states: N/A  Clinician asked if patient has any new or changed medications and patient states:  N/A   If Yes, were medications reconciled? N/A   Was the certifying physician notified of changes in medications? N/A     Clinical assessment (what this visit means for the patient overall and need for ongoing skilled care) and progress or lack of progress towards SPECIFIC goals: Patient has a wound ruptured blister tothe posterior of the left leg. His provider has given the order to increase visits to twicw weekly and wound care. Patient is in agreement with POC. SN needed for further eval and treatment. Written Teaching Material Utilized: N/A    Interdisciplinary communication with:  PA for the purpose of POC collaboration    Discharge planning as follows:  When goals are met    Specific plan for next visit: Isatu Solorzano in safety issues regarding Diet

## 2023-10-05 ENCOUNTER — HOME CARE VISIT (OUTPATIENT)
Facility: HOME HEALTH | Age: 53
End: 2023-10-05
Payer: MEDICAID

## 2023-10-05 ENCOUNTER — HOME CARE VISIT (OUTPATIENT)
Dept: HOME HEALTH SERVICES | Facility: HOME HEALTH | Age: 53
End: 2023-10-05
Payer: MEDICAID

## 2023-10-05 ENCOUNTER — HOSPITAL ENCOUNTER (OUTPATIENT)
Facility: HOSPITAL | Age: 53
Discharge: HOME | End: 2023-10-05
Attending: GENERAL ACUTE CARE HOSPITAL
Payer: MEDICAID

## 2023-10-05 VITALS
HEART RATE: 76 BPM | SYSTOLIC BLOOD PRESSURE: 132 MMHG | OXYGEN SATURATION: 98 % | DIASTOLIC BLOOD PRESSURE: 72 MMHG | TEMPERATURE: 98 F

## 2023-10-05 DIAGNOSIS — E04.1 THYROID NODULE: ICD-10-CM

## 2023-10-05 PROCEDURE — 88172 CYTP DX EVAL FNA 1ST EA SITE: CPT

## 2023-10-05 PROCEDURE — 2500000003 HC RX 250 WO HCPCS: Performed by: GENERAL ACUTE CARE HOSPITAL

## 2023-10-05 PROCEDURE — G0152 HHCP-SERV OF OT,EA 15 MIN: HCPCS

## 2023-10-05 PROCEDURE — 2709999900 US FINE NEEDLE ASPIRATION

## 2023-10-05 PROCEDURE — 88173 CYTOPATH EVAL FNA REPORT: CPT

## 2023-10-05 RX ORDER — LIDOCAINE HYDROCHLORIDE 10 MG/ML
10 INJECTION, SOLUTION EPIDURAL; INFILTRATION; INTRACAUDAL; PERINEURAL ONCE
Status: COMPLETED | OUTPATIENT
Start: 2023-10-05 | End: 2023-10-05

## 2023-10-05 RX ADMIN — LIDOCAINE HYDROCHLORIDE 10 ML: 10 INJECTION, SOLUTION EPIDURAL; INFILTRATION; INTRACAUDAL; PERINEURAL at 15:01

## 2023-10-05 NOTE — HOME HEALTH
Subjective: That bench is so helpful for bathing      Falls since last visit (if yes complete the Fall Tracking Form and include . bsrifallreport):none      Caregiver involvement changes: No changes reported or noted by clinician   Home health supplies by type and quantity ordered/delivered this visit include: N/A for OT      Clinician asked if patient has had any physician contact since last home care visit and patient states: Pt/cg deny any new physician contact     Clinician asked if patient has any new or changed medications and patient states: Pt/cg deny any med changes       If Yes, were medications reconciled? N/A      Was the certifying physician notified of changes in medications? N/A     Clinical assessment (what this visit means for the patient overall and need for ongoing skilled care) and progress/lack of progress towards SPECIFIC goals):  Pt has been seen for OT services with a focus on ADL training, safety and fall prevention, DME education and training. he has participated well and made good progress towards all OT goals. Pt now has a tub bench for increased safety and IND with bathing. He has improved his Barthel Index scores from 50/100 to 90/100 on discharge today, indicating significant functional improvement. Pts gait quality has significantly improved as  well as his independence with transfers and overall endurance. Pt has met all of his OT goals at this time and is agreeable to OT dc today with all goals met.       Written Teaching Material Utilized: Dc instructions     Interdisciplinary communication [id number] team     Discharge planning as follows: OT dc completed today     Specific plan for next visit:NA

## 2023-10-05 NOTE — HOME HEALTH
OCCUPATIONAL THERAPY (OT) [facility]   Certified Occupational Therapy Assistant (GORDON) visit note co-signature Attestation Form    Patient Name:  Duane Capps date of birth: [birth year]              Occupational Therapist Name:  Opal Ni           Certified Occupational Therapy Assistant Name:Susie Hidalgo            Date of Supervisory Meeting:   10/1/23          I attest that I have conferenced with Certified Occupational Therapy Visit on(date)_______10/1/23__ and reviewed the Certified Occupational Therapy Assistant's visit note on (date)_____10/1/23______  for the above mentioned patient.       Date:  10/5/23

## 2023-10-06 ENCOUNTER — HOME CARE VISIT (OUTPATIENT)
Facility: HOME HEALTH | Age: 53
End: 2023-10-06
Payer: MEDICAID

## 2023-10-06 ENCOUNTER — HOME CARE VISIT (OUTPATIENT)
Dept: HOME HEALTH SERVICES | Facility: HOME HEALTH | Age: 53
End: 2023-10-06
Payer: MEDICAID

## 2023-10-06 PROCEDURE — G0153 HHCP-SVS OF S/L PATH,EA 15MN: HCPCS

## 2023-10-08 VITALS
HEART RATE: 99 BPM | DIASTOLIC BLOOD PRESSURE: 62 MMHG | OXYGEN SATURATION: 95 % | TEMPERATURE: 98.5 F | SYSTOLIC BLOOD PRESSURE: 140 MMHG

## 2023-10-08 ASSESSMENT — ENCOUNTER SYMPTOMS: PAIN LOCATION - PAIN QUALITY: TINGLING

## 2023-10-09 ENCOUNTER — OFFICE VISIT (OUTPATIENT)
Age: 53
End: 2023-10-09
Payer: MEDICARE

## 2023-10-09 DIAGNOSIS — E10.42 DIABETIC POLYNEUROPATHY ASSOCIATED WITH TYPE 1 DIABETES MELLITUS (HCC): Primary | ICD-10-CM

## 2023-10-09 PROCEDURE — G0108 DIAB MANAGE TRN  PER INDIV: HCPCS | Performed by: DIETITIAN, REGISTERED

## 2023-10-09 NOTE — HOME HEALTH
Subjective: \"I have been completing the tasks every day. Sometimes they are hard and frustrating but I know they are helping. \"  Falls since last visit No(if yes complete the Fall Tracking Form and include bsrifallreport):   Caregiver involvement changes: none    Clinician asked if patient has had any physician contact since last home care visit and patient states: YES  Clinician asked if patient has any new or changed medications and patient states:  NO   If Yes, were medications reconciled? N/A   Was the certifying physician notified of changes in medications? N/A     A list of reconciled medications has been given to the patient/caregiver . Clinical assessment (what this visit means for the patient overall and need for ongoing skilled care) and progress or lack of progress towards SPECIFIC goals: Pt has met cognitive goals set for him as evidenced in an increase in score on SLUMS and independent carryover of exercises provided by clinician. Pt's able to return demo activities w/ 90% acc. Discharge Instructions: continue daily practice of cognitive exercises    Written Teaching Material Utilized: cognitive applications downloaded on tablet for ease and consistent follow through    Instructed patient/caregiver on the following: Take all medications exactly as prescribed by physician.  Updated medication list present in the home at discharge, Keep all scheduled medical appointments, Follow safety and fall prevention education to prevent falls and Continue home exercises as instructed by your therapist    Call physician for: continuous pain, high fever over 100.4 degrees, increased pain, new problem and frequent falls    The patient/caregiver expressed knowledge and understanding of Discharge Instructions    Interdisciplinary communication with:  LPN, PT and  OT for the purpose of POC collaboration, medication concerns and fall risk concerns

## 2023-10-09 NOTE — PROGRESS NOTES
[facility] Program for Diabetes Health  Diabetes Self-Management Education & Support Program  Encounter Note      SUMMARY  Diabetes self-care management training was completed related to reducing risks. The participant will return on October 23 to continue DSMES related to physical activity and healthy coping. The participant did identify SMART Goal(s) and will practice knowledge and skills related to reducing risks, healthy eating and monitoring, medications, and problem solving to improve diabetes self-management. EVALUATION:  Mr Birch's BG numbers are improving, but he is still going low certain days. His 7 day avg is 163; 41% above target, 56% in target, 3% below target  His 14 day avg is 159; 36% above target, 60% in target, 4% below target    He reports that he is actually reducing his insulin by 1-2 U for his meals because he notices when he follows the directions exactly, that is when he drops low. He is having a balanced meal for his first meal of the day (waffles and sausage links) and does not have many vegetables with dinner but tries to get protein and carbs each time. It is any time after these meals that he may drop low. We discussed last time, but based off of some of his symptoms and GI problems, he may be a good candidate to get a gastric emptying study done. I informed Angel Lopez of this. Mr Hazel Berg reports he has very little energy every morning, which does not seem to be related to BG but everything else he has been going through. He is noticing that eating more than one time per day is helping some. We discussed reducing risks and he is due for a dentist and eye exam and may be a good candidate for a sleep study as well. RECOMMENDATIONS:  Schedule necessary appts  See if he can get a gastric emptying study and sleep study     TOPICS DISCUSSED TODAY:  WHAT IS DIABETES? Minutes: [address]?  30      Next provider visit is scheduled for 10/23/23 with me       SMART

## 2023-10-10 ENCOUNTER — HOME CARE VISIT (OUTPATIENT)
Facility: HOME HEALTH | Age: 53
End: 2023-10-10
Payer: MEDICAID

## 2023-10-10 VITALS
HEART RATE: 94 BPM | TEMPERATURE: 97.7 F | RESPIRATION RATE: 18 BRPM | DIASTOLIC BLOOD PRESSURE: 62 MMHG | OXYGEN SATURATION: 97 % | SYSTOLIC BLOOD PRESSURE: 115 MMHG

## 2023-10-10 PROCEDURE — G2168 SVS BY PT IN HOME HEALTH: HCPCS

## 2023-10-10 ASSESSMENT — ENCOUNTER SYMPTOMS: PAIN LOCATION - PAIN QUALITY: ACHING

## 2023-10-10 NOTE — HOME HEALTH
Subjective: Pt reported taking Tylenol this morning. Falls since last visit No(if yes complete the Fall Tracking Form and include bsrifallreport):   Caregiver involvement changes: none. Home health supplies by type and quantity ordered/delivered this visit include: none. Clinician asked if patient has had any physician contact since last home care visit and patient states: NO  Clinician asked if patient has any new or changed medications and patient states:  NO   If Yes, were medications reconciled? N/A   Was the certifying physician notified of changes in medications? N/A     Clinical assessment (what this visit means for the patient overall and need for ongoing skilled care) and progress or lack of progress towards SPECIFIC goals: Today's treatment. Written Teaching Material Utilized: N/A    Interdisciplinary communication with: N/A for the purpose of POC collaboration    Discharge planning as follows: When goals are met    Specific plan for next visit: Instruct caregiver/patient in gait training.

## 2023-10-11 ENCOUNTER — HOME CARE VISIT (OUTPATIENT)
Facility: HOME HEALTH | Age: 53
End: 2023-10-11
Payer: MEDICAID

## 2023-10-11 VITALS
HEART RATE: 91 BPM | BODY MASS INDEX: 34.91 KG/M2 | DIASTOLIC BLOOD PRESSURE: 78 MMHG | OXYGEN SATURATION: 96 % | RESPIRATION RATE: 16 BRPM | WEIGHT: 243.3 LBS | SYSTOLIC BLOOD PRESSURE: 122 MMHG | TEMPERATURE: 97.7 F

## 2023-10-11 PROCEDURE — G0299 HHS/HOSPICE OF RN EA 15 MIN: HCPCS

## 2023-10-11 ASSESSMENT — ENCOUNTER SYMPTOMS: PAIN LOCATION - PAIN QUALITY: SHARP

## 2023-10-11 NOTE — HOME HEALTH
Subjective:Patient denies pain with wound care but is having other pain. Falls since last visit No(if yes complete the Fall Tracking Form and include bsrifallreport):   Caregiver involvement changes: no  Home health supplies by type and quantity ordered/delivered this visit include: no    Clinician asked if patient has had any physician contact since last home care visit and patient states: N/A  Clinician asked if patient has any new or changed medications and patient states:  N/A   If Yes, were medications reconciled? N/A   Was the certifying physician notified of changes in medications? N/A     Clinical assessment (what this visit means for the patient overall and need for ongoing skilled care) and progress or lack of progress towards SPECIFIC goals: Patients wound care is complete. SN needed for further eval and treatment. Written Teaching Material Utilized: N/A    Interdisciplinary communication with: N/A     Discharge planning as follows:  When goals are met    Specific plan for next visit: Educate in s/s of infection and when to call [facility], MD or 162

## 2023-10-12 ENCOUNTER — HOME CARE VISIT (OUTPATIENT)
Facility: HOME HEALTH | Age: 53
End: 2023-10-12
Payer: MEDICAID

## 2023-10-12 PROCEDURE — G0157 HHC PT ASSISTANT EA 15: HCPCS

## 2023-10-13 ENCOUNTER — HOME CARE VISIT (OUTPATIENT)
Facility: HOME HEALTH | Age: 53
End: 2023-10-13
Payer: MEDICAID

## 2023-10-13 VITALS
TEMPERATURE: 98.1 F | DIASTOLIC BLOOD PRESSURE: 70 MMHG | HEART RATE: 104 BPM | RESPIRATION RATE: 18 BRPM | OXYGEN SATURATION: 94 % | SYSTOLIC BLOOD PRESSURE: 120 MMHG

## 2023-10-13 ASSESSMENT — ENCOUNTER SYMPTOMS: PAIN LOCATION - PAIN QUALITY: ACHING

## 2023-10-13 NOTE — HOME HEALTH
Subjective: Pt reported he is able to cook for himself now. Falls since last visit No(if yes complete the Fall Tracking Form and include bsrifallreport):   Caregiver involvement changes: none. Home health supplies by type and quantity ordered/delivered this visit include: none. Clinician asked if patient has had any physician contact since last home care visit and patient states: NO  Clinician asked if patient has any new or changed medications and patient states:  NO   If Yes, were medications reconciled? N/A   Was the certifying physician notified of changes in medications? N/A     Clinical assessment (what this visit means for the patient overall and need for ongoing skilled care) and progress or lack of progress towards SPECIFIC goals: Todays visit allowed pt to receive education during gait training with use of SPC in his home up to 100 feet. HR was high today and ranged from 105 to 115 . Gait and balance have not been met. Written Teaching Material Utilized: N/A    Interdisciplinary communication with: N/A for the purpose of POC collaboration    Discharge planning as follows: per POC. Specific plan for next visit: Instruct caregiver/patient in gait training outdoors.

## 2023-10-17 ENCOUNTER — HOME CARE VISIT (OUTPATIENT)
Facility: HOME HEALTH | Age: 53
End: 2023-10-17
Payer: MEDICAID

## 2023-10-17 PROCEDURE — G0157 HHC PT ASSISTANT EA 15: HCPCS

## 2023-10-17 PROCEDURE — G0300 HHS/HOSPICE OF LPN EA 15 MIN: HCPCS

## 2023-10-18 VITALS
WEIGHT: 243 LBS | SYSTOLIC BLOOD PRESSURE: 122 MMHG | RESPIRATION RATE: 18 BRPM | OXYGEN SATURATION: 97 % | BODY MASS INDEX: 34.87 KG/M2 | TEMPERATURE: 97.6 F | DIASTOLIC BLOOD PRESSURE: 68 MMHG | HEART RATE: 93 BPM

## 2023-10-18 VITALS
SYSTOLIC BLOOD PRESSURE: 120 MMHG | TEMPERATURE: 97.5 F | HEART RATE: 94 BPM | DIASTOLIC BLOOD PRESSURE: 65 MMHG | OXYGEN SATURATION: 94 % | RESPIRATION RATE: 18 BRPM

## 2023-10-18 ASSESSMENT — ENCOUNTER SYMPTOMS
HEMOPTYSIS: 0
PAIN LOCATION - PAIN QUALITY: ACHING

## 2023-10-18 NOTE — HOME HEALTH
Subjective: Pt reported increased fatigue today. Falls since last visit No(if yes complete the Fall Tracking Form and include bsrifallreport):   Caregiver involvement changes: none. Home health supplies by type and quantity ordered/delivered this visit include: none. Clinician asked if patient has had any physician contact since last home care visit and patient states: NO  Clinician asked if patient has any new or changed medications and patient states:  NO   If Yes, were medications reconciled? N/A   Was the certifying physician notified of changes in medications? N/A     Clinical assessment (what this visit means for the patient overall and need for ongoing skilled care) and progress or lack of progress towards SPECIFIC goals: Todays visit allowed pt to receive balance training and B LE therex to decrease fall risk. Gait and balance goals have not been met. Written Teaching Material Utilized: N/A    Interdisciplinary communication with: PT for the purpose of POC collaboration    Discharge planning as follows: When goals are met    Specific plan for next visit: Instruct caregiver/patient in gait training outdoors.

## 2023-10-19 ENCOUNTER — HOME CARE VISIT (OUTPATIENT)
Facility: HOME HEALTH | Age: 53
End: 2023-10-19
Payer: MEDICAID

## 2023-10-19 PROCEDURE — G0157 HHC PT ASSISTANT EA 15: HCPCS

## 2023-10-20 ENCOUNTER — HOME CARE VISIT (OUTPATIENT)
Facility: HOME HEALTH | Age: 53
End: 2023-10-20
Payer: MEDICAID

## 2023-10-20 PROCEDURE — G0300 HHS/HOSPICE OF LPN EA 15 MIN: HCPCS

## 2023-10-20 NOTE — HOME HEALTH
Subjective: Pt states he continues to get fatigued easily   Falls since last visit No(if yes complete the Fall Tracking Form and include bsrifallreport):   Caregiver involvement changes: Pt's uncle is primary caregiver   Home health supplies by type and quantity ordered/delivered this visit include: none     Clinician asked if patient has had any physician contact since last home care visit and patient states: NO  Clinician asked if patient has any new or changed medications and patient states:  NO   If Yes, were medications reconciled? NO   Was the certifying physician notified of changes in medications? NO     Clinical assessment (what this visit means for the patient overall and need for ongoing skilled care) and progress or lack of progress towards SPECIFIC goals: Pt continues coco require SN for wound care and education regarding energy conservation techniques, diet, and medication management. Pt states although he has made alterations to his diet there are alotn of foods that he does not eat which limits his diet. When SN tried to educate on foods good in protein Pt frowned his face and states who eats that. Pt does not appear to be willing to adjust diet anymore at this time. Written Teaching Material Utilized: N/A    Interdisciplinary communication with: N/A for the purpose of NA     Discharge planning as follows:  When wound is 100% healed    Specific plan for next visit: Education, wound care

## 2023-10-21 VITALS — TEMPERATURE: 97.9 F | OXYGEN SATURATION: 96 % | RESPIRATION RATE: 18 BRPM | HEART RATE: 113 BPM

## 2023-10-21 NOTE — HOME HEALTH
Subjective: Pt reported 7/10 pain . Falls since last visit No(if yes complete the Fall Tracking Form and include bsrifallreport):   Caregiver involvement changes: none. Home health supplies by type and quantity ordered/delivered this visit include: none. Clinician asked if patient has had any physician contact since last home care visit and patient states: NO  Clinician asked if patient has any new or changed medications and patient states:  NO   If Yes, were medications reconciled? N/A   Was the certifying physician notified of changes in medications? N/A     Clinical assessment (what this visit means for the patient overall and need for ongoing skilled care) and progress or lack of progress towards SPECIFIC goals: Today's visit allowed PTA to train pt in gait training outdoors to amb over roots and debri to decrease fall risk. Balance and gait goals have not been met. Written Teaching Material Utilized: N/A    Interdisciplinary communication with:  PT for the purpose of POC collaboration    Discharge planning as follows: When goals are met    Specific plan for next visit: Instruct caregiver/patient in balance training.

## 2023-10-22 VITALS
BODY MASS INDEX: 20.42 KG/M2 | RESPIRATION RATE: 18 BRPM | DIASTOLIC BLOOD PRESSURE: 78 MMHG | WEIGHT: 142.3 LBS | HEART RATE: 94 BPM | OXYGEN SATURATION: 97 % | SYSTOLIC BLOOD PRESSURE: 130 MMHG | TEMPERATURE: 97.5 F

## 2023-10-22 ASSESSMENT — ENCOUNTER SYMPTOMS
HEMOPTYSIS: 0
PAIN LOCATION - PAIN QUALITY: SHARP

## 2023-10-22 NOTE — HOME HEALTH
Subjective: Pt states he is having a sharp pain throughout his legs and arms   Falls since last visit No(if yes complete the Fall Tracking Form and include bsrifallreport):   Caregiver involvement changes: Pt's uncle assist as needed   Home health supplies by type and quantity ordered/delivered this visit include: none     Clinician asked if patient has had any physician contact since last home care visit and patient states: NO  Clinician asked if patient has any new or changed medications and patient states:  NO   If Yes, were medications reconciled? NO   Was the certifying physician notified of changes in medications? NO     Clinical assessment (what this visit means for the patient overall and need for ongoing skilled care) and progress or lack of progress towards SPECIFIC goals: Pt continues to require SN for wound care     Written Teaching Material Utilized: N/A    Interdisciplinary communication with: N/A for the purpose of NA     Discharge planning as follows:  When wound is 100% healed    Specific plan for next visit: Continue with wound care

## 2023-10-23 ENCOUNTER — OFFICE VISIT (OUTPATIENT)
Age: 53
End: 2023-10-23
Payer: MEDICARE

## 2023-10-23 DIAGNOSIS — E10.42 DIABETIC POLYNEUROPATHY ASSOCIATED WITH TYPE 1 DIABETES MELLITUS (HCC): Primary | ICD-10-CM

## 2023-10-23 PROCEDURE — G0108 DIAB MANAGE TRN  PER INDIV: HCPCS | Performed by: DIETITIAN, REGISTERED

## 2023-10-23 NOTE — PROGRESS NOTES
[employer] [facility]  Diabetes Self-Management Education & Support Program  Encounter Note      SUMMARY  Diabetes self-care management training was completed related to healthy eating. The participant will return on November 20 to continue DSMES. The participant did not identify SMART Goal(s) and will practice knowledge and skills related to reducing risks, healthy eating and monitoring, medications, and problem solving to improve diabetes self-management. EVALUATION:  Mr Pablo Orosco got a new FreeStyle sensor so there was only about 4 days of data on it, but he has been 83% in target, 5% low, and 12% above target. He is taking his insulin appropriately but tends to drop low right before his last meal of the day or during the night. If this continues, he will either need his night time dose adjusted or his basal dose adjusted (taking in morning). He tends to have more urgent needs to go to the bathroom or he is constipated, which can all be related to gastroparesis if he does have it. This may mean he would need to change the timing of his insulin. We also discussed (gastroparesis or not) the importance of having more than one meal a day to help use his GI muscles more and spread his carbs out instead of having them all at one meal. He is working on this and trying to have more balanced meals as well, but may be a lot to work on both right now. If he spreads his carbs out more throughout the day, he may not see as much fluctuation in his BG. He left with a referral to GI to hopefully get the gastroparesis study done.     RECOMMENDATIONS:  Get gastric emptying test done  Have 2-3 meals a day with carbs/protein (and veg) at each     [address] [place] [address],[address]:  WHAT CAN I EAT? 30      Next provider visit is scheduled for 11/7/23 with Dr Alex Allen; 11/20/23 with Giulia Portillo RDN, [facility]; 1/28/23 with Ilene Villalpando PA-C       SMART GOAL(S)  Practice Rule of 15 when experiencing hypoglycemia over the

## 2023-10-24 ENCOUNTER — HOME CARE VISIT (OUTPATIENT)
Facility: HOME HEALTH | Age: 53
End: 2023-10-24
Payer: MEDICAID

## 2023-10-24 VITALS
SYSTOLIC BLOOD PRESSURE: 118 MMHG | HEART RATE: 105 BPM | BODY MASS INDEX: 34.8 KG/M2 | DIASTOLIC BLOOD PRESSURE: 76 MMHG | WEIGHT: 242.5 LBS | OXYGEN SATURATION: 95 % | RESPIRATION RATE: 16 BRPM | TEMPERATURE: 96.9 F

## 2023-10-24 PROCEDURE — G0299 HHS/HOSPICE OF RN EA 15 MIN: HCPCS

## 2023-10-24 PROCEDURE — G0157 HHC PT ASSISTANT EA 15: HCPCS

## 2023-10-24 ASSESSMENT — ENCOUNTER SYMPTOMS
PAIN LOCATION - PAIN QUALITY: SHARP
DYSPNEA ACTIVITY LEVEL: AT REST

## 2023-10-24 NOTE — HOME HEALTH
Subjective: Patient states that his hands and arms hurt  Falls since last visit No(if yes complete the Fall Tracking Form and include bsrifallreport):   Caregiver involvement changes: no  Home health supplies by type and quantity ordered/delivered this visit include: no    Clinician asked if patient has had any physician contact since last home care visit and patient states: N/A  Clinician asked if patient has any new or changed medications and patient states:  N/A   If Yes, were medications reconciled? N/A   Was the certifying physician notified of changes in medications? N/A     Clinical assessment (what this visit means for the patient overall and need for ongoing skilled care) and progress or lack of progress towards SPECIFIC goals: Patient wound care is finished. SN needed for further eval and treatment. Written Teaching Material Utilized: N/A    Interdisciplinary communication with: N/A     Discharge planning as follows:  When goals are met    Specific plan for next visit: Educate in s/s of infection and when to call [facility], MD or [phone]

## 2023-10-25 ENCOUNTER — HOSPITAL ENCOUNTER (OUTPATIENT)
Facility: HOSPITAL | Age: 53
Discharge: HOME | End: 2023-10-28
Payer: MEDICAID

## 2023-10-25 ENCOUNTER — PATIENT MESSAGE (OUTPATIENT)
Facility: CLINIC | Age: 53
End: 2023-10-25

## 2023-10-25 DIAGNOSIS — G37.9 DEMYELINATING DISEASE OF CENTRAL NERVOUS SYSTEM, UNSPECIFIED (HCC): ICD-10-CM

## 2023-10-25 PROCEDURE — 72157 MRI CHEST SPINE W/O & W/DYE: CPT

## 2023-10-25 PROCEDURE — A9579 GAD-BASE MR CONTRAST NOS,1ML: HCPCS | Performed by: RADIOLOGY

## 2023-10-25 PROCEDURE — 72156 MRI NECK SPINE W/O & W/DYE: CPT

## 2023-10-25 PROCEDURE — 6360000004 HC RX CONTRAST MEDICATION: Performed by: RADIOLOGY

## 2023-10-25 PROCEDURE — 70553 MRI BRAIN STEM W/O & W/DYE: CPT

## 2023-10-25 RX ORDER — OMEPRAZOLE 40 MG/1
40 CAPSULE, DELAYED RELEASE ORAL DAILY
Qty: 90 CAPSULE | Refills: 3 | Status: SHIPPED | OUTPATIENT
Start: 2023-10-25

## 2023-10-25 RX ADMIN — GADOTERIDOL 20 ML: 279.3 INJECTION, SOLUTION INTRAVENOUS at 15:46

## 2023-10-25 NOTE — TELEPHONE ENCOUNTER
From: Whitney Dill  To: Lawrence Parish  Sent: 10/25/2023 8:16 AM EDT  Subject: Refill of omeprazole    I am unable to refill. I am hoping you can help me get the refill. Thank you very much for your help.

## 2023-10-25 NOTE — TELEPHONE ENCOUNTER
PCP: Nate Allen PA-C     Last appt:  9/11/2023      Future Appointments   Date Time Provider [address]   10/25/2023  2:15 PM SPT MRI 1 SPTMRI [place] [facility]   10/25/2023  3:00 PM SPT MRI 1 SPTMRI [place] [facility]   10/25/2023  3:45 PM SPT MRI 1 SPTMRI [place] [address]   10/26/2023 To Be Determined Jovanni Mcdaniels, [place] [id number] [place] [address]   10/26/2023  9[facility]   10/26/2023 To Be Determined Anjana Azevedo RN [address] [place] [address]   10/30/2023 To Be Determined Aaliyah Ernst, Can [address] [place] [address]   11/1/2023 To Be Determined Anjana Azevedo RN [address] [place] [address]   11/7/2023 11:10 AM Nydia Cohn MD RDE [address] BS AMB   11/20/2023  9:30 AM Fidencio Calixto, RN PDHA BS AMB   12/4/2023  8:10 AM Rasheeda Franco MD RDE [address] BS AMB   12/27/2023  8:00 AM Nate Allen PA-C BSIMA BS AMB   3/21/2024 11:30 AM Shaggy Polk, ANP NEUMRSPB BS AMB          Requested Prescriptions     Pending Prescriptions Disp Refills    omeprazole (PRILOSEC) 40 MG delayed release capsule 90 capsule 3     Sig: Take 1 capsule by mouth daily

## 2023-10-25 NOTE — HOME HEALTH
Subjective: Pt presents with low blood sugar. Falls since last visit No(if yes complete the Fall Tracking Form and include bsrifallreport):   Caregiver involvement changes: none. Home health supplies by type and quantity ordered/delivered this visit include: none. Clinician asked if patient has had any physician contact since last home care visit and patient states: NO  Clinician asked if patient has any new or changed medications and patient states:  NO   If Yes, were medications reconciled? N/A   Was the certifying physician notified of changes in medications? N/A     Clinical assessment (what this visit means for the patient overall and need for ongoing skilled care) and progress or lack of progress towards SPECIFIC goals: Todays tx allowed pt to receive gait training and instruction when blood sugar is low. Balance goal has not been met. Written Teaching Material Utilized: N/A    Interdisciplinary communication with: PT for the purpose of POC collaboration    Discharge planning as follows: When goals are met    Specific plan for next visit: Instruct caregiver/patient in gait training.

## 2023-10-26 ENCOUNTER — PROCEDURE VISIT (OUTPATIENT)
Age: 53
End: 2023-10-26

## 2023-10-26 ENCOUNTER — HOME CARE VISIT (OUTPATIENT)
Facility: HOME HEALTH | Age: 53
End: 2023-10-26
Payer: MEDICAID

## 2023-10-26 VITALS
SYSTOLIC BLOOD PRESSURE: 128 MMHG | DIASTOLIC BLOOD PRESSURE: 78 MMHG | WEIGHT: 240.7 LBS | OXYGEN SATURATION: 95 % | HEART RATE: 87 BPM | TEMPERATURE: 98.1 F | BODY MASS INDEX: 34.54 KG/M2 | RESPIRATION RATE: 16 BRPM

## 2023-10-26 DIAGNOSIS — E11.42 DIABETIC POLYNEUROPATHY ASSOCIATED WITH TYPE 2 DIABETES MELLITUS (HCC): Primary | ICD-10-CM

## 2023-10-26 DIAGNOSIS — G56.23 ULNAR NEUROPATHY OF BOTH UPPER EXTREMITIES: ICD-10-CM

## 2023-10-26 DIAGNOSIS — G60.9 IDIOPATHIC PERIPHERAL NEUROPATHY: ICD-10-CM

## 2023-10-26 PROCEDURE — G0299 HHS/HOSPICE OF RN EA 15 MIN: HCPCS

## 2023-10-26 PROCEDURE — G0151 HHCP-SERV OF PT,EA 15 MIN: HCPCS

## 2023-10-26 NOTE — PROGRESS NOTES
ELECTRODIANOSTIC REPORT  Test Date:  10/26/2023    Patient: Efra Marie : 1970 Physician: Dr. Geoff Parra   Sex: Male Tech: Rebeca Payne, EMG Technician  Ref Phys: Zina Moser NP     CHIEF COMPLAINTS:  Patient is a 48year-old male who presents with pain in his bilateral distal upper extremities with numbness, tingling, and weakness. He does have significant distal hand atrophy and decreased sensation in a length-dependent process. There is also decreased sensation in his lower extremity. He does have generalized weakness and gait instability    EMG & NCV Findings:    Nerve conduction study has listed below were absent for the bilateral ulnar sensory. The right median sensory was also absent. The left median sensory revealed a mild prolongation of peak latency with a reduced amplitude. The bilateral radial sensory also revealed a reduced amplitude. The right superficial fibular sensory was absent  The right fibular motor and right ulnar motor nerve conduction study were absent. The left ulnar motor nerve conduction study revealed a mild prolongation of distal motor latency with reduced amplitude. 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 The bilateral median motor nerve conduction studies revealed a low normal amplitude. Disposable concentric needle examination of the muscles listed below in the bilateral upper extremity did reveal increased insertional activity with fibrillations and positive waves in the bilateral FDI and FCU. The amount of increased insertional activity was worse on the right. Paraspinal musculature was normal.    Impression: This study was abnormal.  There is electrodiagnostic evidence upon today's examination suggestin. A severe bilateral sensorimotor axonal neuropathy    2. A probable superimposed ulnar neuropathy with no clear localization due to severity of the neuropathy. This was worse on the right.     3.  There

## 2023-10-26 NOTE — HOME HEALTH
Subjective: Patient states he had a neuro appt today for nerve conduction test.  Falls since last visit No(if yes complete the Fall Tracking Form and include bsrifallreport):   Caregiver involvement changes: no  Home health supplies by type and quantity ordered/delivered this visit include: no    Clinician asked if patient has had any physician contact since last home care visit and patient states: N/A  Clinician asked if patient has any new or changed medications and patient states:  N/A   If Yes, were medications reconciled? N/A   Was the certifying physician notified of changes in medications? N/A     Clinical assessment (what this visit means for the patient overall and need for ongoing skilled care) and progress or lack of progress towards SPECIFIC goals: Patients wound care is completed for this visit. SN needed for further eval and treatment. Written Teaching Material Utilized: N/A    Interdisciplinary communication with: N/A     Discharge planning as follows:  When goals are met    Specific plan for next visit: Educate on MS disease process

## 2023-10-27 DIAGNOSIS — I10 ESSENTIAL HYPERTENSION: ICD-10-CM

## 2023-10-27 ASSESSMENT — ENCOUNTER SYMPTOMS: HEMOPTYSIS: 0

## 2023-10-27 NOTE — HOME HEALTH
Subjective: Patient reports that he is ready to continue his exercises on his own  Falls since last visitno  Caregiver involvement changes: no  Home health supplies by type and quantity ordered/delivered this visit include: no    Clinician asked if patient has had any physician contact since last home care visit and patient states: no  Clinician asked if patient has any new or changed medications and patient states: no  If Yes, were medications reconciled? yes  Was the certifying physician notified of changes in medications? na    Clinical assessment (what this visit means for the patient overall and need for ongoing skilled care) and progress or lack of progress towards SPECIFIC goals: Patient has been seen by PT for the past 8 weeks. Treatment sessions have included progression of HEP, ther ex, gait training, fall prevention, tramsfers, balance, bed mobility and patient and caregiver education. Patient has been able to make progress with PT and is now mod independent for mobility and mod independent for ADLs. Patient plans to continue his exercises and walking on his own and has met PT goals.     Written Teaching Material Utilized: written copy of exercises given to patient     Interdisciplinary communication with:  to continue    Discharge planning as follows: discharge     Specific plan for next visit: discharge

## 2023-10-31 ENCOUNTER — HOME CARE VISIT (OUTPATIENT)
Facility: HOME HEALTH | Age: 53
End: 2023-10-31
Payer: MEDICAID

## 2023-10-31 VITALS
HEART RATE: 90 BPM | OXYGEN SATURATION: 97 % | DIASTOLIC BLOOD PRESSURE: 76 MMHG | TEMPERATURE: 97.4 F | WEIGHT: 248 LBS | RESPIRATION RATE: 20 BRPM | SYSTOLIC BLOOD PRESSURE: 130 MMHG | BODY MASS INDEX: 35.58 KG/M2

## 2023-10-31 PROCEDURE — G0300 HHS/HOSPICE OF LPN EA 15 MIN: HCPCS

## 2023-10-31 RX ORDER — AMLODIPINE BESYLATE 10 MG/1
10 TABLET ORAL DAILY
Qty: 90 TABLET | Refills: 1 | Status: SHIPPED | OUTPATIENT
Start: 2023-10-31

## 2023-10-31 ASSESSMENT — ENCOUNTER SYMPTOMS: HEMOPTYSIS: 0

## 2023-10-31 NOTE — HOME HEALTH
Subjective: Pt states he had a headache and dizziness after walking yesterday   Falls since last visit No(if yes complete the Fall Tracking Form and include bsrifallreport):   Caregiver involvement changes: Pts uncle assisted as needed   Home health supplies by type and quantity ordered/delivered this visit include: none     Clinician asked if patient has had any physician contact since last home care visit and patient states: NO  Clinician asked if patient has any new or changed medications and patient states:  NO   If Yes, were medications reconciled? NO   Was the certifying physician notified of changes in medications? NO     Clinical assessment (what this visit means for the patient overall and need for ongoing skilled care) and progress or lack of progress towards SPECIFIC goals: Pt continues to require SN for wound care     Written Teaching Material Utilized: N/A    Interdisciplinary communication with: N/A for the purpose of NA     Discharge planning as follows:  When wound is 100% healed    Specific plan for next visit: continue with wound care

## 2023-10-31 NOTE — TELEPHONE ENCOUNTER
PCP: Charla Baum PA-C     Last appt:  9/11/2023      Future Appointments   Date Time Provider [address]   10/31/2023 11:00 AM [address] [address] [address]   11/2/2023 To Be Determined Brenda Parmar RN [address] [address]   11/7/2023 11:10 AM Eileen Murdock MD RDE [address] BS AMB   11/20/2023  9:30 AM Fidencio Conroy, RN PDHA BS AMB   12/4/2023  8:10 AM Ruthy Franco MD RDE [address] BS AMB   12/27/2023  8:00 AM Charla Baum PA-C [facility]   3/21/2024 11:30 AM Kemar Polk, ANP NEUSPB BS AMB          Requested Prescriptions     Pending Prescriptions Disp Refills    amLODIPine (NORVASC) 10 MG tablet [Pharmacy Med Name: amLODIPine Besylate 10 MG Oral Tablet] 90 tablet 0     Sig: Take 1 tablet by mouth daily

## 2023-11-02 ENCOUNTER — TELEPHONE (OUTPATIENT)
Facility: CLINIC | Age: 53
End: 2023-11-02

## 2023-11-02 ENCOUNTER — HOME CARE VISIT (OUTPATIENT)
Facility: HOME HEALTH | Age: 53
End: 2023-11-02

## 2023-11-02 VITALS
HEART RATE: 89 BPM | DIASTOLIC BLOOD PRESSURE: 74 MMHG | TEMPERATURE: 97.6 F | SYSTOLIC BLOOD PRESSURE: 118 MMHG | RESPIRATION RATE: 18 BRPM | OXYGEN SATURATION: 95 %

## 2023-11-02 PROCEDURE — G0299 HHS/HOSPICE OF RN EA 15 MIN: HCPCS

## 2023-11-02 ASSESSMENT — ENCOUNTER SYMPTOMS
PAIN LOCATION - PAIN QUALITY: ACHING
CONSTIPATION: 1

## 2023-11-02 NOTE — TELEPHONE ENCOUNTER
I called Mariana Lindsay and informed her on the message per Cam. She stated understanding and had no further questions.

## 2023-11-02 NOTE — TELEPHONE ENCOUNTER
Osvaldo Early called with [facility] ([phone]) and stated that the pt had a blister on the left calve that ruptured. She would like to know if they can treat with honey to eat the exudate on the edges of wound.  Elisabeth Sanchez stated she did put pictures in chart if needed for reference

## 2023-11-06 ENCOUNTER — HOME CARE VISIT (OUTPATIENT)
Facility: HOME HEALTH | Age: 53
End: 2023-11-06
Payer: MEDICAID

## 2023-11-06 VITALS
HEART RATE: 94 BPM | WEIGHT: 248 LBS | SYSTOLIC BLOOD PRESSURE: 132 MMHG | BODY MASS INDEX: 35.58 KG/M2 | OXYGEN SATURATION: 92 % | TEMPERATURE: 98.1 F | RESPIRATION RATE: 16 BRPM | DIASTOLIC BLOOD PRESSURE: 78 MMHG

## 2023-11-06 PROCEDURE — G0299 HHS/HOSPICE OF RN EA 15 MIN: HCPCS

## 2023-11-06 ASSESSMENT — ENCOUNTER SYMPTOMS: PAIN LOCATION - PAIN QUALITY: ACHING

## 2023-11-06 NOTE — HOME HEALTH
Subjective: Patient states pain in hands mostly today. Falls since last visit No(if yes complete the Fall Tracking Form and include bsrifallreport):   Caregiver involvement changes: no  Home health supplies by type and quantity ordered/delivered this visit include: no    Clinician asked if patient has had any physician contact since last home care visit and patient states: N/A  Clinician asked if patient has any new or changed medications and patient states:  N/A   If Yes, were medications reconciled? N/A   Was the certifying physician notified of changes in medications? N/A     Clinical assessment (what this visit means for the patient overall and need for ongoing skilled care) and progress or lack of progress towards SPECIFIC goals: Patients wound care is complete for this visit. SN needed for further eval and treatment. Written Teaching Material Utilized: N/A    Interdisciplinary communication with: N/A     Discharge planning as follows:  When goals are met    Specific plan for next visit: Educate in s/s of infection and when to call [facility], MD or [phone]

## 2023-11-07 ENCOUNTER — OFFICE VISIT (OUTPATIENT)
Age: 53
End: 2023-11-07
Payer: MEDICARE

## 2023-11-07 VITALS
WEIGHT: 256.2 LBS | BODY MASS INDEX: 36.68 KG/M2 | HEART RATE: 94 BPM | DIASTOLIC BLOOD PRESSURE: 80 MMHG | HEIGHT: 70 IN | SYSTOLIC BLOOD PRESSURE: 139 MMHG

## 2023-11-07 DIAGNOSIS — E66.01 SEVERE OBESITY (BMI 35.0-39.9) WITH COMORBIDITY (HCC): ICD-10-CM

## 2023-11-07 DIAGNOSIS — E10.42 TYPE 1 DIABETES MELLITUS WITH DIABETIC POLYNEUROPATHY (HCC): Primary | ICD-10-CM

## 2023-11-07 DIAGNOSIS — E04.1 THYROID NODULE: ICD-10-CM

## 2023-11-07 LAB
COMMENT:: NORMAL
SPECIMEN HOLD: NORMAL

## 2023-11-07 PROCEDURE — 3074F SYST BP LT 130 MM HG: CPT | Performed by: GENERAL ACUTE CARE HOSPITAL

## 2023-11-07 PROCEDURE — 3051F HG A1C>EQUAL 7.0%<8.0%: CPT | Performed by: GENERAL ACUTE CARE HOSPITAL

## 2023-11-07 PROCEDURE — 99214 OFFICE O/P EST MOD 30 MIN: CPT | Performed by: GENERAL ACUTE CARE HOSPITAL

## 2023-11-07 PROCEDURE — 3078F DIAST BP <80 MM HG: CPT | Performed by: GENERAL ACUTE CARE HOSPITAL

## 2023-11-07 RX ORDER — INSULIN GLARGINE 100 [IU]/ML
INJECTION, SOLUTION SUBCUTANEOUS
Qty: 45 ML | Refills: 5 | Status: SHIPPED | OUTPATIENT
Start: 2023-11-07

## 2023-11-07 NOTE — PATIENT INSTRUCTIONS
PLAN FOR TODAY    We will plan to make the following changes to your diabetes medications:  Stop Levemir  Lantus 36 units daily  Humalog  -Brunch: 26 to 28 units + correction scale   -Dinner: 20 units + correction scale   Correction Scale:   1 unit for every 30 above 150    IF GLUCOSE IS:                 THEN TAKE:      0   Extra Unit  151-180   1   Extra Unit  181-210   2   Extra Units  211-240   3   Extra Units  241-270   4   Extra Units  271-300   5   Extra Units  >300    6   Extra Units    Example: My planned insulin dose:    ____ Units    +   ____ Extra Correction Units  =  ____ total units to take together as one injection. It will be important to continue checking your glucose just as you did previously. I would like you at the very least to check you glucose during:     + AM fasting before breakfast   + Before Lunch   + Dinner time   + Bedtime   + Any other time that you are not feeling well. Always provide a glucose log that is completed at every visit so that we can review the results of your home glucose together. Without this, it is not possible to make accurate changes to your diabetes regimen. Tangela Valadez MD  [facility]     Hypoglycemia:    Hypoglycemia means that your blood sugar is low and your body is not getting enough fuel. Some people get low blood sugar from not eating often enough. Some medicines to treat diabetes can cause low blood sugar. People who have had surgery on their stomachs or intestines may get hypoglycemia. Problems with the pancreas, kidneys, or liver also can cause low blood sugar. A snack or drink with sugar in it will raise your blood sugar and should ease your symptoms right away. How can you care for yourself at home? Learn your signs of low blood sugar. They are different for everyone. Some common early signs include:  Nausea. Hunger. Feeling nervous, irritable, or shaky. Cold, clammy skin.   Sweating (when you're not

## 2023-11-07 NOTE — PROGRESS NOTES
[facility]  DR THERESA BECKER       Diabetes history:  Used to follow with Dr Jorge Yang  Diagnosed with DM2 when he was 44 years and then type 1 DM was diagnosed in 2016   A1c 6/9/2023 8.8%, 12/1/2022 8.5%, 8/24/23  7.1% 11/7/23 7.1%    11/7/23  Overall blood sugar has fluctuations but denies new complaints. 8/24/23  Sedentary lifestyle and sometimes eats more or sometimes does not eat    Not checking blood sugar regularly  In last month had hypoglycemia 55, says occurred due to taking too much insulin with small meal    Denies recent hospitalization, last DKA +10 years ago    Current diabetes medication (ran out at the end of the months)  Levemir 45 units twice daily (reduced from 50 units BID 1 year ago)  Novolog 20 units with meals    Diet:  -eats 1 main meal at 8 pm  -feels not hungry most of the day and if he eats he feels nausea  -dinner: eats steak with baked beans  -does not snack  -drinks bubbly carbonated water, cranapple/cranberry at 2 PM drinks 20 oz (takes 20 units Novolog with it)    Physical activity: sedentary lifestyle, trying to go to the gym but not regular and volunteers in the [employer]    Complications:  MI/CVA: denies   Ophthalm: seen over 1 year ago, has neuromyelitis optica is stable and no current vision loss, had cataracts removal 2021  Nephropathy: CKD 3b , following with Dr Laguna  Neuropathy: denies foot issues, not following with podiatry  Amputations: denies  Gastroparesis: denies    He denies chest pain, shortness of breath, constipation or diarrhea. He does complain of decreased visual acuity which continues to wax and wane.       Past Medical History:   Diagnosis Date    Anxiety and depression     per pt on 10/21/2021    Asthma     GERD (gastroesophageal reflux disease)     per pt on 10/21/2021    Hypertension     per pt on 10/21/2021    MS (multiple sclerosis) ([address])     per pt on 10/21/2021    Type 2 diabetes mellitus with peripheral neuropathy

## 2023-11-08 ENCOUNTER — HOME CARE VISIT (OUTPATIENT)
Facility: HOME HEALTH | Age: 53
End: 2023-11-08
Payer: MEDICAID

## 2023-11-08 VITALS
DIASTOLIC BLOOD PRESSURE: 74 MMHG | BODY MASS INDEX: 35.58 KG/M2 | RESPIRATION RATE: 16 BRPM | TEMPERATURE: 98 F | WEIGHT: 248 LBS | HEART RATE: 92 BPM | OXYGEN SATURATION: 95 % | SYSTOLIC BLOOD PRESSURE: 126 MMHG

## 2023-11-08 LAB
ALBUMIN SERPL-MCNC: 3.6 G/DL (ref 3.5–5)
ALBUMIN/GLOB SERPL: 0.9 (ref 1.1–2.2)
ALP SERPL-CCNC: 100 U/L (ref 45–117)
ALT SERPL-CCNC: 30 U/L (ref 12–78)
ANION GAP SERPL CALC-SCNC: 6 MMOL/L (ref 5–15)
AST SERPL-CCNC: 27 U/L (ref 15–37)
BILIRUB SERPL-MCNC: 0.2 MG/DL (ref 0.2–1)
BUN SERPL-MCNC: 46 MG/DL (ref 6–20)
BUN/CREAT SERPL: 22 (ref 12–20)
CALCIUM SERPL-MCNC: 9.4 MG/DL (ref 8.5–10.1)
CHLORIDE SERPL-SCNC: 110 MMOL/L (ref 97–108)
CHOLEST SERPL-MCNC: 171 MG/DL
CO2 SERPL-SCNC: 30 MMOL/L (ref 21–32)
CREAT SERPL-MCNC: 2.1 MG/DL (ref 0.7–1.3)
CREAT UR-MCNC: 28.8 MG/DL
EST. AVERAGE GLUCOSE BLD GHB EST-MCNC: 157 MG/DL
GLOBULIN SER CALC-MCNC: 4.2 G/DL (ref 2–4)
GLUCOSE SERPL-MCNC: 73 MG/DL (ref 65–100)
HBA1C MFR BLD: 7.1 % (ref 4–5.6)
HDLC SERPL-MCNC: 55 MG/DL
HDLC SERPL: 3.1 (ref 0–5)
LDLC SERPL CALC-MCNC: 101 MG/DL (ref 0–100)
MICROALBUMIN UR-MCNC: 8.92 MG/DL
MICROALBUMIN/CREAT UR-RTO: 310 MG/G (ref 0–30)
POTASSIUM SERPL-SCNC: 4.4 MMOL/L (ref 3.5–5.1)
PROT SERPL-MCNC: 7.8 G/DL (ref 6.4–8.2)
SODIUM SERPL-SCNC: 146 MMOL/L (ref 136–145)
TRIGL SERPL-MCNC: 75 MG/DL
TSH SERPL DL<=0.05 MIU/L-ACNC: 3.19 UIU/ML (ref 0.36–3.74)
VLDLC SERPL CALC-MCNC: 15 MG/DL

## 2023-11-08 PROCEDURE — G0299 HHS/HOSPICE OF RN EA 15 MIN: HCPCS

## 2023-11-08 ASSESSMENT — ENCOUNTER SYMPTOMS: PAIN LOCATION - PAIN QUALITY: SHARP/ACHY

## 2023-11-08 NOTE — HOME HEALTH
Subjective: Patient states the left calf is painful. Falls since last visit No(if yes complete the Fall Tracking Form and include bsrifallreport):   Caregiver involvement changes: no  Home health supplies by type and quantity ordered/delivered this visit include: no    Clinician asked if patient has had any physician contact since last home care visit and patient states: N/A  Clinician asked if patient has any new or changed medications and patient states:  N/A   If Yes, were medications reconciled? N/A   Was the certifying physician notified of changes in medications? N/A     Clinical assessment (what this visit means for the patient overall and need for ongoing skilled care) and progress or lack of progress towards SPECIFIC goals: Patients wound care is complete for this visit. SN needed for further eval and treatment. Written Teaching Material Utilized: N/A    Interdisciplinary communication with: N/A     Discharge planning as follows:  When goals are met    Specific plan for next visit: Educate on CHF disease process

## 2023-11-09 LAB
C PEPTIDE SERPL-MCNC: 0.4 NG/ML (ref 1.1–4.4)
GAD65 AB SER-ACNC: <5 U/ML (ref 0–5)

## 2023-11-10 ENCOUNTER — TELEPHONE (OUTPATIENT)
Facility: CLINIC | Age: 53
End: 2023-11-10

## 2023-11-10 ENCOUNTER — HOME CARE VISIT (OUTPATIENT)
Facility: HOME HEALTH | Age: 53
End: 2023-11-10
Payer: MEDICAID

## 2023-11-10 PROCEDURE — G0299 HHS/HOSPICE OF RN EA 15 MIN: HCPCS

## 2023-11-10 NOTE — TELEPHONE ENCOUNTER
Haylee Irvin from [facility] called with concerns of wounds on the back of pt legs deteriorating, she would like to speak to nurs.  Can call back at [phone]

## 2023-11-10 NOTE — TELEPHONE ENCOUNTER
I called Clarissa Jackson and stated that he had two blisters that ruptured but the one on the left is getting brown. Has tried honey and others but it is not working. Does not think he is a candidate for Santyl but thins pleurotel will work. Also can we switch from saline to wound . After speaking with Cam, I informed Clarissa Jackson on the okay from Cam.     If these changes don't work then he may have to start going to wound care.

## 2023-11-11 VITALS
TEMPERATURE: 97.9 F | SYSTOLIC BLOOD PRESSURE: 126 MMHG | DIASTOLIC BLOOD PRESSURE: 78 MMHG | HEART RATE: 93 BPM | OXYGEN SATURATION: 97 % | RESPIRATION RATE: 16 BRPM

## 2023-11-11 ASSESSMENT — ENCOUNTER SYMPTOMS: PAIN LOCATION - PAIN QUALITY: ACHING

## 2023-11-11 NOTE — HOME HEALTH
Subjective: Patient c/o pain today  Falls since last visit No(if yes complete the Fall Tracking Form and include bsrifallreport):   Caregiver involvement changes: no  Home health supplies by type and quantity ordered/delivered this visit include: no    Clinician asked if patient has had any physician contact since last home care visit and patient states: N/A  Clinician asked if patient has any new or changed medications and patient states:  N/A   If Yes, were medications reconciled? N/A   Was the certifying physician notified of changes in medications? N/A     Clinical assessment (what this visit means for the patient overall and need for ongoing skilled care) and progress or lack of progress towards SPECIFIC goals: Patients wound care is complete and pt wound on the left calf is getting worse, so reached out to PCP Robert Doan and he agrees with changing wound care to pleurogel and cleansing with wound cleanser. Pics are in media and provider is aware. SN needed for further eval and treatment. Written Teaching Material Utilized: N/A    Interdisciplinary communication with: N/A     Discharge planning as follows:  Will discharge when the patient has reached their maximum functional potential and maximum safety in their home    Specific plan for next visit: Leidy Ann in safety issues regarding Diet

## 2023-11-13 ENCOUNTER — HOSPITAL ENCOUNTER (OUTPATIENT)
Facility: HOSPITAL | Age: 53
Discharge: HOME | End: 2023-11-16
Attending: INTERNAL MEDICINE
Payer: MEDICAID

## 2023-11-13 ENCOUNTER — HOME CARE VISIT (OUTPATIENT)
Facility: HOME HEALTH | Age: 53
End: 2023-11-13
Payer: MEDICAID

## 2023-11-13 VITALS
RESPIRATION RATE: 18 BRPM | OXYGEN SATURATION: 95 % | DIASTOLIC BLOOD PRESSURE: 77 MMHG | TEMPERATURE: 97.5 F | HEART RATE: 103 BPM | SYSTOLIC BLOOD PRESSURE: 130 MMHG

## 2023-11-13 DIAGNOSIS — N18.32 CHRONIC KIDNEY DISEASE, STAGE 3B (HCC): ICD-10-CM

## 2023-11-13 PROCEDURE — G0300 HHS/HOSPICE OF LPN EA 15 MIN: HCPCS

## 2023-11-13 PROCEDURE — 76536 US EXAM OF HEAD AND NECK: CPT

## 2023-11-13 PROCEDURE — 76770 US EXAM ABDO BACK WALL COMP: CPT

## 2023-11-13 ASSESSMENT — ENCOUNTER SYMPTOMS: HEMOPTYSIS: 0

## 2023-11-13 NOTE — HOME HEALTH
2Subjective: Pt states that he is tired after going to get an MRI today  Falls since last visit No(if yes complete the Fall Tracking Form and include bsrifallreport):   Caregiver involvement changes: Pts uncle assist as needed   Home health supplies by type and quantity ordered/delivered this visit include: none     Clinician asked if patient has had any physician contact since last home care visit and patient states: NO  Clinician asked if patient has any new or changed medications and patient states:  NO   If Yes, were medications reconciled? NO   Was the certifying physician notified of changes in medications? NO     Clinical assessment (what this visit means for the patient overall and need for ongoing skilled care) and progress or lack of progress towards SPECIFIC goals: Pt continues to require SN for wound care     Written Teaching Material Utilized: N/A    Interdisciplinary communication with: N/A for the purpose of NA    Discharge planning as follows:  When wound is 100% healed    Specific plan for next visit: Wound care

## 2023-11-15 ENCOUNTER — HOME CARE VISIT (OUTPATIENT)
Facility: HOME HEALTH | Age: 53
End: 2023-11-15
Payer: MEDICAID

## 2023-11-15 PROCEDURE — G0300 HHS/HOSPICE OF LPN EA 15 MIN: HCPCS

## 2023-11-16 VITALS
HEART RATE: 90 BPM | OXYGEN SATURATION: 95 % | DIASTOLIC BLOOD PRESSURE: 78 MMHG | BODY MASS INDEX: 35.35 KG/M2 | SYSTOLIC BLOOD PRESSURE: 118 MMHG | TEMPERATURE: 97.4 F | WEIGHT: 246.4 LBS | RESPIRATION RATE: 18 BRPM

## 2023-11-16 ASSESSMENT — ENCOUNTER SYMPTOMS: HEMOPTYSIS: 0

## 2023-11-16 NOTE — HOME HEALTH
Subjective: Pt states he had not had a bowel movement since Sunday until this morning   Falls since last visit No(if yes complete the Fall Tracking Form and include bsrifallreport):   Caregiver involvement changes: Uncle assist as needed   Home health supplies by type and quantity ordered/delivered this visit include: none     Clinician asked if patient has had any physician contact since last home care visit and patient states: NO  Clinician asked if patient has any new or changed medications and patient states:  NO   If Yes, were medications reconciled? NO   Was the certifying physician notified of changes in medications? NO     Clinical assessment (what this visit means for the patient overall and need for ongoing skilled care) and progress or lack of progress towards SPECIFIC goals: Pt continues to require SN for wound care     Written Teaching Material Utilized: N/A    Interdisciplinary communication with: N/A for the purpose of NA     Discharge planning as follows:  When wound is 100% healed    Specific plan for next visit: Wound care

## 2023-11-17 ENCOUNTER — HOME CARE VISIT (OUTPATIENT)
Facility: HOME HEALTH | Age: 53
End: 2023-11-17
Payer: MEDICAID

## 2023-11-17 VITALS
WEIGHT: 246 LBS | HEART RATE: 90 BPM | OXYGEN SATURATION: 95 % | DIASTOLIC BLOOD PRESSURE: 74 MMHG | TEMPERATURE: 97.7 F | RESPIRATION RATE: 16 BRPM | SYSTOLIC BLOOD PRESSURE: 122 MMHG | BODY MASS INDEX: 35.3 KG/M2

## 2023-11-17 PROCEDURE — G0299 HHS/HOSPICE OF RN EA 15 MIN: HCPCS

## 2023-11-17 NOTE — HOME HEALTH
Subjective: Patient states he is doing well. Falls since last visit No(if yes complete the Fall Tracking Form and include bsrifallreport):   Caregiver involvement changes: no  Home health supplies by type and quantity ordered/delivered this visit include: no    Clinician asked if patient has had any physician contact since last home care visit and patient states: N/A  Clinician asked if patient has any new or changed medications and patient states:  N/A   If Yes, were medications reconciled? N/A   Was the certifying physician notified of changes in medications? N/A     Clinical assessment (what this visit means for the patient overall and need for ongoing skilled care) and progress or lack of progress towards SPECIFIC goals: Patients wound care is complete. SN needed for further eval and treatment. Written Teaching Material Utilized: N/A    Interdisciplinary communication with: N/A     Discharge planning as follows:  When goals are met    Specific plan for next visit: Isatu Solorzano in safety issues regarding Diet

## 2023-11-20 ENCOUNTER — OFFICE VISIT (OUTPATIENT)
Age: 53
End: 2023-11-20
Payer: MEDICAID

## 2023-11-20 DIAGNOSIS — E10.42 DIABETIC POLYNEUROPATHY ASSOCIATED WITH TYPE 1 DIABETES MELLITUS (HCC): Primary | ICD-10-CM

## 2023-11-20 PROCEDURE — G0108 DIAB MANAGE TRN  PER INDIV: HCPCS

## 2023-11-20 RX ORDER — BUMETANIDE 1 MG/1
1 TABLET ORAL DAILY
Qty: 30 TABLET | Refills: 3 | Status: SHIPPED | OUTPATIENT
Start: 2023-11-20

## 2023-11-20 NOTE — PROGRESS NOTES
[facility] Diabetes [facility]  Diabetes Self-Management Education & Support Program  Encounter Note      SUMMARY  Diabetes self-care management training was completed related to monitoring and taking medications. he participant will return on January 22 to continue DSMES related to physical activity and healthy coping. The participant did identify SMART Goal(s) and will practice knowledge and skills related to reducing risks, healthy eating and monitoring, being active and medications, and healthy coping and problem solving to improve diabetes self-management. EVALUATION:  Mr. Joana Mosley CGM data for the past 7 days indicates: Avg gluc: 134  Above target 20%  In target: 75%  Below:5%; over the past 14 days there have been 4 lows between 12-6 AM, 4 lows from 12-6 PM, and 3 from 6 PM -12AM.  These range 56-69, Mr. Jared Maldonado typically does not treat for hypoglycemia, some lows are recurrent with in the same day/night. Fasting BGs typicall , postprandials (patient reported <200) on CGM some >250, but improved over the last week    He is taking Lantus 36 units daily. His meal choices and timing and humalog are likely influencing his lows. Breakfast with about 45 g carbs with protein is at 10 AM, he is taking humalog 26-28 units + correction scale before eating  Lunch with about 45 g carbs with protein is at 2 PM, he is taking humalog 20 units before eating and does not normally need correction  Dinner WITHOUT carbs, but with protein and non-starchy vegetables, is 2 HOURS after lunch at 4 PM,  he takes humalog 20 units before eating. He is eating dinner several hours before bed, he says eating carbs within in the 3-4 hours before bed causes heartburn. Mr. Jared Maldonado is ready to have his meals at least 4 hours apart, he will have breakfast at 8, lunch at 12, dinner at 4. We also discussed the onset/action/duration of humalog and the risk of taking doses too close together.   I have asked him to

## 2023-11-21 ENCOUNTER — HOME CARE VISIT (OUTPATIENT)
Facility: HOME HEALTH | Age: 53
End: 2023-11-21
Payer: MEDICAID

## 2023-11-21 VITALS
WEIGHT: 249 LBS | TEMPERATURE: 98.2 F | SYSTOLIC BLOOD PRESSURE: 146 MMHG | HEART RATE: 92 BPM | RESPIRATION RATE: 16 BRPM | BODY MASS INDEX: 35.73 KG/M2 | OXYGEN SATURATION: 96 % | DIASTOLIC BLOOD PRESSURE: 78 MMHG

## 2023-11-21 PROCEDURE — G0299 HHS/HOSPICE OF RN EA 15 MIN: HCPCS

## 2023-11-21 ASSESSMENT — ENCOUNTER SYMPTOMS: PAIN LOCATION - PAIN QUALITY: NERVE PAIN

## 2023-11-21 NOTE — HOME HEALTH
Subjective: \"I have pain all over but that's usual for me. \"  Falls since last visit No(if yes complete the Fall Tracking Form and include bsrifallreport):   Caregiver involvement changes:   Home health supplies by type and quantity ordered/delivered this visit include: Supplies needed Optifoam gentle ex 4x4 silicone faced foam & border    Clinician asked if patient has had any physician contact since last home care visit and patient states: NO  Clinician asked if patient has any new or changed medications and patient states:  NO   If Yes, were medications reconciled? NO   Was the certifying physician notified of changes in medications? N/A     Clinical assessment (what this visit means for the patient overall and need for ongoing skilled care) and progress or lack of progress towards SPECIFIC goals: SN performed assessment, VS and wound care per provider orders. SN has two ruptured blisters to BLE that show no signs and symptoms of infection, progressing towards healing goal. Pt would benefit from further [address] visits to perform wound care and education to prevent rehospitalization. SN educated pt on Bumex and the importance or taking medication, daily weights and need for medication. Pt demonstrates 80% proficiency, further education needed for recall. Pt unable to check blood sugar d/t waiting for delivery of supplies. Pt report not checking blood sugar for past 2 days. Written Teaching Material Utilized: N/A    Interdisciplinary communication with: N/A     Discharge planning as follows: When wound is 100% healed, Per physician order and When goals are met    Specific plan for next visit: Educate in s/s of infection and when to call [address], MD or 911. Perform wound care. Final education for Bumex to reach goal of 100% proficiency.

## 2023-11-22 ENCOUNTER — HOME CARE VISIT (OUTPATIENT)
Dept: HOME HEALTH SERVICES | Facility: HOME HEALTH | Age: 53
End: 2023-11-22
Payer: MEDICAID

## 2023-11-24 ENCOUNTER — HOME CARE VISIT (OUTPATIENT)
Facility: HOME HEALTH | Age: 53
End: 2023-11-24
Payer: MEDICAID

## 2023-11-24 PROCEDURE — G0300 HHS/HOSPICE OF LPN EA 15 MIN: HCPCS

## 2023-11-26 VITALS
TEMPERATURE: 97.2 F | DIASTOLIC BLOOD PRESSURE: 70 MMHG | OXYGEN SATURATION: 95 % | HEART RATE: 88 BPM | WEIGHT: 249 LBS | SYSTOLIC BLOOD PRESSURE: 118 MMHG | RESPIRATION RATE: 20 BRPM | BODY MASS INDEX: 35.73 KG/M2

## 2023-11-26 ASSESSMENT — ENCOUNTER SYMPTOMS: HEMOPTYSIS: 0

## 2023-11-26 NOTE — HOME HEALTH
Subjective: Pt states his weight fluctuated for a couple of days. SN reinereated call MD when pt has weight gain of 2lbs in one day or 5lbs in one week  Falls since last visit No(if yes complete the Fall Tracking Form and include bsrifallreport):   Caregiver involvement changes: Uncle assist as needed   Home health supplies by type and quantity ordered/delivered this visit include: none     Clinician asked if patient has had any physician contact since last home care visit and patient states: NO  Clinician asked if patient has any new or changed medications and patient states:  NO   If Yes, were medications reconciled? NO   Was the certifying physician notified of changes in medications? NO     Clinical assessment (what this visit means for the patient overall and need for ongoing skilled care) and progress or lack of progress towards SPECIFIC goals: Pt continues to nrequire SN for wound care     Written Teaching Material Utilized: N/A    Interdisciplinary communication with: N/A for the purpose of NA     Discharge planning as follows:  When wound is 100% healed    Specific plan for next visit: Wound care

## 2023-11-27 ENCOUNTER — HOME CARE VISIT (OUTPATIENT)
Facility: HOME HEALTH | Age: 53
End: 2023-11-27
Payer: MEDICAID

## 2023-11-27 VITALS
SYSTOLIC BLOOD PRESSURE: 120 MMHG | HEART RATE: 98 BPM | TEMPERATURE: 97.5 F | OXYGEN SATURATION: 91 % | DIASTOLIC BLOOD PRESSURE: 82 MMHG | RESPIRATION RATE: 16 BRPM | WEIGHT: 251 LBS | BODY MASS INDEX: 36.01 KG/M2

## 2023-11-27 PROBLEM — E66.01 SEVERE OBESITY (BMI 35.0-39.9) WITH COMORBIDITY (HCC): Status: ACTIVE | Noted: 2023-11-27

## 2023-11-27 PROCEDURE — G0299 HHS/HOSPICE OF RN EA 15 MIN: HCPCS

## 2023-11-27 NOTE — HOME HEALTH
Subjective: \"I have not checked my blood sugar because my sensor fell off so once I get the neew one I will be able to check my sugars. \"  Falls since last visit No(if yes complete the Fall Tracking Form and include bsrifallreport):   Caregiver involvement changes:   Home health supplies by type and quantity ordered/delivered this visit include: Supllies in the home. Clinician asked if patient has had any physician contact since last home care visit and patient states: NO  Clinician asked if patient has any new or changed medications and patient states:  NO   If Yes, were medications reconciled? NO   Was the certifying physician notified of changes in medications? N/A     Clinical assessment (what this visit means for the patient overall and need for ongoing skilled care) and progress or lack of progress towards SPECIFIC goals: SN performed wound care, VS and assessment. SN educated py on hpoglycemia and hyperglycemia with 100% proficiency, no further education needed for hypoglycemia and hyperglycemia. Pt would benefit from further [address] [facility] visits for wound care to prevent infection and rehospitalization. Pt has 2 wounds to left leg that are progressing towards wound goal but pt remains at risk of infection. Written Teaching Material Utilized: N/A    Interdisciplinary communication with: N/A     Discharge planning as follows:  When wound is 100% healed, Per physician order and When goals are met    Specific plan for next visit: Wound care

## 2023-11-29 ENCOUNTER — HOME CARE VISIT (OUTPATIENT)
Facility: HOME HEALTH | Age: 53
End: 2023-11-29
Payer: MEDICAID

## 2023-11-29 PROCEDURE — G0300 HHS/HOSPICE OF LPN EA 15 MIN: HCPCS

## 2023-12-01 ENCOUNTER — HOME CARE VISIT (OUTPATIENT)
Facility: HOME HEALTH | Age: 53
End: 2023-12-01
Payer: MEDICAID

## 2023-12-01 PROCEDURE — G0300 HHS/HOSPICE OF LPN EA 15 MIN: HCPCS

## 2023-12-03 VITALS
OXYGEN SATURATION: 96 % | SYSTOLIC BLOOD PRESSURE: 130 MMHG | RESPIRATION RATE: 18 BRPM | WEIGHT: 251.8 LBS | TEMPERATURE: 97.6 F | TEMPERATURE: 97.3 F | RESPIRATION RATE: 18 BRPM | HEART RATE: 103 BPM | DIASTOLIC BLOOD PRESSURE: 88 MMHG | SYSTOLIC BLOOD PRESSURE: 122 MMHG | DIASTOLIC BLOOD PRESSURE: 78 MMHG | HEART RATE: 86 BPM | BODY MASS INDEX: 36.13 KG/M2 | OXYGEN SATURATION: 98 %

## 2023-12-03 ASSESSMENT — ENCOUNTER SYMPTOMS
HEMOPTYSIS: 0
HEMOPTYSIS: 0
RESPIRATORY SYMPTOMS COMMENTS: UPON EXERTION

## 2023-12-03 NOTE — HOME HEALTH
Subjective: Pt states he has had a headache lately   Falls since last visit No(if yes complete the Fall Tracking Form and include bsrifallreport):   Caregiver involvement changes: Uncle assist as needed   Home health supplies by type and quantity ordered/delivered this visit include: none     Clinician asked if patient has had any physician contact since last home care visit and patient states: NO  Clinician asked if patient has any new or changed medications and patient states:  NO   If Yes, were medications reconciled? NO   Was the certifying physician notified of changes in medications? NO     Clinical assessment (what this visit means for the patient overall and need for ongoing skilled care) and progress or lack of progress towards SPECIFIC goals: Pt continues to require SN for wound care     Written Teaching Material Utilized: NA     Interdisciplinary communication with: Physician for the purpose of update on condition of wound     Discharge planning as follows:  When wound is 100% healed    Specific plan for next visit: Wound care

## 2023-12-04 ENCOUNTER — OFFICE VISIT (OUTPATIENT)
Age: 53
End: 2023-12-04
Payer: MEDICARE

## 2023-12-04 VITALS
SYSTOLIC BLOOD PRESSURE: 144 MMHG | HEART RATE: 97 BPM | WEIGHT: 261.2 LBS | BODY MASS INDEX: 37.39 KG/M2 | DIASTOLIC BLOOD PRESSURE: 85 MMHG | HEIGHT: 70 IN

## 2023-12-04 DIAGNOSIS — E10.42 TYPE 1 DIABETES MELLITUS WITH DIABETIC POLYNEUROPATHY (HCC): Primary | ICD-10-CM

## 2023-12-04 DIAGNOSIS — E04.1 THYROID NODULE: ICD-10-CM

## 2023-12-04 PROCEDURE — 3051F HG A1C>EQUAL 7.0%<8.0%: CPT | Performed by: GENERAL ACUTE CARE HOSPITAL

## 2023-12-04 PROCEDURE — 3077F SYST BP >= 140 MM HG: CPT | Performed by: GENERAL ACUTE CARE HOSPITAL

## 2023-12-04 PROCEDURE — 3079F DIAST BP 80-89 MM HG: CPT | Performed by: GENERAL ACUTE CARE HOSPITAL

## 2023-12-04 PROCEDURE — 99214 OFFICE O/P EST MOD 30 MIN: CPT | Performed by: GENERAL ACUTE CARE HOSPITAL

## 2023-12-04 RX ORDER — INSULIN GLARGINE 100 [IU]/ML
INJECTION, SOLUTION SUBCUTANEOUS
Qty: 45 ML | Refills: 6 | Status: SHIPPED | OUTPATIENT
Start: 2023-12-04

## 2023-12-04 RX ORDER — ACYCLOVIR 400 MG/1
TABLET ORAL
Qty: 3 EACH | Refills: 3 | Status: SHIPPED | OUTPATIENT
Start: 2023-12-04

## 2023-12-04 RX ORDER — PRAVASTATIN SODIUM 40 MG
40 TABLET ORAL NIGHTLY
Qty: 90 TABLET | Refills: 3 | Status: SHIPPED | OUTPATIENT
Start: 2023-12-04

## 2023-12-04 RX ORDER — ACYCLOVIR 400 MG/1
TABLET ORAL
Qty: 1 EACH | Refills: 0 | Status: SHIPPED | OUTPATIENT
Start: 2023-12-04

## 2023-12-04 NOTE — PROGRESS NOTES
units if repeatedly having low blood sugar overnight <70)  Humalog  -Brun: 26 to 28 units + correction scale   -Dinner: 20 units + correction scale   Correction Scale:   1 unit for every 30 above 150    IF GLUCOSE IS:                 THEN TAKE:      0   Extra Unit  151-180   1   Extra Unit  181-210   2   Extra Units  211-240   3   Extra Units  241-270   4   Extra Units  271-300   5   Extra Units  >300    6   Extra Units    Example: My planned insulin dose:    ____ Units    +   ____ Extra Correction Units  =  ____ total units to take together as one injection. Keep appointment with Ophthalmologist    It will be important to continue checking your glucose just as you did previously. I would like you at the very least to check you glucose during:     + AM fasting before breakfast   + Before Lunch   + Dinner time   + Bedtime   + Any other time that you are not feeling well. Always provide a glucose log that is completed at every visit so that we can review the results of your home glucose together. Without this, it is not possible to make accurate changes to your diabetes regimen. Isa Lu MD  [facility]     Hypoglycemia:    Hypoglycemia means that your blood sugar is low and your body is not getting enough fuel. Some people get low blood sugar from not eating often enough. Some medicines to treat diabetes can cause low blood sugar. People who have had surgery on their stomachs or intestines may get hypoglycemia. Problems with the pancreas, kidneys, or liver also can cause low blood sugar. A snack or drink with sugar in it will raise your blood sugar and should ease your symptoms right away. How can you care for yourself at home? Learn your signs of low blood sugar. They are different for everyone. Some common early signs include:  Nausea. Hunger. Feeling nervous, irritable, or shaky. Cold, clammy skin. Sweating (when you're not exercising).   Use the \"rule of 15\"

## 2023-12-05 ENCOUNTER — HOME CARE VISIT (OUTPATIENT)
Facility: HOME HEALTH | Age: 53
End: 2023-12-05
Payer: MEDICAID

## 2023-12-05 ENCOUNTER — HOME CARE VISIT (OUTPATIENT)
Dept: HOME HEALTH SERVICES | Facility: HOME HEALTH | Age: 53
End: 2023-12-05
Payer: MEDICAID

## 2023-12-05 ENCOUNTER — APPOINTMENT (OUTPATIENT)
Facility: HOSPITAL | Age: 53
End: 2023-12-05
Payer: MEDICAID

## 2023-12-05 ENCOUNTER — HOSPITAL ENCOUNTER (INPATIENT)
Facility: HOSPITAL | Age: 53
LOS: 3 days | Discharge: HOME | End: 2023-12-08
Attending: EMERGENCY MEDICINE | Admitting: INTERNAL MEDICINE
Payer: MEDICAID

## 2023-12-05 DIAGNOSIS — J18.9 PNEUMONIA OF BOTH LUNGS DUE TO INFECTIOUS ORGANISM, UNSPECIFIED PART OF LUNG: ICD-10-CM

## 2023-12-05 DIAGNOSIS — J96.01 ACUTE RESPIRATORY FAILURE WITH HYPOXIA (HCC): Primary | ICD-10-CM

## 2023-12-05 DIAGNOSIS — E87.5 HYPERKALEMIA: ICD-10-CM

## 2023-12-05 PROBLEM — J13 CAP (COMMUNITY ACQUIRED PNEUMONIA) DUE TO PNEUMOCOCCUS (HCC): Status: ACTIVE | Noted: 2023-12-05

## 2023-12-05 LAB
ALBUMIN SERPL-MCNC: 3.2 G/DL (ref 3.5–5)
ALBUMIN/GLOB SERPL: 0.8 (ref 1.1–2.2)
ALP SERPL-CCNC: 90 U/L (ref 45–117)
ALT SERPL-CCNC: 25 U/L (ref 12–78)
ANION GAP SERPL CALC-SCNC: 4 MMOL/L (ref 5–15)
ANION GAP SERPL CALC-SCNC: 5 MMOL/L (ref 5–15)
AST SERPL-CCNC: 29 U/L (ref 15–37)
BASOPHILS # BLD: 0.1 K/UL (ref 0–0.1)
BASOPHILS NFR BLD: 0 % (ref 0–1)
BILIRUB SERPL-MCNC: 0.6 MG/DL (ref 0.2–1)
BUN SERPL-MCNC: 45 MG/DL (ref 6–20)
BUN SERPL-MCNC: 47 MG/DL (ref 6–20)
BUN/CREAT SERPL: 21 (ref 12–20)
BUN/CREAT SERPL: 22 (ref 12–20)
CALCIUM SERPL-MCNC: 8.5 MG/DL (ref 8.5–10.1)
CALCIUM SERPL-MCNC: 8.5 MG/DL (ref 8.5–10.1)
CHLORIDE SERPL-SCNC: 114 MMOL/L (ref 97–108)
CHLORIDE SERPL-SCNC: 114 MMOL/L (ref 97–108)
CO2 SERPL-SCNC: 24 MMOL/L (ref 21–32)
CO2 SERPL-SCNC: 26 MMOL/L (ref 21–32)
COMMENT:: NORMAL
CREAT SERPL-MCNC: 2.09 MG/DL (ref 0.7–1.3)
CREAT SERPL-MCNC: 2.19 MG/DL (ref 0.7–1.3)
DIFFERENTIAL METHOD BLD: ABNORMAL
EOSINOPHIL # BLD: 0 K/UL (ref 0–0.4)
EOSINOPHIL NFR BLD: 0 % (ref 0–7)
ERYTHROCYTE [DISTWIDTH] IN BLOOD BY AUTOMATED COUNT: 15.2 % (ref 11.5–14.5)
GLOBULIN SER CALC-MCNC: 4 G/DL (ref 2–4)
GLUCOSE BLD STRIP.AUTO-MCNC: 197 MG/DL (ref 65–117)
GLUCOSE SERPL-MCNC: 189 MG/DL (ref 65–100)
GLUCOSE SERPL-MCNC: 250 MG/DL (ref 65–100)
HCT VFR BLD AUTO: 29 % (ref 36.6–50.3)
HGB BLD-MCNC: 10 G/DL (ref 12.1–17)
IMM GRANULOCYTES # BLD AUTO: 0.1 K/UL (ref 0–0.04)
IMM GRANULOCYTES NFR BLD AUTO: 1 % (ref 0–0.5)
LACTATE BLD-SCNC: 0.75 MMOL/L (ref 0.4–2)
LYMPHOCYTES # BLD: 1.6 K/UL (ref 0.8–3.5)
LYMPHOCYTES NFR BLD: 8 % (ref 12–49)
MAGNESIUM SERPL-MCNC: 2 MG/DL (ref 1.6–2.4)
MCH RBC QN AUTO: 28.4 PG (ref 26–34)
MCHC RBC AUTO-ENTMCNC: 34.5 G/DL (ref 30–36.5)
MCV RBC AUTO: 82.4 FL (ref 80–99)
MONOCYTES # BLD: 1.6 K/UL (ref 0–1)
MONOCYTES NFR BLD: 8 % (ref 5–13)
NEUTS SEG # BLD: 16.4 K/UL (ref 1.8–8)
NEUTS SEG NFR BLD: 83 % (ref 32–75)
NRBC # BLD: 0 K/UL (ref 0–0.01)
NRBC BLD-RTO: 0 PER 100 WBC
NT PRO BNP: 368 PG/ML
PLATELET # BLD AUTO: 233 K/UL (ref 150–400)
PMV BLD AUTO: 10.2 FL (ref 8.9–12.9)
POTASSIUM SERPL-SCNC: 4.6 MMOL/L (ref 3.5–5.1)
POTASSIUM SERPL-SCNC: 6.3 MMOL/L (ref 3.5–5.1)
PROCALCITONIN SERPL-MCNC: 0.39 NG/ML
PROT SERPL-MCNC: 7.2 G/DL (ref 6.4–8.2)
RBC # BLD AUTO: 3.52 M/UL (ref 4.1–5.7)
SARS-COV-2 RDRP RESP QL NAA+PROBE: NOT DETECTED
SERVICE CMNT-IMP: ABNORMAL
SODIUM SERPL-SCNC: 143 MMOL/L (ref 136–145)
SODIUM SERPL-SCNC: 144 MMOL/L (ref 136–145)
SOURCE: NORMAL
SPECIMEN HOLD: NORMAL
TROPONIN I SERPL HS-MCNC: 15 NG/L (ref 0–76)
WBC # BLD AUTO: 19.8 K/UL (ref 4.1–11.1)

## 2023-12-05 PROCEDURE — 96365 THER/PROPH/DIAG IV INF INIT: CPT

## 2023-12-05 PROCEDURE — 6360000004 HC RX CONTRAST MEDICATION: Performed by: EMERGENCY MEDICINE

## 2023-12-05 PROCEDURE — 83735 ASSAY OF MAGNESIUM: CPT

## 2023-12-05 PROCEDURE — 6360000002 HC RX W HCPCS: Performed by: INTERNAL MEDICINE

## 2023-12-05 PROCEDURE — 83605 ASSAY OF LACTIC ACID: CPT

## 2023-12-05 PROCEDURE — 2580000003 HC RX 258: Performed by: INTERNAL MEDICINE

## 2023-12-05 PROCEDURE — 2580000003 HC RX 258: Performed by: EMERGENCY MEDICINE

## 2023-12-05 PROCEDURE — 83880 ASSAY OF NATRIURETIC PEPTIDE: CPT

## 2023-12-05 PROCEDURE — 93005 ELECTROCARDIOGRAM TRACING: CPT | Performed by: EMERGENCY MEDICINE

## 2023-12-05 PROCEDURE — 36415 COLL VENOUS BLD VENIPUNCTURE: CPT

## 2023-12-05 PROCEDURE — 96368 THER/DIAG CONCURRENT INF: CPT

## 2023-12-05 PROCEDURE — 96375 TX/PRO/DX INJ NEW DRUG ADDON: CPT

## 2023-12-05 PROCEDURE — 84484 ASSAY OF TROPONIN QUANT: CPT

## 2023-12-05 PROCEDURE — 6370000000 HC RX 637 (ALT 250 FOR IP): Performed by: INTERNAL MEDICINE

## 2023-12-05 PROCEDURE — 87635 SARS-COV-2 COVID-19 AMP PRB: CPT

## 2023-12-05 PROCEDURE — 71275 CT ANGIOGRAPHY CHEST: CPT

## 2023-12-05 PROCEDURE — 87040 BLOOD CULTURE FOR BACTERIA: CPT

## 2023-12-05 PROCEDURE — 94762 N-INVAS EAR/PLS OXIMTRY CONT: CPT

## 2023-12-05 PROCEDURE — 82962 GLUCOSE BLOOD TEST: CPT

## 2023-12-05 PROCEDURE — G0300 HHS/HOSPICE OF LPN EA 15 MIN: HCPCS

## 2023-12-05 PROCEDURE — 2500000003 HC RX 250 WO HCPCS: Performed by: EMERGENCY MEDICINE

## 2023-12-05 PROCEDURE — 96361 HYDRATE IV INFUSION ADD-ON: CPT

## 2023-12-05 PROCEDURE — 1100000003 HC PRIVATE W/ TELEMETRY

## 2023-12-05 PROCEDURE — 80053 COMPREHEN METABOLIC PANEL: CPT

## 2023-12-05 PROCEDURE — 6370000000 HC RX 637 (ALT 250 FOR IP): Performed by: EMERGENCY MEDICINE

## 2023-12-05 PROCEDURE — 84145 PROCALCITONIN (PCT): CPT

## 2023-12-05 PROCEDURE — 6360000002 HC RX W HCPCS: Performed by: EMERGENCY MEDICINE

## 2023-12-05 PROCEDURE — 85025 COMPLETE CBC W/AUTO DIFF WBC: CPT

## 2023-12-05 PROCEDURE — 71045 X-RAY EXAM CHEST 1 VIEW: CPT

## 2023-12-05 PROCEDURE — 99285 EMERGENCY DEPT VISIT HI MDM: CPT

## 2023-12-05 RX ORDER — MAGNESIUM SULFATE IN WATER 40 MG/ML
2000 INJECTION, SOLUTION INTRAVENOUS PRN
Status: DISCONTINUED | OUTPATIENT
Start: 2023-12-05 | End: 2023-12-08 | Stop reason: HOSPADM

## 2023-12-05 RX ORDER — PRAVASTATIN SODIUM 40 MG
40 TABLET ORAL NIGHTLY
Status: DISCONTINUED | OUTPATIENT
Start: 2023-12-05 | End: 2023-12-08 | Stop reason: HOSPADM

## 2023-12-05 RX ORDER — INSULIN LISPRO 100 [IU]/ML
0-4 INJECTION, SOLUTION INTRAVENOUS; SUBCUTANEOUS NIGHTLY
Status: DISCONTINUED | OUTPATIENT
Start: 2023-12-05 | End: 2023-12-08 | Stop reason: HOSPADM

## 2023-12-05 RX ORDER — ALBUTEROL SULFATE 2.5 MG/3ML
2.5 SOLUTION RESPIRATORY (INHALATION) EVERY 6 HOURS PRN
Status: DISCONTINUED | OUTPATIENT
Start: 2023-12-05 | End: 2023-12-08 | Stop reason: HOSPADM

## 2023-12-05 RX ORDER — SODIUM CHLORIDE 0.9 % (FLUSH) 0.9 %
5-40 SYRINGE (ML) INJECTION PRN
Status: DISCONTINUED | OUTPATIENT
Start: 2023-12-05 | End: 2023-12-08 | Stop reason: HOSPADM

## 2023-12-05 RX ORDER — INSULIN GLARGINE 100 [IU]/ML
34 INJECTION, SOLUTION SUBCUTANEOUS NIGHTLY
Status: DISCONTINUED | OUTPATIENT
Start: 2023-12-05 | End: 2023-12-08 | Stop reason: HOSPADM

## 2023-12-05 RX ORDER — BUMETANIDE 1 MG/1
1 TABLET ORAL DAILY
Status: DISCONTINUED | OUTPATIENT
Start: 2023-12-05 | End: 2023-12-06

## 2023-12-05 RX ORDER — POTASSIUM CHLORIDE 7.45 MG/ML
10 INJECTION INTRAVENOUS PRN
Status: DISCONTINUED | OUTPATIENT
Start: 2023-12-05 | End: 2023-12-08 | Stop reason: HOSPADM

## 2023-12-05 RX ORDER — ONDANSETRON 2 MG/ML
4 INJECTION INTRAMUSCULAR; INTRAVENOUS EVERY 6 HOURS PRN
Status: DISCONTINUED | OUTPATIENT
Start: 2023-12-05 | End: 2023-12-08 | Stop reason: HOSPADM

## 2023-12-05 RX ORDER — SODIUM CHLORIDE 9 MG/ML
INJECTION, SOLUTION INTRAVENOUS PRN
Status: DISCONTINUED | OUTPATIENT
Start: 2023-12-05 | End: 2023-12-08 | Stop reason: HOSPADM

## 2023-12-05 RX ORDER — ACETAMINOPHEN 325 MG/1
650 TABLET ORAL EVERY 4 HOURS PRN
Status: DISCONTINUED | OUTPATIENT
Start: 2023-12-05 | End: 2023-12-05

## 2023-12-05 RX ORDER — INSULIN LISPRO 100 [IU]/ML
20 INJECTION, SOLUTION INTRAVENOUS; SUBCUTANEOUS 2 TIMES DAILY
Status: DISCONTINUED | OUTPATIENT
Start: 2023-12-05 | End: 2023-12-08 | Stop reason: HOSPADM

## 2023-12-05 RX ORDER — AMLODIPINE BESYLATE 5 MG/1
10 TABLET ORAL DAILY
Status: DISCONTINUED | OUTPATIENT
Start: 2023-12-05 | End: 2023-12-08 | Stop reason: HOSPADM

## 2023-12-05 RX ORDER — POLYETHYLENE GLYCOL 3350 17 G/17G
17 POWDER, FOR SOLUTION ORAL DAILY PRN
Status: DISCONTINUED | OUTPATIENT
Start: 2023-12-05 | End: 2023-12-08 | Stop reason: HOSPADM

## 2023-12-05 RX ORDER — PANTOPRAZOLE SODIUM 40 MG/1
40 TABLET, DELAYED RELEASE ORAL
Status: DISCONTINUED | OUTPATIENT
Start: 2023-12-06 | End: 2023-12-08 | Stop reason: HOSPADM

## 2023-12-05 RX ORDER — ONDANSETRON 4 MG/1
4 TABLET, ORALLY DISINTEGRATING ORAL EVERY 8 HOURS PRN
Status: DISCONTINUED | OUTPATIENT
Start: 2023-12-05 | End: 2023-12-08 | Stop reason: HOSPADM

## 2023-12-05 RX ORDER — HYDROXYZINE HYDROCHLORIDE 25 MG/1
25 TABLET, FILM COATED ORAL 4 TIMES DAILY PRN
Status: DISCONTINUED | OUTPATIENT
Start: 2023-12-05 | End: 2023-12-08 | Stop reason: HOSPADM

## 2023-12-05 RX ORDER — 0.9 % SODIUM CHLORIDE 0.9 %
500 INTRAVENOUS SOLUTION INTRAVENOUS ONCE
Status: COMPLETED | OUTPATIENT
Start: 2023-12-05 | End: 2023-12-05

## 2023-12-05 RX ORDER — POTASSIUM CHLORIDE 20 MEQ/1
40 TABLET, EXTENDED RELEASE ORAL PRN
Status: DISCONTINUED | OUTPATIENT
Start: 2023-12-05 | End: 2023-12-08 | Stop reason: HOSPADM

## 2023-12-05 RX ORDER — INSULIN LISPRO 100 [IU]/ML
0-8 INJECTION, SOLUTION INTRAVENOUS; SUBCUTANEOUS
Status: DISCONTINUED | OUTPATIENT
Start: 2023-12-05 | End: 2023-12-08 | Stop reason: HOSPADM

## 2023-12-05 RX ORDER — ACETAMINOPHEN 325 MG/1
650 TABLET ORAL EVERY 6 HOURS PRN
Status: DISCONTINUED | OUTPATIENT
Start: 2023-12-05 | End: 2023-12-08 | Stop reason: HOSPADM

## 2023-12-05 RX ORDER — ACETAMINOPHEN 650 MG/1
650 SUPPOSITORY RECTAL EVERY 6 HOURS PRN
Status: DISCONTINUED | OUTPATIENT
Start: 2023-12-05 | End: 2023-12-08 | Stop reason: HOSPADM

## 2023-12-05 RX ORDER — SODIUM CHLORIDE 9 MG/ML
INJECTION, SOLUTION INTRAVENOUS CONTINUOUS
Status: DISCONTINUED | OUTPATIENT
Start: 2023-12-05 | End: 2023-12-06

## 2023-12-05 RX ORDER — CHOLECALCIFEROL (VITAMIN D3) 125 MCG
1000 CAPSULE ORAL DAILY
Status: DISCONTINUED | OUTPATIENT
Start: 2023-12-06 | End: 2023-12-08 | Stop reason: HOSPADM

## 2023-12-05 RX ORDER — ENOXAPARIN SODIUM 100 MG/ML
30 INJECTION SUBCUTANEOUS 2 TIMES DAILY
Status: DISCONTINUED | OUTPATIENT
Start: 2023-12-05 | End: 2023-12-08 | Stop reason: HOSPADM

## 2023-12-05 RX ORDER — DEXTROSE MONOHYDRATE 25 G/50ML
25 INJECTION, SOLUTION INTRAVENOUS ONCE
Status: COMPLETED | OUTPATIENT
Start: 2023-12-05 | End: 2023-12-05

## 2023-12-05 RX ORDER — SODIUM CHLORIDE 0.9 % (FLUSH) 0.9 %
5-40 SYRINGE (ML) INJECTION EVERY 12 HOURS SCHEDULED
Status: DISCONTINUED | OUTPATIENT
Start: 2023-12-05 | End: 2023-12-08 | Stop reason: HOSPADM

## 2023-12-05 RX ADMIN — BUMETANIDE 1 MG: 1 TABLET ORAL at 17:52

## 2023-12-05 RX ADMIN — AZITHROMYCIN MONOHYDRATE 500 MG: 500 INJECTION, POWDER, LYOPHILIZED, FOR SOLUTION INTRAVENOUS at 15:30

## 2023-12-05 RX ADMIN — SODIUM CHLORIDE, PRESERVATIVE FREE 10 ML: 5 INJECTION INTRAVENOUS at 22:20

## 2023-12-05 RX ADMIN — SODIUM ZIRCONIUM CYCLOSILICATE 10 G: 10 POWDER, FOR SUSPENSION ORAL at 15:18

## 2023-12-05 RX ADMIN — PRAVASTATIN SODIUM 40 MG: 40 TABLET ORAL at 22:19

## 2023-12-05 RX ADMIN — ENOXAPARIN SODIUM 30 MG: 100 INJECTION SUBCUTANEOUS at 22:18

## 2023-12-05 RX ADMIN — SODIUM CHLORIDE 1000 MG: 900 INJECTION INTRAVENOUS at 15:18

## 2023-12-05 RX ADMIN — INSULIN HUMAN 10 UNITS: 100 INJECTION, SOLUTION PARENTERAL at 15:31

## 2023-12-05 RX ADMIN — DEXTROSE MONOHYDRATE 25 G: 25 INJECTION, SOLUTION INTRAVENOUS at 15:30

## 2023-12-05 RX ADMIN — INSULIN GLARGINE 34 UNITS: 100 INJECTION, SOLUTION SUBCUTANEOUS at 22:19

## 2023-12-05 RX ADMIN — AMLODIPINE BESYLATE 10 MG: 5 TABLET ORAL at 17:52

## 2023-12-05 RX ADMIN — SODIUM CHLORIDE 500 ML: 9 INJECTION, SOLUTION INTRAVENOUS at 13:59

## 2023-12-05 RX ADMIN — ACETAMINOPHEN 650 MG: 325 TABLET ORAL at 16:46

## 2023-12-05 RX ADMIN — ACETAMINOPHEN 650 MG: 325 TABLET ORAL at 22:18

## 2023-12-05 RX ADMIN — SODIUM CHLORIDE: 9 INJECTION, SOLUTION INTRAVENOUS at 16:53

## 2023-12-05 RX ADMIN — SODIUM BICARBONATE 50 MEQ: 84 INJECTION INTRAVENOUS at 15:18

## 2023-12-05 RX ADMIN — IOPAMIDOL 100 ML: 755 INJECTION, SOLUTION INTRAVENOUS at 14:59

## 2023-12-05 ASSESSMENT — PAIN DESCRIPTION - PAIN TYPE: TYPE: CHRONIC PAIN

## 2023-12-05 ASSESSMENT — PAIN SCALES - GENERAL
PAINLEVEL_OUTOF10: 7
PAINLEVEL_OUTOF10: 5
PAINLEVEL_OUTOF10: 5

## 2023-12-05 ASSESSMENT — PAIN DESCRIPTION - LOCATION
LOCATION: GENERALIZED
LOCATION: GENERALIZED
LOCATION: LEG

## 2023-12-05 ASSESSMENT — PAIN DESCRIPTION - FREQUENCY: FREQUENCY: CONTINUOUS

## 2023-12-05 ASSESSMENT — PAIN DESCRIPTION - ONSET: ONSET: ON-GOING

## 2023-12-05 ASSESSMENT — PAIN DESCRIPTION - DESCRIPTORS
DESCRIPTORS: ACHING
DESCRIPTORS: ACHING

## 2023-12-05 ASSESSMENT — PAIN - FUNCTIONAL ASSESSMENT
PAIN_FUNCTIONAL_ASSESSMENT: ACTIVITIES ARE NOT PREVENTED
PAIN_FUNCTIONAL_ASSESSMENT: ACTIVITIES ARE NOT PREVENTED

## 2023-12-05 NOTE — H&P
Hospitalist Admission Note    NAME:   Natacha Chappell   : 1970   MRN: [id number]     Date/Time: 2023 4:15 PM    Patient PCP: Prachi Vizcaino PA-C    ______________________________________________________________________  Given the patient's current clinical presentation, I have a high level of concern for decompensation if discharged from the emergency department. Complex decision making was performed, which includes reviewing the patient's available past medical records, laboratory results, and x-ray films. My assessment of this patient's clinical condition and my plan of care is as follows. Assessment / Plan:    Community-acquired pneumonia  Acute hypoxic respiratory failure due to above  Sepsis due to above (tachycardia and leukocytosis)  -Clinical presentation consistent with pneumonia given his respiratory symptoms, tachycardia, pulmonary infiltrates on CT chest  -He is currently on 3 L nasal cannula and saturating at about 96%  -Lactic acid is within normal limits  -During last admission also patient was noted to have possible pneumonia versus cardiogenic pulmonary edema and was treated with antibiotics and received complete work-up and was ultimately discharged on p.o. Bumex  -proBNP is only 368 currently. Echocardiogram in  shows surgical fraction of around 55 to 60% with normal wall motion and  reduced right ventricular systolic function  -Blood culture sent in the ED. COVID rapid test is negative. Start empiric antibiotics with ceftriaxone and Zithromax  -Monitor for hypoxia and if patient becomes hypoxic, consider getting ABG and increased oxygen support  -Consider pulmonary consultation given pulmonary infiltrates on CT chest now and even had some bilateral atelectasis on a previous CAT scan back in September  -Given possible history of congestive heart failure and on Bumex, will hydrate for 12 hours and reevaluate the need for IV fluids.   Resume Bumex from tomorrow Globulin 4.0 2.0 - 4.0 g/dL    Albumin/Globulin Ratio 0.8 (L) 1.1 - 2.2     Magnesium    Collection Time: 12/05/23  1:46 PM   Result Value Ref Range    Magnesium 2.0 1.6 - 2.4 mg/dL   Brain Natriuretic Peptide    Collection Time: 12/05/23  1:46 PM   Result Value Ref Range    NT Pro- (H) <125 PG/ML   Troponin    Collection Time: 12/05/23  1:46 PM   Result Value Ref Range    Troponin, High Sensitivity 15 0 - 76 ng/L   Extra Tubes Hold    Collection Time: 12/05/23  1:46 PM   Result Value Ref Range    Specimen HOld blue, sst     Comment:        Add-on orders for these samples will be processed based on acceptable specimen integrity and analyte stability, which may vary by analyte. Procalcitonin    Collection Time: 12/05/23  1:46 PM   Result Value Ref Range    Procalcitonin 0.39 ng/mL   COVID-19, Rapid    Collection Time: 12/05/23  2:01 PM    Specimen: Nasopharyngeal   Result Value Ref Range    Source Nasopharyngeal      SARS-CoV-2, Rapid Not detected NOTD           CTA CHEST W WO CONTRAST    Result Date: 12/5/2023  EXAM:  CTA CHEST W WO CONTRAST INDICATION: hypoxia, COMPARISON: Chest radiograph 12/5/2023, CT chest 8/30/2023. CONTRAST:  100 mL of Isovue-370. ? Technique: Following the uneventful intravenous administration of contrast, thin axial images were obtained through the chest. Coronal and sagittal reconstructions were generated. MIP image reconstructions were also performed. CT dose reduction was achieved through use of a standardized protocol tailored for this examination and automatic exposure control for dose modulation. ? Findings: Vascular: No evidence of acute or chronic pulmonary embolism. The pulmonary arteries are normal in size. The thoracic aorta is normal in size. Chest: Lungs/Pleura: There are patchy consolidative opacities noted in the bilateral lower lobes, and bilateral upper lobes, left worse than right. No significant pleural effusion. No pneumothorax.  Axilla/Soft Tissue: No

## 2023-12-05 NOTE — PROGRESS NOTES
Dual skin assessments preformed with Macrina Stanford RN. Wounds to lateral left calf dressed with foam dressing, healing wound to right medial calf open to air.  Wound consult placed

## 2023-12-05 NOTE — PROGRESS NOTES
TRANSFER - IN REPORT:    Verbal report received from [address] on De[address] Judge  being received from ED for routine progression of patient care      Report consisted of patient's Situation, Background, Assessment and   Recommendations(SBAR). Information from the following report(s) Nurse Handoff Report, Intake/Output, and MAR was reviewed with the receiving nurse. Opportunity for questions and clarification was provided.

## 2023-12-06 ENCOUNTER — HOME CARE VISIT (OUTPATIENT)
Facility: HOME HEALTH | Age: 53
End: 2023-12-06
Payer: MEDICAID

## 2023-12-06 VITALS
SYSTOLIC BLOOD PRESSURE: 150 MMHG | HEART RATE: 120 BPM | DIASTOLIC BLOOD PRESSURE: 80 MMHG | TEMPERATURE: 99.1 F | RESPIRATION RATE: 20 BRPM | OXYGEN SATURATION: 95 % | WEIGHT: 253 LBS | BODY MASS INDEX: 36.3 KG/M2

## 2023-12-06 LAB
ANION GAP SERPL CALC-SCNC: 6 MMOL/L (ref 5–15)
BASOPHILS # BLD: 0.1 K/UL (ref 0–0.1)
BASOPHILS NFR BLD: 1 % (ref 0–1)
BUN SERPL-MCNC: 50 MG/DL (ref 6–20)
BUN/CREAT SERPL: 21 (ref 12–20)
CALCIUM SERPL-MCNC: 8.3 MG/DL (ref 8.5–10.1)
CHLORIDE SERPL-SCNC: 115 MMOL/L (ref 97–108)
CO2 SERPL-SCNC: 23 MMOL/L (ref 21–32)
CREAT SERPL-MCNC: 2.39 MG/DL (ref 0.7–1.3)
DIFFERENTIAL METHOD BLD: ABNORMAL
EKG ATRIAL RATE: 109 BPM
EKG DIAGNOSIS: NORMAL
EKG P AXIS: 50 DEGREES
EKG P-R INTERVAL: 176 MS
EKG Q-T INTERVAL: 310 MS
EKG QRS DURATION: 84 MS
EKG QTC CALCULATION (BAZETT): 417 MS
EKG R AXIS: 42 DEGREES
EKG T AXIS: 70 DEGREES
EKG VENTRICULAR RATE: 109 BPM
EOSINOPHIL # BLD: 0.3 K/UL (ref 0–0.4)
EOSINOPHIL NFR BLD: 3 % (ref 0–7)
ERYTHROCYTE [DISTWIDTH] IN BLOOD BY AUTOMATED COUNT: 15.3 % (ref 11.5–14.5)
GLUCOSE BLD STRIP.AUTO-MCNC: 148 MG/DL (ref 65–117)
GLUCOSE BLD STRIP.AUTO-MCNC: 198 MG/DL (ref 65–117)
GLUCOSE BLD STRIP.AUTO-MCNC: 248 MG/DL (ref 65–117)
GLUCOSE BLD STRIP.AUTO-MCNC: 61 MG/DL (ref 65–117)
GLUCOSE BLD STRIP.AUTO-MCNC: 69 MG/DL (ref 65–117)
GLUCOSE BLD STRIP.AUTO-MCNC: 87 MG/DL (ref 65–117)
GLUCOSE SERPL-MCNC: 258 MG/DL (ref 65–100)
HCT VFR BLD AUTO: 28.5 % (ref 36.6–50.3)
HGB BLD-MCNC: 9.5 G/DL (ref 12.1–17)
IMM GRANULOCYTES # BLD AUTO: 0.1 K/UL (ref 0–0.04)
IMM GRANULOCYTES NFR BLD AUTO: 0 % (ref 0–0.5)
LYMPHOCYTES # BLD: 1.9 K/UL (ref 0.8–3.5)
LYMPHOCYTES NFR BLD: 14 % (ref 12–49)
MCH RBC QN AUTO: 28.2 PG (ref 26–34)
MCHC RBC AUTO-ENTMCNC: 33.3 G/DL (ref 30–36.5)
MCV RBC AUTO: 84.6 FL (ref 80–99)
MONOCYTES # BLD: 1.2 K/UL (ref 0–1)
MONOCYTES NFR BLD: 9 % (ref 5–13)
NEUTS SEG # BLD: 10 K/UL (ref 1.8–8)
NEUTS SEG NFR BLD: 73 % (ref 32–75)
NRBC # BLD: 0 K/UL (ref 0–0.01)
NRBC BLD-RTO: 0 PER 100 WBC
PLATELET # BLD AUTO: 223 K/UL (ref 150–400)
PMV BLD AUTO: 10.3 FL (ref 8.9–12.9)
POTASSIUM SERPL-SCNC: 5.1 MMOL/L (ref 3.5–5.1)
RBC # BLD AUTO: 3.37 M/UL (ref 4.1–5.7)
SERVICE CMNT-IMP: ABNORMAL
SERVICE CMNT-IMP: NORMAL
SERVICE CMNT-IMP: NORMAL
SODIUM SERPL-SCNC: 144 MMOL/L (ref 136–145)
WBC # BLD AUTO: 13.6 K/UL (ref 4.1–11.1)

## 2023-12-06 PROCEDURE — 1100000003 HC PRIVATE W/ TELEMETRY

## 2023-12-06 PROCEDURE — 85025 COMPLETE CBC W/AUTO DIFF WBC: CPT

## 2023-12-06 PROCEDURE — 6370000000 HC RX 637 (ALT 250 FOR IP): Performed by: INTERNAL MEDICINE

## 2023-12-06 PROCEDURE — 2580000003 HC RX 258: Performed by: INTERNAL MEDICINE

## 2023-12-06 PROCEDURE — 82962 GLUCOSE BLOOD TEST: CPT

## 2023-12-06 PROCEDURE — 80048 BASIC METABOLIC PNL TOTAL CA: CPT

## 2023-12-06 PROCEDURE — 6360000002 HC RX W HCPCS: Performed by: INTERNAL MEDICINE

## 2023-12-06 PROCEDURE — 2500000003 HC RX 250 WO HCPCS: Performed by: INTERNAL MEDICINE

## 2023-12-06 PROCEDURE — 2700000000 HC OXYGEN THERAPY PER DAY

## 2023-12-06 PROCEDURE — 36415 COLL VENOUS BLD VENIPUNCTURE: CPT

## 2023-12-06 PROCEDURE — 94760 N-INVAS EAR/PLS OXIMETRY 1: CPT

## 2023-12-06 RX ORDER — PREGABALIN 100 MG/1
200 CAPSULE ORAL 2 TIMES DAILY
Status: DISCONTINUED | OUTPATIENT
Start: 2023-12-06 | End: 2023-12-08 | Stop reason: HOSPADM

## 2023-12-06 RX ORDER — BUMETANIDE 0.25 MG/ML
1 INJECTION INTRAMUSCULAR; INTRAVENOUS DAILY
Status: DISCONTINUED | OUTPATIENT
Start: 2023-12-06 | End: 2023-12-08 | Stop reason: HOSPADM

## 2023-12-06 RX ADMIN — ENOXAPARIN SODIUM 30 MG: 100 INJECTION SUBCUTANEOUS at 08:37

## 2023-12-06 RX ADMIN — INSULIN LISPRO 20 UNITS: 100 INJECTION, SOLUTION INTRAVENOUS; SUBCUTANEOUS at 17:10

## 2023-12-06 RX ADMIN — AMLODIPINE BESYLATE 10 MG: 5 TABLET ORAL at 08:37

## 2023-12-06 RX ADMIN — INSULIN LISPRO 2 UNITS: 100 INJECTION, SOLUTION INTRAVENOUS; SUBCUTANEOUS at 08:39

## 2023-12-06 RX ADMIN — SODIUM CHLORIDE, PRESERVATIVE FREE 10 ML: 5 INJECTION INTRAVENOUS at 22:23

## 2023-12-06 RX ADMIN — BUMETANIDE 1 MG: 0.25 INJECTION INTRAMUSCULAR; INTRAVENOUS at 16:07

## 2023-12-06 RX ADMIN — PREGABALIN 200 MG: 75 CAPSULE ORAL at 22:22

## 2023-12-06 RX ADMIN — SODIUM CHLORIDE: 9 INJECTION, SOLUTION INTRAVENOUS at 06:26

## 2023-12-06 RX ADMIN — BUMETANIDE 1 MG: 1 TABLET ORAL at 08:37

## 2023-12-06 RX ADMIN — SODIUM CHLORIDE, PRESERVATIVE FREE 10 ML: 5 INJECTION INTRAVENOUS at 08:46

## 2023-12-06 RX ADMIN — AZITHROMYCIN MONOHYDRATE 500 MG: 500 INJECTION, POWDER, LYOPHILIZED, FOR SOLUTION INTRAVENOUS at 08:38

## 2023-12-06 RX ADMIN — ENOXAPARIN SODIUM 30 MG: 100 INJECTION SUBCUTANEOUS at 22:23

## 2023-12-06 RX ADMIN — INSULIN GLARGINE 34 UNITS: 100 INJECTION, SOLUTION SUBCUTANEOUS at 22:22

## 2023-12-06 RX ADMIN — SODIUM CHLORIDE 1000 MG: 900 INJECTION INTRAVENOUS at 08:33

## 2023-12-06 RX ADMIN — PANTOPRAZOLE SODIUM 40 MG: 40 TABLET, DELAYED RELEASE ORAL at 06:54

## 2023-12-06 RX ADMIN — PRAVASTATIN SODIUM 40 MG: 40 TABLET ORAL at 22:22

## 2023-12-06 NOTE — HOME HEALTH
Subjective: Uncle states Pt is not doing well  Falls since last visit No(if yes complete the Fall Tracking Form and include bsrifallreport):   Caregiver involvement changes:  is primary caregiver   Home health supplies by type and quantity ordered/delivered this visit include: none     Clinician asked if patient has had any physician contact since last home care visit and patient states: YES, Pt saw Endocrinologist yesterday   Clinician asked if patient has any new or changed medications and patient states:  NO   If Yes, were medications reconciled? NO   Was the certifying physician notified of changes in medications? NO     Clinical assessment (what this visit means for the patient overall and need for ongoing skilled care) and progress or lack of progress towards SPECIFIC goals: Pt continues to require SN for wound care. Upon entering Pt home today Pt was still in the bed. Uncle went to wake pt up, and requested SN come upstairs as pt is not doing good. Pt was in bed conscious but not oriented, states he just doesnt feel good and had emesis and diarrhea overnight from 4-6am and once he went back to bed he could not get up. Pt vital signs taken and pulse was 120, /80, resp 20. Attempted to get pt out of bed and on the scale and pt began to cry stating that right leg is too painful and he is unable to move it without pain. SN and Uncle assisted pt to standing position but scale could not  weight due to being on carpet. Uncle moved scale to bathroom and SN and Uncle assist pt into bathroom and and onto scale and pts weight was 253lbs which is a 2lb weight gain from 12/1/23. At this time pt agreed to go to the Hospital. SN placed a call to 911 because Pt was not able to get down the stairs safely. EMTs arrived within 10 minutes and was able to transfer pt to ambulance via stair chair. Pt was sent to [facility] where he was admitted for acute respiratory distress.     Written Teaching Material Utilized:

## 2023-12-06 NOTE — PLAN OF CARE
Problem: Pain  Goal: Verbalizes/displays adequate comfort level or baseline comfort level  12/6/2023 1013 by Alison Maddox RN  Outcome: Progressing  12/6/2023 0430 by Ariel Courtney RN  Outcome: Progressing  Flowsheets (Taken 12/5/2023 1909)  Verbalizes/displays adequate comfort level or baseline comfort level:   Encourage patient to monitor pain and request assistance   Assess pain using appropriate pain scale   Administer analgesics based on type and severity of pain and evaluate response   Implement non-pharmacological measures as appropriate and evaluate response   Notify Licensed Independent Practitioner if interventions unsuccessful or patient reports new pain

## 2023-12-06 NOTE — HOME HEALTH
Subjective: Pt states he has an appointment with endocrinologist on Monday 12/4/23  Falls since last visit No(if yes complete the Fall Tracking Form and include bsrifallreport):   Caregiver involvement changes: Pts Uncle assist as needed   Home health supplies by type and quantity ordered/delivered this visit include: none     Clinician asked if patient has had any physician contact since last home care visit and patient states: NO  Clinician asked if patient has any new or changed medications and patient states:  NO   If Yes, were medications reconciled? NO   Was the certifying physician notified of changes in medications? NO     Clinical assessment (what this visit means for the patient overall and need for ongoing skilled care) and progress or lack of progress towards SPECIFIC goals: Pt continues to require SN for wound care. Pt states a few hours before SN arrived Pts blood sugar was over 200 and [facility] pts freestyle sam sensor went off stating that Pt blood sugar 60. Pt very frustrated and states there is no way blood sugar now that low. Pt waited 5 minutes and rechecked blood sugar and he was now up into the 90s. Pt states this happens almost daily with freestlye sam if its working because sensor has needed to replaced before expiring several times. SN informed Pt to let endocrinologist know this at appointment on 12/4/23 and see if he is canidate for Dexcom because he is having so much difficulty with freestyle sam. Pt states he will be asking Endocrinologist.    Written Teaching Material Utilized: N/A    Interdisciplinary communication with: Ish CORONA  for the purpose of MAde aware of swelling in LLE    Discharge planning as follows:  When wound is 100% healed    Specific plan for next visit: wound care

## 2023-12-06 NOTE — ED PROVIDER NOTES
[facility] [address]       Pt Name: Whitney Dill  MRN: [id number]  [facility] 1970  Date of evaluation: 12/5/2023  Provider: Katie Reyes DO   PCP: Lawrence Parish PA-C  Note Started: 7:28 PM EST 12/5/23     CHIEF COMPLAINT       Chief Complaint   Patient presents with    Shortness of Breath     Pt arrives to ED via EMS coming from home. Pt c/o SOB and garett lower leg swelling. Per EMS pt was 89-90% on RA. Pt is 85 % on RA in TR. Placed pt on 6L Nc at this time. Pt normally does not wear oxygen        HISTORY OF PRESENT ILLNESS: 1 or more elements      History From: Patient, History limited by: none     Whitney Dill is a 48 y.o. male presents to the emergency department by EMS for shortness of breath. Please See MDM for Additional Details of the HPI/PMH  Nursing Notes were all reviewed and agreed with or any disagreements were addressed in the HPI. REVIEW OF SYSTEMS        Positives and Pertinent negatives as per HPI.     PAST HISTORY     Past Medical History:  Past Medical History:   Diagnosis Date    Anxiety and depression     per pt on 10/21/2021    Asthma     GERD (gastroesophageal reflux disease)     per pt on 10/21/2021    Hypertension     per pt on 10/21/2021    MS (multiple sclerosis) ([address])     per pt on 10/21/2021    Type 2 diabetes mellitus with peripheral neuropathy [facility])        Past Surgical History:  Past Surgical History:   Procedure Laterality Date    CATARACT REMOVAL Left 12/03/2021    CATARACT REMOVAL Right 10/2021    HEENT      ear surgery(both)    TONSILLECTOMY      per pt on 10/21/2021       Family History:  Family History   Problem Relation Age of Onset    No Known Problems Brother     Thyroid Disease Sister     Hypertension Maternal Grandmother     Heart Disease Mother     Other Mother         Crohn's disease    Hypertension Mother     Diabetes Father     Cancer Father         prostate       Social History:  Social History     Tobacco Use interpreted by me, sinus tachycardia with 9, normal axis/PA/QRS, no acute ST changes. []   8832 EKG 12 Lead []      ED Course User Index  [] [facility], DO     Chest x-ray reviewed with no significant abnormality. Given this we will obtain CT a of the chest given possible PE or other pathology not seen on x-ray. Patient's white blood cell count did come back elevated at 19.8 we will start IV antibiotics. CMP remarkable for potassium of 6.3. Patient will be given an amp of bicarb and D50 in addition to 10 of insulin IV. Will also dose with Lokelma. Patient with no EKG changes at this time we will hold on calcium. Creatinine appears to be at baseline at 2.19. CT demonstrates no pulmonary emboli however patient does have multifocal pneumonia in bilateral lower lobes and bilateral upper lobes. Will consult hospitalist for admission. I have updated the patient as well as his family on clinical course and plan of care. Consult note:  Case discussed with Dr. Babak Llamas. Patient be evaluated and admitted. FINAL IMPRESSION     1. Acute respiratory failure with hypoxia ([address])    2. Pneumonia of both lungs due to infectious organism, unspecified part of lung    3. Hyperkalemia          DISPOSITION/PLAN   Tom Birch's  results have been reviewed with him. He has been counseled regarding his diagnosis, treatment, and plan. He verbally conveys understanding and agreement of the signs, symptoms, diagnosis, treatment and prognosis and additionally agrees to follow up as discussed. He also agrees with the care-plan and conveys that all of his questions have been answered. CLINICAL IMPRESSION    Admit Note: Pt is being admitted by hospitalist. The results of their tests and reason(s) for their admission have been discussed with pt and/or available family. They convey agreement and understanding for the need to be admitted and for the admission diagnosis.            I am the Primary Clinician of

## 2023-12-07 LAB
ALBUMIN SERPL-MCNC: 2.9 G/DL (ref 3.5–5)
ALBUMIN/GLOB SERPL: 0.7 (ref 1.1–2.2)
ALP SERPL-CCNC: 72 U/L (ref 45–117)
ALT SERPL-CCNC: 32 U/L (ref 12–78)
ANION GAP SERPL CALC-SCNC: 3 MMOL/L (ref 5–15)
AST SERPL-CCNC: 48 U/L (ref 15–37)
BASOPHILS # BLD: 0.1 K/UL (ref 0–0.1)
BASOPHILS NFR BLD: 1 % (ref 0–1)
BILIRUB SERPL-MCNC: 0.2 MG/DL (ref 0.2–1)
BUN SERPL-MCNC: 48 MG/DL (ref 6–20)
BUN/CREAT SERPL: 23 (ref 12–20)
CALCIUM SERPL-MCNC: 8.5 MG/DL (ref 8.5–10.1)
CHLORIDE SERPL-SCNC: 116 MMOL/L (ref 97–108)
CO2 SERPL-SCNC: 26 MMOL/L (ref 21–32)
CREAT SERPL-MCNC: 2.11 MG/DL (ref 0.7–1.3)
DIFFERENTIAL METHOD BLD: ABNORMAL
EOSINOPHIL # BLD: 0.3 K/UL (ref 0–0.4)
EOSINOPHIL NFR BLD: 3 % (ref 0–7)
ERYTHROCYTE [DISTWIDTH] IN BLOOD BY AUTOMATED COUNT: 14.9 % (ref 11.5–14.5)
GLOBULIN SER CALC-MCNC: 4.2 G/DL (ref 2–4)
GLUCOSE BLD STRIP.AUTO-MCNC: 129 MG/DL (ref 65–117)
GLUCOSE BLD STRIP.AUTO-MCNC: 175 MG/DL (ref 65–117)
GLUCOSE BLD STRIP.AUTO-MCNC: 179 MG/DL (ref 65–117)
GLUCOSE BLD STRIP.AUTO-MCNC: 202 MG/DL (ref 65–117)
GLUCOSE SERPL-MCNC: 178 MG/DL (ref 65–100)
HCT VFR BLD AUTO: 28.4 % (ref 36.6–50.3)
HGB BLD-MCNC: 9.1 G/DL (ref 12.1–17)
IMM GRANULOCYTES # BLD AUTO: 0.1 K/UL (ref 0–0.04)
IMM GRANULOCYTES NFR BLD AUTO: 1 % (ref 0–0.5)
LYMPHOCYTES # BLD: 1.9 K/UL (ref 0.8–3.5)
LYMPHOCYTES NFR BLD: 18 % (ref 12–49)
MAGNESIUM SERPL-MCNC: 2 MG/DL (ref 1.6–2.4)
MCH RBC QN AUTO: 27.7 PG (ref 26–34)
MCHC RBC AUTO-ENTMCNC: 32 G/DL (ref 30–36.5)
MCV RBC AUTO: 86.6 FL (ref 80–99)
MONOCYTES # BLD: 1.2 K/UL (ref 0–1)
MONOCYTES NFR BLD: 12 % (ref 5–13)
NEUTS SEG # BLD: 7 K/UL (ref 1.8–8)
NEUTS SEG NFR BLD: 65 % (ref 32–75)
NRBC # BLD: 0 K/UL (ref 0–0.01)
NRBC BLD-RTO: 0 PER 100 WBC
PLATELET # BLD AUTO: 213 K/UL (ref 150–400)
PMV BLD AUTO: 10.1 FL (ref 8.9–12.9)
POTASSIUM SERPL-SCNC: 4.7 MMOL/L (ref 3.5–5.1)
PROT SERPL-MCNC: 7.1 G/DL (ref 6.4–8.2)
RBC # BLD AUTO: 3.28 M/UL (ref 4.1–5.7)
SERVICE CMNT-IMP: ABNORMAL
SODIUM SERPL-SCNC: 145 MMOL/L (ref 136–145)
WBC # BLD AUTO: 10.6 K/UL (ref 4.1–11.1)

## 2023-12-07 PROCEDURE — 82962 GLUCOSE BLOOD TEST: CPT

## 2023-12-07 PROCEDURE — 6360000002 HC RX W HCPCS: Performed by: INTERNAL MEDICINE

## 2023-12-07 PROCEDURE — 1100000000 HC RM PRIVATE

## 2023-12-07 PROCEDURE — 6370000000 HC RX 637 (ALT 250 FOR IP): Performed by: INTERNAL MEDICINE

## 2023-12-07 PROCEDURE — 83735 ASSAY OF MAGNESIUM: CPT

## 2023-12-07 PROCEDURE — 85025 COMPLETE CBC W/AUTO DIFF WBC: CPT

## 2023-12-07 PROCEDURE — 94760 N-INVAS EAR/PLS OXIMETRY 1: CPT

## 2023-12-07 PROCEDURE — 2580000003 HC RX 258: Performed by: INTERNAL MEDICINE

## 2023-12-07 PROCEDURE — 80053 COMPREHEN METABOLIC PANEL: CPT

## 2023-12-07 PROCEDURE — 2500000003 HC RX 250 WO HCPCS: Performed by: INTERNAL MEDICINE

## 2023-12-07 PROCEDURE — 36415 COLL VENOUS BLD VENIPUNCTURE: CPT

## 2023-12-07 PROCEDURE — 2700000000 HC OXYGEN THERAPY PER DAY

## 2023-12-07 RX ORDER — GUAIFENESIN 600 MG/1
600 TABLET, EXTENDED RELEASE ORAL 2 TIMES DAILY
Status: DISCONTINUED | OUTPATIENT
Start: 2023-12-07 | End: 2023-12-08 | Stop reason: HOSPADM

## 2023-12-07 RX ORDER — BENZONATATE 100 MG/1
100 CAPSULE ORAL 3 TIMES DAILY PRN
Status: DISCONTINUED | OUTPATIENT
Start: 2023-12-07 | End: 2023-12-08 | Stop reason: HOSPADM

## 2023-12-07 RX ADMIN — PRAVASTATIN SODIUM 40 MG: 40 TABLET ORAL at 22:53

## 2023-12-07 RX ADMIN — INSULIN LISPRO 2 UNITS: 100 INJECTION, SOLUTION INTRAVENOUS; SUBCUTANEOUS at 16:24

## 2023-12-07 RX ADMIN — ENOXAPARIN SODIUM 30 MG: 100 INJECTION SUBCUTANEOUS at 22:52

## 2023-12-07 RX ADMIN — PANTOPRAZOLE SODIUM 40 MG: 40 TABLET, DELAYED RELEASE ORAL at 06:36

## 2023-12-07 RX ADMIN — GUAIFENESIN 600 MG: 600 TABLET, EXTENDED RELEASE ORAL at 16:17

## 2023-12-07 RX ADMIN — SODIUM CHLORIDE 1000 MG: 900 INJECTION INTRAVENOUS at 08:28

## 2023-12-07 RX ADMIN — BUMETANIDE 1 MG: 0.25 INJECTION INTRAMUSCULAR; INTRAVENOUS at 08:21

## 2023-12-07 RX ADMIN — ACETAMINOPHEN 650 MG: 325 TABLET ORAL at 16:17

## 2023-12-07 RX ADMIN — ENOXAPARIN SODIUM 30 MG: 100 INJECTION SUBCUTANEOUS at 08:21

## 2023-12-07 RX ADMIN — CYANOCOBALAMIN TAB 500 MCG 1000 MCG: 500 TAB at 08:24

## 2023-12-07 RX ADMIN — GUAIFENESIN 600 MG: 600 TABLET, EXTENDED RELEASE ORAL at 22:52

## 2023-12-07 RX ADMIN — AMLODIPINE BESYLATE 10 MG: 5 TABLET ORAL at 08:26

## 2023-12-07 RX ADMIN — SODIUM CHLORIDE, PRESERVATIVE FREE 10 ML: 5 INJECTION INTRAVENOUS at 08:22

## 2023-12-07 RX ADMIN — AZITHROMYCIN MONOHYDRATE 500 MG: 500 INJECTION, POWDER, LYOPHILIZED, FOR SOLUTION INTRAVENOUS at 08:38

## 2023-12-07 RX ADMIN — INSULIN GLARGINE 34 UNITS: 100 INJECTION, SOLUTION SUBCUTANEOUS at 22:52

## 2023-12-07 RX ADMIN — PREGABALIN 200 MG: 75 CAPSULE ORAL at 08:25

## 2023-12-07 RX ADMIN — PREGABALIN 200 MG: 75 CAPSULE ORAL at 22:53

## 2023-12-07 RX ADMIN — ACETAMINOPHEN 650 MG: 325 TABLET ORAL at 22:54

## 2023-12-07 RX ADMIN — SODIUM CHLORIDE, PRESERVATIVE FREE 10 ML: 5 INJECTION INTRAVENOUS at 21:15

## 2023-12-07 RX ADMIN — BENZONATATE 100 MG: 100 CAPSULE ORAL at 22:54

## 2023-12-07 ASSESSMENT — PAIN - FUNCTIONAL ASSESSMENT: PAIN_FUNCTIONAL_ASSESSMENT: ACTIVITIES ARE NOT PREVENTED

## 2023-12-07 ASSESSMENT — PAIN DESCRIPTION - LOCATION: LOCATION: GENERALIZED

## 2023-12-07 ASSESSMENT — PAIN DESCRIPTION - DESCRIPTORS: DESCRIPTORS: ACHING

## 2023-12-07 ASSESSMENT — PAIN SCALES - GENERAL: PAINLEVEL_OUTOF10: 4

## 2023-12-07 NOTE — PROGRESS NOTES
Hospitalist Progress Note    NAME:   Adryan Garcia   : 1970   MRN: [id number]     Date/Time: 2023 7:27 AM  Patient PCP: Marianne Chisholm PA-C    Estimated discharge date: 1-2 days  Barriers: Clinical improvement, wean O2 from 2 liters, home O2 challenge      Assessment / Plan:  Bilateral community-acquired pneumonia POA  Possible acute on chronic diastolic CHF POA  Acute hypoxic respiratory failure POA currently on 5 liters Sepsis POA WBC 19.8, , lactate 0.75  Baseline does not wear o2  Came in feeling cloudy, EMS found low O2 sats  ED RA sats 85%, RR to 28  Now requiring 5 liters  Last admission 2023   ? pneumonia versus cardiogenic pulmonary edema   Treated with antibiotics  Discharged on p.o. Bumex  Echocardiogram in  LVEF 55-60%   Reduced right ventricular systolic function  CTA chest IMPRESSION:  No pulmonary embolism  Bibasilar subsegmental atelectasis per radiology   I reviewed scan bibasilar ASD with air bronchograms            pBNP 358, but elevated BMI  Blood culture negative so far  COVID rapid test is negative. Start empiric antibiotics with Ceftriaxone and Zithromax              Procalcitonin 0.39  Stop further IVF  Bumex 1 mg IV daily  Wean O2  Ambulate  Pulm consultation if not improving  : Patient on 2 L of oxygen. Will continue to wean off oxygen as tolerated. Patient reports decreased cough and no chest pain. Blood culture from  have been negative to date. Hyperkalemia 6.3 POA  Stage 3 chronic kidney disease POA baseline creat 1.6 to 2.1  Losartan at home which could be contributing = hold   S/p reversal treatment in the ED including Lokelma. Improved to 5.1  Serial labs  : Potassium is normal at 4.7.  BUN/creatinine of 48/2.1 which is around his baseline. Diabetes mellitus type 2 on chronic insulin POA  Lantus 34 units at bedtime, Humalog at 20 units twice daily.     SSI  , 248, 198  Patient asking to resume his patient on 12/7/2023, as outlined below:  PHYSICAL EXAM:  General: Alert, obese, cooperative  EENT:  EOMI. Anicteric sclerae. Resp:  2L O2, Crackles at bases bilaterally   CV:  Regular  rhythm,  No edema  GI:  Soft, Non distended, Non tender. +Bowel sounds  Neurologic:  Alert and oriented X 3, normal speech,   Psych:   Good insight. Not anxious nor agitated  Skin:  Bilateral leg wounds that is covered with surgical tape    Reviewed most current lab test results and cultures  YES  Reviewed most current radiology test results   YES  Review and summation of old records today    NO  Reviewed patient's current orders and MAR    YES  PMH/SH reviewed - no change compared to H&P    Procedures: see electronic medical records for all procedures/Xrays and details which were not copied into this note but were reviewed prior to creation of Plan. LABS:  I reviewed today's most current labs and imaging studies.   Pertinent labs include:  Recent Labs     12/05/23  1346 12/06/23  0352 12/07/23  0341   WBC 19.8* 13.6* 10.6   HGB 10.0* 9.5* 9.1*   HCT 29.0* 28.5* 28.4*    223 213     Recent Labs     12/05/23  1346 12/05/23 2011 12/06/23  0352 12/07/23  0341    144 144 145   K 6.3* 4.6 5.1 4.7   * 114* 115* 116*   CO2 24 26 23 26   GLUCOSE 250* 189* 258* 178*   BUN 47* 45* 50* 48*   CREATININE 2.19* 2.09* 2.39* 2.11*   CALCIUM 8.5 8.5 8.3* 8.5   MG 2.0  --   --   --    LABALBU 3.2*  --   --  2.9*   BILITOT 0.6  --   --  0.2   AST 29  --   --  48*   ALT 25  --   --  32       Signed: Jono Biswas MD

## 2023-12-07 NOTE — PROGRESS NOTES
Pt taken off oxygen. Pt desats to mid 70's he will come up quickly if he takes a deep breathe.   Pt placed back on 1lnc

## 2023-12-07 NOTE — CARE COORDINATION
Care Management Initial Assessment       RUR: 18%  Readmission? No  1st IM letter given? Yes         12/07/23 1545   Service Assessment   Patient Orientation Alert and Oriented   Cognition Alert   History Provided By Patient   Primary Caregiver Self   Support Systems Family Members; [address] is: Patient Declined (Legal Next of Kin Remains as Decision Maker)   PCP Verified by CM Yes   Prior Functional Level Independent in ADLs/IADLs   Current Functional Level Independent in ADLs/IADLs   Can patient return to prior living arrangement Yes   Family able to assist with home care needs: Yes   Financial Resources Medicare;Medicaid   Social/Functional History   Lives With Family  (aunt and uncle)   Type of [address] [place] [address] Dr Two level   Home Access   (3 elaine)   Bathroom Equipment Shower chair   Home Equipment Cane   Active  No   Patient's  Info uncle   Discharge Planning   Type of [address] (ACP) Conversation    Date of Conversation: 12/5/2023  Conducted with: Patient with Decision Making Capacity    Content/Action Overview:  DECLINED ACP Conversation - will revisit periodically    Ivory DongGeisinger Jersey Shore Hospitalnciole, [facility] [address]

## 2023-12-08 ENCOUNTER — HOME CARE VISIT (OUTPATIENT)
Facility: HOME HEALTH | Age: 53
End: 2023-12-08
Payer: MEDICAID

## 2023-12-08 ENCOUNTER — TELEPHONE (OUTPATIENT)
Facility: CLINIC | Age: 53
End: 2023-12-08

## 2023-12-08 VITALS
TEMPERATURE: 97.7 F | BODY MASS INDEX: 37.37 KG/M2 | HEIGHT: 70 IN | OXYGEN SATURATION: 95 % | WEIGHT: 261 LBS | RESPIRATION RATE: 18 BRPM | HEART RATE: 102 BPM | SYSTOLIC BLOOD PRESSURE: 148 MMHG | DIASTOLIC BLOOD PRESSURE: 81 MMHG

## 2023-12-08 LAB
ALBUMIN SERPL-MCNC: 3 G/DL (ref 3.5–5)
ALBUMIN/GLOB SERPL: 0.7 (ref 1.1–2.2)
ALP SERPL-CCNC: 70 U/L (ref 45–117)
ALT SERPL-CCNC: 32 U/L (ref 12–78)
ANION GAP SERPL CALC-SCNC: 2 MMOL/L (ref 5–15)
AST SERPL-CCNC: 35 U/L (ref 15–37)
BILIRUB SERPL-MCNC: 0.2 MG/DL (ref 0.2–1)
BUN SERPL-MCNC: 45 MG/DL (ref 6–20)
BUN/CREAT SERPL: 24 (ref 12–20)
CALCIUM SERPL-MCNC: 8.7 MG/DL (ref 8.5–10.1)
CHLORIDE SERPL-SCNC: 116 MMOL/L (ref 97–108)
CO2 SERPL-SCNC: 26 MMOL/L (ref 21–32)
CREAT SERPL-MCNC: 1.91 MG/DL (ref 0.7–1.3)
ERYTHROCYTE [DISTWIDTH] IN BLOOD BY AUTOMATED COUNT: 15.2 % (ref 11.5–14.5)
GLOBULIN SER CALC-MCNC: 4.6 G/DL (ref 2–4)
GLUCOSE BLD STRIP.AUTO-MCNC: 121 MG/DL (ref 65–117)
GLUCOSE BLD STRIP.AUTO-MCNC: 189 MG/DL (ref 65–117)
GLUCOSE BLD STRIP.AUTO-MCNC: 276 MG/DL (ref 65–117)
GLUCOSE SERPL-MCNC: 112 MG/DL (ref 65–100)
HCT VFR BLD AUTO: 28.3 % (ref 36.6–50.3)
HGB BLD-MCNC: 9.2 G/DL (ref 12.1–17)
LACTATE SERPL-SCNC: 0.5 MMOL/L (ref 0.4–2)
MAGNESIUM SERPL-MCNC: 2 MG/DL (ref 1.6–2.4)
MCH RBC QN AUTO: 28 PG (ref 26–34)
MCHC RBC AUTO-ENTMCNC: 32.5 G/DL (ref 30–36.5)
MCV RBC AUTO: 86.3 FL (ref 80–99)
NRBC # BLD: 0 K/UL (ref 0–0.01)
NRBC BLD-RTO: 0 PER 100 WBC
PLATELET # BLD AUTO: 237 K/UL (ref 150–400)
PMV BLD AUTO: 10.2 FL (ref 8.9–12.9)
POTASSIUM SERPL-SCNC: 4.8 MMOL/L (ref 3.5–5.1)
PROT SERPL-MCNC: 7.6 G/DL (ref 6.4–8.2)
RBC # BLD AUTO: 3.28 M/UL (ref 4.1–5.7)
SERVICE CMNT-IMP: ABNORMAL
SODIUM SERPL-SCNC: 144 MMOL/L (ref 136–145)
WBC # BLD AUTO: 10 K/UL (ref 4.1–11.1)

## 2023-12-08 PROCEDURE — 83605 ASSAY OF LACTIC ACID: CPT

## 2023-12-08 PROCEDURE — 6360000002 HC RX W HCPCS: Performed by: INTERNAL MEDICINE

## 2023-12-08 PROCEDURE — 80053 COMPREHEN METABOLIC PANEL: CPT

## 2023-12-08 PROCEDURE — 94640 AIRWAY INHALATION TREATMENT: CPT

## 2023-12-08 PROCEDURE — 6370000000 HC RX 637 (ALT 250 FOR IP): Performed by: INTERNAL MEDICINE

## 2023-12-08 PROCEDURE — 85027 COMPLETE CBC AUTOMATED: CPT

## 2023-12-08 PROCEDURE — 83735 ASSAY OF MAGNESIUM: CPT

## 2023-12-08 PROCEDURE — 2580000003 HC RX 258: Performed by: INTERNAL MEDICINE

## 2023-12-08 PROCEDURE — 36415 COLL VENOUS BLD VENIPUNCTURE: CPT

## 2023-12-08 PROCEDURE — 2500000003 HC RX 250 WO HCPCS: Performed by: INTERNAL MEDICINE

## 2023-12-08 PROCEDURE — 82962 GLUCOSE BLOOD TEST: CPT

## 2023-12-08 RX ORDER — CEFDINIR 300 MG/1
300 CAPSULE ORAL 2 TIMES DAILY
Qty: 10 CAPSULE | Refills: 0 | Status: SHIPPED | OUTPATIENT
Start: 2023-12-08 | End: 2023-12-13

## 2023-12-08 RX ORDER — AZITHROMYCIN 500 MG/1
500 TABLET, FILM COATED ORAL DAILY
Qty: 1 TABLET | Refills: 0 | Status: SHIPPED | OUTPATIENT
Start: 2023-12-08 | End: 2023-12-09

## 2023-12-08 RX ORDER — ALBUTEROL SULFATE 2.5 MG/3ML
2.5 SOLUTION RESPIRATORY (INHALATION) EVERY 6 HOURS PRN
Qty: 120 EACH | Refills: 0 | Status: SHIPPED | OUTPATIENT
Start: 2023-12-08 | End: 2024-01-07

## 2023-12-08 RX ORDER — ALBUTEROL SULFATE 90 UG/1
2 AEROSOL, METERED RESPIRATORY (INHALATION) EVERY 4 HOURS PRN
Qty: 9 G | Refills: 0 | Status: SHIPPED | OUTPATIENT
Start: 2023-12-08 | End: 2024-01-07

## 2023-12-08 RX ADMIN — SODIUM CHLORIDE, PRESERVATIVE FREE 10 ML: 5 INJECTION INTRAVENOUS at 10:08

## 2023-12-08 RX ADMIN — PREGABALIN 200 MG: 75 CAPSULE ORAL at 10:05

## 2023-12-08 RX ADMIN — SODIUM CHLORIDE 1000 MG: 900 INJECTION INTRAVENOUS at 10:16

## 2023-12-08 RX ADMIN — ALBUTEROL SULFATE 2.5 MG: 2.5 SOLUTION RESPIRATORY (INHALATION) at 06:21

## 2023-12-08 RX ADMIN — SODIUM CHLORIDE: 9 INJECTION, SOLUTION INTRAVENOUS at 10:15

## 2023-12-08 RX ADMIN — CYANOCOBALAMIN TAB 500 MCG 1000 MCG: 500 TAB at 10:06

## 2023-12-08 RX ADMIN — GUAIFENESIN 600 MG: 600 TABLET, EXTENDED RELEASE ORAL at 10:06

## 2023-12-08 RX ADMIN — ENOXAPARIN SODIUM 30 MG: 100 INJECTION SUBCUTANEOUS at 10:06

## 2023-12-08 RX ADMIN — BUMETANIDE 1 MG: 0.25 INJECTION INTRAMUSCULAR; INTRAVENOUS at 10:06

## 2023-12-08 RX ADMIN — PANTOPRAZOLE SODIUM 40 MG: 40 TABLET, DELAYED RELEASE ORAL at 07:19

## 2023-12-08 RX ADMIN — AZITHROMYCIN MONOHYDRATE 500 MG: 500 INJECTION, POWDER, LYOPHILIZED, FOR SOLUTION INTRAVENOUS at 12:10

## 2023-12-08 RX ADMIN — AMLODIPINE BESYLATE 10 MG: 5 TABLET ORAL at 10:06

## 2023-12-08 RX ADMIN — INSULIN LISPRO 4 UNITS: 100 INJECTION, SOLUTION INTRAVENOUS; SUBCUTANEOUS at 12:12

## 2023-12-08 NOTE — PROGRESS NOTES
02 challenge test:  At rest on  2 liters  of o2 was 98% 91% on room air  Sitting o2 status was 93% on 2 liters of o2  91% on room air  Walking 91% on 2 liters of o2 88% on room air.

## 2023-12-08 NOTE — TELEPHONE ENCOUNTER
Meme with [facility] called and would like to know if the provider will follow for OT, PT, and nursing.  [phone]

## 2023-12-08 NOTE — CARE COORDINATION
Transition of Care Plan:    RUR: 18%  Prior Level of Functioning: independent   Disposition: home with [facility]   Follow up appointments: PCP  DME needed: nebulizer and home O2  Transportation at discharge: family  IM/IMM Medicare/ letter given: provided  Caregiver Contact: Layton Goltz child [phone]  Discharge Caregiver contacted prior to discharge? Patient wishes to contact  Barriers to discharge: DME    Update  Nebulizer and home O2 approved by Lissett Silva. Patient aware to call Lissett Silva on the way home for the concentrator to be delivered. RN provided education on O2, nebulizer, and wound care instructions as well as sending patient home with extra wound care supplies. Initial note  Patient voiced no preference in home health agency. [place] referral sent out. [facility] accepted. Patient in need of home O2 and nebulizer.  Orders have been sent to [address] and pending approval.     Viviana Castro, [address]

## 2023-12-08 NOTE — PLAN OF CARE
Problem: Pain  Goal: Verbalizes/displays adequate comfort level or baseline comfort level  Outcome: Adequate for Discharge  Flowsheets (Taken 12/8/2023 0715 by Stevan ENGLISH)  Verbalizes/displays adequate comfort level or baseline comfort level:   Encourage patient to monitor pain and request assistance   Assess pain using appropriate pain scale   Implement non-pharmacological measures as appropriate and evaluate response   Consider cultural and social influences on pain and pain management   Administer analgesics based on type and severity of pain and evaluate response   Notify Licensed Independent Practitioner if interventions unsuccessful or patient reports new pain     Problem: Safety - Adult  Goal: Free from fall injury  Outcome: Adequate for Discharge  Flowsheets (Taken 12/8/2023 0820)  Free From Fall Injury: Instruct family/caregiver on patient safety     Problem: Neurosensory - Adult  Goal: Achieves stable or improved neurological status  Outcome: Adequate for Discharge  Flowsheets (Taken 12/8/2023 0820)  Achieves stable or improved neurological status: Assess for and report changes in neurological status  Goal: Achieves maximal functionality and self care  Outcome: Adequate for Discharge  Flowsheets (Taken 12/8/2023 0820)  Achieves maximal functionality and self care:   Monitor swallowing and airway patency with patient fatigue and changes in neurological status   Encourage and assist patient to increase activity and self care with guidance from physical therapy/occupational therapy     Problem: Respiratory - Adult  Goal: Achieves optimal ventilation and oxygenation  12/8/2023 1453 by Hilario Sosa RN  Outcome: Adequate for Discharge  Flowsheets (Taken 12/8/2023 0820)  Achieves optimal ventilation and oxygenation:   Assess for changes in respiratory status   Oxygen supplementation based on oxygen saturation or arterial blood gases  12/8/2023 0622 by Vasquez Jimenes RT  Outcome: Progressing 12/8/2023 0820)  Electrolytes maintained within normal limits: Monitor labs and assess patient for signs and symptoms of electrolyte imbalances  Goal: Hemodynamic stability and optimal renal function maintained  Outcome: Adequate for Discharge  Flowsheets (Taken 12/8/2023 0820)  Hemodynamic stability and optimal renal function maintained: Monitor labs and assess for signs and symptoms of volume excess or deficit  Goal: Glucose maintained within prescribed range  Outcome: Adequate for Discharge  Flowsheets (Taken 12/8/2023 0820)  Glucose maintained within prescribed range: Monitor blood glucose as ordered     Problem: Chronic Conditions and Co-morbidities  Goal: Patient's chronic conditions and co-morbidity symptoms are monitored and maintained or improved  Outcome: Adequate for Discharge  Flowsheets (Taken 12/8/2023 0820)  Care Plan - Patient's Chronic Conditions and Co-Morbidity Symptoms are Monitored and Maintained or Improved:   Monitor and assess patient's chronic conditions and comorbid symptoms for stability, deterioration, or improvement   Collaborate with multidisciplinary team to address chronic and comorbid conditions and prevent exacerbation or deterioration   Update acute care plan with appropriate goals if chronic or comorbid symptoms are exacerbated and prevent overall improvement and discharge     Problem: Skin/Tissue Integrity  Goal: Absence of new skin breakdown  Description: 1. Monitor for areas of redness and/or skin breakdown  2. Assess vascular access sites hourly  3. Every 4-6 hours minimum:  Change oxygen saturation probe site  4. Every 4-6 hours:  If on nasal continuous positive airway pressure, respiratory therapy assess nares and determine need for appliance change or resting period.   Outcome: Adequate for Discharge

## 2023-12-08 NOTE — TELEPHONE ENCOUNTER
I called Lawanda Dumont and informed her on the message per Cam. She stated understanding and had no further questions.

## 2023-12-08 NOTE — PROGRESS NOTES
PCP hospital follow-up transitional care appointment has been scheduled with PA Claud Brunner on 12/13/23 at 1130. Pending patient discharge.  Phi Carrasco, Care Management Assistant

## 2023-12-10 LAB
BACTERIA SPEC CULT: NORMAL
BACTERIA SPEC CULT: NORMAL
SERVICE CMNT-IMP: NORMAL
SERVICE CMNT-IMP: NORMAL

## 2023-12-11 ENCOUNTER — HOME CARE VISIT (OUTPATIENT)
Facility: HOME HEALTH | Age: 53
End: 2023-12-11
Payer: MEDICAID

## 2023-12-11 PROCEDURE — G0300 HHS/HOSPICE OF LPN EA 15 MIN: HCPCS

## 2023-12-12 VITALS
WEIGHT: 249.6 LBS | TEMPERATURE: 97 F | BODY MASS INDEX: 35.81 KG/M2 | OXYGEN SATURATION: 94 % | HEART RATE: 95 BPM | DIASTOLIC BLOOD PRESSURE: 86 MMHG | RESPIRATION RATE: 18 BRPM | SYSTOLIC BLOOD PRESSURE: 127 MMHG

## 2023-12-12 ASSESSMENT — ENCOUNTER SYMPTOMS
HEMOPTYSIS: 0
DYSPNEA ACTIVITY LEVEL: AFTER AMBULATING 10 - 20 FT

## 2023-12-12 NOTE — HOME HEALTH
Subjective: Pt states he now has prn oxygen  Falls since last visit No(if yes complete the Fall Tracking Form and include bsrifallreport):   Caregiver involvement changes: Pts uncle assist as needed   Home health supplies by type and quantity ordered/delivered this visit include: none     Clinician asked if patient has had any physician contact since last home care visit and patient states: NO  Clinician asked if patient has any new or changed medications and patient states:  NO   If Yes, were medications reconciled? NO   Was the certifying physician notified of changes in medications? NO     Clinical assessment (what this visit means for the patient overall and need for ongoing skilled care) and progress or lack of progress towards SPECIFIC goals: Pt continues to require SN for wound care. SN educated pt on Oxygen safety      Written Teaching Material Utilized: N/A    Interdisciplinary communication with: N/A for the purpose of NA     Discharge planning as follows:  When wound is 100% healed    Specific plan for next visit: Wound care

## 2023-12-13 ENCOUNTER — OFFICE VISIT (OUTPATIENT)
Facility: CLINIC | Age: 53
End: 2023-12-13

## 2023-12-13 ENCOUNTER — TELEPHONE (OUTPATIENT)
Facility: CLINIC | Age: 53
End: 2023-12-13

## 2023-12-13 VITALS
HEIGHT: 70 IN | RESPIRATION RATE: 16 BRPM | BODY MASS INDEX: 36.85 KG/M2 | TEMPERATURE: 97.5 F | HEART RATE: 104 BPM | SYSTOLIC BLOOD PRESSURE: 132 MMHG | DIASTOLIC BLOOD PRESSURE: 60 MMHG | WEIGHT: 257.4 LBS | OXYGEN SATURATION: 92 %

## 2023-12-13 DIAGNOSIS — J96.01 ACUTE RESPIRATORY FAILURE WITH HYPOXIA (HCC): ICD-10-CM

## 2023-12-13 DIAGNOSIS — G89.4 CHRONIC PAIN ASSOCIATED WITH SIGNIFICANT PSYCHOSOCIAL DYSFUNCTION: Primary | ICD-10-CM

## 2023-12-13 DIAGNOSIS — J13 CAP (COMMUNITY ACQUIRED PNEUMONIA) DUE TO PNEUMOCOCCUS (HCC): ICD-10-CM

## 2023-12-13 DIAGNOSIS — G37.9 DEMYELINATING DISEASE OF CENTRAL NERVOUS SYSTEM, UNSPECIFIED (HCC): ICD-10-CM

## 2023-12-13 DIAGNOSIS — F32.4 MAJOR DEPRESSIVE DISORDER WITH SINGLE EPISODE, IN PARTIAL REMISSION (HCC): ICD-10-CM

## 2023-12-13 DIAGNOSIS — Z86.79 HISTORY OF HEART FAILURE: ICD-10-CM

## 2023-12-13 DIAGNOSIS — G89.4 CHRONIC PAIN ASSOCIATED WITH SIGNIFICANT PSYCHOSOCIAL DYSFUNCTION: ICD-10-CM

## 2023-12-13 DIAGNOSIS — Z09 HOSPITAL DISCHARGE FOLLOW-UP: Primary | ICD-10-CM

## 2023-12-13 RX ORDER — BUMETANIDE 1 MG/1
TABLET ORAL
Qty: 60 TABLET | Refills: 3 | Status: SHIPPED | OUTPATIENT
Start: 2023-12-13

## 2023-12-13 ASSESSMENT — ENCOUNTER SYMPTOMS
DYSPNEA ACTIVITY LEVEL: AT REST
HEMOPTYSIS: 0
DIARRHEA: 1

## 2023-12-13 NOTE — PROGRESS NOTES
Post-Discharge Transitional Care  Follow Up      Tom Birch   YOB: 1970    Date of Office Visit:  12/13/2023  Date of Hospital Admission: 12/5/23  Date of Hospital Discharge: 12/8/23  Risk of hospital readmission (high >=14%. Medium >=10%) :Readmission Risk Score: 17.6      Care management risk score Rising risk (score 2-5) and Complex Care (Scores >=6): No Risk Score On File     Non face to face  following discharge, date last encounter closed (first attempt may have been earlier): 12/11/2023    Call initiated 2 business days of discharge: Yes    ASSESSMENT/PLAN:   Hospital discharge follow-up  -     GA DISCHARGE MEDS RECONCILED W/ CURRENT OUTPATIENT MED LIST  CAP (community acquired pneumonia) due to Pneumococcus [facility])  Needs follow up with Pulm. Given referral for Pulm. Had acute respiratory failure. Advised to continue using home O2 and will trend O2 with home health nurses until seen by pulm and can potentially pull off O2      Demyelinating disease of central nervous system, unspecified ([address])  STABLE      Major depressive disorder with single episode, in partial remission ([address])  Stable--he reports he just wants to get back to baseline healthwise    Acute respiratory failure with hypoxia ([address])  -     [facility] - Oscar Vargas MD, Pulmonology, [address]  Continue home O2, will work to get him portable for short term visits to appts  History of heart failure  -     [facility] - Prachi Gibson MD, Cardiology, [facility])  Needs second opinion, weights are still problem   Chronic pain associated with significant psychosocial dysfunction  -     External Referral To Pain Clinic  New referral given, MS related pains. Medical Decision Making: moderate complexity  No follow-ups on file.     On this date 12/13/2023 I have spent 60 minutes reviewing previous notes, test results and face to face with the patient discussing the diagnosis and importance of compliance with the treatment plan as well as

## 2023-12-21 ENCOUNTER — HOME CARE VISIT (OUTPATIENT)
Facility: HOME HEALTH | Age: 53
End: 2023-12-21
Payer: MEDICAID

## 2023-12-21 PROCEDURE — G0300 HHS/HOSPICE OF LPN EA 15 MIN: HCPCS

## 2023-12-25 ENCOUNTER — HOME CARE VISIT (OUTPATIENT)
Dept: HOME HEALTH SERVICES | Facility: HOME HEALTH | Age: 53
End: 2023-12-25
Payer: MEDICAID

## 2023-12-27 ENCOUNTER — OFFICE VISIT (OUTPATIENT)
Age: 53
End: 2023-12-27
Payer: MEDICAID

## 2023-12-27 ENCOUNTER — OFFICE VISIT (OUTPATIENT)
Facility: CLINIC | Age: 53
End: 2023-12-27
Payer: MEDICAID

## 2023-12-27 ENCOUNTER — HOME CARE VISIT (OUTPATIENT)
Facility: HOME HEALTH | Age: 53
End: 2023-12-27
Payer: MEDICAID

## 2023-12-27 VITALS
BODY MASS INDEX: 36.39 KG/M2 | TEMPERATURE: 97.6 F | OXYGEN SATURATION: 91 % | HEART RATE: 81 BPM | RESPIRATION RATE: 20 BRPM | DIASTOLIC BLOOD PRESSURE: 72 MMHG | WEIGHT: 253.6 LBS | SYSTOLIC BLOOD PRESSURE: 137 MMHG

## 2023-12-27 VITALS
HEART RATE: 101 BPM | BODY MASS INDEX: 37.25 KG/M2 | SYSTOLIC BLOOD PRESSURE: 132 MMHG | WEIGHT: 260.2 LBS | HEIGHT: 70 IN | OXYGEN SATURATION: 86 % | TEMPERATURE: 98.1 F | DIASTOLIC BLOOD PRESSURE: 62 MMHG | RESPIRATION RATE: 16 BRPM

## 2023-12-27 VITALS
DIASTOLIC BLOOD PRESSURE: 56 MMHG | OXYGEN SATURATION: 97 % | BODY MASS INDEX: 37.22 KG/M2 | HEART RATE: 100 BPM | WEIGHT: 260 LBS | HEIGHT: 70 IN | SYSTOLIC BLOOD PRESSURE: 108 MMHG

## 2023-12-27 DIAGNOSIS — G81.10 SPASTIC HEMIPARESIS AFFECTING DOMINANT SIDE (HCC): ICD-10-CM

## 2023-12-27 DIAGNOSIS — E78.2 MIXED HYPERLIPIDEMIA: ICD-10-CM

## 2023-12-27 DIAGNOSIS — E66.01 SEVERE OBESITY (BMI 35.0-39.9) WITH COMORBIDITY (HCC): ICD-10-CM

## 2023-12-27 DIAGNOSIS — I50.9 CONGESTIVE HEART FAILURE, UNSPECIFIED HF CHRONICITY, UNSPECIFIED HEART FAILURE TYPE (HCC): ICD-10-CM

## 2023-12-27 DIAGNOSIS — E78.00 HYPERCHOLESTEROLEMIA: ICD-10-CM

## 2023-12-27 DIAGNOSIS — R00.2 HEART PALPITATIONS: ICD-10-CM

## 2023-12-27 DIAGNOSIS — E10.3593 PROLIFERATIVE DIABETIC RETINOPATHY OF BOTH EYES ASSOCIATED WITH TYPE 1 DIABETES MELLITUS, UNSPECIFIED PROLIFERATIVE RETINOPATHY TYPE (HCC): ICD-10-CM

## 2023-12-27 DIAGNOSIS — R53.83 OTHER FATIGUE: ICD-10-CM

## 2023-12-27 DIAGNOSIS — J96.11 CHRONIC RESPIRATORY FAILURE WITH HYPOXIA (HCC): ICD-10-CM

## 2023-12-27 DIAGNOSIS — I10 ESSENTIAL HYPERTENSION: ICD-10-CM

## 2023-12-27 DIAGNOSIS — I50.32 CHRONIC DIASTOLIC CHF (CONGESTIVE HEART FAILURE), NYHA CLASS 1 (HCC): Primary | ICD-10-CM

## 2023-12-27 DIAGNOSIS — I10 BENIGN ESSENTIAL HTN: ICD-10-CM

## 2023-12-27 DIAGNOSIS — E10.42 DIABETIC POLYNEUROPATHY ASSOCIATED WITH TYPE 1 DIABETES MELLITUS (HCC): ICD-10-CM

## 2023-12-27 DIAGNOSIS — F32.4 MAJOR DEPRESSIVE DISORDER WITH SINGLE EPISODE, IN PARTIAL REMISSION (HCC): ICD-10-CM

## 2023-12-27 DIAGNOSIS — Z00.00 ENCOUNTER FOR SUBSEQUENT ANNUAL WELLNESS VISIT (AWV) IN MEDICARE PATIENT: Primary | ICD-10-CM

## 2023-12-27 DIAGNOSIS — F41.9 ANXIETY: ICD-10-CM

## 2023-12-27 DIAGNOSIS — G37.9 DEMYELINATING DISEASE OF CENTRAL NERVOUS SYSTEM, UNSPECIFIED (HCC): ICD-10-CM

## 2023-12-27 DIAGNOSIS — G36.0 NEUROMYELITIS OPTICA (HCC): ICD-10-CM

## 2023-12-27 DIAGNOSIS — E10.9 TYPE 1 DIABETES MELLITUS WITHOUT COMPLICATION (HCC): ICD-10-CM

## 2023-12-27 LAB
ALBUMIN SERPL-MCNC: 3.4 G/DL (ref 3.5–5)
ALBUMIN/GLOB SERPL: 0.8 (ref 1.1–2.2)
ALP SERPL-CCNC: 96 U/L (ref 45–117)
ALT SERPL-CCNC: 30 U/L (ref 12–78)
ANION GAP SERPL CALC-SCNC: 4 MMOL/L (ref 5–15)
AST SERPL-CCNC: 27 U/L (ref 15–37)
BILIRUB SERPL-MCNC: 0.3 MG/DL (ref 0.2–1)
BUN SERPL-MCNC: 31 MG/DL (ref 6–20)
BUN/CREAT SERPL: 14 (ref 12–20)
CALCIUM SERPL-MCNC: 8.6 MG/DL (ref 8.5–10.1)
CHLORIDE SERPL-SCNC: 112 MMOL/L (ref 97–108)
CO2 SERPL-SCNC: 30 MMOL/L (ref 21–32)
CREAT SERPL-MCNC: 2.14 MG/DL (ref 0.7–1.3)
GLOBULIN SER CALC-MCNC: 4.3 G/DL (ref 2–4)
GLUCOSE SERPL-MCNC: 156 MG/DL (ref 65–100)
POTASSIUM SERPL-SCNC: 5.2 MMOL/L (ref 3.5–5.1)
PROT SERPL-MCNC: 7.7 G/DL (ref 6.4–8.2)
SODIUM SERPL-SCNC: 146 MMOL/L (ref 136–145)

## 2023-12-27 PROCEDURE — 99214 OFFICE O/P EST MOD 30 MIN: CPT | Performed by: PHYSICIAN ASSISTANT

## 2023-12-27 PROCEDURE — 99204 OFFICE O/P NEW MOD 45 MIN: CPT | Performed by: INTERNAL MEDICINE

## 2023-12-27 PROCEDURE — G0300 HHS/HOSPICE OF LPN EA 15 MIN: HCPCS

## 2023-12-27 PROCEDURE — 3078F DIAST BP <80 MM HG: CPT | Performed by: INTERNAL MEDICINE

## 2023-12-27 PROCEDURE — 3078F DIAST BP <80 MM HG: CPT | Performed by: PHYSICIAN ASSISTANT

## 2023-12-27 PROCEDURE — 3075F SYST BP GE 130 - 139MM HG: CPT | Performed by: PHYSICIAN ASSISTANT

## 2023-12-27 PROCEDURE — G0439 PPPS, SUBSEQ VISIT: HCPCS | Performed by: PHYSICIAN ASSISTANT

## 2023-12-27 PROCEDURE — 3074F SYST BP LT 130 MM HG: CPT | Performed by: INTERNAL MEDICINE

## 2023-12-27 PROCEDURE — 3051F HG A1C>EQUAL 7.0%<8.0%: CPT | Performed by: PHYSICIAN ASSISTANT

## 2023-12-27 PROCEDURE — 99214 OFFICE O/P EST MOD 30 MIN: CPT | Performed by: INTERNAL MEDICINE

## 2023-12-27 NOTE — PROGRESS NOTES
Records requested to heart and Vascular at [phone]. H and V nurse stated that there are  no results from Holter, either lost or patient did not return it. MD informed.

## 2023-12-27 NOTE — PROGRESS NOTES
Medicare Annual Wellness Visit    Dez Judge is here for Medicare AWV (Room 4A // )    Assessment & Plan   Encounter for subsequent annual wellness visit (AWV) in Medicare patient  Essential hypertension-at goal, continue amlodipine and losartan  Proliferative diabetic retinopathy of both eyes associated with type 1 diabetes mellitus, unspecified proliferative retinopathy type ([address])  Diabetic polyneuropathy associated with type 1 diabetes mellitus ([address])  Demyelinating disease of central nervous system, unspecified (HCC)  Spastic hemiparesis affecting dominant side (HCC)  Neuromyelitis optica ([address])  Anxiety-stable  Hypercholesterolemia-at goal, continue statin  Major depressive disorder with single episode, in partial remission ([address])  Difficult time with hypoxia, limited planning and swelling. Hopeful to see improvement over few months  Severe obesity (BMI 35.0-39. 9) with comorbidity (HCC)  Congestive heart failure, unspecified HF chronicity, unspecified heart failure type (HCC)  -     Comprehensive Metabolic Panel  -     CBC with Auto Differential  Going to see Dr. Amber Agrawal today, hopeful he will provide better care than last group  Chronic respiratory failure with hypoxia ([address])  Continues to run hypoxic without O2 when out. Needs urgent referral to pulm to get further eval  Recommendations for Preventive Services Due: see orders and patient instructions/AVS.  Recommended screening schedule for the next 5-10 years is provided to the patient in written form: see Patient Instructions/AVS.     No follow-ups on file. Subjective   The following acute and/or chronic problems were also addressed today:  CHF, Hypoxia, Neuropathy    He has been struggling since September when he had acute volume overload which he related to high levels of insulin use for his type 1 diabetes. With the volume overload he was hospitalized for many days and developed acute HF and LUPILLO with hepatic congestion.  He also recently was hospitalized

## 2023-12-27 NOTE — HOME HEALTH
Subjective: Pt states he has pain to arms and legs   Falls since last visit No(if yes complete the Fall Tracking Form and include bsrifallreport):   Caregiver involvement changes: Uncle is primary caregiver   Home health supplies by type and quantity ordered/delivered this visit include: none     Clinician asked if patient has had any physician contact since last home care visit and patient states: NO  Clinician asked if patient has any new or changed medications and patient states:  NO   If Yes, were medications reconciled? NO   Was the certifying physician notified of changes in medications? NO     Clinical assessment (what this visit means for the patient overall and need for ongoing skilled care) and progress or lack of progress towards SPECIFIC goals: Pt continues to require SN for wound care     Written Teaching Material Utilized: N/A    Interdisciplinary communication with: N/A for the purpose of NA     Discharge planning as follows: When wound is 100% healed    Specific plan for next visit: Wound care

## 2023-12-27 NOTE — PROGRESS NOTES
[place]: Laurel Rendon  [phone]    History of Present Illness:  Mr. Ellyn Berman is a 47 yo M with hospitalization back in August for diastolic CHF, seen previously by PARKER HARMAN and Vascular, echocardiogram in August demonstrated preserved LV function with dilated RV, stress test thereafter and 24 hour Holter were done and were apparently okay and will request and review those results, multiple sclerosis, type 1 diabetes followed by endocrine Dr. Pravin Reddy, chronic kidney disease creatinine of 2 followed by nephrology Dr. Candy Martin, referred by his primary care for cardiac evaluation. From a symptom standpoint, he does have some shortness of breath with exertion. He says overall his breathing is \"so so\". He does have occasional chest discomfort often times when he is getting up in the morning. He has been having elevated heart rate and palpitation. He is compensated on exam with clear lungs. He does have 1+ bilateral lower extremity edema. He does take Bumex regularly. Soc hx. No tobacco  Fam hx. Mom with CAD. Assessment and Plan:   1. Chronic diastolic CHF, NYHA I. He had an echocardiogram this year with preserved LV systolic function. He does not appear to be in CHF at this time. With regard to diastolic CHF, focus is on blood pressure control, diuretic to manage fluid status. I do think he would benefit from getting a sleep study and will go ahead and set this up. His blood pressure is under control and no change to make there. Will request his stress test and Holter monitor results, but these were reportedly normal.    2. Palpitations. Will review his Holter. 3. Essential hypertension. Blood pressure is controlled and no changes made. 4. Type 2 diabetes. Followed by endocrinology, Dr. Pravin Reddy.        He  has a past medical history of Anxiety and depression, Asthma, GERD (gastroesophageal reflux disease), Hypertension, MS (multiple sclerosis) ([address]), and Type 2 diabetes mellitus with

## 2023-12-28 LAB
BASOPHILS # BLD: 0.1 K/UL (ref 0–0.1)
BASOPHILS NFR BLD: 1 % (ref 0–1)
DIFFERENTIAL METHOD BLD: ABNORMAL
EOSINOPHIL # BLD: 0.4 K/UL (ref 0–0.4)
EOSINOPHIL NFR BLD: 4 % (ref 0–7)
ERYTHROCYTE [DISTWIDTH] IN BLOOD BY AUTOMATED COUNT: 15.8 % (ref 11.5–14.5)
HCT VFR BLD AUTO: 33.3 % (ref 36.6–50.3)
HGB BLD-MCNC: 10.4 G/DL (ref 12.1–17)
IMM GRANULOCYTES # BLD AUTO: 0.1 K/UL (ref 0–0.04)
IMM GRANULOCYTES NFR BLD AUTO: 1 % (ref 0–0.5)
LYMPHOCYTES # BLD: 2.1 K/UL (ref 0.8–3.5)
LYMPHOCYTES NFR BLD: 21 % (ref 12–49)
MCH RBC QN AUTO: 27 PG (ref 26–34)
MCHC RBC AUTO-ENTMCNC: 31.2 G/DL (ref 30–36.5)
MCV RBC AUTO: 86.5 FL (ref 80–99)
MONOCYTES # BLD: 0.9 K/UL (ref 0–1)
MONOCYTES NFR BLD: 9 % (ref 5–13)
NEUTS SEG # BLD: 6.4 K/UL (ref 1.8–8)
NEUTS SEG NFR BLD: 64 % (ref 32–75)
NRBC # BLD: 0 K/UL (ref 0–0.01)
NRBC BLD-RTO: 0 PER 100 WBC
PLATELET # BLD AUTO: 256 K/UL (ref 150–400)
PMV BLD AUTO: 11.1 FL (ref 8.9–12.9)
RBC # BLD AUTO: 3.85 M/UL (ref 4.1–5.7)
WBC # BLD AUTO: 9.9 K/UL (ref 4.1–11.1)

## 2023-12-28 RX ORDER — OMEPRAZOLE 40 MG/1
40 CAPSULE, DELAYED RELEASE ORAL DAILY
Qty: 90 CAPSULE | Refills: 3 | Status: SHIPPED | OUTPATIENT
Start: 2023-12-28

## 2023-12-28 RX ORDER — LOSARTAN POTASSIUM 100 MG/1
100 TABLET ORAL DAILY
Qty: 90 TABLET | Refills: 0 | OUTPATIENT
Start: 2023-12-28

## 2023-12-28 RX ORDER — LOSARTAN POTASSIUM 100 MG/1
100 TABLET ORAL DAILY
Qty: 90 TABLET | Refills: 3 | Status: SHIPPED | OUTPATIENT
Start: 2023-12-28

## 2023-12-28 NOTE — TELEPHONE ENCOUNTER
PCP: Paco Bray PA-C     Last appt:  12/27/2023      Future Appointments   Date Time Provider Department Center   12/29/2023 To Be Determined Ivett Fagan RN [facility]   1/1/2024 To Be Determined Cathy Lan LPN [facility]   1/3/2024 To Be Determined Ivett Fagan RN [facility]   1/22/2024 10:45 AM Lety Manzo RN [facility]   3/21/2024 11:30 AM Mitzi Polk ANP [facility]   3/27/2024  8:30 AM Paco Bray PA-C [facility] AM[facility]   5/7/2024  9:50 AM José Franco MD RDE [place] [address] BS AMB   7/2/2024 10:40 AM Juan José Rodriguez MD [facility] AM[facility]          Requested Prescriptions     Pending Prescriptions Disp Refills    losartan (COZAAR) 100 MG tablet [Pharmacy Med Name: Losartan Potassium 100 MG Oral Tablet] 90 tablet 0     Sig: Take 1 tablet by mouth once daily

## 2023-12-29 ENCOUNTER — HOME CARE VISIT (OUTPATIENT)
Facility: HOME HEALTH | Age: 53
End: 2023-12-29
Payer: MEDICAID

## 2023-12-29 PROCEDURE — G0299 HHS/HOSPICE OF RN EA 15 MIN: HCPCS

## 2023-12-30 VITALS
OXYGEN SATURATION: 95 % | RESPIRATION RATE: 16 BRPM | HEART RATE: 100 BPM | DIASTOLIC BLOOD PRESSURE: 78 MMHG | SYSTOLIC BLOOD PRESSURE: 148 MMHG | TEMPERATURE: 97.1 F

## 2023-12-31 VITALS
SYSTOLIC BLOOD PRESSURE: 142 MMHG | OXYGEN SATURATION: 90 % | TEMPERATURE: 96.9 F | DIASTOLIC BLOOD PRESSURE: 83 MMHG | RESPIRATION RATE: 18 BRPM | HEART RATE: 95 BPM

## 2023-12-31 NOTE — HOME HEALTH
Subjective: Pt states cardiologist states there is nothing wrong with the heart  Falls since last visit No(if yes complete the Fall Tracking Form and include bsrifallreport):   Caregiver involvement changes: Uncle is primary caregiver   Home health supplies by type and quantity ordered/delivered this visit include: none     Clinician asked if patient has had any physician contact since last home care visit and patient states: NO  Clinician asked if patient has any new or changed medications and patient states:  NO   If Yes, were medications reconciled? NO   Was the certifying physician notified of changes in medications? NO     Clinical assessment (what this visit means for the patient overall and need for ongoing skilled care) and progress or lack of progress towards SPECIFIC goals: Pt continues to require SN for wound care    Written Teaching Material Utilized: N/A    Interdisciplinary communication with: N/A for the purpose of NA     Discharge planning as follows: When wound is 100% healed    Specific plan for next visit: Wound care

## 2024-01-01 ENCOUNTER — HOME CARE VISIT (OUTPATIENT)
Facility: HOME HEALTH | Age: 54
End: 2024-01-01
Payer: MEDICAID

## 2024-01-01 VITALS
TEMPERATURE: 98 F | OXYGEN SATURATION: 96 % | HEART RATE: 82 BPM | BODY MASS INDEX: 35.87 KG/M2 | SYSTOLIC BLOOD PRESSURE: 136 MMHG | WEIGHT: 250 LBS | DIASTOLIC BLOOD PRESSURE: 70 MMHG | RESPIRATION RATE: 18 BRPM

## 2024-01-01 PROCEDURE — G0300 HHS/HOSPICE OF LPN EA 15 MIN: HCPCS

## 2024-01-01 ASSESSMENT — ENCOUNTER SYMPTOMS: STOOL DESCRIPTION: SOFT FORMED

## 2024-01-01 NOTE — HOME HEALTH
Subjective: \"A couple of my wounds are healed now\"  Falls since last visit: No (if yes complete the Fall Tracking Form and include bsrifallreport):   Caregiver involvement changes: n/a  Home health supplies by type and quantity ordered/delivered this visit include: n/a    Clinician asked if patient has had any physician contact since last home care visit and patient states: NO  Clinician asked if patient has any new or changed medications and patient states:  NO   If Yes, were medications reconciled? N/A   Was the certifying physician notified of changes in medications? N/A     Clinical assessment (what this visit means for the patient overall and need for ongoing skilled care) and progress or lack of progress towards SPECIFIC goals: Patient with slow healing wound requiring SN services for assessment, education and wound care to reduce risk for infection and rehospitalization. Patient progressing towards educational and wound care goals but not yet met.  Unable to obtain wound photos at [facility] due to connection issue within [place].     Written Teaching Material Utilized: N/A    Interdisciplinary communication with: care team for the purpose of POC collaboration    Discharge planning as follows: When goals are met    Specific plan for next visit: Assessmen,t wound care and photos

## 2024-01-01 NOTE — HOME HEALTH
Subjective: Patient stated he was doing fine today but had not checked his blood sugar this morning but states been low.  Falls since last visit No(if yes complete the Fall Tracking Form and include bsrifallreport):   Caregiver involvement changes: none  Home health supplies by type and quantity ordered/delivered this visit include: none    Clinician asked if patient has had any physician contact since last home care visit and patient states: NO  Clinician asked if patient has any new or changed medications and patient states:  NO   If Yes, were medications reconciled? N/A   Was the certifying physician notified of changes in medications? NO     Clinical assessment (what this visit means for the patient overall and need for ongoing skilled care) and progress or lack of progress towards SPECIFIC goals: Patient being seen for wound care, CHF and diabetes. Wound is free of signs and symptoms of infection on left leg, all other are open to air and no open areas. Patient checked blood sugar during visit and explained about making sure to check before going to bed and have a snack if needed, patient was able to teach back at 75% and need re-education. Patient is progressing well with wound goals with some being open to air and no openings.    Written Teaching Material Utilized: N/A    Interdisciplinary communication with: N/A     Discharge planning as follows: When wound is 100% healed, Per physician order and When goals are met    Specific plan for next visit: SN perform wound care and also educate about medications

## 2024-01-03 ENCOUNTER — HOME CARE VISIT (OUTPATIENT)
Facility: HOME HEALTH | Age: 54
End: 2024-01-03
Payer: MEDICAID

## 2024-01-03 PROCEDURE — G0299 HHS/HOSPICE OF RN EA 15 MIN: HCPCS

## 2024-01-04 ENCOUNTER — TELEPHONE (OUTPATIENT)
Facility: CLINIC | Age: 54
End: 2024-01-04

## 2024-01-04 VITALS
SYSTOLIC BLOOD PRESSURE: 124 MMHG | BODY MASS INDEX: 36 KG/M2 | WEIGHT: 250.9 LBS | TEMPERATURE: 97.5 F | RESPIRATION RATE: 16 BRPM | OXYGEN SATURATION: 94 % | HEART RATE: 86 BPM | DIASTOLIC BLOOD PRESSURE: 78 MMHG

## 2024-01-04 DIAGNOSIS — S80.822D BLISTER OF LEFT LOWER LEG, SUBSEQUENT ENCOUNTER: Primary | ICD-10-CM

## 2024-01-04 DIAGNOSIS — S80.821D BLISTER OF RIGHT LOWER LEG, SUBSEQUENT ENCOUNTER: ICD-10-CM

## 2024-01-04 RX ORDER — ALBUTEROL SULFATE 90 UG/1
2 AEROSOL, METERED RESPIRATORY (INHALATION) EVERY 4 HOURS PRN
Qty: 9 G | Refills: 1 | Status: SHIPPED | OUTPATIENT
Start: 2024-01-04 | End: 2024-02-03

## 2024-01-04 ASSESSMENT — ENCOUNTER SYMPTOMS
DYSPNEA ACTIVITY LEVEL: AFTER AMBULATING MORE THAN 20 FT
PAIN LOCATION - PAIN QUALITY: STABBING

## 2024-01-04 NOTE — HOME HEALTH
Justification for continued intermittent care: patient with wound care concerns as follows left leg and patient with rehospitalization during episode     Paco Bray PA-C approved of the following: the need for continued Skilled Nursing home health services and plan of care for 2w9, 3prn Skilled Nursing for: additional assessment and education on disease process, medication mgmt, and additional assessment and wound care services to left lower leg wound.  Subjective: \"I finally got my Bumex prescription from the pharmacy. THey were giving me trouble about it\"    Clinical assessment (what this visit means for the patient overall and need for ongoing skilled care) and progress or lack of progress towards SPECIFIC goals: 52yo patient with recent hospitalization and wound on LLE requiring continued SN services for disease process, med mgmt, and wound care to reduce risk for infection and rehospitalization. Patient was hospitalized after not taking Bumex for several days due to issues with the pharmacy. Patient was sent home with oxygen therapy following pneumonia. Patient has met educational goals realetd to pneumonia and preventing another onset. Patient's DM is under control and patient is competent in insulin administration requiring no further teaching. Patient has pain ongoing however states he competent in pain management strategies to make it tolerable.  Patient is competent on medication routes including PO, SQ, topical, and inhaled. SN will continue to provide med mgmt on Bumex. Patient's wound remains stagnant. SN consulted with Paco Bray PA-C on wound clinic referral and referral has been made.     Falls since last visit: No    Written Teaching Material Utilized: Admission handbook, med list     Interdisciplinary communication with: Paco Dixon PA-C for the purpose of POC collaboration    Medications reconciled and all medications are available in the home this visit. A list of reconciled

## 2024-01-04 NOTE — TELEPHONE ENCOUNTER
I called Ivett and she went to see him yesterday for wound care & re-cert for skilled nursing. His would is stagnant and okay. Spoke with prior nurse and knows that many things have been tried. Wanted to know if we could refer the patient to the wound clinic for some other options.     As of right now they will only be coming out twice weekly.     Referral pended to wound care.

## 2024-01-04 NOTE — TELEPHONE ENCOUNTER
Dee [facility] called and wanted to talk to the nurse about the pt woud please give them a call back [phone]

## 2024-01-04 NOTE — TELEPHONE ENCOUNTER
PCP: Paco Bray PA-C     Last appt:  12/27/2023      Future Appointments   Date Time Provider Department Center   1/22/2024 10:45 AM Lety Manzo RN PDHA BS AMB   3/21/2024 11:30 AM Mitzi Polk ANP [facility] BS AMB   3/27/2024  8:30 AM Paco Bray PA-C [facility] AMB   5/7/2024  9:50 AM José Franco MD RDE [address] AMB   7/2/2024 10:40 AM Juan José Rodriguez MD [facility] BS AMB          Requested Prescriptions     Pending Prescriptions Disp Refills    albuterol sulfate HFA (PROVENTIL;VENTOLIN;PROAIR) 108 (90 Base) MCG/ACT inhaler 9 g 0     Sig: Inhale 2 puffs into the lungs every 4 hours as needed for Wheezing

## 2024-01-06 ENCOUNTER — HOME CARE VISIT (OUTPATIENT)
Facility: HOME HEALTH | Age: 54
End: 2024-01-06
Payer: MEDICAID

## 2024-01-06 PROCEDURE — G0300 HHS/HOSPICE OF LPN EA 15 MIN: HCPCS

## 2024-01-07 VITALS
SYSTOLIC BLOOD PRESSURE: 132 MMHG | OXYGEN SATURATION: 96 % | TEMPERATURE: 97.9 F | HEART RATE: 96 BPM | RESPIRATION RATE: 20 BRPM | DIASTOLIC BLOOD PRESSURE: 78 MMHG

## 2024-01-07 ASSESSMENT — ENCOUNTER SYMPTOMS: PAIN LOCATION - PAIN QUALITY: ACHING

## 2024-01-09 ENCOUNTER — HOME CARE VISIT (OUTPATIENT)
Facility: HOME HEALTH | Age: 54
End: 2024-01-09
Payer: MEDICAID

## 2024-01-09 VITALS
SYSTOLIC BLOOD PRESSURE: 136 MMHG | DIASTOLIC BLOOD PRESSURE: 78 MMHG | HEART RATE: 65 BPM | RESPIRATION RATE: 16 BRPM | WEIGHT: 248 LBS | BODY MASS INDEX: 35.58 KG/M2 | TEMPERATURE: 97.3 F | OXYGEN SATURATION: 96 %

## 2024-01-09 PROCEDURE — G0299 HHS/HOSPICE OF RN EA 15 MIN: HCPCS

## 2024-01-09 ASSESSMENT — ENCOUNTER SYMPTOMS
DYSPNEA ACTIVITY LEVEL: AFTER AMBULATING MORE THAN 20 FT
COUGH: 1
COUGH CHARACTERISTICS: DRY
PAIN LOCATION - PAIN QUALITY: SHOOTING

## 2024-01-11 ENCOUNTER — HOME CARE VISIT (OUTPATIENT)
Facility: HOME HEALTH | Age: 54
End: 2024-01-11
Payer: MEDICAID

## 2024-01-11 ENCOUNTER — TELEPHONE (OUTPATIENT)
Age: 54
End: 2024-01-11

## 2024-01-11 VITALS
SYSTOLIC BLOOD PRESSURE: 131 MMHG | WEIGHT: 249 LBS | DIASTOLIC BLOOD PRESSURE: 87 MMHG | HEART RATE: 88 BPM | TEMPERATURE: 97.3 F | RESPIRATION RATE: 18 BRPM | OXYGEN SATURATION: 97 % | BODY MASS INDEX: 35.73 KG/M2

## 2024-01-11 DIAGNOSIS — E10.3593 TYPE 1 DIABETES MELLITUS WITH PROLIFERATIVE DIABETIC RETINOPATHY WITHOUT MACULAR EDEMA, BILATERAL (HCC): ICD-10-CM

## 2024-01-11 DIAGNOSIS — G37.9 DEMYELINATING DISEASE OF CENTRAL NERVOUS SYSTEM, UNSPECIFIED (HCC): ICD-10-CM

## 2024-01-11 DIAGNOSIS — E10.42 DIABETIC POLYNEUROPATHY ASSOCIATED WITH TYPE 1 DIABETES MELLITUS (HCC): ICD-10-CM

## 2024-01-11 PROCEDURE — G0300 HHS/HOSPICE OF LPN EA 15 MIN: HCPCS

## 2024-01-11 NOTE — HOME HEALTH
Subjective: Pt states he gets short of breath all of a sudden and not necessarily exertion   Falls since last visit No(if yes complete the Fall Tracking Form and include bsrifallreport):   Caregiver involvement changes: Pts Uncle is primary caregiver   Home health supplies by type and quantity ordered/delivered this visit include: none     Clinician asked if patient has had any physician contact since last home care visit and patient states: NO  Clinician asked if patient has any new or changed medications and patient states:  NO   If Yes, were medications reconciled? NO   Was the certifying physician notified of changes in medications? NO     Clinical assessment (what this visit means for the patient overall and need for ongoing skilled care) and progress or lack of progress towards SPECIFIC goals: Pt continues to require SN for wound care     Written Teaching Material Utilized: N/A    Interdisciplinary communication with: N/A for the purpose of NA     Discharge planning as follows: When wound is 100% healed    Specific plan for next visit: Wound care

## 2024-01-11 NOTE — TELEPHONE ENCOUNTER
PCP: Paco Bray PA-C     Last appt:  12/27/2023      Future Appointments   Date Time Provider Department Center   1/16/2024  8:00 AM Darby Gill MD [facility]   1/17/2024 To Be Determined Delgado, Jamila, LPN [facility]AL   1/18/2024 To Be Determined Delgado, Jamila, LPN [facility]AL   1/22/2024 10:45 AM Lety Manzo RN PD[facility] BS AMB   1/23/2024 To Be Determined Ivett Fagan RN [facility] HOME [facility]AL   1/25/2024 To Be Determined Delgado, Jamila, LPN [facility]AL   1/30/2024 To Be Determined Delgado, Jamila, LPN [facility]AL   1/30/2024 To Be Determined Delgado, Jamila, LPN [facility] HOME HEAL   2/1/2024 To Be Determined Delgado, Jamila, LPN [facility]AL   2/6/2024 To Be Determined Delgado, Jamila, LPN [facility]   2/8/2024 To Be Determined Delgado, Jamila, LPN [facility]   2/13/2024 To Be Determined Delgado, Jamila, LPN [facility]   2/15/2024 To Be Determined Delgado, Jamila, LPN [facility]   2/20/2024 To Be Determined Delgado, Jamila, LPN [facility]   2/22/2024 To Be Determined Delgado, Jamila, LPN [facility] HOME HEAL   3/13/2024  2:00 PM Serenity Wall MD [facility] BS AMB   3/21/2024 11:30 AM Mitzi Polk ANP [facility] BS AMB   3/27/2024  8:30 AM Paco Bray PA-C [facility] BS AMB   5/7/2024  9:50 AM José Franco MD RDE [address] BS AMB   7/2/2024 10:40 AM Juan José Rodriguez MD [facility] BS AMB          Requested Prescriptions     Pending Prescriptions Disp Refills    pregabalin (LYRICA) 200 MG capsule [Pharmacy Med Name: Pregabalin 200 MG Oral Capsule] 60 capsule 0     Sig: TAKE 1 CAPSULE BY MOUTH TWICE DAILY . DO NOT EXCEED 2 PER 24 HOURS

## 2024-01-11 NOTE — HOME HEALTH
Subjective: \"My wound starts to sting on the second day after dressing change\"  Falls since last visit: No (if yes complete the Fall Tracking Form and include bsrifallreport):   Caregiver involvement changes: n/a  Home health supplies by type and quantity ordered/delivered this visit include: n/a    Clinician asked if patient has had any physician contact since last home care visit and patient states: NO  Clinician asked if patient has any new or changed medications and patient states:  NO   If Yes, were medications reconciled? N/A   Was the certifying physician notified of changes in medications? N/A     Clinical assessment (what this visit means for the patient overall and need for ongoing skilled care) and progress or lack of progress towards SPECIFIC goals: Patient with slow healing wound requiring SN services for assessment, education and wound care to reduce risk for infection adn rehospitalization. Patient progressing towards educational and wound care goals but not yet met.      Written Teaching Material Utilized: N/A    Interdisciplinary communication with: Care team for the purpose of POC collaboration    Discharge planning as follows: When goals are met    Specific plan for next visit: Assessment, wound care, photos

## 2024-01-11 NOTE — TELEPHONE ENCOUNTER
Karine is calling from [facility] to speak with the nurse about the patient.    [phone]

## 2024-01-12 RX ORDER — PREGABALIN 200 MG/1
CAPSULE ORAL
Qty: 60 CAPSULE | Refills: 2 | Status: SHIPPED | OUTPATIENT
Start: 2024-01-12 | End: 2024-04-11

## 2024-01-16 ENCOUNTER — HOSPITAL ENCOUNTER (OUTPATIENT)
Facility: HOSPITAL | Age: 54
Discharge: HOME | End: 2024-01-16
Attending: FAMILY MEDICINE
Payer: MEDICAID

## 2024-01-16 VITALS
WEIGHT: 245 LBS | BODY MASS INDEX: 35.07 KG/M2 | DIASTOLIC BLOOD PRESSURE: 67 MMHG | RESPIRATION RATE: 18 BRPM | HEIGHT: 70 IN | HEART RATE: 103 BPM | SYSTOLIC BLOOD PRESSURE: 135 MMHG | TEMPERATURE: 97.3 F

## 2024-01-16 DIAGNOSIS — E10.622 TYPE I DIABETES MELLITUS WITH ULCER (HCC): ICD-10-CM

## 2024-01-16 DIAGNOSIS — L97.222 NON-PRESSURE CHRONIC ULCER OF LEFT CALF WITH FAT LAYER EXPOSED (HCC): Primary | ICD-10-CM

## 2024-01-16 DIAGNOSIS — L98.499 TYPE I DIABETES MELLITUS WITH ULCER (HCC): ICD-10-CM

## 2024-01-16 PROCEDURE — 11042 DBRDMT SUBQ TIS 1ST 20SQCM/<: CPT

## 2024-01-16 PROCEDURE — 99213 OFFICE O/P EST LOW 20 MIN: CPT

## 2024-01-16 RX ORDER — LIDOCAINE 50 MG/G
OINTMENT TOPICAL ONCE
OUTPATIENT
Start: 2024-01-16 | End: 2024-01-16

## 2024-01-16 RX ORDER — LIDOCAINE HYDROCHLORIDE 20 MG/ML
JELLY TOPICAL ONCE
OUTPATIENT
Start: 2024-01-16 | End: 2024-01-16

## 2024-01-16 RX ORDER — BACITRACIN ZINC AND POLYMYXIN B SULFATE 500; 1000 [USP'U]/G; [USP'U]/G
OINTMENT TOPICAL ONCE
OUTPATIENT
Start: 2024-01-16 | End: 2024-01-16

## 2024-01-16 RX ORDER — IBUPROFEN 200 MG
TABLET ORAL ONCE
OUTPATIENT
Start: 2024-01-16 | End: 2024-01-16

## 2024-01-16 RX ORDER — CLOBETASOL PROPIONATE 0.5 MG/G
OINTMENT TOPICAL ONCE
OUTPATIENT
Start: 2024-01-16 | End: 2024-01-16

## 2024-01-16 RX ORDER — LIDOCAINE 40 MG/G
CREAM TOPICAL ONCE
OUTPATIENT
Start: 2024-01-16 | End: 2024-01-16

## 2024-01-16 RX ORDER — GENTAMICIN SULFATE 1 MG/G
OINTMENT TOPICAL ONCE
OUTPATIENT
Start: 2024-01-16 | End: 2024-01-16

## 2024-01-16 RX ORDER — TRIAMCINOLONE ACETONIDE 1 MG/G
OINTMENT TOPICAL ONCE
OUTPATIENT
Start: 2024-01-16 | End: 2024-01-16

## 2024-01-16 RX ORDER — LIDOCAINE HYDROCHLORIDE 40 MG/ML
SOLUTION TOPICAL ONCE
OUTPATIENT
Start: 2024-01-16 | End: 2024-01-16

## 2024-01-16 RX ORDER — SODIUM CHLOR/HYPOCHLOROUS ACID 0.033 %
SOLUTION, IRRIGATION IRRIGATION ONCE
OUTPATIENT
Start: 2024-01-16 | End: 2024-01-16

## 2024-01-16 RX ORDER — GINSENG 100 MG
CAPSULE ORAL ONCE
OUTPATIENT
Start: 2024-01-16 | End: 2024-01-16

## 2024-01-16 RX ORDER — BETAMETHASONE DIPROPIONATE 0.5 MG/G
CREAM TOPICAL ONCE
OUTPATIENT
Start: 2024-01-16 | End: 2024-01-16

## 2024-01-16 ASSESSMENT — PAIN DESCRIPTION - DESCRIPTORS: DESCRIPTORS: STABBING

## 2024-01-16 ASSESSMENT — PAIN DESCRIPTION - PAIN TYPE: TYPE: CHRONIC PAIN

## 2024-01-16 ASSESSMENT — PAIN DESCRIPTION - LOCATION: LOCATION: GENERALIZED

## 2024-01-16 ASSESSMENT — PAIN DESCRIPTION - ONSET: ONSET: ON-GOING

## 2024-01-16 ASSESSMENT — PAIN DESCRIPTION - FREQUENCY: FREQUENCY: CONTINUOUS

## 2024-01-16 ASSESSMENT — PAIN SCALES - GENERAL: PAINLEVEL_OUTOF10: 6

## 2024-01-16 ASSESSMENT — PAIN - FUNCTIONAL ASSESSMENT: PAIN_FUNCTIONAL_ASSESSMENT: PREVENTS OR INTERFERES SOME ACTIVE ACTIVITIES AND ADLS

## 2024-01-16 NOTE — FLOWSHEET NOTE
01/16/24 0833   Left Leg Edema Point of Measurement   Leg circumference 41.5 cm   Ankle circumference 24.5 cm   Compression Therapy Other  (Elevation)   LLE Neurovascular Assessment   Capillary Refill Less than/Equal to 3 seconds   Color Appropriate for Ethnicity   Temperature Warm   L Pedal Pulse +2   Wound 10/20/23 Leg Lateral;Left Ruptured blister    Date First Assessed: 10/20/23   Primary Wound Type: Pressure Injury  Location: Leg  Wound Location Orientation: Lateral;Left  Wound Description (Comments): Ruptured blister    Wound Image    Wound Etiology Venous   Dressing Status Old drainage noted   Wound Cleansed Cleansed with saline   Dressing/Treatment   (Foam border)   Wound Length (cm) 2.2 cm   Wound Width (cm) 4 cm   Wound Depth (cm) 0.1 cm   Wound Surface Area (cm^2) 8.8 cm^2   Change in Wound Size % (l*w) 44.13   Wound Volume (cm^3) 0.88 cm^3   Wound Healing % 83   Wound Assessment Hersey/red;Slough   Drainage Amount Moderate (25-50%)   Drainage Description Serosanguinous   Odor None   Shital-wound Assessment Intact;Fragile   Margins Defined edges   Wound Thickness Description not for Pressure Injury Full thickness     /67   Pulse (!) 103   Temp 97.3 °F (36.3 °C) (Temporal)   Resp 18   Ht 1.778 m (5' 10\")   Wt 111.1 kg (245 lb)   BMI 35.15 kg/m²

## 2024-01-16 NOTE — FLOWSHEET NOTE
01/16/24 0849   Wound 10/20/23 Leg Lateral;Left Ruptured blister    Date First Assessed: 10/20/23   Primary Wound Type: Pressure Injury  Location: Leg  Wound Location Orientation: Lateral;Left  Wound Description (Comments): Ruptured blister    Dressing/Treatment   (Collagen silver, plain alg, G, ABD, 2 layer with calamine)   Post-Procedure Length (cm) 2.2 cm   Post-Procedure Width (cm) 4 cm   Post-Procedure Depth (cm) 0.3 cm   Post-Procedure Surface Area (cm^2) 8.8 cm^2   Post-Procedure Volume (cm^3) 2.64 cm^3     Multilayer Compression Wrap   (Not Unna) Below the Knee    NAME:  Tom Birch  YOB: 1970  MEDICAL RECORD NUMBER:  [id number]  DATE:  1/16/2024    Multilayer compression wrap: Removed old Multilayer wrap if indicated and wash leg with mild soap/water.  Applied moisturizing agent to dry skin as needed.   Applied primary and secondary dressing as ordered.  Applied multilayered dressing below the knee to left lower leg.  Instructed patient/caregiver not to remove dressing and to keep it clean and dry.   Instructed patient/caregiver on complications to report to provider, such as pain, numbness in toes, heavy drainage, and slippage of dressing.  Instructed patient on purpose of compression dressing and on activity and exercise recommendations.    Patient tolerated procedure well with no impairment to circulation noted.      Electronically signed by Kayce Mandujano RN on 1/16/2024 at 8:58 AM

## 2024-01-16 NOTE — PROGRESS NOTES
capsule Take 1 capsule by mouth daily 12/28/23   Paco Bray PA-C   losartan (COZAAR) 100 MG tablet Take 1 tablet by mouth once daily 12/28/23   Paco rBay PA-C   OXYGEN Inhale 2 L/min into the lungs as needed for Shortness of Breath.    Karen Wood MD   acetaminophen (TYLENOL) 500 MG tablet Take 2 tablets by mouth every 6 hours as needed for Pain (mild pain)    ProviderKaren MD   bumetanide (BUMEX) 1 MG tablet Take one tab daily, may take second tab in AM if weight gain >3 pounds 12/13/23   Paco Bray PA-C   albuterol (PROVENTIL) (2.5 MG/3ML) 0.083% nebulizer solution Take 3 mLs by nebulization every 6 hours as needed for Shortness of Breath or Wheezing 12/8/23 1/16/24  Kristie Duque MD   Continuous Blood Gluc  (DEXCOM G7 ) FRANNY For checking blood sugar 4 to 5 times per day E11.65 12/4/23   José Franco MD   Continuous Blood Gluc Sensor (DEXCOM G7 SENSOR) MISC Used to measure blood sugar 4 to 5 times per day, E11.65 12/4/23   José Franco MD   LANTUS SOLOSTAR 100 UNIT/ML injection pen Take 34 units at bedtime 12/4/23   José Franco MD   pravastatin (PRAVACHOL) 40 MG tablet Take 1 tablet by mouth nightly 12/4/23   José Franco MD   Continuous Blood Gluc  (FREESTYLE ELY 2 READER) FRANNY by Does not apply route    ProviderKaren MD   amLODIPine (NORVASC) 10 MG tablet Take 1 tablet by mouth daily 10/31/23   Paco Bray PA-C   hydrOXYzine pamoate (VISTARIL) 25 MG capsule Take 1 capsule by mouth 4 times daily as needed for Itching 10/24/23   Paco Bray PA-C   HUMALOG KWIKPEN 100 UNIT/ML SOPN Use as directed for max dose of 80 units per day 9/22/23   José Franco MD   Insulin Pen Needle 32G X 4 MM MISC Use pen needle with insulin pens for up to 4 times per day E10.65 9/22/23   José Franco MD   blood glucose test strips (ONETOUCH VERIO) strip 1 each by In Vitro route in the morning, at noon, and at bedtime DX: E10.9 9/14/23

## 2024-01-16 NOTE — DISCHARGE INSTRUCTIONS
Discharge Instructions/Wound Care Orders  [facility]   [address]  [address], [address]  [address] [facility]  Telephone: [phone]     FAX [phone]     Wound Care Orders:  Left lateral lower leg  :Cleanse with normal saline, apply primary dressing Silver collagen, cover with plain alginate, cover with secondary dressing ABD and Gauze.  Apply 2 layer compression wrap with calamine  Pt./pcg/HH nurse to change (freq) 2x Weekly Home Health to change on Friday and as needed for compromise.F/U in 1 week.     Treatment Orders:   Elevate leg(s) above the level of the heart when sitting, if swelling present.  Avoid prolonged standing in one place.  Wear off-loading shoe/boot on the affected foot when walking.  Do not get dressing/cast wet.  Do not use objects to scratch inside cast/wrap.   Follow diet as prescribed:  [x] Diet as tolerated: [x] Diabetic Diet: Low carb no sugar [x] No Added Salt:  [] Increase Protein: [] Other:Limit the amount of liquid you are drinking and avoid drinking in between meals             Follow-up:  [x] Return Appointment: With Dr. Gill in  1 Week(s)  [] Ordered tests:    Electronically signed Kayce Mandujano RN on 1/16/2024 at 8:51 AM     Wound Care Center Information: Should you experience any significant changes in your wound(s) or have questions about your wound care, please contact the [facility] at MONDAY - FRIDAY 8:00 am - 4:30.  If you need help with your wound outside these hours and cannot wait until we are again available, contact your PCP or go to the hospital emergency room.     PLEASE NOTE: IF YOU ARE UNABLE TO OBTAIN WOUND SUPPLIES, CONTINUE TO USE THE SUPPLIES YOU HAVE AVAILABLE UNTIL YOU ARE ABLE TO REACH US. IT IS MOST IMPORTANT TO KEEP THE WOUND COVERED AT ALL TIMES.     Physician Signature:_______________________    Date: ___________ Time:  ____________

## 2024-01-17 ENCOUNTER — HOME CARE VISIT (OUTPATIENT)
Dept: HOME HEALTH SERVICES | Facility: HOME HEALTH | Age: 54
End: 2024-01-17
Payer: MEDICAID

## 2024-01-18 ENCOUNTER — HOME CARE VISIT (OUTPATIENT)
Facility: HOME HEALTH | Age: 54
End: 2024-01-18
Payer: MEDICAID

## 2024-01-18 PROCEDURE — G0300 HHS/HOSPICE OF LPN EA 15 MIN: HCPCS

## 2024-01-19 ENCOUNTER — TELEPHONE (OUTPATIENT)
Facility: CLINIC | Age: 54
End: 2024-01-19

## 2024-01-19 NOTE — TELEPHONE ENCOUNTER
Home care delivery called to nathalie on Certificate of medical necessity fax, can call back at [phone]

## 2024-01-22 ENCOUNTER — HOME CARE VISIT (OUTPATIENT)
Facility: HOME HEALTH | Age: 54
End: 2024-01-22
Payer: MEDICAID

## 2024-01-22 ENCOUNTER — OFFICE VISIT (OUTPATIENT)
Age: 54
End: 2024-01-22
Payer: MEDICAID

## 2024-01-22 VITALS
OXYGEN SATURATION: 99 % | BODY MASS INDEX: 36.37 KG/M2 | DIASTOLIC BLOOD PRESSURE: 74 MMHG | HEART RATE: 90 BPM | TEMPERATURE: 97.9 F | SYSTOLIC BLOOD PRESSURE: 126 MMHG | WEIGHT: 253.5 LBS

## 2024-01-22 DIAGNOSIS — E10.42 DIABETIC POLYNEUROPATHY ASSOCIATED WITH TYPE 1 DIABETES MELLITUS (HCC): Primary | ICD-10-CM

## 2024-01-22 PROCEDURE — G0299 HHS/HOSPICE OF RN EA 15 MIN: HCPCS

## 2024-01-22 PROCEDURE — G0108 DIAB MANAGE TRN  PER INDIV: HCPCS

## 2024-01-22 RX ORDER — BUMETANIDE 1 MG/1
TABLET ORAL
Qty: 60 TABLET | Refills: 3 | Status: SHIPPED | OUTPATIENT
Start: 2024-01-22

## 2024-01-22 ASSESSMENT — ENCOUNTER SYMPTOMS: PAIN LOCATION - PAIN QUALITY: ACHING

## 2024-01-22 NOTE — HOME HEALTH
Subjective:Patient is having pain in his arms and legs.  Falls since last visit No(if yes complete the Fall Tracking Form and include bsrifallreport):   Caregiver involvement changes: no  Home health supplies by type and quantity ordered/delivered this visit include: no    Clinician asked if patient has had any physician contact since last home care visit and patient states: N/A  Clinician asked if patient has any new or changed medications and patient states:  N/A   If Yes, were medications reconciled? N/A   Was the certifying physician notified of changes in medications? N/A     Clinical assessment (what this visit means for the patient overall and need for ongoing skilled care) and progress or lack of progress towards SPECIFIC goals: Patient is having pain in his arms and legs, he states that it is always hurting. Pt wound care is complete. SN neeeded for further eval and treatment.    Written Teaching Material Utilized: N/A    Interdisciplinary communication with: N/A     Discharge planning as follows: Will discharge when the patient has reached their maximum functional potential and maximum safety in their home    Specific plan for next visit: Educate in s/s of infection   and when to call MD BAKARI or [phone]

## 2024-01-22 NOTE — PROGRESS NOTES
[facility]  Diabetes Self-Management Education & Support Program  Encounter Note      SUMMARY  Diabetes self-care management training was completed related to healthy eating and problem solving. The participant will return on February 05 to continue DSMES related to TBD. The participant did identify SMART Goal(s) and will practice knowledge and skills related to reducing risks, healthy eating and monitoring, healthy coping and problem solving, and medications to improve diabetes self-management.        EVALUATION:  The initial plan for today's visit had been to cover Physical Activity and Coping Skills, however Mr. Birch has had significant medical events occur since our last visit and continues to have SOB with light exertion, chronic fatigue, swelling in his lower legs, and \"foggy brain\" that precludes anything beyond typical ADLs.    O2 in the office started at 86% and settled at 92% after 1 minute.    Mr. Birch's BGs continue to be very erratic, he may be near 300 2 hours after dinner and drop into the 50s with in 2-4 more hours.  Lows occur mainly in the early morning hours around 3-5 AM about 1-3x/week.  At the last appointment, the lows were occurring in the late afternoon and were likely due to meal choices and doses of humalog spaced to closely together. The cause of the current lows is unclear. He does not consistently eat lunch, he does have a breakfast of 2 pancakes and sausage every morning and steak with rice and peas or chicken with mashed potatoes every night.  We have discussed addling non-starchy vegetables to his dinner choices and varying his meal content to see how BGs react to other combinations of foods.  He is taking lantus 34 units HS and humalog 3x/day before meals according to directions for sliding scale from Dr. Franco. We covered using the 15-15 Rule to treat hypoglycemia and the best food options to use to initially bring BGs above 70.    [facility], [facility]1983 CGM data:   30

## 2024-01-23 ENCOUNTER — HOSPITAL ENCOUNTER (OUTPATIENT)
Facility: HOSPITAL | Age: 54
Discharge: HOME | End: 2024-01-23
Attending: FAMILY MEDICINE
Payer: MEDICAID

## 2024-01-23 VITALS
DIASTOLIC BLOOD PRESSURE: 75 MMHG | TEMPERATURE: 97.4 F | RESPIRATION RATE: 18 BRPM | HEART RATE: 106 BPM | SYSTOLIC BLOOD PRESSURE: 173 MMHG

## 2024-01-23 DIAGNOSIS — L98.499 TYPE I DIABETES MELLITUS WITH ULCER (HCC): ICD-10-CM

## 2024-01-23 DIAGNOSIS — E10.622 TYPE I DIABETES MELLITUS WITH ULCER (HCC): ICD-10-CM

## 2024-01-23 DIAGNOSIS — L97.222 NON-PRESSURE CHRONIC ULCER OF LEFT CALF WITH FAT LAYER EXPOSED (HCC): Primary | ICD-10-CM

## 2024-01-23 PROCEDURE — 11042 DBRDMT SUBQ TIS 1ST 20SQCM/<: CPT

## 2024-01-23 RX ORDER — BETAMETHASONE DIPROPIONATE 0.5 MG/G
CREAM TOPICAL ONCE
OUTPATIENT
Start: 2024-01-23 | End: 2024-01-23

## 2024-01-23 RX ORDER — LIDOCAINE 40 MG/G
CREAM TOPICAL ONCE
OUTPATIENT
Start: 2024-01-23 | End: 2024-01-23

## 2024-01-23 RX ORDER — TRIAMCINOLONE ACETONIDE 1 MG/G
OINTMENT TOPICAL ONCE
OUTPATIENT
Start: 2024-01-23 | End: 2024-01-23

## 2024-01-23 RX ORDER — LIDOCAINE 50 MG/G
OINTMENT TOPICAL ONCE
OUTPATIENT
Start: 2024-01-23 | End: 2024-01-23

## 2024-01-23 RX ORDER — BACITRACIN ZINC AND POLYMYXIN B SULFATE 500; 1000 [USP'U]/G; [USP'U]/G
OINTMENT TOPICAL ONCE
OUTPATIENT
Start: 2024-01-23 | End: 2024-01-23

## 2024-01-23 RX ORDER — LIDOCAINE HYDROCHLORIDE 20 MG/ML
JELLY TOPICAL ONCE
OUTPATIENT
Start: 2024-01-23 | End: 2024-01-23

## 2024-01-23 RX ORDER — GINSENG 100 MG
CAPSULE ORAL ONCE
OUTPATIENT
Start: 2024-01-23 | End: 2024-01-23

## 2024-01-23 RX ORDER — GENTAMICIN SULFATE 1 MG/G
OINTMENT TOPICAL ONCE
OUTPATIENT
Start: 2024-01-23 | End: 2024-01-23

## 2024-01-23 RX ORDER — LIDOCAINE HYDROCHLORIDE 40 MG/ML
SOLUTION TOPICAL ONCE
OUTPATIENT
Start: 2024-01-23 | End: 2024-01-23

## 2024-01-23 RX ORDER — CLOBETASOL PROPIONATE 0.5 MG/G
OINTMENT TOPICAL ONCE
OUTPATIENT
Start: 2024-01-23 | End: 2024-01-23

## 2024-01-23 RX ORDER — IBUPROFEN 200 MG
TABLET ORAL ONCE
OUTPATIENT
Start: 2024-01-23 | End: 2024-01-23

## 2024-01-23 RX ORDER — SODIUM CHLOR/HYPOCHLOROUS ACID 0.033 %
SOLUTION, IRRIGATION IRRIGATION ONCE
OUTPATIENT
Start: 2024-01-23 | End: 2024-01-23

## 2024-01-23 NOTE — PROGRESS NOTES
Wound Center  Progress Note / Procedure Note      Chief Complaint:  Tom Birch is a 53 y.o.  male  with L lower leg lateral wound of >1 months duration.      Assessment/Plan     53 y.o. male with DM I, CHF    -L lower leg lateral chronic ulcer.  Diabetic, venous, swelling  Full thickness  Less slough, narrowing  Slough/granular  Necessitates debridement  for wound healing and to prevent/heal infection  Ulcer needs debridement- see note below      Dm1  A1c 7.1  BS ranging from 50s to 200- working with endo   Discussed in detail    Leg swelling  Does not wear compression  Discussed:  Elevation  Compression          Following discussed with patient  Needs :  Serial debridement- prn    Good local wound care  Dressing: collagen, alginate  Frequency : twice weekly    Continue Home health    Coflex 2 layer  Do not get wet   Do not remove  Cast cover to shower    -Edema management    -Nutrition optimization    -Good Diabetic control    -Good offloading    Patient/  understood and agrees with plan. Questions answered.      Follow up with me in 1 week    Subjective:   Since last visit  No issues    HPI:     CHF and pneumonia in Dec- in hosp for f ew days  Both legs had blister on them, right healed  Left blister opened and remains open  Has HH  Doing wound care 3x/week- foam dressing  History/Chart/Medications reviewed    Wound caused by:  mixed  Current wound care:See flowsheet  Offloading wound: Yes  Elevating legs: yes  Compression compliance:yes  Appetite: good  Wound associated pain: See flowsheet  Diabetic: yes  Smoker: no  ROS: no N/V/D, no T/chills; no local rash, no chest pain or shortness of breath, infreq headache and dizzyness (due to BS being al over place?)      Objective:     Physical Exam:   See flowsheet / nursing notes for vitals  Vitals:    01/23/24 0915   BP: (!) 173/75   Pulse: (!) 106   Resp: 18   Temp: 97.4 °F (36.3 °C)     General: NAD. Hygiene good  Psych: cooperative. No anxiety

## 2024-01-23 NOTE — FLOWSHEET NOTE
01/23/24 0915   Left Leg Edema Point of Measurement   Leg circumference 38.4 cm   Ankle circumference 22.4 cm   Compression Therapy 2 layer compression wrap   LLE Neurovascular Assessment   Capillary Refill Less than/Equal to 3 seconds   Color Appropriate for Ethnicity   Temperature Warm   L Pedal Pulse +2   Wound 10/20/23 Leg Lateral;Left Ruptured blister    Date First Assessed: 10/20/23   Primary Wound Type: Pressure Injury  Location: Leg  Wound Location Orientation: Lateral;Left  Wound Description (Comments): Ruptured blister    Wound Image    Wound Etiology Venous   Dressing Status Old drainage noted   Wound Cleansed Soap and water   Wound Length (cm) 2.5 cm   Wound Width (cm) 3.4 cm   Wound Depth (cm) 0.1 cm   Wound Surface Area (cm^2) 8.5 cm^2   Change in Wound Size % (l*w) 46.03   Wound Volume (cm^3) 0.85 cm^3   Wound Healing % 84   Wound Assessment [place]/red;Slough   Drainage Amount Moderate (25-50%)   Drainage Description Serosanguinous   Odor None   Shital-wound Assessment Fragile   Margins Defined edges   Wound Thickness Description not for Pressure Injury Full thickness   Pain Assessment   Pain Assessment None - Denies Pain     BP (!) 173/75   Pulse (!) 106   Temp 97.4 °F (36.3 °C) (Temporal)   Resp 18

## 2024-01-23 NOTE — FLOWSHEET NOTE
Multilayer Compression Wrap   (Not Unna) Below the Knee    NAME:  Tom Birch  YOB: 1970  MEDICAL RECORD NUMBER:  [id number]  DATE:  1/23/2024 01/23/24 0940   Wound 10/20/23 Leg Lateral;Left Ruptured blister    Date First Assessed: 10/20/23   Primary Wound Type: Pressure Injury  Location: Leg  Wound Location Orientation: Lateral;Left  Wound Description (Comments): Ruptured blister    Dressing Status New dressing applied   Dressing/Treatment Collagen with Ag;Alginate;ABD  (2 layers with calamine)   Post-Procedure Length (cm) 2.6 cm   Post-Procedure Width (cm) 3.5 cm   Post-Procedure Depth (cm) 0.2 cm   Post-Procedure Surface Area (cm^2) 9.1 cm^2   Post-Procedure Volume (cm^3) 1.82 cm^3       Removed old Multilayer wrap if indicated and wash leg with mild soap/water., Applied moisturizing agent to dry skin as needed. , Applied primary and secondary dressing as ordered., Applied multilayered dressing below the knee to left lower leg., Instructed patient/caregiver not to remove dressing and to keep it clean and dry. , Instructed patient/caregiver on complications to report to provider, such as pain, numbness in toes, heavy drainage, and slippage of dressing., and Instructed patient on purpose of compression dressing and on activity and exercise recommendations.      Electronically signed by ALESSANDRO CLEARY RN on 1/23/2024 at 9:42 AM

## 2024-01-23 NOTE — DISCHARGE INSTRUCTIONS
Discharge Instructions [facility]  [address]  [place] [address]  Telephone: [phone]     FAX [phone]    NAME:  Tom Birch  YOB: 1970  MEDICAL RECORD NUMBER:  [id number]  DATE:  1/23/2024  WOUND CARE ORDERS:  Left lower leg :Cleanse with soap and water , apply primary dressing Collagen with AG , alginate cover with secondary dressing   ABD .  Apply 2 layer compression wrap with calamine  Pt./pcg/HH nurse to change (freq) 2x Weekly(HH to change on Friday) and as needed for compromise.F/U in 1 week on Tuesday.     TREATMENT ORDERS:    Elevate leg(s) above the level of the heart when sitting.   Avoid prolonged standing in one place.  Do no get dressing/wrap wet.  Follow Diet as prescribed:   [x] Diet as tolerated: [x] Calorie Diabetic Diet: Low carb and no Sugar [x] No Added Salt:  [] Increase Protein: [] Limit the amount of liquid you are drinking and avoid drinking in between meals     Return Appointment:  [x] Return Appointment: With Dr. Gill  in  1 Week(s)      Electronically signed ALESSANDRO CLEARY RN on 1/23/2024 at 9:24 AM     Wound Care Center Information: Should you experience any significant changes in your wound(s) or have questions about your wound care, please contact the [facility] at MONDAY - FRIDAY 8:00 am - 4:30.  If you need help with your wound outside these hours and cannot wait until we are again available, contact your PCP or go to the hospital emergency room.   PLEASE NOTE: IF YOU ARE UNABLE TO OBTAIN WOUND SUPPLIES, CONTINUE TO USE THE SUPPLIES YOU HAVE AVAILABLE UNTIL YOU ARE ABLE TO REACH US. IT IS MOST IMPORTANT TO KEEP THE WOUND COVERED AT ALL TIMES.     Physician Signature:_______________________    Date: ___________ Time:  ____________

## 2024-01-24 VITALS
RESPIRATION RATE: 18 BRPM | OXYGEN SATURATION: 94 % | DIASTOLIC BLOOD PRESSURE: 98 MMHG | SYSTOLIC BLOOD PRESSURE: 140 MMHG | BODY MASS INDEX: 36.3 KG/M2 | WEIGHT: 253 LBS | HEART RATE: 88 BPM

## 2024-01-26 ENCOUNTER — HOME CARE VISIT (OUTPATIENT)
Facility: HOME HEALTH | Age: 54
End: 2024-01-26
Payer: MEDICAID

## 2024-01-26 PROCEDURE — G0300 HHS/HOSPICE OF LPN EA 15 MIN: HCPCS

## 2024-01-28 VITALS
BODY MASS INDEX: 37.29 KG/M2 | TEMPERATURE: 97.5 F | DIASTOLIC BLOOD PRESSURE: 88 MMHG | WEIGHT: 259.9 LBS | OXYGEN SATURATION: 95 % | RESPIRATION RATE: 18 BRPM | HEART RATE: 90 BPM | SYSTOLIC BLOOD PRESSURE: 138 MMHG

## 2024-01-28 NOTE — HOME HEALTH
Subjective: Pt states that there are now new wounds to LLE  Falls since last visit No(if yes complete the Fall Tracking Form and include bsrifallreport):   Caregiver involvement changes: Uncle is primary caregiver   Home health supplies by type and quantity ordered/delivered this visit include: none     Clinician asked if patient has had any physician contact since last home care visit and patient states: YES, Pt went to wound care on !/23/24  Clinician asked if patient has any new or changed medications and patient states:  NO   If Yes, were medications reconciled? NO   Was the certifying physician notified of changes in medications? NO     Clinical assessment (what this visit means for the patient overall and need for ongoing skilled care) and progress or lack of progress towards SPECIFIC goals: Pt continues [facility] for wound care     Written Teaching Material Utilized: N/A    Interdisciplinary communication with: Paco Bray NP for the purpose of made aware of weight gain     Discharge planning as follows: When wound is 100% healed    Specific plan for next visit: Wound care

## 2024-01-30 ENCOUNTER — HOME CARE VISIT (OUTPATIENT)
Dept: HOME HEALTH SERVICES | Facility: HOME HEALTH | Age: 54
End: 2024-01-30
Payer: MEDICAID

## 2024-01-30 ENCOUNTER — HOSPITAL ENCOUNTER (OUTPATIENT)
Facility: HOSPITAL | Age: 54
Discharge: HOME | End: 2024-01-30
Attending: FAMILY MEDICINE
Payer: MEDICAID

## 2024-01-30 VITALS
SYSTOLIC BLOOD PRESSURE: 148 MMHG | HEART RATE: 91 BPM | DIASTOLIC BLOOD PRESSURE: 74 MMHG | TEMPERATURE: 97.4 F | RESPIRATION RATE: 18 BRPM

## 2024-01-30 DIAGNOSIS — L97.222 NON-PRESSURE CHRONIC ULCER OF LEFT CALF WITH FAT LAYER EXPOSED (HCC): Primary | ICD-10-CM

## 2024-01-30 DIAGNOSIS — L97.212 CALF ULCER, RIGHT, WITH FAT LAYER EXPOSED (HCC): ICD-10-CM

## 2024-01-30 DIAGNOSIS — E10.622 TYPE I DIABETES MELLITUS WITH ULCER (HCC): ICD-10-CM

## 2024-01-30 DIAGNOSIS — L98.499 TYPE I DIABETES MELLITUS WITH ULCER (HCC): ICD-10-CM

## 2024-01-30 PROCEDURE — 11042 DBRDMT SUBQ TIS 1ST 20SQCM/<: CPT

## 2024-01-30 PROCEDURE — 11045 DBRDMT SUBQ TISS EACH ADDL: CPT

## 2024-01-30 RX ORDER — LIDOCAINE HYDROCHLORIDE 20 MG/ML
JELLY TOPICAL ONCE
OUTPATIENT
Start: 2024-01-30 | End: 2024-01-30

## 2024-01-30 RX ORDER — BACITRACIN ZINC AND POLYMYXIN B SULFATE 500; 1000 [USP'U]/G; [USP'U]/G
OINTMENT TOPICAL ONCE
OUTPATIENT
Start: 2024-01-30 | End: 2024-01-30

## 2024-01-30 RX ORDER — LIDOCAINE HYDROCHLORIDE 40 MG/ML
SOLUTION TOPICAL ONCE
OUTPATIENT
Start: 2024-01-30 | End: 2024-01-30

## 2024-01-30 RX ORDER — IBUPROFEN 200 MG
TABLET ORAL ONCE
OUTPATIENT
Start: 2024-01-30 | End: 2024-01-30

## 2024-01-30 RX ORDER — TRIAMCINOLONE ACETONIDE 1 MG/G
OINTMENT TOPICAL ONCE
OUTPATIENT
Start: 2024-01-30 | End: 2024-01-30

## 2024-01-30 RX ORDER — CLOBETASOL PROPIONATE 0.5 MG/G
OINTMENT TOPICAL ONCE
OUTPATIENT
Start: 2024-01-30 | End: 2024-01-30

## 2024-01-30 RX ORDER — SODIUM CHLOR/HYPOCHLOROUS ACID 0.033 %
SOLUTION, IRRIGATION IRRIGATION ONCE
OUTPATIENT
Start: 2024-01-30 | End: 2024-01-30

## 2024-01-30 RX ORDER — GENTAMICIN SULFATE 1 MG/G
OINTMENT TOPICAL ONCE
OUTPATIENT
Start: 2024-01-30 | End: 2024-01-30

## 2024-01-30 RX ORDER — LIDOCAINE 40 MG/G
CREAM TOPICAL ONCE
OUTPATIENT
Start: 2024-01-30 | End: 2024-01-30

## 2024-01-30 RX ORDER — BETAMETHASONE DIPROPIONATE 0.5 MG/G
CREAM TOPICAL ONCE
OUTPATIENT
Start: 2024-01-30 | End: 2024-01-30

## 2024-01-30 RX ORDER — LIDOCAINE 50 MG/G
OINTMENT TOPICAL ONCE
OUTPATIENT
Start: 2024-01-30 | End: 2024-01-30

## 2024-01-30 RX ORDER — GINSENG 100 MG
CAPSULE ORAL ONCE
OUTPATIENT
Start: 2024-01-30 | End: 2024-01-30

## 2024-01-30 ASSESSMENT — PAIN SCALES - GENERAL: PAINLEVEL_OUTOF10: 6

## 2024-01-30 ASSESSMENT — PAIN DESCRIPTION - PAIN TYPE: TYPE: CHRONIC PAIN

## 2024-01-30 ASSESSMENT — PAIN DESCRIPTION - FREQUENCY: FREQUENCY: CONTINUOUS

## 2024-01-30 ASSESSMENT — PAIN DESCRIPTION - LOCATION: LOCATION: GENERALIZED

## 2024-01-30 ASSESSMENT — PAIN - FUNCTIONAL ASSESSMENT: PAIN_FUNCTIONAL_ASSESSMENT: PREVENTS OR INTERFERES SOME ACTIVE ACTIVITIES AND ADLS

## 2024-01-30 ASSESSMENT — PAIN DESCRIPTION - DESCRIPTORS: DESCRIPTORS: STABBING;ACHING

## 2024-01-30 NOTE — FLOWSHEET NOTE
01/30/24 1015   Left Leg Edema Point of Measurement   Leg circumference 38.5 cm   Ankle circumference 23.8 cm   Compression Therapy 2 layer compression wrap   LLE Neurovascular Assessment   Capillary Refill Less than/Equal to 3 seconds   Color Appropriate for Ethnicity   Temperature Warm   L Pedal Pulse +2   Wound 10/20/23 Leg Lateral;Left Ruptured blister    Date First Assessed: 10/20/23   Primary Wound Type: Pressure Injury  Location: Leg  Wound Location Orientation: Lateral;Left  Wound Description (Comments): Ruptured blister    Dressing/Treatment Collagen;Hydrofera blue;ABD  (2 layer calamine wrap)   Post-Procedure Length (cm) 2.2 cm   Post-Procedure Width (cm) 3 cm   Post-Procedure Depth (cm) 0.2 cm   Post-Procedure Surface Area (cm^2) 6.6 cm^2   Post-Procedure Volume (cm^3) 1.32 cm^3   Wound 01/26/24 Leg Medial;Right Intact Blister    Date First Assessed/Time First Assessed: 01/26/24 1320   Present on Original Admission: No  Primary Wound Type: (c)   Location: Leg  Wound Location Orientation: Medial;Right  Wound Description (Comments): Intact Blister    Dressing/Treatment Collagen;Hydrofera blue;ABD  (2 layer calamine wrap)   Post-Procedure Length (cm) 11 cm   Post-Procedure Width (cm) 9 cm   Post-Procedure Depth (cm) 0.2 cm   Post-Procedure Surface Area (cm^2) 99 cm^2   Post-Procedure Volume (cm^3) 19.8 cm^3     Multilayer Compression Wrap  (Not Unna) Below the Knee    NAME:  Tom Birch  YOB: 1970  MEDICAL RECORD NUMBER:  [id number]  DATE:  1/30/2024    Removed old Multilayer wrap if indicated and wash leg with mild soap/water.  Applied moisturizing agent to dry skin as needed.   Applied primary and secondary dressing as ordered.  Applied multilayered dressing below the knee to bilateral lower legs.  Instructed patient/caregiver not to remove dressing and to keep it clean and dry.   Instructed patient/caregiver on complications to report to provider, such as pain, numbness in toes,

## 2024-01-30 NOTE — PROGRESS NOTES
Wound Center  Progress Note / Procedure Note      Chief Complaint:  Tom Birch is a 53 y.o.  male  with L lower leg lateral wound of >1 months duration.      Assessment/Plan     53 y.o. male with DM I, CHF    -L lower leg lateral chronic ulcer.  Diabetic, venous, swelling  Full thickness  smaller  Slough/granular  Necessitates debridement  for wound healing and to prevent/heal infection  Ulcer needs debridement- see note below    -R lower leg chronic ulcer. NEW  Diabetic, venous, swelling  Open blister  Full thickness  Slough/granular/devitalized dermis  Necessitates debridement  for wound healing and to prevent/heal infection  Ulcer needs debridement- see note below    Dm1  A1c 7.1  BS ranging from 50s to 200- working with endo   Discussed in detail    Leg swelling  Does not wear compression  Discussed:  Elevation  Compression          Following discussed with patient  Needs :  Serial debridement- prn    Good local wound care  Dressing: collagen, START HBR, D/C alginate  Frequency : twice weekly    Continue Home health    Coflex 2 layer to both legs  Do not get wet   Do not remove  Cast cover to shower    -Edema management    -Nutrition optimization    -Good Diabetic control    -Good offloading    Patient/  understood and agrees with plan. Questions answered.      Follow up with me in 1 week    Subjective:   Since last visit  New right leg wound, started as blister on friday - now open     HPI:     CHF and pneumonia in Dec- in hosp for f ew days  Both legs had blister on them, right healed  Left blister opened and remains open  Has HH  Doing wound care 3x/week- foam dressing  History/Chart/Medications reviewed    Wound caused by:  mixed  Current wound care:See flowsheet  Offloading wound: Yes  Elevating legs: yes  Compression compliance:yes  Appetite: good  Wound associated pain: See flowsheet  Diabetic: yes  Smoker: no  ROS: no N/V/D, no T/chills; no local rash, no chest pain or shortness of

## 2024-01-30 NOTE — FLOWSHEET NOTE
01/30/24 1010   Wound 10/20/23 Leg Lateral;Left Ruptured blister    Date First Assessed: 10/20/23   Primary Wound Type: Pressure Injury  Location: Leg  Wound Location Orientation: Lateral;Left  Wound Description (Comments): Ruptured blister    Wound Image    Wound Etiology Venous   Dressing Status Old drainage noted   Wound Cleansed Soap and water   Wound Length (cm) 2.2 cm   Wound Width (cm) 3 cm   Wound Depth (cm) 0.1 cm   Wound Surface Area (cm^2) 6.6 cm^2   Change in Wound Size % (l*w) 58.1   Wound Volume (cm^3) 0.66 cm^3   Wound Healing % 87   Wound Assessment Granulation tissue   Drainage Amount Moderate (25-50%)   Drainage Description Serosanguinous   Odor None   Shital-wound Assessment Fragile   Margins Defined edges;Flat/open edges   Wound Thickness Description not for Pressure Injury Full thickness   Wound 01/26/24 Leg Medial;Right Intact Blister    Date First Assessed/Time First Assessed: 01/26/24 1320   Present on Original Admission: No  Primary Wound Type: (c)   Location: Leg  Wound Location Orientation: Medial;Right  Wound Description (Comments): Intact Blister    Wound Image    Wound Etiology Other  (ruptured blister)   Dressing Status Old drainage noted   Wound Cleansed Cleansed with saline   Wound Length (cm) 11 cm   Wound Width (cm) 9 cm   Wound Depth (cm) 0.1 cm   Wound Surface Area (cm^2) 99 cm^2   Wound Volume (cm^3) 9.9 cm^3   Wound Assessment Ruptured blister   Drainage Amount Small (< 25%)   Drainage Description Serous   Odor None   Shital-wound Assessment Intact   Margins Defined edges   Wound Thickness Description not for Pressure Injury Partial thickness     BP (!) 148/74   Pulse 91   Temp 97.4 °F (36.3 °C) (Temporal)   Resp 18

## 2024-01-30 NOTE — DISCHARGE INSTRUCTIONS
Discharge Instructions/Wound Care Orders  [facility]   [address]  [address] [facility]  Telephone: [phone]     FAX [phone]    NAME:  Tom Birch  YOB: 1970  MEDICAL RECORD NUMBER:  [id number]  DATE:  1/30/2024   Wound Care Orders:  Bilateral lower extremity wounds  :Cleanse with soap and water, apply primary dressing Collagen, HFBR, ABD, bilateral 2 layer calamine wrap.. Care to be done 2 x week, return to see MD  in 1 week..    Home Health Agency: [facility]  Treatment Orders:   Elevate leg(s) above the level of the heart when sitting, if swelling present.  Elevate for an hour, every other hour during the day..     Avoid prolonged standing in one place.  Wear off-loading shoe/boot on the affected foot when walking.  Do not get dressing/cast wet.  Do not use objects to scratch inside cast/wrap.   Follow diet as prescribed:  [] Diet as tolerated: [x] Diabetic Diet:  [x] No Added Salt:  [] Increase Protein: [] Other:Limit the amount of liquid you are drinking and avoid drinking in between meals             Follow-up:  [] Return Appointment: With Dr. Gill in  1 Week(s)  [] Ordered tests:    Electronically signed Olive Gallegos RN on 1/30/2024 at 10:49 AM     Wound Care Center Information: Should you experience any significant changes in your wound(s) or have questions about your wound care, please contact the [facility] at MONDAY - FRIDAY 8:00 am - 4:30.  If you need help with your wound outside these hours and cannot wait until we are again available, contact your PCP or go to the hospital emergency room.     PLEASE NOTE: IF YOU ARE UNABLE TO OBTAIN WOUND SUPPLIES, CONTINUE TO USE THE SUPPLIES YOU HAVE AVAILABLE UNTIL YOU ARE ABLE TO REACH US. IT IS MOST IMPORTANT TO KEEP THE WOUND COVERED AT ALL TIMES.     Physician Signature:_______________________    Date: ___________ Time:  ____________

## 2024-02-02 ENCOUNTER — HOME CARE VISIT (OUTPATIENT)
Facility: HOME HEALTH | Age: 54
End: 2024-02-02
Payer: MEDICAID

## 2024-02-02 PROCEDURE — G0300 HHS/HOSPICE OF LPN EA 15 MIN: HCPCS

## 2024-02-04 VITALS
RESPIRATION RATE: 18 BRPM | HEART RATE: 94 BPM | OXYGEN SATURATION: 100 % | DIASTOLIC BLOOD PRESSURE: 78 MMHG | SYSTOLIC BLOOD PRESSURE: 148 MMHG | TEMPERATURE: 97.4 F

## 2024-02-06 ENCOUNTER — HOSPITAL ENCOUNTER (OUTPATIENT)
Facility: HOSPITAL | Age: 54
Discharge: HOME | End: 2024-02-06
Attending: FAMILY MEDICINE
Payer: MEDICAID

## 2024-02-06 ENCOUNTER — APPOINTMENT (OUTPATIENT)
Facility: HOSPITAL | Age: 54
End: 2024-02-06
Payer: MEDICAID

## 2024-02-06 ENCOUNTER — HOSPITAL ENCOUNTER (EMERGENCY)
Facility: HOSPITAL | Age: 54
Discharge: HOME | End: 2024-02-06
Attending: EMERGENCY MEDICINE
Payer: MEDICAID

## 2024-02-06 VITALS
TEMPERATURE: 97.3 F | SYSTOLIC BLOOD PRESSURE: 150 MMHG | RESPIRATION RATE: 18 BRPM | DIASTOLIC BLOOD PRESSURE: 69 MMHG | HEART RATE: 99 BPM

## 2024-02-06 VITALS
SYSTOLIC BLOOD PRESSURE: 182 MMHG | HEIGHT: 70 IN | OXYGEN SATURATION: 96 % | TEMPERATURE: 97.7 F | DIASTOLIC BLOOD PRESSURE: 97 MMHG | BODY MASS INDEX: 36.22 KG/M2 | WEIGHT: 253 LBS | RESPIRATION RATE: 18 BRPM | HEART RATE: 94 BPM

## 2024-02-06 DIAGNOSIS — L97.222 NON-PRESSURE CHRONIC ULCER OF LEFT CALF WITH FAT LAYER EXPOSED (HCC): Primary | ICD-10-CM

## 2024-02-06 DIAGNOSIS — S86.001A ACHILLES TENDON INJURY, RIGHT, INITIAL ENCOUNTER: ICD-10-CM

## 2024-02-06 DIAGNOSIS — E10.622 TYPE I DIABETES MELLITUS WITH ULCER (HCC): ICD-10-CM

## 2024-02-06 DIAGNOSIS — L97.212 CALF ULCER, RIGHT, WITH FAT LAYER EXPOSED (HCC): ICD-10-CM

## 2024-02-06 DIAGNOSIS — L98.499 TYPE I DIABETES MELLITUS WITH ULCER (HCC): ICD-10-CM

## 2024-02-06 DIAGNOSIS — S86.002A INJURY OF LEFT ACHILLES TENDON, INITIAL ENCOUNTER: ICD-10-CM

## 2024-02-06 DIAGNOSIS — M25.572 ACUTE LEFT ANKLE PAIN: Primary | ICD-10-CM

## 2024-02-06 PROCEDURE — 11042 DBRDMT SUBQ TIS 1ST 20SQCM/<: CPT

## 2024-02-06 PROCEDURE — 73610 X-RAY EXAM OF ANKLE: CPT

## 2024-02-06 PROCEDURE — 96372 THER/PROPH/DIAG INJ SC/IM: CPT

## 2024-02-06 PROCEDURE — 99284 EMERGENCY DEPT VISIT MOD MDM: CPT

## 2024-02-06 PROCEDURE — 11045 DBRDMT SUBQ TISS EACH ADDL: CPT

## 2024-02-06 PROCEDURE — 6370000000 HC RX 637 (ALT 250 FOR IP): Performed by: EMERGENCY MEDICINE

## 2024-02-06 PROCEDURE — 6360000002 HC RX W HCPCS: Performed by: EMERGENCY MEDICINE

## 2024-02-06 RX ORDER — LIDOCAINE 50 MG/G
OINTMENT TOPICAL ONCE
OUTPATIENT
Start: 2024-02-06 | End: 2024-02-06

## 2024-02-06 RX ORDER — GINSENG 100 MG
CAPSULE ORAL ONCE
OUTPATIENT
Start: 2024-02-06 | End: 2024-02-06

## 2024-02-06 RX ORDER — ONDANSETRON 4 MG/1
4 TABLET, ORALLY DISINTEGRATING ORAL ONCE
Status: COMPLETED | OUTPATIENT
Start: 2024-02-06 | End: 2024-02-06

## 2024-02-06 RX ORDER — LIDOCAINE 40 MG/G
CREAM TOPICAL ONCE
OUTPATIENT
Start: 2024-02-06 | End: 2024-02-06

## 2024-02-06 RX ORDER — LIDOCAINE HYDROCHLORIDE 20 MG/ML
JELLY TOPICAL ONCE
OUTPATIENT
Start: 2024-02-06 | End: 2024-02-06

## 2024-02-06 RX ORDER — GENTAMICIN SULFATE 1 MG/G
OINTMENT TOPICAL ONCE
OUTPATIENT
Start: 2024-02-06 | End: 2024-02-06

## 2024-02-06 RX ORDER — IBUPROFEN 200 MG
TABLET ORAL ONCE
OUTPATIENT
Start: 2024-02-06 | End: 2024-02-06

## 2024-02-06 RX ORDER — CLOBETASOL PROPIONATE 0.5 MG/G
OINTMENT TOPICAL ONCE
OUTPATIENT
Start: 2024-02-06 | End: 2024-02-06

## 2024-02-06 RX ORDER — MORPHINE SULFATE 10 MG/ML
8 INJECTION, SOLUTION INTRAMUSCULAR; INTRAVENOUS
Status: COMPLETED | OUTPATIENT
Start: 2024-02-06 | End: 2024-02-06

## 2024-02-06 RX ORDER — BACITRACIN ZINC AND POLYMYXIN B SULFATE 500; 1000 [USP'U]/G; [USP'U]/G
OINTMENT TOPICAL ONCE
OUTPATIENT
Start: 2024-02-06 | End: 2024-02-06

## 2024-02-06 RX ORDER — HYDROCODONE BITARTRATE AND ACETAMINOPHEN 5; 325 MG/1; MG/1
1 TABLET ORAL EVERY 6 HOURS PRN
Qty: 12 TABLET | Refills: 0 | Status: SHIPPED | OUTPATIENT
Start: 2024-02-06 | End: 2024-02-10 | Stop reason: SDUPTHER

## 2024-02-06 RX ORDER — SODIUM CHLOR/HYPOCHLOROUS ACID 0.033 %
SOLUTION, IRRIGATION IRRIGATION ONCE
OUTPATIENT
Start: 2024-02-06 | End: 2024-02-06

## 2024-02-06 RX ORDER — BETAMETHASONE DIPROPIONATE 0.5 MG/G
CREAM TOPICAL ONCE
OUTPATIENT
Start: 2024-02-06 | End: 2024-02-06

## 2024-02-06 RX ORDER — LIDOCAINE HYDROCHLORIDE 40 MG/ML
SOLUTION TOPICAL ONCE
OUTPATIENT
Start: 2024-02-06 | End: 2024-02-06

## 2024-02-06 RX ORDER — TRIAMCINOLONE ACETONIDE 1 MG/G
OINTMENT TOPICAL ONCE
OUTPATIENT
Start: 2024-02-06 | End: 2024-02-06

## 2024-02-06 RX ADMIN — ONDANSETRON 4 MG: 4 TABLET, ORALLY DISINTEGRATING ORAL at 11:53

## 2024-02-06 RX ADMIN — MORPHINE SULFATE 8 MG: 10 INJECTION, SOLUTION INTRAMUSCULAR; INTRAVENOUS at 11:53

## 2024-02-06 ASSESSMENT — PAIN - FUNCTIONAL ASSESSMENT
PAIN_FUNCTIONAL_ASSESSMENT: 0-10
PAIN_FUNCTIONAL_ASSESSMENT: PREVENTS OR INTERFERES SOME ACTIVE ACTIVITIES AND ADLS

## 2024-02-06 ASSESSMENT — PAIN DESCRIPTION - PAIN TYPE: TYPE: ACUTE PAIN

## 2024-02-06 ASSESSMENT — PAIN DESCRIPTION - LOCATION
LOCATION: LEG

## 2024-02-06 ASSESSMENT — PAIN DESCRIPTION - ONSET: ONSET: SUDDEN

## 2024-02-06 ASSESSMENT — PAIN DESCRIPTION - ORIENTATION: ORIENTATION: LEFT

## 2024-02-06 ASSESSMENT — ENCOUNTER SYMPTOMS
COUGH: 0
ABDOMINAL PAIN: 0
NAUSEA: 0
SHORTNESS OF BREATH: 0
VOMITING: 0
COLOR CHANGE: 0

## 2024-02-06 ASSESSMENT — PAIN DESCRIPTION - FREQUENCY: FREQUENCY: CONTINUOUS

## 2024-02-06 ASSESSMENT — PAIN SCALES - GENERAL
PAINLEVEL_OUTOF10: 10
PAINLEVEL_OUTOF10: 8

## 2024-02-06 ASSESSMENT — PAIN DESCRIPTION - DESCRIPTORS: DESCRIPTORS: SHOOTING

## 2024-02-06 NOTE — ED NOTES
Posterior splint applied to L foot by Gary Torrez. Pt discharged by Rashard [facility]. Discharge instructions discussed and pt given opportunity to ask questions. Pt ambulatory out of ED

## 2024-02-06 NOTE — PROGRESS NOTES
Wound Center  Progress Note / Procedure Note      Chief Complaint:  Tom Birch is a 53 y.o.  male  with L lower leg lateral wound of >1 months duration.      Assessment/Plan     53 y.o. male with DM I, CHF    -L lower leg lateral chronic ulcer.  Diabetic, venous, swelling  Full thickness  smaller  Slough/granular  Necessitates debridement  for wound healing and to prevent/heal infection  Ulcer needs debridement- see note below    -R lower leg chronic ulcer.   Diabetic, venous, swelling  smaller  Full thickness  Slough/granular/devitalized dermis  Necessitates debridement  for wound healing and to prevent/heal infection  Ulcer needs debridement- see note below      -Left achilles injury NEW  +tender  Palpable break in tendon area compared to right side  Sent to ER    Dm1  A1c 7.1  BS ranging from 50s to 200- working with endo   Discussed in detail    Leg swelling  Does not wear compression  Discussed:  Elevation  Compression          Following discussed with patient  Needs :  Serial debridement- prn    Good local wound care  Dressing: collagen,  HBR  Frequency : twice weekly    Continue Home health    Coflex 2 layer to right only for this week  Do not get wet   Do not remove  Cast cover to shower    -Edema management    -Nutrition optimization    -Good Diabetic control    -Good offloading    Patient/  understood and agrees with plan. Questions answered.      Follow up with me in 1 week    Subjective:   Since last visit  When walking up here, heard a pop in achilles area and after that couldn't walk.  ++ pain, not able to weight bear  Had to be wheelchaired up    HPI:     CHF and pneumonia in Dec- in hosp for f ew days  Both legs had blister on them, right healed  Left blister opened and remains open  Has HH  Doing wound care 3x/week- foam dressing  History/Chart/Medications reviewed    Wound caused by:  mixed  Current wound care:See flowsheet  Offloading wound: Yes  Elevating legs:

## 2024-02-06 NOTE — DISCHARGE INSTRUCTIONS
You were seen in the emergency room for ankle pain after a pop.  This could be a Achilles tendon problem.  The orthopedist today recommends nonweightbearing which means you will need to use a walker or wheelchair.  They would like you to see Dr. Workman in the outpatient clinic at the number provided.  Thank You!    It was a pleasure taking care of you in our Emergency Department today. We know that when you come to our Emergency Department, you are entrusting us with your health, comfort, and safety. Our physicians and nurses honor that trust, and truly appreciate the opportunity to care for you and your loved ones.      We also value your feedback. If you receive a survey about your Emergency Department experience today, please fill it out.  We care about our patients' feedback, and we listen to what you have to say.  Thank you.    Katelynn Chahal MD      ________________________________________________________________________  I have included a copy of your lab results and/or radiologic studies from today's visit so you can have them easily available at your follow-up visit. We hope you feel better and please do not hesitate to contact the ED if you have any questions at all!    No results found for this or any previous visit (from the past 12 hour(s)).  XR ANKLE LEFT (MIN 3 VIEWS)   Final Result   No acute abnormality.          The exam and treatment you received in the Emergency Department were for an urgent problem and are not intended as complete care. It is important that you follow up with a doctor, nurse practitioner, or physician assistant for ongoing care. If your symptoms become worse or you do not improve as expected and you are unable to reach your usual health care provider, you should return to the Emergency Department. We are available 24 hours a day.    Please take your discharge instructions with you when you go to your follow-up appointment.     If a prescription has been provided, please

## 2024-02-06 NOTE — FLOWSHEET NOTE
02/06/24 0937   Right Leg Edema Point of Measurement   Leg circumference 39 cm   Ankle circumference 24 cm   Compression Therapy 2 layer compression wrap   Left Leg Edema Point of Measurement   Leg circumference 36 cm   Ankle circumference 23.5 cm   Compression Therapy 2 layer compression wrap   RLE Neurovascular Assessment   Capillary Refill Less than/Equal to 3 seconds   Color Appropriate for Ethnicity   Temperature Warm   R Pedal Pulse +2   LLE Neurovascular Assessment   Capillary Refill Less than/Equal to 3 seconds   Color Appropriate for Ethnicity   Temperature Warm   L Pedal Pulse +2   Wound 01/26/24 Leg Medial;Right Intact Blister    Date First Assessed/Time First Assessed: 01/26/24 1320   Present on Original Admission: No  Primary Wound Type: (c)   Location: Leg  Wound Location Orientation: Medial;Right  Wound Description (Comments): Intact Blister    Wound Image    Wound Etiology Venous   Dressing Status Old drainage noted   Wound Cleansed Wound cleanser   Dressing/Treatment   (Collagen, HFBR, 2 layer with calamine)   Wound Length (cm) 10.5 cm   Wound Width (cm) 9 cm   Wound Depth (cm) 0.1 cm   Wound Surface Area (cm^2) 94.5 cm^2   Change in Wound Size % (l*w) 4.55   Wound Volume (cm^3) 9.45 cm^3   Wound Healing % 5   Wound Assessment [place]/red;Slough   Drainage Amount Moderate (25-50%)   Drainage Description Serous   Odor None   Shital-wound Assessment Fragile   Margins Defined edges   Wound Thickness Description not for Pressure Injury Partial thickness   Wound 10/20/23 Leg Lateral;Left Ruptured blister    Date First Assessed: 10/20/23   Primary Wound Type: Pressure Injury  Location: Leg  Wound Location Orientation: Lateral;Left  Wound Description (Comments): Ruptured blister    Wound Image    Wound Etiology Venous   Dressing Status Old drainage noted   Wound Cleansed Soap and water;Cleansed with saline   Dressing/Treatment   (Collagen, HFBR, 2 layer with calamine)   Wound Length (cm) 1.5 cm   Wound Width

## 2024-02-06 NOTE — FLOWSHEET NOTE
02/06/24 0952   Wound 01/26/24 Leg Medial;Right Intact Blister    Date First Assessed/Time First Assessed: 01/26/24 1320   Present on Original Admission: No  Primary Wound Type: (c)   Location: Leg  Wound Location Orientation: Medial;Right  Wound Description (Comments): Intact Blister    Dressing/Treatment   (Collagen, HFBR, 2 layer with calamine)   Post-Procedure Length (cm) 9 cm   Post-Procedure Width (cm) 7 cm   Post-Procedure Depth (cm) 0.2 cm   Post-Procedure Surface Area (cm^2) 63 cm^2   Post-Procedure Volume (cm^3) 12.6 cm^3   Wound 10/20/23 Leg Lateral;Left Ruptured blister    Date First Assessed: 10/20/23   Primary Wound Type: Pressure Injury  Location: Leg  Wound Location Orientation: Lateral;Left  Wound Description (Comments): Ruptured blister    Dressing/Treatment   (Collagen, HFBR, Gauze, Roll gauze, tape)   Post-Procedure Length (cm) 1.5 cm   Post-Procedure Width (cm) 3 cm   Post-Procedure Depth (cm) 0.2 cm   Post-Procedure Surface Area (cm^2) 4.5 cm^2   Post-Procedure Volume (cm^3) 0.9 cm^3     Multilayer Compression Wrap   (Not Unna) Below the Knee    NAME:  Tom Birch  YOB: 1970  MEDICAL RECORD NUMBER:  [id number]  DATE:  2/6/2024    Multilayer compression wrap: Removed old Multilayer wrap if indicated and wash leg with mild soap/water.  Applied moisturizing agent to dry skin as needed.   Applied primary and secondary dressing as ordered.  Applied multilayered dressing below the knee to right lower leg.  Instructed patient/caregiver not to remove dressing and to keep it clean and dry.   Instructed patient/caregiver on complications to report to provider, such as pain, numbness in toes, heavy drainage, and slippage of dressing.  Instructed patient on purpose of compression dressing and on activity and exercise recommendations.    Patient tolerated procedure well with no impairment to circulation noted.      Electronically signed by Kayce Mandujano RN on 2/6/2024 at 10:03

## 2024-02-06 NOTE — ED PROVIDER NOTES
LAYER OF CREAM EXTERNALLY TO AFFECTED AREA ON NECK RASH AND EXTERNAL EAR TWICE DAILY FOR  SKIN  IRRITATION, Historical Med       !! - Potential duplicate medications found. Please discuss with provider.            PHYSICAL EXAM      ED Triage Vitals   Enc Vitals Group      BP 02/06/24 1029 (!) 182/97      Pulse 02/06/24 1029 94      Respirations 02/06/24 1029 18      Temp 02/06/24 1029 97.7 °F (36.5 °C)      Temp src --       SpO2 02/06/24 1029 96 %      Weight - Scale 02/06/24 1030 114.8 kg (253 lb)      Height 02/06/24 1030 1.778 m (5' 10\")      Head Circumference --       Peak Flow --       Pain Score --       Pain Loc --       Pain Edu? --       Excl. in GC? --               Physical Exam  Constitutional:       Comments: This is an overweight middle-aged male awake and alert appearing uncomfortable   HENT:      Head: Normocephalic and atraumatic.   Eyes:      Pupils: Pupils are equal, round, and reactive to light.   Cardiovascular:      Rate and Rhythm: Normal rate and regular rhythm.      Pulses: Normal pulses.      Heart sounds: No murmur heard.  Musculoskeletal:      Right lower leg: Edema present.      Left lower leg: Edema present.      Comments: Left calf tender in the mid calf extending down to the posterior ankle.  Unable to palpate the Achilles tendon due to tenderness   Skin:     Comments: Left post calf wound with clean dressing in place. Appears to be healing.    Neurological:      Mental Status: He is alert.          DIAGNOSTIC RESULTS        RADIOLOGY:     Interpretation per the Radiologist below, if available at the time of this note:     XR ANKLE LEFT (MIN 3 VIEWS)   Final Result   No acute abnormality.              EMERGENCY DEPARTMENT COURSE and DIFFERENTIAL DIAGNOSIS/MDM   Vitals:    Vitals:    02/06/24 1029 02/06/24 1030   BP: (!) 182/97    Pulse: 94    Resp: 18    Temp: 97.7 °F (36.5 °C)    SpO2: 96%    Weight:  114.8 kg (253 lb)   Height:  1.778 m (5' 10\")       Patient was given the

## 2024-02-10 RX ORDER — HYDROCODONE BITARTRATE AND ACETAMINOPHEN 5; 325 MG/1; MG/1
1 TABLET ORAL EVERY 6 HOURS PRN
Qty: 12 TABLET | Refills: 0 | Status: SHIPPED | OUTPATIENT
Start: 2024-02-10 | End: 2024-02-13

## 2024-02-13 ENCOUNTER — HOSPITAL ENCOUNTER (OUTPATIENT)
Facility: HOSPITAL | Age: 54
Discharge: HOME | End: 2024-02-13
Attending: FAMILY MEDICINE
Payer: MEDICAID

## 2024-02-13 VITALS
HEART RATE: 99 BPM | RESPIRATION RATE: 18 BRPM | TEMPERATURE: 97.8 F | SYSTOLIC BLOOD PRESSURE: 158 MMHG | DIASTOLIC BLOOD PRESSURE: 80 MMHG

## 2024-02-13 DIAGNOSIS — L98.499 TYPE I DIABETES MELLITUS WITH ULCER (HCC): ICD-10-CM

## 2024-02-13 DIAGNOSIS — L97.212 CALF ULCER, RIGHT, WITH FAT LAYER EXPOSED (HCC): ICD-10-CM

## 2024-02-13 DIAGNOSIS — E10.622 TYPE I DIABETES MELLITUS WITH ULCER (HCC): ICD-10-CM

## 2024-02-13 DIAGNOSIS — L97.222 NON-PRESSURE CHRONIC ULCER OF LEFT CALF WITH FAT LAYER EXPOSED (HCC): Primary | ICD-10-CM

## 2024-02-13 PROCEDURE — 11042 DBRDMT SUBQ TIS 1ST 20SQCM/<: CPT

## 2024-02-13 RX ORDER — LIDOCAINE HYDROCHLORIDE 40 MG/ML
SOLUTION TOPICAL ONCE
OUTPATIENT
Start: 2024-02-13 | End: 2024-02-13

## 2024-02-13 RX ORDER — IBUPROFEN 200 MG
TABLET ORAL ONCE
OUTPATIENT
Start: 2024-02-13 | End: 2024-02-13

## 2024-02-13 RX ORDER — GENTAMICIN SULFATE 1 MG/G
OINTMENT TOPICAL ONCE
OUTPATIENT
Start: 2024-02-13 | End: 2024-02-13

## 2024-02-13 RX ORDER — CLOBETASOL PROPIONATE 0.5 MG/G
OINTMENT TOPICAL ONCE
OUTPATIENT
Start: 2024-02-13 | End: 2024-02-13

## 2024-02-13 RX ORDER — TRIAMCINOLONE ACETONIDE 1 MG/G
OINTMENT TOPICAL ONCE
OUTPATIENT
Start: 2024-02-13 | End: 2024-02-13

## 2024-02-13 RX ORDER — BACITRACIN ZINC AND POLYMYXIN B SULFATE 500; 1000 [USP'U]/G; [USP'U]/G
OINTMENT TOPICAL ONCE
OUTPATIENT
Start: 2024-02-13 | End: 2024-02-13

## 2024-02-13 RX ORDER — LIDOCAINE 50 MG/G
OINTMENT TOPICAL ONCE
OUTPATIENT
Start: 2024-02-13 | End: 2024-02-13

## 2024-02-13 RX ORDER — SODIUM CHLOR/HYPOCHLOROUS ACID 0.033 %
SOLUTION, IRRIGATION IRRIGATION ONCE
OUTPATIENT
Start: 2024-02-13 | End: 2024-02-13

## 2024-02-13 RX ORDER — GINSENG 100 MG
CAPSULE ORAL ONCE
OUTPATIENT
Start: 2024-02-13 | End: 2024-02-13

## 2024-02-13 RX ORDER — LIDOCAINE HYDROCHLORIDE 20 MG/ML
JELLY TOPICAL ONCE
OUTPATIENT
Start: 2024-02-13 | End: 2024-02-13

## 2024-02-13 RX ORDER — LIDOCAINE 40 MG/G
CREAM TOPICAL ONCE
OUTPATIENT
Start: 2024-02-13 | End: 2024-02-13

## 2024-02-13 RX ORDER — BETAMETHASONE DIPROPIONATE 0.5 MG/G
CREAM TOPICAL ONCE
OUTPATIENT
Start: 2024-02-13 | End: 2024-02-13

## 2024-02-13 NOTE — PROGRESS NOTES
10/24/23   Paco Bray PA-C   HUMALOG KWIKPEN 100 UNIT/ML SOPN Use as directed for max dose of 80 units per day 9/22/23   José Franco MD   Insulin Pen Needle 32G X 4 MM MISC Use pen needle with insulin pens for up to 4 times per day E10.65 9/22/23   José Franco MD   blood glucose test strips (ONETOUCH VERIO) strip 1 each by In Vitro route in the morning, at noon, and at bedtime DX: E10.9  Patient not taking: Reported on 2/6/2024 9/14/23   Paco Bray PA-C   glucose monitoring kit 1 kit by Does not apply route daily  Patient not taking: Reported on 2/6/2024 9/11/23   Paco Bray PA-C   Lancets MISC 1 each by Does not apply route daily  Patient not taking: Reported on 2/6/2024 9/11/23   Paco Bray PA-C   cyanocobalamin 1000 MCG tablet Take 1 tablet by mouth daily    Automatic Reconciliation, Ar   selenium sulfide (SELSUN BLUE) 1 % shampoo Apply to head, mustache area and left ear at least several times daily. 11/13/18   Automatic Reconciliation, Ar   triamcinolone (KENALOG) 0.1 % cream APPLY THIN LAYER OF CREAM EXTERNALLY TO AFFECTED AREA ON NECK RASH AND EXTERNAL EAR TWICE DAILY FOR  SKIN  IRRITATION 12/20/22   Automatic Reconciliation, Ar      No Known Allergies       Signed By: Darby Gill MD      02/13/24

## 2024-02-13 NOTE — FLOWSHEET NOTE
02/13/24 0928   Right Leg Edema Point of Measurement   Leg circumference 36.5 cm   Ankle circumference 24 cm   Compression Therapy 2 layer compression wrap   Left Leg Edema Point of Measurement   Compression Therapy Ace wrap   RLE Neurovascular Assessment   Capillary Refill Less than/Equal to 3 seconds   Color Appropriate for Ethnicity   Temperature Warm   R Pedal Pulse +2   Wound 01/26/24 Leg Medial;Right Intact Blister    Date First Assessed/Time First Assessed: 01/26/24 1320   Present on Original Admission: No  Primary Wound Type: (c)   Location: Leg  Wound Location Orientation: Medial;Right  Wound Description (Comments): Intact Blister    Wound Image    Wound Etiology Venous   Dressing Status Old drainage noted   Wound Cleansed Soap and water   Wound Length (cm) 3 cm   Wound Width (cm) 6 cm   Wound Depth (cm) 0.1 cm   Wound Surface Area (cm^2) 18 cm^2   Change in Wound Size % (l*w) 81.82   Wound Volume (cm^3) 1.8 cm^3   Wound Healing % 82   Wound Assessment Deary/red;Slough   Drainage Amount Moderate (25-50%)   Drainage Description Serous   Odor None   Shital-wound Assessment Fragile   Margins Defined edges   Wound Thickness Description not for Pressure Injury Full thickness   Pain Assessment   Pain Assessment 0-10   Pain Level 5     Temp 97.8 °F (36.6 °C) (Temporal)   Resp 18

## 2024-02-13 NOTE — DISCHARGE INSTRUCTIONS
Discharge Instructions [facility]                      [address]                         [address], [address]                   [place] [address]  Telephone: [phone]     FAX [phone]    NAME:  Tom Birch  YOB: 1970  MEDICAL RECORD NUMBER:  [id number]  DATE:  2/13/2024  WOUND CARE ORDERS:  Right lower leg wound :Cleanse with soap and water , apply primary dressing Collagen and Hydrofera Blue Ready  cover with secondary dressing ABD.  Apply 2 layer compression wrap with calamine Pt./pcg/HH nurse to change (freq) 2x Weekly and as needed for compromise.Follow up with provider in 2 Week(s).   Home Health Agency: [facility]   TREATMENT ORDERS:    Elevate leg(s) above the level of the heart when sitting.   Avoid prolonged standing in one place.  Do no get dressing/wrap wet.  Follow Diet as prescribed:   [] Diet as tolerated: [] Calorie Diabetic Diet: Low carb and no Sugar [] No Added Salt:  [] Increase Protein: [] Limit the amount of liquid you are drinking and avoid drinking in between meals     Return Appointment:  [x] Return Appointment: With Dr. Gill in  2 Week(s)  [] Nurse Visit : *** days  [] Ordered tests:    Electronically signed Susan Alvarado RN on 2/13/2024 at 9:46 AM     Wound Care Center Information: Should you experience any significant changes in your wound(s) or have questions about your wound care, please contact the [facility] at MONDAY - FRIDAY 8:00 am - 4:30.  If you need help with your wound outside these hours and cannot wait until we are again available, contact your PCP or go to the hospital emergency room.   PLEASE NOTE: IF YOU ARE UNABLE TO OBTAIN WOUND SUPPLIES, CONTINUE TO USE THE SUPPLIES YOU HAVE AVAILABLE UNTIL YOU ARE ABLE TO REACH US. IT IS MOST IMPORTANT TO KEEP THE WOUND COVERED AT ALL TIMES.     Physician Signature:_______________________    Date: ___________ Time:  ____________  Address for

## 2024-02-13 NOTE — FLOWSHEET NOTE
02/13/24 0946   Wound 01/26/24 Leg Medial;Right Intact Blister    Date First Assessed/Time First Assessed: 01/26/24 1320   Present on Original Admission: No  Primary Wound Type: (c)   Location: Leg  Wound Location Orientation: Medial;Right  Wound Description (Comments): Intact Blister    Dressing Status New dressing applied   Dressing/Treatment Collagen;Hydrofera blue;ABD  (2 layer with calamine)   Post-Procedure Length (cm) 3 cm   Post-Procedure Width (cm) 6 cm   Post-Procedure Depth (cm) 0.2 cm   Post-Procedure Surface Area (cm^2) 18 cm^2   Post-Procedure Volume (cm^3) 3.6 cm^3       Multilayer Compression Wrap  (Not Unna) Below the Knee    NAME:  Tom Birch  YOB: 1970  MEDICAL RECORD NUMBER:  [id number]  DATE:  2/13/2024    Removed old Multilayer wrap if indicated and wash leg with mild soap/water.  Applied moisturizing agent to dry skin as needed.   Applied primary and secondary dressing as ordered.  Applied multilayered dressing below the knee to Right lower leg.  Instructed patient/caregiver not to remove dressing and to keep it clean and dry.   Instructed patient/caregiver on complications to report to provider, such as pain, numbness in toes, heavy drainage, and slippage of dressing.  Instructed patient on purpose of compression dressing and on activity and exercise recommendations.    Patient tolerated procedure well with no impairment to circulation noted.    Electronically signed by Susan Alvarado RN on 2/13/2024 at 9:48 AM

## 2024-02-20 ENCOUNTER — HOME CARE VISIT (OUTPATIENT)
Facility: HOME HEALTH | Age: 54
End: 2024-02-20
Payer: MEDICAID

## 2024-02-20 PROCEDURE — G0300 HHS/HOSPICE OF LPN EA 15 MIN: HCPCS

## 2024-02-21 VITALS
OXYGEN SATURATION: 96 % | RESPIRATION RATE: 18 BRPM | DIASTOLIC BLOOD PRESSURE: 84 MMHG | TEMPERATURE: 98.2 F | HEART RATE: 88 BPM | SYSTOLIC BLOOD PRESSURE: 122 MMHG

## 2024-02-21 ASSESSMENT — ENCOUNTER SYMPTOMS
RESPIRATORY SYMPTOMS COMMENTS: UPON EXERTION
PAIN LOCATION - PAIN QUALITY: ACHING
HEMOPTYSIS: 0

## 2024-02-21 NOTE — HOME HEALTH
Subjective: Pt states he goes to ortho at 1pm today   Falls since last visit YES(if yes complete the Fall Tracking Form and include bsrifallreport):   Caregiver involvement changes: Uncle is primary caregiver   Home health supplies by type and quantity ordered/delivered this visit include: none     Clinician asked if patient has had any physician contact since last home care visit and patient states: NO  Clinician asked if patient has any new or changed medications and patient states:  NO   If Yes, were medications reconciled? NO   Was the certifying physician notified of changes in medications? NO     Clinical assessment (what this visit means for the patient overall and need for ongoing skilled care) and progress or lack of progress towards SPECIFIC goals: Pt continues to require SN for wound care     Written Teaching Material Utilized: N/A    Interdisciplinary communication with: N/A for the purpose of NA     Discharge planning as follows: When wound is 100% healed    Specific plan for next visit: Wound care

## 2024-02-22 ENCOUNTER — HOME CARE VISIT (OUTPATIENT)
Dept: HOME HEALTH SERVICES | Facility: HOME HEALTH | Age: 54
End: 2024-02-22
Payer: MEDICAID

## 2024-02-23 ENCOUNTER — HOME CARE VISIT (OUTPATIENT)
Facility: HOME HEALTH | Age: 54
End: 2024-02-23
Payer: MEDICAID

## 2024-02-23 VITALS
DIASTOLIC BLOOD PRESSURE: 70 MMHG | OXYGEN SATURATION: 95 % | RESPIRATION RATE: 18 BRPM | WEIGHT: 256.5 LBS | TEMPERATURE: 98.3 F | HEART RATE: 104 BPM | SYSTOLIC BLOOD PRESSURE: 130 MMHG | BODY MASS INDEX: 36.8 KG/M2

## 2024-02-23 PROCEDURE — G0299 HHS/HOSPICE OF RN EA 15 MIN: HCPCS

## 2024-02-23 ASSESSMENT — ENCOUNTER SYMPTOMS: PAIN LOCATION - PAIN QUALITY: STABBING, THROBBING

## 2024-02-23 NOTE — HOME HEALTH
Subjective: \"I am having pain all over.\"  Falls since last visit No(if yes complete the Fall Tracking Form and include bsrifallreport):   Caregiver involvement changes:   Home health supplies by type and quantity ordered/delivered this visit include: Rolled gauze    Clinician asked if patient has had any physician contact since last home care visit and patient states: NO  Clinician asked if patient has any new or changed medications and patient states:  NO   If Yes, were medications reconciled? N/A   Was the certifying physician notified of changes in medications? N/A     Clinical assessment (what this visit means for the patient overall and need for ongoing skilled care) and progress or lack of progress towards SPECIFIC goals: Pt has bilateral wounds to anterior bilateral lower extremities. Pt wounds are progressing towards healing goal. D/t wound location pt would benefit from further HH visits for wound care. SN performed wound care, assessment, aducation and VS.  Next scheduled visit is Friday 3/1/24    Written Teaching Material Utilized: N/A    Interdisciplinary communication with: N/A     Discharge planning as follows: When wound is 100% healed and When goals are met    Specific plan for next visit: Wound care and Recert

## 2024-02-24 DIAGNOSIS — F41.9 ANXIETY: ICD-10-CM

## 2024-02-26 RX ORDER — LORAZEPAM 0.5 MG/1
TABLET ORAL
Qty: 2 TABLET | Refills: 0 | OUTPATIENT
Start: 2024-02-26

## 2024-02-27 ENCOUNTER — HOSPITAL ENCOUNTER (OUTPATIENT)
Facility: HOSPITAL | Age: 54
Discharge: HOME | End: 2024-02-27
Attending: FAMILY MEDICINE
Payer: MEDICAID

## 2024-02-27 VITALS
SYSTOLIC BLOOD PRESSURE: 143 MMHG | DIASTOLIC BLOOD PRESSURE: 72 MMHG | RESPIRATION RATE: 18 BRPM | HEART RATE: 105 BPM | TEMPERATURE: 97.8 F

## 2024-02-27 DIAGNOSIS — L98.499 TYPE I DIABETES MELLITUS WITH ULCER (HCC): ICD-10-CM

## 2024-02-27 DIAGNOSIS — E10.622 TYPE I DIABETES MELLITUS WITH ULCER (HCC): ICD-10-CM

## 2024-02-27 DIAGNOSIS — L97.212 CALF ULCER, RIGHT, WITH FAT LAYER EXPOSED (HCC): ICD-10-CM

## 2024-02-27 DIAGNOSIS — L97.222 NON-PRESSURE CHRONIC ULCER OF LEFT CALF WITH FAT LAYER EXPOSED (HCC): Primary | ICD-10-CM

## 2024-02-27 PROCEDURE — 29581 APPL MULTLAYER CMPRN SYS LEG: CPT

## 2024-02-27 RX ORDER — GINSENG 100 MG
CAPSULE ORAL ONCE
OUTPATIENT
Start: 2024-02-27 | End: 2024-02-27

## 2024-02-27 RX ORDER — IBUPROFEN 200 MG
TABLET ORAL ONCE
OUTPATIENT
Start: 2024-02-27 | End: 2024-02-27

## 2024-02-27 RX ORDER — LIDOCAINE HYDROCHLORIDE 20 MG/ML
JELLY TOPICAL ONCE
OUTPATIENT
Start: 2024-02-27 | End: 2024-02-27

## 2024-02-27 RX ORDER — BACITRACIN ZINC AND POLYMYXIN B SULFATE 500; 1000 [USP'U]/G; [USP'U]/G
OINTMENT TOPICAL ONCE
OUTPATIENT
Start: 2024-02-27 | End: 2024-02-27

## 2024-02-27 RX ORDER — LIDOCAINE 50 MG/G
OINTMENT TOPICAL ONCE
OUTPATIENT
Start: 2024-02-27 | End: 2024-02-27

## 2024-02-27 RX ORDER — GENTAMICIN SULFATE 1 MG/G
OINTMENT TOPICAL ONCE
OUTPATIENT
Start: 2024-02-27 | End: 2024-02-27

## 2024-02-27 RX ORDER — LIDOCAINE 40 MG/G
CREAM TOPICAL ONCE
OUTPATIENT
Start: 2024-02-27 | End: 2024-02-27

## 2024-02-27 RX ORDER — TRIAMCINOLONE ACETONIDE 1 MG/G
OINTMENT TOPICAL ONCE
OUTPATIENT
Start: 2024-02-27 | End: 2024-02-27

## 2024-02-27 RX ORDER — LIDOCAINE HYDROCHLORIDE 40 MG/ML
SOLUTION TOPICAL ONCE
OUTPATIENT
Start: 2024-02-27 | End: 2024-02-27

## 2024-02-27 RX ORDER — SODIUM CHLOR/HYPOCHLOROUS ACID 0.033 %
SOLUTION, IRRIGATION IRRIGATION ONCE
OUTPATIENT
Start: 2024-02-27 | End: 2024-02-27

## 2024-02-27 RX ORDER — CLOBETASOL PROPIONATE 0.5 MG/G
OINTMENT TOPICAL ONCE
OUTPATIENT
Start: 2024-02-27 | End: 2024-02-27

## 2024-02-27 RX ORDER — BETAMETHASONE DIPROPIONATE 0.5 MG/G
CREAM TOPICAL ONCE
OUTPATIENT
Start: 2024-02-27 | End: 2024-02-27

## 2024-02-27 NOTE — DISCHARGE INSTRUCTIONS
Discharge Instructions/Wound Care Orders  [facility]   [address]   [address], [address]  [place] [address]   Telephone: [phone]     FAX [phone]    NAME:  Tom Birch  YOB: 1970  MEDICAL RECORD NUMBER:  [id number]  DATE:  2/27/2024     Wound Care Orders:  Bilateral lower leg wounds :Cleanse with soap and water, apply zinc, 2 sabiha calamine wrap. Dressing does not need to be changed..Home Health is no longer needed for wound care. Retrun to see MD in 1 week  Home Health Agency: No longer needed for wound care  Treatment Orders:   Elevate leg(s) above the level of the heart when sitting, if swelling present.  Avoid prolonged standing in one place.  Wear off-loading shoe/boot on the affected foot when walking.  Do not get dressing/cast wet.  Do not use objects to scratch inside cast/wrap.   Follow diet as prescribed:  [] Diet as tolerated: [] Diabetic Diet: Low carb no sugar [] No Added Salt:  [] Increase Protein: [] Other:Limit the amount of liquid you are drinking and avoid drinking in between meals             Follow-up:  [x] Return Appointment: With Dr. Gill in  1 Week(s)  [] Ordered tests:    Electronically signed KAREL PICKARD RN on 2/27/2024 at 10:13 AM     Wound Care Center Information: Should you experience any significant changes in your wound(s) or have questions about your wound care, please contact the [facility] at MONDAY - FRIDAY 8:00 am - 4:30.  If you need help with your wound outside these hours and cannot wait until we are again available, contact your PCP or go to the hospital emergency room.     PLEASE NOTE: IF YOU ARE UNABLE TO OBTAIN WOUND SUPPLIES, CONTINUE TO USE THE SUPPLIES YOU HAVE AVAILABLE UNTIL YOU ARE ABLE TO REACH US. IT IS MOST IMPORTANT TO KEEP THE WOUND COVERED AT ALL TIMES.     Physician Signature:_______________________    Date: ___________ Time:  ____________

## 2024-02-27 NOTE — PROGRESS NOTES
Wound Center  Progress Note / Procedure Note      Chief Complaint:  Tom Birch is a 53 y.o.  male  with L lower leg lateral wound of >1 months duration.      Assessment/Plan     53 y.o. male with DM I, CHF    -L lower leg lateral chronic ulcer.  Diabetic, venous, swelling  Full thickness  Smaller; almost healed    -R lower leg chronic ulcer.   Diabetic, venous, swelling  Smaller; almost healed      -Left achilles injury   bulky splint on  Has immobilizer boot  MRI on 3/8    Dm1  A1c 7.1  BS ranging from 50s to 200- working with endo   Discussed in detail    Leg swelling  Does not wear compression  Discussed:  Elevation  Compression  To order velcro wraps next week        Following discussed with patient  Needs :  Serial debridement- prn    Good local wound care  Dressing: D/C collagen,  HBR; SRART zinc  Frequency : once weekly    D/C Home health    Coflex 2 layer to both legs  Do not get wet   Do not remove  Cast cover to shower    -    Patient/  understood and agrees with plan. Questions answered.      Follow up with me in 1 week    Subjective:   Since last visit  2/27/24  Saw ortho- gave a immobilizer boot to wear and adv to limit walking  MRI on 3/8      2/13/24   Went to ER, xray normal, put in splint and referred to ortho on 2/20  ER Doc Discussed case with Ortho consultant CHLOE David. He recommends bulky splint, nonweightbearing and follow-up with Ji in the orthopedics office     HPI:     CHF and pneumonia in Dec- in hosp for f ew days  Both legs had blister on them, right healed  Left blister opened and remains open  Has HH  Doing wound care 3x/week- foam dressing  History/Chart/Medications reviewed    Wound caused by:  mixed  Current wound care:See flowsheet  Offloading wound: Yes  Elevating legs: yes  Compression compliance:yes  Appetite: good  Wound associated pain: See flowsheet  Diabetic: yes  Smoker: no  ROS: no N/V/D, no T/chills; no local rash, no chest pain or

## 2024-02-27 NOTE — FLOWSHEET NOTE
02/27/24 0947   Wound 10/20/23 Leg Lateral;Left Ruptured blister    Date First Assessed: 10/20/23   Primary Wound Type: Pressure Injury  Location: Leg  Wound Location Orientation: Lateral;Left  Wound Description (Comments): Ruptured blister    Wound Image    Wound Etiology Venous   Dressing Status Old drainage noted   Wound Cleansed Cleansed with saline   Wound Length (cm) 0.3 cm   Wound Width (cm) 0.3 cm   Wound Depth (cm) 0.1 cm   Wound Surface Area (cm^2) 0.09 cm^2   Change in Wound Size % (l*w) 99.43   Wound Volume (cm^3) 0.009 cm^3   Wound Healing % 100   Wound Assessment Pink/red;Epithelialization   Drainage Amount Scant (moist but unmeasurable)   Drainage Description Serous   Odor None   Shital-wound Assessment Intact   Margins Flat/open edges   Wound Thickness Description not for Pressure Injury Partial thickness   Wound 01/26/24 Leg Medial;Right Intact Blister    Date First Assessed/Time First Assessed: 01/26/24 1320   Present on Original Admission: No  Primary Wound Type: (c)   Location: Leg  Wound Location Orientation: Medial;Right  Wound Description (Comments): Intact Blister    Wound Image    Wound Etiology Venous   Dressing Status Old drainage noted   Wound Cleansed Soap and water   Wound Length (cm) 0.4 cm   Wound Width (cm) 2 cm   Wound Depth (cm) 0.1 cm   Wound Surface Area (cm^2) 0.8 cm^2   Change in Wound Size % (l*w) 99.19   Wound Volume (cm^3) 0.08 cm^3   Wound Healing % 99   Wound Assessment Pink/red;Epithelialization;Granulation tissue   Drainage Amount Small (< 25%)   Drainage Description Serosanguinous   Odor None   Shital-wound Assessment Intact   Margins Defined edges;Flat/open edges   Wound Thickness Description not for Pressure Injury Full thickness  (appears to be healed)     BP (!) 143/72   Pulse (!) 105   Temp 97.8 °F (36.6 °C) (Temporal)   Resp 18     
error  
of dressing.    Patient tolerated procedure well with no impairment to circulation noted.      Electronically signed by KAREL PICKARD RN on 2/27/2024 at 10:22 AM

## 2024-03-01 ENCOUNTER — HOME CARE VISIT (OUTPATIENT)
Facility: HOME HEALTH | Age: 54
End: 2024-03-01

## 2024-03-01 VITALS
DIASTOLIC BLOOD PRESSURE: 78 MMHG | HEART RATE: 94 BPM | TEMPERATURE: 98.2 F | WEIGHT: 256.5 LBS | SYSTOLIC BLOOD PRESSURE: 132 MMHG | BODY MASS INDEX: 36.8 KG/M2 | RESPIRATION RATE: 16 BRPM | OXYGEN SATURATION: 94 %

## 2024-03-01 PROCEDURE — G0299 HHS/HOSPICE OF RN EA 15 MIN: HCPCS

## 2024-03-01 NOTE — HOME HEALTH
Justification for continued intermittent care: patient with wound care concerns as follows venous stasis ulcers     Paco Bray PA-C approved of the following: the need for continued Skilled Nursing home health services and plan of care for   Skilled Nursing for: additional assessment and wound care services.  Subjective: Patient states pain has been pretty intense    Clinical assessment (what this visit means for the patient overall and need for ongoing skilled care) and progress or lack of progress towards SPECIFIC goals: Patient see Darby Gill for wound care and she didnt want us to do wound care today due to pt will be going to her next week. Patient is in need of skilled nursing for further evaluation and treatment.    Falls since last visit No(if yes complete the Fall Tracking Form and include bsrifallreport):     Written Teaching Material Utilized: N/A    Interdisciplinary communication with: N/A     Medications reconciled and all medications are available in the home this visit. A list of reconciled medications has been given to the patient/caregiver     Patient/caregiver instructed on plan of care and are agreeable to plan of care at this time.      Discharge planning as follows: When goals are met    Specific plan for next visit: Educate in s/s of infection and when to call MD BAKARI or 911

## 2024-03-04 ENCOUNTER — HOME CARE VISIT (OUTPATIENT)
Dept: HOME HEALTH SERVICES | Facility: HOME HEALTH | Age: 54
End: 2024-03-04

## 2024-03-05 ENCOUNTER — HOSPITAL ENCOUNTER (OUTPATIENT)
Facility: HOSPITAL | Age: 54
Discharge: HOME | End: 2024-03-05
Attending: FAMILY MEDICINE
Payer: MEDICAID

## 2024-03-05 VITALS
RESPIRATION RATE: 18 BRPM | TEMPERATURE: 97.8 F | HEART RATE: 97 BPM | SYSTOLIC BLOOD PRESSURE: 160 MMHG | DIASTOLIC BLOOD PRESSURE: 89 MMHG

## 2024-03-05 DIAGNOSIS — E10.622 TYPE I DIABETES MELLITUS WITH ULCER (HCC): ICD-10-CM

## 2024-03-05 DIAGNOSIS — L97.212 CALF ULCER, RIGHT, WITH FAT LAYER EXPOSED (HCC): ICD-10-CM

## 2024-03-05 DIAGNOSIS — L98.499 TYPE I DIABETES MELLITUS WITH ULCER (HCC): ICD-10-CM

## 2024-03-05 DIAGNOSIS — L97.222 NON-PRESSURE CHRONIC ULCER OF LEFT CALF WITH FAT LAYER EXPOSED (HCC): Primary | ICD-10-CM

## 2024-03-05 PROCEDURE — 29581 APPL MULTLAYER CMPRN SYS LEG: CPT

## 2024-03-05 RX ORDER — TRIAMCINOLONE ACETONIDE 1 MG/G
OINTMENT TOPICAL ONCE
OUTPATIENT
Start: 2024-03-05 | End: 2024-03-05

## 2024-03-05 RX ORDER — LIDOCAINE HYDROCHLORIDE 40 MG/ML
SOLUTION TOPICAL ONCE
OUTPATIENT
Start: 2024-03-05 | End: 2024-03-05

## 2024-03-05 RX ORDER — GINSENG 100 MG
CAPSULE ORAL ONCE
OUTPATIENT
Start: 2024-03-05 | End: 2024-03-05

## 2024-03-05 RX ORDER — LIDOCAINE HYDROCHLORIDE 20 MG/ML
JELLY TOPICAL ONCE
OUTPATIENT
Start: 2024-03-05 | End: 2024-03-05

## 2024-03-05 RX ORDER — IBUPROFEN 200 MG
TABLET ORAL ONCE
OUTPATIENT
Start: 2024-03-05 | End: 2024-03-05

## 2024-03-05 RX ORDER — SODIUM CHLOR/HYPOCHLOROUS ACID 0.033 %
SOLUTION, IRRIGATION IRRIGATION ONCE
OUTPATIENT
Start: 2024-03-05 | End: 2024-03-05

## 2024-03-05 RX ORDER — LIDOCAINE 40 MG/G
CREAM TOPICAL ONCE
OUTPATIENT
Start: 2024-03-05 | End: 2024-03-05

## 2024-03-05 RX ORDER — LIDOCAINE 50 MG/G
OINTMENT TOPICAL ONCE
OUTPATIENT
Start: 2024-03-05 | End: 2024-03-05

## 2024-03-05 RX ORDER — GENTAMICIN SULFATE 1 MG/G
OINTMENT TOPICAL ONCE
OUTPATIENT
Start: 2024-03-05 | End: 2024-03-05

## 2024-03-05 RX ORDER — BACITRACIN ZINC AND POLYMYXIN B SULFATE 500; 1000 [USP'U]/G; [USP'U]/G
OINTMENT TOPICAL ONCE
OUTPATIENT
Start: 2024-03-05 | End: 2024-03-05

## 2024-03-05 RX ORDER — BETAMETHASONE DIPROPIONATE 0.5 MG/G
CREAM TOPICAL ONCE
OUTPATIENT
Start: 2024-03-05 | End: 2024-03-05

## 2024-03-05 RX ORDER — CLOBETASOL PROPIONATE 0.5 MG/G
OINTMENT TOPICAL ONCE
OUTPATIENT
Start: 2024-03-05 | End: 2024-03-05

## 2024-03-08 ENCOUNTER — HOSPITAL ENCOUNTER (OUTPATIENT)
Facility: HOSPITAL | Age: 54
End: 2024-03-08
Payer: MEDICAID

## 2024-03-08 DIAGNOSIS — S86.012A RUPTURE OF LEFT ACHILLES TENDON, INITIAL ENCOUNTER: ICD-10-CM

## 2024-03-08 PROCEDURE — 73721 MRI JNT OF LWR EXTRE W/O DYE: CPT

## 2024-03-12 ENCOUNTER — HOSPITAL ENCOUNTER (OUTPATIENT)
Facility: HOSPITAL | Age: 54
Discharge: HOME | End: 2024-03-12
Attending: FAMILY MEDICINE
Payer: MEDICAID

## 2024-03-12 VITALS — TEMPERATURE: 98 F

## 2024-03-12 DIAGNOSIS — L97.212 CALF ULCER, RIGHT, WITH FAT LAYER EXPOSED (HCC): ICD-10-CM

## 2024-03-12 DIAGNOSIS — L98.499 TYPE I DIABETES MELLITUS WITH ULCER (HCC): ICD-10-CM

## 2024-03-12 DIAGNOSIS — L97.222 NON-PRESSURE CHRONIC ULCER OF LEFT CALF WITH FAT LAYER EXPOSED (HCC): Primary | ICD-10-CM

## 2024-03-12 DIAGNOSIS — E10.622 TYPE I DIABETES MELLITUS WITH ULCER (HCC): ICD-10-CM

## 2024-03-12 PROCEDURE — 29581 APPL MULTLAYER CMPRN SYS LEG: CPT

## 2024-03-12 RX ORDER — BETAMETHASONE DIPROPIONATE 0.5 MG/G
CREAM TOPICAL ONCE
OUTPATIENT
Start: 2024-03-12 | End: 2024-03-12

## 2024-03-12 RX ORDER — CLOBETASOL PROPIONATE 0.5 MG/G
OINTMENT TOPICAL ONCE
OUTPATIENT
Start: 2024-03-12 | End: 2024-03-12

## 2024-03-12 RX ORDER — LIDOCAINE HYDROCHLORIDE 40 MG/ML
SOLUTION TOPICAL ONCE
OUTPATIENT
Start: 2024-03-12 | End: 2024-03-12

## 2024-03-12 RX ORDER — SODIUM CHLOR/HYPOCHLOROUS ACID 0.033 %
SOLUTION, IRRIGATION IRRIGATION ONCE
OUTPATIENT
Start: 2024-03-12 | End: 2024-03-12

## 2024-03-12 RX ORDER — LIDOCAINE HYDROCHLORIDE 20 MG/ML
JELLY TOPICAL ONCE
OUTPATIENT
Start: 2024-03-12 | End: 2024-03-12

## 2024-03-12 RX ORDER — LIDOCAINE 50 MG/G
OINTMENT TOPICAL ONCE
OUTPATIENT
Start: 2024-03-12 | End: 2024-03-12

## 2024-03-12 RX ORDER — GENTAMICIN SULFATE 1 MG/G
OINTMENT TOPICAL ONCE
OUTPATIENT
Start: 2024-03-12 | End: 2024-03-12

## 2024-03-12 RX ORDER — GINSENG 100 MG
CAPSULE ORAL ONCE
OUTPATIENT
Start: 2024-03-12 | End: 2024-03-12

## 2024-03-12 RX ORDER — TRIAMCINOLONE ACETONIDE 1 MG/G
OINTMENT TOPICAL ONCE
OUTPATIENT
Start: 2024-03-12 | End: 2024-03-12

## 2024-03-12 RX ORDER — IBUPROFEN 200 MG
TABLET ORAL ONCE
OUTPATIENT
Start: 2024-03-12 | End: 2024-03-12

## 2024-03-12 RX ORDER — BACITRACIN ZINC AND POLYMYXIN B SULFATE 500; 1000 [USP'U]/G; [USP'U]/G
OINTMENT TOPICAL ONCE
OUTPATIENT
Start: 2024-03-12 | End: 2024-03-12

## 2024-03-12 RX ORDER — LIDOCAINE 40 MG/G
CREAM TOPICAL ONCE
OUTPATIENT
Start: 2024-03-12 | End: 2024-03-12

## 2024-03-12 NOTE — FLOWSHEET NOTE
03/12/24 0952   Wound 01/26/24 Leg Medial;Right Intact Blister    Date First Assessed/Time First Assessed: 01/26/24 1320   Present on Original Admission: No  Primary Wound Type: (c)   Location: Leg  Wound Location Orientation: Medial;Right  Wound Description (Comments): Intact Blister    Dressing/Treatment Zinc paste  (2 layer calamine wrap)   Wound 10/20/23 Leg Lateral;Left Ruptured blister    Date First Assessed: 10/20/23   Primary Wound Type: Pressure Injury  Location: Leg  Wound Location Orientation: Lateral;Left  Wound Description (Comments): Ruptured blister    Dressing/Treatment Zinc paste  (2 layer calamine wrap)     Multilayer Compression Wrap   (Not Unna) Below the Knee    NAME:  Tom Birch  YOB: 1970  MEDICAL RECORD NUMBER:  [id number]  DATE:  3/12/2024    Multilayer compression wrap: Removed old Multilayer wrap if indicated and wash leg with mild soap/water.  Applied moisturizing agent to dry skin as needed.   Applied primary and secondary dressing as ordered.  Applied multilayered dressing below the knee to right lower leg.  Applied multilayered dressing below the knee to left lower leg.  Instructed patient/caregiver not to remove dressing and to keep it clean and dry.   Instructed patient/caregiver on complications to report to provider, such as pain, numbness in toes, heavy drainage, and slippage of dressing.    Patient tolerated procedure well with no impairment to circulation noted.      Electronically signed by KAREL PICKARD RN on 3/12/2024 at 9:53 AM

## 2024-03-12 NOTE — PROGRESS NOTES
Wound Center  Progress Note       Chief Complaint:  Tom Birch is a 53 y.o.  male  with L lower leg lateral wound of >1 months duration.      Assessment/Plan     53 y.o. male with DM I, CHF    -L lower leg lateral chronic ulcer.  Diabetic, venous, swelling  Full thickness  healed    -R lower leg chronic ulcer.   Diabetic, venous, swelling  healed    Will see one more time to ensure thorough healing    -Left achilles injury   bulky splint on  Has immobilizer boot  MRI on 3/8    Dm1  A1c 7.1  BS ranging from 50s to 200- working with endo   Discussed in detail    Leg swelling  Does not wear compression  Discussed:  Elevation  Compression  ordered velcro wraps - pending- bring to next visit        Following discussed with patient  Needs :  Serial debridement- prn    Good local wound care  Dressing:   zinc  Frequency : once weekly    D/C Home health    Coflex 2 layer to both legs  Do not get wet   Do not remove  Cast cover to shower    -    Patient/  understood and agrees with plan. Questions answered.      Follow up with me in 1 week    Subjective:   Since last visit  3/12  No issues    2/27/24  Saw ortho- gave a immobilizer boot to wear and adv to limit walking  MRI on 3/8      2/13/24   Went to ER, xray normal, put in splint and referred to ortho on 2/20  ER Doc Discussed case with Ortho consultant CHLOE David. He recommends bulky splint, nonweightbearing and follow-up with Ji in the orthopedics office     HPI:     CHF and pneumonia in Dec- in hosp for f ew days  Both legs had blister on them, right healed  Left blister opened and remains open  Has HH  Doing wound care 3x/week- foam dressing  History/Chart/Medications reviewed    Wound caused by:  mixed  Current wound care:See flowsheet  Offloading wound: Yes  Elevating legs: yes  Compression compliance:yes  Appetite: good  Wound associated pain: See flowsheet  Diabetic: yes  Smoker: no  ROS: no N/V/D, no T/chills; no local rash, no

## 2024-03-12 NOTE — DISCHARGE INSTRUCTIONS
Discharge Instructions/Wound Care Orders  [facility]   [address]   [address], [address]  [place] [address]   Telephone: [phone]     FAX [phone]    NAME:  Tom Birch  YOB: 1970  MEDICAL RECORD NUMBER:  [id number]  DATE:  3/12/2024     Wound Care Orders:  BLE  :Cleanse with soap and water, apply primary dressing Russell or Calmoseptine to an wound   Apply 2 layer compression wrap with calamine  Pt./pcg/HH nurse to change (freq) Once a week in clinic  and as needed for compromise.F/U in 1 week.   Home Health Agency:  Treatment Orders:   Elevate leg(s) above the level of the heart when sitting, if swelling present.  Avoid prolonged standing in one place.  Wear off-loading shoe/boot on the affected foot when walking.  Do not get dressing/cast wet.  Do not use objects to scratch inside cast/wrap.   Follow diet as prescribed:  [] Diet as tolerated: [] Diabetic Diet: Low carb no sugar [] No Added Salt:  [] Increase Protein: [] Other:Limit the amount of liquid you are drinking and avoid drinking in between meals             Follow-up:  [x] Return Appointment: With Dr. Gill in  1 Week(s)  [] Ordered tests:    Electronically signed KAREL PICKARD RN on 3/12/2024 at 9:44 AM     Wound Care Center Information: Should you experience any significant changes in your wound(s) or have questions about your wound care, please contact the [facility] at MONDAY - FRIDAY 8:00 am - 4:30.  If you need help with your wound outside these hours and cannot wait until we are again available, contact your PCP or go to the hospital emergency room.     PLEASE NOTE: IF YOU ARE UNABLE TO OBTAIN WOUND SUPPLIES, CONTINUE TO USE THE SUPPLIES YOU HAVE AVAILABLE UNTIL YOU ARE ABLE TO REACH US. IT IS MOST IMPORTANT TO KEEP THE WOUND COVERED AT ALL TIMES.     Physician Signature:_______________________    Date: ___________ Time:  ____________

## 2024-03-12 NOTE — FLOWSHEET NOTE
03/12/24 0926   Right Leg Edema Point of Measurement   Leg circumference 37 cm   Ankle circumference 22 cm   Compression Therapy 2 layer compression wrap   Left Leg Edema Point of Measurement   Leg circumference 37 cm   Ankle circumference 24 cm   Compression Therapy 2 layer compression wrap   RLE Neurovascular Assessment   Capillary Refill Less than/Equal to 3 seconds   Color Appropriate for Ethnicity   Temperature Warm   R Pedal Pulse +2   LLE Neurovascular Assessment   Capillary Refill Less than/Equal to 3 seconds   Color Appropriate for Ethnicity   Temperature Warm   L Pedal Pulse +2   Wound 01/26/24 Leg Medial;Right Intact Blister    Date First Assessed/Time First Assessed: 01/26/24 1320   Present on Original Admission: No  Primary Wound Type: (c)   Location: Leg  Wound Location Orientation: Medial;Right  Wound Description (Comments): Intact Blister    Wound Image    Wound Etiology Venous   Dressing Status Intact   Wound Cleansed Soap and water   Wound Length (cm) 0.1 cm   Wound Width (cm) 0.1 cm   Wound Depth (cm) 0.1 cm   Wound Surface Area (cm^2) 0.01 cm^2   Change in Wound Size % (l*w) 99.99   Wound Volume (cm^3) 0.001 cm^3   Wound Healing % 100   Wound Assessment Epithelialization   Drainage Amount Scant (moist but unmeasurable)   Drainage Description Serous   Odor None   Shital-wound Assessment Intact   Margins Defined edges   Wound Thickness Description not for Pressure Injury Partial thickness   Wound 10/20/23 Leg Lateral;Left Ruptured blister    Date First Assessed: 10/20/23   Primary Wound Type: Pressure Injury  Location: Leg  Wound Location Orientation: Lateral;Left  Wound Description (Comments): Ruptured blister    Wound Image    Wound Etiology Venous   Dressing Status Intact   Wound Cleansed Soap and water   Wound Length (cm) 0.1 cm   Wound Width (cm) 0.1 cm   Wound Depth (cm) 0.1 cm   Wound Surface Area (cm^2) 0.01 cm^2   Change in Wound Size % (l*w) 99.94   Wound Volume (cm^3) 0.001 cm^3   Wound

## 2024-03-13 ENCOUNTER — OFFICE VISIT (OUTPATIENT)
Age: 54
End: 2024-03-13
Payer: MEDICAID

## 2024-03-13 VITALS
HEIGHT: 70 IN | OXYGEN SATURATION: 85 % | DIASTOLIC BLOOD PRESSURE: 62 MMHG | SYSTOLIC BLOOD PRESSURE: 108 MMHG | TEMPERATURE: 97.7 F | HEART RATE: 103 BPM | BODY MASS INDEX: 36.22 KG/M2 | WEIGHT: 253 LBS

## 2024-03-13 DIAGNOSIS — G47.33 OSA (OBSTRUCTIVE SLEEP APNEA): Primary | ICD-10-CM

## 2024-03-13 DIAGNOSIS — I10 PRIMARY HYPERTENSION: ICD-10-CM

## 2024-03-13 PROCEDURE — 3078F DIAST BP <80 MM HG: CPT | Performed by: INTERNAL MEDICINE

## 2024-03-13 PROCEDURE — 3074F SYST BP LT 130 MM HG: CPT | Performed by: INTERNAL MEDICINE

## 2024-03-13 PROCEDURE — 99204 OFFICE O/P NEW MOD 45 MIN: CPT | Performed by: INTERNAL MEDICINE

## 2024-03-13 ASSESSMENT — SLEEP AND FATIGUE QUESTIONNAIRES
HOW LIKELY ARE YOU TO NOD OFF OR FALL ASLEEP WHILE SITTING QUIETLY AFTER LUNCH WITHOUT ALCOHOL: 1
HOW LIKELY ARE YOU TO NOD OFF OR FALL ASLEEP WHILE SITTING AND READING: 1
HOW LIKELY ARE YOU TO NOD OFF OR FALL ASLEEP WHILE LYING DOWN TO REST IN THE AFTERNOON WHEN CIRCUMSTANCES PERMIT: 1
HOW LIKELY ARE YOU TO NOD OFF OR FALL ASLEEP WHILE SITTING AND TALKING TO SOMEONE: 0
HOW LIKELY ARE YOU TO NOD OFF OR FALL ASLEEP WHILE SITTING INACTIVE IN A PUBLIC PLACE: 0
NECK CIRCUMFERENCE (INCHES): 17
HOW LIKELY ARE YOU TO NOD OFF OR FALL ASLEEP WHILE WATCHING TV: 2
HOW LIKELY ARE YOU TO NOD OFF OR FALL ASLEEP IN A CAR, WHILE STOPPED FOR A FEW MINUTES IN TRAFFIC: 0
HOW LIKELY ARE YOU TO NOD OFF OR FALL ASLEEP WHEN YOU ARE A PASSENGER IN A CAR FOR AN HOUR WITHOUT A BREAK: 2
ESS TOTAL SCORE: 7

## 2024-03-13 NOTE — PATIENT INSTRUCTIONS
label. Alcohol naturally makes you sleepy and on its own can cause accidents. Combined with excessive drowsiness its effects are amplified.   Signs of Drowsy Driving:   * You don't remember driving the last few miles   * You may drift out of your chris   * You are unable to focus and your thoughts wander   * You may yawn more often than normal   * You have difficulty keeping your eyes open / nodding off   * Missing traffic signs, speeding, or tailgating  Prevention-   Good sleep hygiene, lifestyle and behavioral choices have the most impact on drowsy driving. There is no substitute for sleep and the average person requires 8 hours nightly. If you find yourself driving drowsy, stop and sleep. Consider the sleep hygiene tips provided during your visit as well.     Medication Refill Policy: Refills for all medications require 1 week advance notice. Please have your pharmacy fax a refill request. We are unable to fax, or call in \"controled substance\" medications and you will need to pick these prescriptions up from our office.

## 2024-03-13 NOTE — PROGRESS NOTES
Sitting, Cuff Size: Medium Adult)   Pulse (!) 103   Temp 97.7 °F (36.5 °C)   Ht 1.778 m (5' 10\")   Wt 114.8 kg (253 lb)   SpO2 (!) 85%   BMI 36.30 kg/m²         General:   Alert, oriented, not in acute distress   Eyes:  Anicteric Sclerae; intact EOM's   Nose:  No obvious nasal septum deviation    Oropharynx:   Mallampati score 4, thick tongue base, uvula not seen due to low-lying soft palate, narrow tonsilo-pharyngeal pilars, tongue scalloped   Neck:   midline trachea,  no JVD   Chest/Lungs:  symmetrical lung expansion ,clear lung fields on auscultation    CVS:  Normal rate, regular rhythm    Extremities:  No obvious rashes, absent edema    Neuro:  No focal deficits; No obvious tremor    Psych:  Normal eye contact; normal  affect, normal countenance          JOSELITO DAILEY MD, FAASM  Diplomate American Board of Sleep Medicine  Diplomate in Sleep Medicine - ABP    Electronically signed. 03/13/24

## 2024-03-19 ENCOUNTER — HOSPITAL ENCOUNTER (OUTPATIENT)
Facility: HOSPITAL | Age: 54
Discharge: HOME | End: 2024-03-19
Attending: FAMILY MEDICINE
Payer: MEDICAID

## 2024-03-19 VITALS — TEMPERATURE: 97.7 F

## 2024-03-19 DIAGNOSIS — E10.622 TYPE I DIABETES MELLITUS WITH ULCER (HCC): ICD-10-CM

## 2024-03-19 DIAGNOSIS — L97.222 NON-PRESSURE CHRONIC ULCER OF LEFT CALF WITH FAT LAYER EXPOSED (HCC): Primary | ICD-10-CM

## 2024-03-19 DIAGNOSIS — L98.499 TYPE I DIABETES MELLITUS WITH ULCER (HCC): ICD-10-CM

## 2024-03-19 DIAGNOSIS — L97.212 CALF ULCER, RIGHT, WITH FAT LAYER EXPOSED (HCC): ICD-10-CM

## 2024-03-19 PROCEDURE — 29581 APPL MULTLAYER CMPRN SYS LEG: CPT

## 2024-03-19 RX ORDER — LIDOCAINE HYDROCHLORIDE 20 MG/ML
JELLY TOPICAL ONCE
OUTPATIENT
Start: 2024-03-19 | End: 2024-03-19

## 2024-03-19 RX ORDER — SODIUM CHLOR/HYPOCHLOROUS ACID 0.033 %
SOLUTION, IRRIGATION IRRIGATION ONCE
OUTPATIENT
Start: 2024-03-19 | End: 2024-03-19

## 2024-03-19 RX ORDER — CLOBETASOL PROPIONATE 0.5 MG/G
OINTMENT TOPICAL ONCE
OUTPATIENT
Start: 2024-03-19 | End: 2024-03-19

## 2024-03-19 RX ORDER — GENTAMICIN SULFATE 1 MG/G
OINTMENT TOPICAL ONCE
OUTPATIENT
Start: 2024-03-19 | End: 2024-03-19

## 2024-03-19 RX ORDER — IBUPROFEN 200 MG
TABLET ORAL ONCE
OUTPATIENT
Start: 2024-03-19 | End: 2024-03-19

## 2024-03-19 RX ORDER — TRIAMCINOLONE ACETONIDE 1 MG/G
OINTMENT TOPICAL ONCE
OUTPATIENT
Start: 2024-03-19 | End: 2024-03-19

## 2024-03-19 RX ORDER — BACITRACIN ZINC AND POLYMYXIN B SULFATE 500; 1000 [USP'U]/G; [USP'U]/G
OINTMENT TOPICAL ONCE
OUTPATIENT
Start: 2024-03-19 | End: 2024-03-19

## 2024-03-19 RX ORDER — BETAMETHASONE DIPROPIONATE 0.5 MG/G
CREAM TOPICAL ONCE
OUTPATIENT
Start: 2024-03-19 | End: 2024-03-19

## 2024-03-19 RX ORDER — LIDOCAINE HYDROCHLORIDE 40 MG/ML
SOLUTION TOPICAL ONCE
OUTPATIENT
Start: 2024-03-19 | End: 2024-03-19

## 2024-03-19 RX ORDER — GINSENG 100 MG
CAPSULE ORAL ONCE
OUTPATIENT
Start: 2024-03-19 | End: 2024-03-19

## 2024-03-19 RX ORDER — LIDOCAINE 50 MG/G
OINTMENT TOPICAL ONCE
OUTPATIENT
Start: 2024-03-19 | End: 2024-03-19

## 2024-03-19 RX ORDER — LIDOCAINE 40 MG/G
CREAM TOPICAL ONCE
OUTPATIENT
Start: 2024-03-19 | End: 2024-03-19

## 2024-03-19 NOTE — FLOWSHEET NOTE
03/19/24 0953   Right Leg Edema Point of Measurement   Leg circumference 36.8 cm   Ankle circumference 22 cm   Compression Therapy 2 layer compression wrap   Left Leg Edema Point of Measurement   Leg circumference 37 cm   Ankle circumference 23.2 cm   Compression Therapy 2 layer compression wrap   RLE Neurovascular Assessment   Capillary Refill Less than/Equal to 3 seconds   Color Appropriate for Ethnicity   Temperature Warm   R Pedal Pulse +2   LLE Neurovascular Assessment   Capillary Refill Less than/Equal to 3 seconds   Color Appropriate for Ethnicity   Temperature Warm   L Pedal Pulse +2   Wound 01/26/24 Leg Medial;Right Intact Blister    Date First Assessed/Time First Assessed: 01/26/24 1320   Present on Original Admission: No  Primary Wound Type: (c)   Location: Leg  Wound Location Orientation: Medial;Right  Wound Description (Comments): Intact Blister    Wound Image    Wound Etiology Venous   Dressing Status Other (Comment)  (removed wrap)   Wound Cleansed Soap and water   Wound Length (cm) 0 cm   Wound Width (cm) 0 cm   Wound Depth (cm) 0 cm   Wound Surface Area (cm^2) 0 cm^2   Change in Wound Size % (l*w) 100   Wound Volume (cm^3) 0 cm^3   Wound Healing % 100   Wound Assessment Epithelialization   Drainage Amount None (dry)   Odor None   Shital-wound Assessment Fragile   Wound Thickness Description not for Pressure Injury   (appears to be closed)   Wound 10/20/23 Leg Lateral;Left Ruptured blister    Date First Assessed: 10/20/23   Primary Wound Type: Pressure Injury  Location: Leg  Wound Location Orientation: Lateral;Left  Wound Description (Comments): Ruptured blister    Wound Image    Wound Etiology Venous   Dressing Status Other (Comment)  (removed wrap)   Wound Cleansed Soap and water   Wound Length (cm) 0 cm   Wound Width (cm) 0 cm   Wound Depth (cm) 0 cm   Wound Surface Area (cm^2) 0 cm^2   Change in Wound Size % (l*w) 100   Wound Volume (cm^3) 0 cm^3   Wound Healing % 100   Wound Assessment

## 2024-03-19 NOTE — FLOWSHEET NOTE
03/19/24 1001   Wound 01/26/24 Leg Medial;Right Intact Blister    Date First Assessed/Time First Assessed: 01/26/24 1320   Present on Original Admission: No  Primary Wound Type: (c)   Location: Leg  Wound Location Orientation: Medial;Right  Wound Description (Comments): Intact Blister    Dressing Status New dressing applied   Dressing/Treatment Calmoseptine/zinc oxide with menthol  (2 layer with calmaine)   Wound 10/20/23 Leg Lateral;Left Ruptured blister    Date First Assessed: 10/20/23   Primary Wound Type: Pressure Injury  Location: Leg  Wound Location Orientation: Lateral;Left  Wound Description (Comments): Ruptured blister    Dressing Status New dressing applied   Dressing/Treatment Calmoseptine/zinc oxide with menthol  (2 layer with calamine)       Multilayer Compression Wrap  (Not Unna) Below the Knee    NAME:  Tom Birch  YOB: 1970  MEDICAL RECORD NUMBER:  [id number]  DATE:  3/19/2024    Removed old Multilayer wrap if indicated and wash leg with mild soap/water.  Applied moisturizing agent to dry skin as needed.   Applied primary and secondary dressing as ordered.  Applied multilayered dressing below the knee to Left and Right lower leg.  Instructed patient/caregiver not to remove dressing and to keep it clean and dry.   Instructed patient/caregiver on complications to report to provider, such as pain, numbness in toes, heavy drainage, and slippage of dressing.  Instructed patient on purpose of compression dressing and on activity and exercise recommendations.    Patient tolerated procedure well with no impairment to circulation noted.    Electronically signed by Susan Alvarado RN on 3/19/2024 at 10:02 AM

## 2024-03-19 NOTE — DISCHARGE INSTRUCTIONS
Discharge Instructions [facility]  [address]   [address]  [place] [address]   Telephone: [phone]     FAX [phone]    NAME:  Tom Birch  YOB: 1970  MEDICAL RECORD NUMBER:  [id number]  DATE:  3/19/2024  WOUND CARE ORDERS:  Bilateral lower leg wounds :Cleanse with soap and water , apply primary dressing Calmoseptine/zinc oxide with menthol.  Apply 2 layer compression wrap with calamine Pt./pcg/HH nurse to change (freq) Once a week in clinic  and as needed for compromise.Follow up with provider in 1 Week(s).   Home Health Agency: none  TREATMENT ORDERS:    Elevate leg(s) above the level of the heart when sitting.   Avoid prolonged standing in one place.  Do no get dressing/wrap wet.  Follow Diet as prescribed:   [] Diet as tolerated: [] Calorie Diabetic Diet: Low carb and no Sugar [] No Added Salt:  [] Increase Protein: [] Limit the amount of liquid you are drinking and avoid drinking in between meals     Return Appointment:  [x] Return Appointment: With Dr. Gill in  1 Week(s)  [] Nurse Visit : *** days  [] Ordered tests:    Electronically signed Susan Alvarado RN on 3/19/2024 at 10:00 AM     Wound Care Center Information: Should you experience any significant changes in your wound(s) or have questions about your wound care, please contact the [facility] at MONDAY - FRIDAY 8:00 am - 4:30.  If you need help with your wound outside these hours and cannot wait until we are again available, contact your PCP or go to the hospital emergency room.   PLEASE NOTE: IF YOU ARE UNABLE TO OBTAIN WOUND SUPPLIES, CONTINUE TO USE THE SUPPLIES YOU HAVE AVAILABLE UNTIL YOU ARE ABLE TO REACH US. IT IS MOST IMPORTANT TO KEEP THE WOUND COVERED AT ALL TIMES.     Physician Signature:_______________________    Date: ___________ Time:  ____________

## 2024-03-19 NOTE — PROGRESS NOTES
Wound Center  Progress Note       Chief Complaint:  Tom Birch is a 53 y.o.  male  with L lower leg lateral wound of >1 months duration.      Assessment/Plan     53 y.o. male with DM I, CHF    -L lower leg lateral chronic ulcer.  Diabetic, venous, swelling  Full thickness  Remains healed    -R lower leg chronic ulcer.   Diabetic, venous, swelling  Remains healed     Velcro wraps to be delivered tomorrow to patients home- bring to next visit so we can instruct on proper use      -Left achilles injury   bulky splint on  Has immobilizer boot  MRI on 3/8    Dm1  A1c 7.1  BS ranging from 50s to 200- working with endo   Discussed in detail    Leg swelling  Does not wear compression  Discussed:  Elevation  Compression  ordered velcro wraps - pending- bring to next visit        Following discussed with patient  Needs :  Serial debridement- prn    Good local wound care  Dressing:   zinc  Frequency : once weekly    D/C Home health    Coflex 2 layer to both legs  Do not get wet   Do not remove  Cast cover to shower    -    Patient/  understood and agrees with plan. Questions answered.      Follow up with me in 1 week    Subjective:   Since last visit  3/19  No issues    2/27/24  Saw ortho- gave a immobilizer boot to wear and adv to limit walking  MRI on 3/8      2/13/24   Went to ER, xray normal, put in splint and referred to ortho on 2/20  ER Doc Discussed case with Ortho consultant CHLOE David. He recommends bulky splint, nonweightbearing and follow-up with [facility] in the orthopedics office     HPI:     CHF and pneumonia in Dec- in hosp for f ew days  Both legs had blister on them, right healed  Left blister opened and remains open  Has HH  Doing wound care 3x/week- foam dressing  History/Chart/Medications reviewed    Wound caused by:  mixed  Current wound care:See flowsheet  Offloading wound: Yes  Elevating legs: yes  Compression compliance:yes  Appetite: good  Wound associated pain: See

## 2024-03-21 ENCOUNTER — OFFICE VISIT (OUTPATIENT)
Age: 54
End: 2024-03-21
Payer: MEDICAID

## 2024-03-21 VITALS
HEART RATE: 101 BPM | WEIGHT: 261.4 LBS | DIASTOLIC BLOOD PRESSURE: 72 MMHG | RESPIRATION RATE: 16 BRPM | BODY MASS INDEX: 37.42 KG/M2 | HEIGHT: 70 IN | OXYGEN SATURATION: 96 % | TEMPERATURE: 98.2 F | SYSTOLIC BLOOD PRESSURE: 134 MMHG

## 2024-03-21 DIAGNOSIS — G82.20 PARAPARESIS OF BOTH LOWER LIMBS (HCC): ICD-10-CM

## 2024-03-21 DIAGNOSIS — E10.42 DIABETIC POLYNEUROPATHY ASSOCIATED WITH TYPE 1 DIABETES MELLITUS (HCC): ICD-10-CM

## 2024-03-21 DIAGNOSIS — G81.10 SPASTIC HEMIPARESIS AFFECTING DOMINANT SIDE (HCC): ICD-10-CM

## 2024-03-21 DIAGNOSIS — G37.9 DEMYELINATING DISEASE OF CENTRAL NERVOUS SYSTEM, UNSPECIFIED (HCC): Primary | ICD-10-CM

## 2024-03-21 DIAGNOSIS — E66.01 SEVERE OBESITY (BMI 35.0-39.9) WITH COMORBIDITY (HCC): ICD-10-CM

## 2024-03-21 PROBLEM — I50.9 CONGESTIVE HEART FAILURE (HCC): Status: RESOLVED | Noted: 2023-08-24 | Resolved: 2024-03-21

## 2024-03-21 PROBLEM — R79.89 ELEVATED D-DIMER: Status: RESOLVED | Noted: 2023-08-25 | Resolved: 2024-03-21

## 2024-03-21 PROBLEM — J13 CAP (COMMUNITY ACQUIRED PNEUMONIA) DUE TO PNEUMOCOCCUS (HCC): Status: RESOLVED | Noted: 2023-12-05 | Resolved: 2024-03-21

## 2024-03-21 PROBLEM — G36.0 NEUROMYELITIS OPTICA (HCC): Status: RESOLVED | Noted: 2018-09-20 | Resolved: 2024-03-21

## 2024-03-21 PROCEDURE — 3075F SYST BP GE 130 - 139MM HG: CPT | Performed by: PSYCHIATRY & NEUROLOGY

## 2024-03-21 PROCEDURE — 99215 OFFICE O/P EST HI 40 MIN: CPT | Performed by: PSYCHIATRY & NEUROLOGY

## 2024-03-21 PROCEDURE — 3078F DIAST BP <80 MM HG: CPT | Performed by: PSYCHIATRY & NEUROLOGY

## 2024-03-21 ASSESSMENT — PATIENT HEALTH QUESTIONNAIRE - PHQ9
1. LITTLE INTEREST OR PLEASURE IN DOING THINGS: NOT AT ALL
SUM OF ALL RESPONSES TO PHQ9 QUESTIONS 1 & 2: 0
SUM OF ALL RESPONSES TO PHQ QUESTIONS 1-9: 0
2. FEELING DOWN, DEPRESSED OR HOPELESS: NOT AT ALL
SUM OF ALL RESPONSES TO PHQ QUESTIONS 1-9: 0

## 2024-03-21 NOTE — ASSESSMENT & PLAN NOTE
Fairly severe by EMG completed in 2023 and certainly contributory to gait and ambulatory difficulties    Patient has been encouraged to continue to keep his comorbid conditions under good control

## 2024-03-21 NOTE — ASSESSMENT & PLAN NOTE
Pt remains ambulatory with assistive device but this is complicated by patient's severe sensorimotor diabetic peripheral neuropathy  He is most recently had a fall with a ruptured Achilles tendon and fractured foot being followed by Ortho  Patient is to continue to follow-up with Ortho continue with assistive devices for ambulation and continue with fall precautions

## 2024-03-21 NOTE — PATIENT INSTRUCTIONS
As a reminder:   Please come to your appointment 15 minutes before your office appointment.  This way, you can get checked in at the  and checked in by the nursing staff so you have the full allotment of time with your provider for your visit.  Please bring an up-to-date and accurate list of all your medications.  Or bring all your active prescription bottles with you at the time of your office visit and this includes over-the-counter medications so we can make sure that your medication list is up-to-date.        As per discussion  From a neurologic perspective you are doing pretty well.  Your EMGs and I have given you a copy of this, show that you have significant neuropathy in your legs which is really what is causing the weakness more than anything else    You also have what we call ulnar neuropathy in your arms so when you go see the orthopedic team, even though there can to be focusing on your foot you may want to find out who is there hand specialist and see if they can see you for that ulnar neuropathy    In the meantime continue to work to keep all your other medical conditions under good control and we will see you back in 6 months        Office Policies    Phone calls/patient messages:  Please allow up to 48 hours for someone in the office to contact you about your call or message. Be mindful your provider may be out of the office or your message may require further review. We encourage you to use [employer] for your messages as this is a faster, more efficient way to communicate with our office    Medication Refills:  Prescription medications require up to 48 business hours to process. We encourage you to use [employer] for your refills.     For controlled medications: Please allow up to 72 business hours to process. Certain medications may require you to  a written prescription at our office.    NO narcotic/controlled medications will be prescribed after 4pm Monday through Friday or on

## 2024-03-21 NOTE — ASSESSMENT & PLAN NOTE
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

## 2024-03-21 NOTE — PROGRESS NOTES
[facility]  In Office FOLLOW-UP VISIT         Tom Birch is a 53 y.o.  male who presents today for the following:  Chief Complaint   Patient presents with    Follow-up     Patient states that he sharp shooting pains in legs and it happens in the arms every so often and then they feel completely numb.           ASSESSMENT AND PLAN  1. Demyelinating disease of central nervous system, unspecified (HCC)  Assessment & Plan:  Patient is currently diagnosed with transverse myelitis.  AquaPorin-4 has a repeatedly been negative and there is been no change an MRI scan over time  He is stable at this time    We will continue to monitor for changes however.     He is left with residual left hemiparesis and spasticity   2. Spastic hemiparesis affecting dominant side ([facility])  Assessment & Plan:  Pt remains ambulatory with assistive device but this is complicated by patient's severe sensorimotor diabetic peripheral neuropathy  He is most recently had a fall with a ruptured Achilles tendon and fractured foot being followed by Ortho  Patient is to continue to follow-up with Ortho continue with assistive devices for ambulation and continue with fall precautions   3. Severe obesity (BMI 35.0-39.9) with comorbidity (HCC)  Assessment & Plan:  Discussed with patient the need to work on diet.  He needs to follow-up with his primary care.  He has poorly controlled diabetes which is causing significant medical issues from a neurologic perspective severe neuropathy he also has diabetic proliferative retinopathy    Discussed healthy diet but would like him to follow-up with primary care   4. Diabetic polyneuropathy associated with type 1 diabetes mellitus (HCC)  Assessment & Plan:  Fairly severe by EMG completed in 2023 and certainly contributory to gait and ambulatory difficulties    Patient has been encouraged to continue to keep his comorbid conditions under good control   5. Paraparesis of both lower

## 2024-03-21 NOTE — ASSESSMENT & PLAN NOTE
Discussed with patient the need to work on diet.  He needs to follow-up with his primary care.  He has poorly controlled diabetes which is causing significant medical issues from a neurologic perspective severe neuropathy he also has diabetic proliferative retinopathy    Discussed healthy diet but would like him to follow-up with primary care

## 2024-03-21 NOTE — ASSESSMENT & PLAN NOTE
Patient is currently diagnosed with transverse myelitis.  AquaPorin-4 has a repeatedly been negative and there is been no change an MRI scan over time  He is stable at this time    We will continue to monitor for changes however.     He is left with residual left hemiparesis and spasticity

## 2024-03-26 ENCOUNTER — HOSPITAL ENCOUNTER (OUTPATIENT)
Facility: HOSPITAL | Age: 54
Discharge: HOME | End: 2024-03-26
Attending: FAMILY MEDICINE
Payer: MEDICAID

## 2024-03-26 VITALS
TEMPERATURE: 97.1 F | HEART RATE: 94 BPM | RESPIRATION RATE: 16 BRPM | SYSTOLIC BLOOD PRESSURE: 143 MMHG | DIASTOLIC BLOOD PRESSURE: 76 MMHG

## 2024-03-26 DIAGNOSIS — L97.222 NON-PRESSURE CHRONIC ULCER OF LEFT CALF WITH FAT LAYER EXPOSED (HCC): Primary | ICD-10-CM

## 2024-03-26 DIAGNOSIS — L97.212 CALF ULCER, RIGHT, WITH FAT LAYER EXPOSED (HCC): ICD-10-CM

## 2024-03-26 DIAGNOSIS — L98.499 TYPE I DIABETES MELLITUS WITH ULCER (HCC): ICD-10-CM

## 2024-03-26 DIAGNOSIS — E10.622 TYPE I DIABETES MELLITUS WITH ULCER (HCC): ICD-10-CM

## 2024-03-26 PROBLEM — N18.32 CHRONIC KIDNEY DISEASE, STAGE 3B (HCC): Status: ACTIVE | Noted: 2024-03-26

## 2024-03-26 PROCEDURE — 99213 OFFICE O/P EST LOW 20 MIN: CPT

## 2024-03-26 RX ORDER — LIDOCAINE HYDROCHLORIDE 20 MG/ML
JELLY TOPICAL ONCE
OUTPATIENT
Start: 2024-03-26 | End: 2024-03-26

## 2024-03-26 RX ORDER — LIDOCAINE 50 MG/G
OINTMENT TOPICAL ONCE
OUTPATIENT
Start: 2024-03-26 | End: 2024-03-26

## 2024-03-26 RX ORDER — CLOBETASOL PROPIONATE 0.5 MG/G
OINTMENT TOPICAL ONCE
OUTPATIENT
Start: 2024-03-26 | End: 2024-03-26

## 2024-03-26 RX ORDER — BETAMETHASONE DIPROPIONATE 0.5 MG/G
CREAM TOPICAL ONCE
OUTPATIENT
Start: 2024-03-26 | End: 2024-03-26

## 2024-03-26 RX ORDER — LIDOCAINE HYDROCHLORIDE 40 MG/ML
SOLUTION TOPICAL ONCE
OUTPATIENT
Start: 2024-03-26 | End: 2024-03-26

## 2024-03-26 RX ORDER — GINSENG 100 MG
CAPSULE ORAL ONCE
OUTPATIENT
Start: 2024-03-26 | End: 2024-03-26

## 2024-03-26 RX ORDER — LIDOCAINE 40 MG/G
CREAM TOPICAL ONCE
OUTPATIENT
Start: 2024-03-26 | End: 2024-03-26

## 2024-03-26 RX ORDER — GENTAMICIN SULFATE 1 MG/G
OINTMENT TOPICAL ONCE
OUTPATIENT
Start: 2024-03-26 | End: 2024-03-26

## 2024-03-26 RX ORDER — BACITRACIN ZINC AND POLYMYXIN B SULFATE 500; 1000 [USP'U]/G; [USP'U]/G
OINTMENT TOPICAL ONCE
OUTPATIENT
Start: 2024-03-26 | End: 2024-03-26

## 2024-03-26 RX ORDER — IBUPROFEN 200 MG
TABLET ORAL ONCE
OUTPATIENT
Start: 2024-03-26 | End: 2024-03-26

## 2024-03-26 RX ORDER — TRIAMCINOLONE ACETONIDE 1 MG/G
OINTMENT TOPICAL ONCE
OUTPATIENT
Start: 2024-03-26 | End: 2024-03-26

## 2024-03-26 RX ORDER — SODIUM CHLOR/HYPOCHLOROUS ACID 0.033 %
SOLUTION, IRRIGATION IRRIGATION ONCE
OUTPATIENT
Start: 2024-03-26 | End: 2024-03-26

## 2024-03-26 ASSESSMENT — PAIN DESCRIPTION - ORIENTATION: ORIENTATION: LEFT

## 2024-03-26 ASSESSMENT — PAIN DESCRIPTION - LOCATION: LOCATION: LEG

## 2024-03-26 ASSESSMENT — PAIN SCALES - GENERAL: PAINLEVEL_OUTOF10: 7

## 2024-03-26 ASSESSMENT — PAIN DESCRIPTION - DESCRIPTORS: DESCRIPTORS: ACHING

## 2024-03-26 NOTE — PROGRESS NOTES
Wound Center  Progress Note       Chief Complaint:  Tom Birch is a 53 y.o.  male  with L lower leg lateral wound of >1 months duration.      Assessment/Plan     53 y.o. male with DM I, CHF    -L lower leg lateral chronic ulcer.  Diabetic, venous, swelling  Full thickness   healed    -R lower leg chronic ulcer.   Diabetic, venous, swelling   healed    Prevention discussed    Cont to Elevate legs throughout day and wear compression- has velco wrapd   -    Patient/  understood and agrees with plan. Questions answered.      Follow up prn    Subjective:   Since last visit  3/26  No issues    2/27/24  Saw ortho- gave a immobilizer boot to wear and adv to limit walking  MRI on 3/8      2/13/24   Went to ER, xray normal, put in splint and referred to ortho on 2/20  ER Doc Discussed case with Ortho consultant CHLOE David. He recommends bulky splint, nonweightbearing and follow-up with [facility] in the orthopedics office     HPI:     CHF and pneumonia in Dec- in hosp for f ew days  Both legs had blister on them, right healed  Left blister opened and remains open  Has HH  Doing wound care 3x/week- foam dressing  History/Chart/Medications reviewed    Wound caused by:  mixed  Current wound care:See flowsheet  Offloading wound: Yes  Elevating legs: yes  Compression compliance:yes  Appetite: good  Wound associated pain: See flowsheet  Diabetic: yes  Smoker: no  ROS: no N/V/D, no T/chills; no local rash, no chest pain or shortness of breath, infreq headache and dizzyness (due to BS being al over place?)      Objective:     Physical Exam:   See flowsheet / nursing notes for vitals  Vitals:    03/26/24 0945   BP: (!) 143/76   Pulse: 94   Resp: 16   Temp: 97.1 °F (36.2 °C)     General: NAD. Hygiene good  Psych: cooperative. No anxiety or depression. Normal mood and affect.  Neuro: alert and oriented to person/place/situation. Otherwise nonfocal.  Derm: Normal  turgor for age, dry skin  HEENT: Normocephalic,

## 2024-03-26 NOTE — FLOWSHEET NOTE
03/26/24 0951   Right Leg Edema Point of Measurement   Leg circumference 37 cm   Ankle circumference 23 cm   Compression Therapy 2 layer compression wrap   Left Leg Edema Point of Measurement   Leg circumference 36 cm   Ankle circumference 23 cm   Compression Therapy 2 layer compression wrap   RLE Neurovascular Assessment   Capillary Refill Less than/Equal to 3 seconds   Color Appropriate for Ethnicity   Temperature Warm   R Pedal Pulse +2   LLE Neurovascular Assessment   Capillary Refill Less than/Equal to 3 seconds   Color Appropriate for Ethnicity   Temperature Warm   L Pedal Pulse +2   Wound 01/26/24 Leg Medial;Right Intact Blister    Date First Assessed/Time First Assessed: 01/26/24 1320   Present on Original Admission: No  Primary Wound Type: (c)   Location: Leg  Wound Location Orientation: Medial;Right  Wound Description (Comments): Intact Blister    Wound Image    Wound Etiology Venous   Dressing Status Dry   Wound Cleansed Soap and water   Wound Length (cm) 0 cm   Wound Width (cm) 0 cm   Wound Depth (cm) 0 cm   Wound Surface Area (cm^2) 0 cm^2   Change in Wound Size % (l*w) 100   Wound Volume (cm^3) 0 cm^3   Wound Healing % 100   Wound Assessment Epithelialization   Drainage Amount None (dry)   Odor None   Wound 10/20/23 Leg Lateral;Left Ruptured blister    Date First Assessed: 10/20/23   Primary Wound Type: Pressure Injury  Location: Leg  Wound Location Orientation: Lateral;Left  Wound Description (Comments): Ruptured blister    Wound Image    Wound Etiology Venous   Dressing Status Dry   Wound Length (cm) 0 cm   Wound Width (cm) 0 cm   Wound Depth (cm) 0 cm   Wound Surface Area (cm^2) 0 cm^2   Change in Wound Size % (l*w) 100   Wound Volume (cm^3) 0 cm^3   Wound Healing % 100   Wound Assessment Epithelialization   Drainage Amount None (dry)   Odor None       BP (!) 143/76   Pulse 94   Temp 97.1 °F (36.2 °C) (Temporal)   Resp 16

## 2024-03-26 NOTE — DISCHARGE INSTRUCTIONS
Discharge Instructions [facility]  [address]   [address], [address]  [place] [address]   Telephone: [phone]     FAX [phone]    NAME:  Tom Birch  YOB: 1970  MEDICAL RECORD NUMBER:  [id number]  DATE:  3/26/2024  WOUND CARE ORDERS:  Bilateral lower leg :Cleanse with soap and water. Apply Farrow wrap  from the time you wake up till the time you go to bed.Pt./pcg/HH nurse to change (freq) Daily and as needed for compromise.No follow up at this time wound is healed.   Home Health Agency: none  TREATMENT ORDERS:    Elevate leg(s) above the level of the heart when sitting.   Avoid prolonged standing in one place.  Do no get dressing/wrap wet.  Follow Diet as prescribed:   [] Diet as tolerated: [] Calorie Diabetic Diet: Low carb and no Sugar [] No Added Salt:  [] Increase Protein: [] Limit the amount of liquid you are drinking and avoid drinking in between meals     Return Appointment:  [x] Return Appointment: With Dr. Gill in  no follow up   [] Nurse Visit : *** days  [] Ordered tests:    Electronically signed Susan Alvarado RN on 3/26/2024 at 10:11 AM     Wound Care Center Information: Should you experience any significant changes in your wound(s) or have questions about your wound care, please contact the [facility] at MONDAY - FRIDAY 8:00 am - 4:30.  If you need help with your wound outside these hours and cannot wait until we are again available, contact your PCP or go to the hospital emergency room.   PLEASE NOTE: IF YOU ARE UNABLE TO OBTAIN WOUND SUPPLIES, CONTINUE TO USE THE SUPPLIES YOU HAVE AVAILABLE UNTIL YOU ARE ABLE TO REACH US. IT IS MOST IMPORTANT TO KEEP THE WOUND COVERED AT ALL TIMES.     Physician Signature:_______________________    Date: ___________ Time:  ____________

## 2024-03-27 ENCOUNTER — TELEMEDICINE (OUTPATIENT)
Facility: CLINIC | Age: 54
End: 2024-03-27
Payer: MEDICAID

## 2024-03-27 DIAGNOSIS — N18.32 CHRONIC KIDNEY DISEASE, STAGE 3B (HCC): Primary | ICD-10-CM

## 2024-03-27 DIAGNOSIS — E10.3593 PROLIFERATIVE DIABETIC RETINOPATHY OF BOTH EYES ASSOCIATED WITH TYPE 1 DIABETES MELLITUS, UNSPECIFIED PROLIFERATIVE RETINOPATHY TYPE (HCC): ICD-10-CM

## 2024-03-27 DIAGNOSIS — F60.9 PERSONALITY DISORDER (HCC): ICD-10-CM

## 2024-03-27 DIAGNOSIS — J96.11 CHRONIC RESPIRATORY FAILURE WITH HYPOXIA (HCC): ICD-10-CM

## 2024-03-27 DIAGNOSIS — F32.4 MAJOR DEPRESSIVE DISORDER WITH SINGLE EPISODE, IN PARTIAL REMISSION (HCC): ICD-10-CM

## 2024-03-27 DIAGNOSIS — I10 ESSENTIAL HYPERTENSION: ICD-10-CM

## 2024-03-27 DIAGNOSIS — G37.9 DEMYELINATING DISEASE OF CENTRAL NERVOUS SYSTEM, UNSPECIFIED (HCC): ICD-10-CM

## 2024-03-27 PROCEDURE — 99214 OFFICE O/P EST MOD 30 MIN: CPT | Performed by: PHYSICIAN ASSISTANT

## 2024-03-27 RX ORDER — BUMETANIDE 1 MG/1
TABLET ORAL
Qty: 60 TABLET | Refills: 3 | Status: SHIPPED | OUTPATIENT
Start: 2024-03-27

## 2024-03-27 ASSESSMENT — PAIN DESCRIPTION - LOCATION: LOCATION: LEG

## 2024-03-27 ASSESSMENT — ENCOUNTER SYMPTOMS
CONSTIPATION: 0
NAUSEA: 0
VOMITING: 0
SHORTNESS OF BREATH: 0
BLOOD IN STOOL: 0
ABDOMINAL PAIN: 0
DIARRHEA: 0
CHOKING: 1

## 2024-03-27 ASSESSMENT — PAIN SCALES - GENERAL: PAINLEVEL_OUTOF10: 7

## 2024-03-27 NOTE — PROGRESS NOTES
3/27/2024    TELEHEALTH EVALUATION -- Audio/Visual    HPI:    Tom Birch (:  1970) has requested an audio/video evaluation for the following concern(s):    Patient overall is doing well. He had torn achilles tendon while walking into an office visit.  He is working with ortho and PT, does not think he will need surgery but is is boot for 6-8 weeks.     History of HF. Has diastolic dysfunction but normal EF. Still using Bumex for fluid and tracking weights.   Following with Nephrology who is monitoring kidney function and creatining.   Saw Dr. Rodriguez for Cardiology, BP has been looking better.     Follows with Dr. Franco for Type 1 DM. Last A1c 7.1. no changes to routine, having trouble with freestyle falling off, may need improved upgraded product. Has occasional sudden drops from 200's to 70s.     Has had total wound healing now and has been discharged from wound care.    Had routine follow up with Neuro for MS. Now seeing sleep med for KAYLA Work up and pulm for choking and dysphagia, suspected to be due to MS.       Review of Systems   Constitutional:  Negative for chills and fever.   Respiratory:  Positive for choking. Negative for shortness of breath.    Cardiovascular:  Negative for chest pain, palpitations and leg swelling.   Gastrointestinal:  Negative for abdominal pain, blood in stool, constipation, diarrhea, nausea and vomiting.   Genitourinary:  Negative for dysuria and hematuria.   Skin:  Negative for rash.   Neurological:  Negative for headaches.       Prior to Visit Medications    Medication Sig Taking? Authorizing Provider   bumetanide (BUMEX) 1 MG tablet Take one tab daily, may take second tab in AM if weight gain >3 pounds Yes Paco Bray PA-C   oxyCODONE-acetaminophen (PERCOCET)  MG per tablet Take 1 tablet by mouth every 8 hours as needed for Pain. Yes Provider, MD Karen   pregabalin (LYRICA) 200 MG capsule Take 1 capsule by mouth 2 times daily. Max: 400mg Yes Provider,

## 2024-03-27 NOTE — PROGRESS NOTES
Tom Birch  Identified pt with two pt identifiers(name and ).     Chief Complaint   Patient presents with    Hypertension    Diabetes       Reviewed record In preparation for visit and have obtained necessary documentation.     1. Have you been to the ER, urgent care clinic or hospitalized since your last visit? No     2. Have you seen or consulted any other health care providers outside of the [facility] since your last visit? Include any pap smears or colon screening. No    Patient does not have an advance directive.     Vitals reviewed with provider.    Health Maintenance reviewed:     Health Maintenance Due   Topic    HIV screen     Diabetic retinal exam     Diabetic foot exam     Annual Wellness Visit (Medicare Advantage)     A1C test (Diabetic or Prediabetic)           Wt Readings from Last 3 Encounters:   24 118.6 kg (261 lb 6.4 oz)   24 114.8 kg (253 lb)   24 116.3 kg (256 lb 8 oz)        Temp Readings from Last 3 Encounters:   24 97.1 °F (36.2 °C) (Temporal)   24 98.2 °F (36.8 °C) (Temporal)   24 97.7 °F (36.5 °C) (Temporal)        BP Readings from Last 3 Encounters:   24 (!) 143/76   24 134/72   24 108/62        Pulse Readings from Last 3 Encounters:   24 94   24 (!) 101   24 (!) 103             No data to display                  Learning Assessment:         2023    10:00 AM   [facility] AMB LEARNING ASSESSMENT   Primary Learner Patient   Primary Language ENGLISH   Learning Preference OTHER (COMMENT)   Answered By Patient   Relationship to Learner SELF         Fall Risk Assessment:   :         3/21/2024    11:29 AM 2023     1:16 PM 2023     8:01 AM 2023     1:12 PM 11/3/2021     8:16 AM 10/27/2021     8:12 AM 10/21/2021     9:21 AM   Amb Fall Risk Assessment and TUG Test   Do you feel unsteady or are you worried about falling?  yes no yes yes      2 or more falls in past year? yes no yes yes

## 2024-04-02 ENCOUNTER — HOSPITAL ENCOUNTER (OUTPATIENT)
Facility: HOSPITAL | Age: 54
Discharge: HOME | End: 2024-04-05
Payer: MEDICAID

## 2024-04-02 DIAGNOSIS — G47.33 OSA (OBSTRUCTIVE SLEEP APNEA): Primary | ICD-10-CM

## 2024-04-02 DIAGNOSIS — G47.33 OSA (OBSTRUCTIVE SLEEP APNEA): ICD-10-CM

## 2024-04-02 PROCEDURE — 95810 POLYSOM 6/> YRS 4/> PARAM: CPT | Performed by: INTERNAL MEDICINE

## 2024-04-03 VITALS
HEART RATE: 104 BPM | OXYGEN SATURATION: 89 % | DIASTOLIC BLOOD PRESSURE: 82 MMHG | WEIGHT: 261 LBS | HEIGHT: 70 IN | TEMPERATURE: 97.7 F | BODY MASS INDEX: 37.37 KG/M2 | SYSTOLIC BLOOD PRESSURE: 145 MMHG

## 2024-04-03 NOTE — PROGRESS NOTES
Disclaimer:  all notes have not been confirmed by the interpreting physician    Acquisition Notes:  Lights off: 2254    Respiratory events: Y  A---Y  H---Y  C---Y  M---N  ECG: SR   Arrhythmias: N  Snoring: SEVERE  Sleep Stages: 1, 2, 3  REM: Y  PAP titration: NO   Eliminated events: N/A  Reduced events: N/A  Events at final pressure: N/A  Leak: N/A

## 2024-04-18 ENCOUNTER — CLINICAL DOCUMENTATION (OUTPATIENT)
Age: 54
End: 2024-04-18

## 2024-04-18 DIAGNOSIS — G47.33 OSA (OBSTRUCTIVE SLEEP APNEA): Primary | ICD-10-CM

## 2024-04-18 DIAGNOSIS — G47.34 NOCTURNAL HYPOXEMIA: ICD-10-CM

## 2024-04-19 ENCOUNTER — CLINICAL DOCUMENTATION (OUTPATIENT)
Age: 54
End: 2024-04-19

## 2024-04-19 NOTE — PROGRESS NOTES
Tom Birch is to be contacted by sleep technologists regarding results of WatchPAT Testing which was indicative of an average  pAHI 4% of 48.4 per hour.  SpO2 lissy was 39% and SpO2 of < 88% was 305.70 minutes.        An APAP prescription has been written and patient will be contacted by office staff regarding follow-up  in 2-3 months after initiation of therapy.    Encounter Diagnoses   Name Primary?    KAYLA (obstructive sleep apnea) Yes    Nocturnal hypoxemia        Orders Placed This Encounter   Procedures    DME Order for (Specify) as OP     - DME device ordered - ResMed Device with Heated Humidifer  / .   - Diagnosis: Obstructive Sleep Apnea (G47.33)  - Length of Need: Lifetime    Pressure Settin - 15 cmH2O     Nasal Cushion (Replace) 2 per month.     Nasal Interface Mask 1 every 3 months.    Headgear 1 every 6 months.     Filter(s) Disposable 2 per month.   Filter(s) Non-Disposable 1 every 6 months.      Water Chamber for Humidifier (Replace) 1 every 6 months.   Tubing with heating element 1 every 3 months.    Perform Mask Fitting per patient preference and comfort - replace as above.      Serenity Wall MD, FAASM; NPI: [id number]  Electronically signed. 2024    DME Order for (Specify) as OP     Diagnosis: Sleep Apnea ICD-10 Code (G47.30); ICD-9 Code (780.57).    Nocturnal Oximetry on PAP without supplemental oxygen.     Serenity Wall MD, FAASM; NPI: [id number]  Electronically signed. 24

## 2024-05-07 ENCOUNTER — OFFICE VISIT (OUTPATIENT)
Age: 54
End: 2024-05-07
Payer: MEDICAID

## 2024-05-07 VITALS
WEIGHT: 261 LBS | BODY MASS INDEX: 37.37 KG/M2 | HEART RATE: 93 BPM | DIASTOLIC BLOOD PRESSURE: 73 MMHG | SYSTOLIC BLOOD PRESSURE: 124 MMHG | HEIGHT: 70 IN

## 2024-05-07 DIAGNOSIS — E66.9 OBESITY (BMI 30-39.9): ICD-10-CM

## 2024-05-07 DIAGNOSIS — N18.32 STAGE 3B CHRONIC KIDNEY DISEASE (HCC): ICD-10-CM

## 2024-05-07 DIAGNOSIS — E78.2 MIXED HYPERLIPIDEMIA: ICD-10-CM

## 2024-05-07 DIAGNOSIS — E04.1 THYROID NODULE: ICD-10-CM

## 2024-05-07 DIAGNOSIS — E10.42 TYPE 1 DIABETES MELLITUS WITH DIABETIC POLYNEUROPATHY (HCC): Primary | ICD-10-CM

## 2024-05-07 LAB — HBA1C MFR BLD: 7.2 %

## 2024-05-07 PROCEDURE — 95251 CONT GLUC MNTR ANALYSIS I&R: CPT | Performed by: GENERAL ACUTE CARE HOSPITAL

## 2024-05-07 PROCEDURE — 3078F DIAST BP <80 MM HG: CPT | Performed by: GENERAL ACUTE CARE HOSPITAL

## 2024-05-07 PROCEDURE — 99214 OFFICE O/P EST MOD 30 MIN: CPT | Performed by: GENERAL ACUTE CARE HOSPITAL

## 2024-05-07 PROCEDURE — 3074F SYST BP LT 130 MM HG: CPT | Performed by: GENERAL ACUTE CARE HOSPITAL

## 2024-05-07 PROCEDURE — 83036 HEMOGLOBIN GLYCOSYLATED A1C: CPT | Performed by: GENERAL ACUTE CARE HOSPITAL

## 2024-05-07 NOTE — PATIENT INSTRUCTIONS
sugar in it will raise your blood sugar and should ease your symptoms right away.  How can you care for yourself at home?  Learn your signs of low blood sugar. They are different for everyone. Some common early signs include:  Nausea.  Hunger.  Feeling nervous, irritable, or shaky.  Cold, clammy skin.  Sweating (when you're not exercising).  Use the \"rule of 15\" to treat low blood sugar. This includes eating 15 grams of carbohydrate from a quick-sugar food, such as 3 or 4 glucose tablets or ½ cup of juice. Wait 15 minutes and check your blood sugar. If it is still below 70 mg/dL, eat another 15 grams of carbohydrate. Repeat this every 15 minutes until your blood sugar is in a safe target range.  Once your blood sugar is in a safe range, eat a snack or meal to prevent recurrent low blood sugar.  Make sure family, friends, and coworkers know the symptoms of low blood sugar and know how to get your sugar level up.  If you were prescribed glucagon, always have it with you. Make sure friends and family know how to use it.  When should you call for help?     Call 911 anytime you think you may need emergency care. For example, call if:    You passed out (lost consciousness).     You are confused or cannot think clearly.     Your blood sugar is very high or very low.     Watch closely for changes in your health, and be sure to contact your doctor if:    Your blood sugar stays outside the level your doctor set for you.     You have any problems.

## 2024-05-07 NOTE — PROGRESS NOTES
loneliness or isolation: Never   Intimate Partner Violence: Not on file   Housing Stability: Unknown (6/9/2023)    Housing Stability Vital Sign     Unable to Pay for Housing in the Last Year: Not on file     Number of Places Lived in the Last Year: Not on file     Unstable Housing in the Last Year: No         Review of Systems:  Constitutional Symptoms: no fevers or chills, weight loss  Eyes: no blurry vision or double vision  Cardiovascular: no chest pain or palpitations  Respiratory: no cough or shortness of breath  Gastrointestinal: no dysphagia or abdominal pain  Musculoskeletal: no joint pains or weakness  Integumentary: no rashes  Neurological: no numbness, tingling, or headaches  Psychiatric: no depression or anxiety  Endocrine: no polydipsia, no heat or cold intolerance     Physical Examination:  There were no vitals taken for this visit.  General: pleasant, no distress, good eye contact  HEENT: no exopthalmos, no periorbital edema, no scleral/conjunctival injection, EOMI, no lid lag or stare  Neck: supple, no thyromegaly, masses, lymph nodes, or carotid bruits, no supraclavicular or dorsocervical fat pads  Cardiovascular: regular, normal rate, normal S1 and S2, no murmurs/rubs/gallops,  Respiratory: clear to auscultation bilaterally  Gastrointestinal: soft, nontender, nondistended, no masses, no hepatosplenomegaly  Musculoskeletal: no proximal muscle weakness in upper or lower extremities  Integumentary: no acanthosis nigricans, no abdominal striae, no rashes, no edema, no foot ulcers  Neurological: see foot exam  Psychiatric: normal mood and affect    Diabetic foot exam 07/2023:     Left Foot:   Visual Exam: normal    Pulse DP: 2+ (normal)   Filament test: reduced sensation    Vibratory sensation: absent      Right Foot:   Visual Exam: normal    Pulse DP: 2+ (normal)   Filament test: reduced sensation    Vibratory sensation: absent      REVIEW OF LABORATORY AND RADIOLOGY DATA:   Labs and documentation have 
full range of motion in all extremities

## 2024-07-02 ENCOUNTER — OFFICE VISIT (OUTPATIENT)
Age: 54
End: 2024-07-02
Payer: MEDICAID

## 2024-07-02 VITALS
DIASTOLIC BLOOD PRESSURE: 64 MMHG | WEIGHT: 258.2 LBS | HEIGHT: 70 IN | BODY MASS INDEX: 36.97 KG/M2 | SYSTOLIC BLOOD PRESSURE: 116 MMHG | HEART RATE: 88 BPM | OXYGEN SATURATION: 90 %

## 2024-07-02 DIAGNOSIS — I20.0 UNSTABLE ANGINA (HCC): Primary | ICD-10-CM

## 2024-07-02 DIAGNOSIS — E78.2 MIXED HYPERLIPIDEMIA: ICD-10-CM

## 2024-07-02 DIAGNOSIS — I10 BENIGN ESSENTIAL HTN: ICD-10-CM

## 2024-07-02 DIAGNOSIS — E10.9 TYPE 1 DIABETES MELLITUS WITHOUT COMPLICATION (HCC): ICD-10-CM

## 2024-07-02 PROCEDURE — 99214 OFFICE O/P EST MOD 30 MIN: CPT | Performed by: INTERNAL MEDICINE

## 2024-07-02 PROCEDURE — 3078F DIAST BP <80 MM HG: CPT | Performed by: INTERNAL MEDICINE

## 2024-07-02 PROCEDURE — 3074F SYST BP LT 130 MM HG: CPT | Performed by: INTERNAL MEDICINE

## 2024-07-02 ASSESSMENT — PATIENT HEALTH QUESTIONNAIRE - PHQ9
SUM OF ALL RESPONSES TO PHQ QUESTIONS 1-9: 0
SUM OF ALL RESPONSES TO PHQ QUESTIONS 1-9: 0
1. LITTLE INTEREST OR PLEASURE IN DOING THINGS: NOT AT ALL
SUM OF ALL RESPONSES TO PHQ QUESTIONS 1-9: 0
SUM OF ALL RESPONSES TO PHQ QUESTIONS 1-9: 0
SUM OF ALL RESPONSES TO PHQ9 QUESTIONS 1 & 2: 0
2. FEELING DOWN, DEPRESSED OR HOPELESS: NOT AT ALL

## 2024-07-02 NOTE — PROGRESS NOTES
ANICETO [place]:   [phone]    History of Present Illness:  Mr. Birch is a 54 yo M with hospitalization back in August for diastolic CHF, seen previously by [facility], multiple sclerosis, type 1 diabetes followed by endocrine Dr. Franco, chronic kidney disease creatinine of 2 followed by nephrology Dr. Carbajal.    Over the last few months he does notice that he has had shortness of breath with exertion.  He has had episodes of chest discomfort, oftentimes when he is walking and with exertion.  Walking to the office today he did have some chest tightness.  When this happens, it is substernal, can last minutes at a time.  He tore his left Achilles tendon and is treating this conservatively.  He was seen by wound care for a left lower chronic ulcer and says that has resolved.  He is compensated on exam with clear lungs and he does have trace bilateral lower extremity edema.    Assessment/Plan:  1. Unstable angina.  Chest pain, shortness of breath, multiple risk factors and exertional component; proceed with a stress test for further evaluation. He cannot complete a treadmill as he tore his Achilles tendon and will do this Lexiscan.  2. Chronic diastolic CHF, NYHA class 1.  Echo a few months ago demonstrated preserved LV systolic function.  He is not actively in heart failure at this time.  Focus is on blood pressure control.  3. Essential hypertension.  Blood pressure controlled and no changes made.  4. Palpitations.  5. Type 1 diabetes.  Followed by endocrinology, Dr. Franco.      He  has a past medical history of Anxiety and depression, Asthma, CAP (community acquired pneumonia) due to Pneumococcus ([facility]), CHF (congestive heart failure) ([facility]), Congestive heart failure (HCC), Elevated d-dimer, GERD (gastroesophageal reflux disease), Hyperlipidemia, Hypertension, and Type 2 diabetes mellitus with peripheral neuropathy ([facility]).    All other systems negative except as above.     PE  Vitals:

## 2024-07-02 NOTE — PATIENT INSTRUCTIONS
You will be scheduled for a Nuclear Stress Test after your appointment today.    Nuclear stress testing evaluates blood flow to your heart muscle and assesses cardiac function. There are 2 parts (Rest/Stress) to this procedure and will include either an exercise on a treadmill or an IV administration of a stressing medication called Lexiscan. Your cardiologist will determine which type of testing is best for you. This test can be performed in one day unless it is determined that better quality images will be obtained by performing the test over two days.     *Please arrive 15 minutes prior to your appointment time    Test Duration:    -One day testing will take 4 hours  Day of testing instructions:    NO CAFFEINE (not even decaffeinated products) 24 HOURS PRIOR TO TESTING. This includes coffee, soda, tea, chocolate, multivitamins, and migraine medication, like Excedrin or Fioricet that contains caffeine.  Nothing to eat or drink 4 HOURS prior to testing  NO NICOTINE 12 hours prior to testing  Do no hold any medications DIABETIC PATIENTS: Take half of your insulin with a light meal 4 hours before your test.  Wear comfortable clothes and shoes (Shirts with no metal, shorts or pants, tennis shoes, no heels or flip flops)    IMPORTANT: This testing involves a cardiac tracer ordered specifically for you. If you are unable to make your appointment, please call to cancel/reschedule AT LEAST 24 hours prior to your appointment so your tracer can be cancelled. [phone].

## 2024-07-05 DIAGNOSIS — I10 ESSENTIAL HYPERTENSION: ICD-10-CM

## 2024-07-07 RX ORDER — AMLODIPINE BESYLATE 10 MG/1
10 TABLET ORAL DAILY
Qty: 90 TABLET | Refills: 0 | Status: SHIPPED | OUTPATIENT
Start: 2024-07-07

## 2024-07-15 DIAGNOSIS — E10.42 DIABETIC POLYNEUROPATHY ASSOCIATED WITH TYPE 1 DIABETES MELLITUS (HCC): Primary | ICD-10-CM

## 2024-07-15 RX ORDER — PREGABALIN 200 MG/1
CAPSULE ORAL
Qty: 60 CAPSULE | Refills: 2 | Status: SHIPPED | OUTPATIENT
Start: 2024-07-15 | End: 2024-08-14

## 2024-07-15 NOTE — TELEPHONE ENCOUNTER
PCP: Paco Bray PA-C     Last appt:  3/27/2024      Future Appointments   Date Time Provider Department Center   7/18/2024 12:30 PM DENIS VERNON 1 [facility] AMB   9/26/2024 10:40 AM Mitzi Polk, JORGE KUO [facility] AMB   10/14/2024  9:50 AM José Franco MD [facility] AMB          Requested Prescriptions     Pending Prescriptions Disp Refills    pregabalin (LYRICA) 200 MG capsule [Pharmacy Med Name: Pregabalin 200 MG Oral Capsule] 60 capsule 0     Sig: TAKE 1 CAPSULE BY MOUTH TWICE DAILY . DO NOT EXCEED 2 PER 24 HOURS

## 2024-07-18 ENCOUNTER — ANCILLARY PROCEDURE (OUTPATIENT)
Age: 54
End: 2024-07-18
Payer: MEDICAID

## 2024-07-18 VITALS
WEIGHT: 258 LBS | SYSTOLIC BLOOD PRESSURE: 118 MMHG | DIASTOLIC BLOOD PRESSURE: 72 MMHG | BODY MASS INDEX: 36.94 KG/M2 | HEART RATE: 108 BPM | HEIGHT: 70 IN

## 2024-07-18 DIAGNOSIS — I10 BENIGN ESSENTIAL HTN: ICD-10-CM

## 2024-07-18 DIAGNOSIS — E78.2 MIXED HYPERLIPIDEMIA: ICD-10-CM

## 2024-07-18 DIAGNOSIS — I20.0 UNSTABLE ANGINA (HCC): ICD-10-CM

## 2024-07-18 DIAGNOSIS — E10.9 TYPE 1 DIABETES MELLITUS WITHOUT COMPLICATION (HCC): ICD-10-CM

## 2024-07-18 PROCEDURE — 78452 HT MUSCLE IMAGE SPECT MULT: CPT | Performed by: SPECIALIST

## 2024-07-18 PROCEDURE — A9500 TC99M SESTAMIBI: HCPCS | Performed by: SPECIALIST

## 2024-07-18 RX ORDER — TETRAKIS(2-METHOXYISOBUTYLISOCYANIDE)COPPER(I) TETRAFLUOROBORATE 1 MG/ML
8.2 INJECTION, POWDER, LYOPHILIZED, FOR SOLUTION INTRAVENOUS
Status: COMPLETED | OUTPATIENT
Start: 2024-07-18 | End: 2024-07-18

## 2024-07-18 RX ORDER — TETRAKIS(2-METHOXYISOBUTYLISOCYANIDE)COPPER(I) TETRAFLUOROBORATE 1 MG/ML
23.4 INJECTION, POWDER, LYOPHILIZED, FOR SOLUTION INTRAVENOUS
Status: COMPLETED | OUTPATIENT
Start: 2024-07-18 | End: 2024-07-18

## 2024-07-18 RX ORDER — REGADENOSON 0.08 MG/ML
0.4 INJECTION, SOLUTION INTRAVENOUS
Status: COMPLETED | OUTPATIENT
Start: 2024-07-18 | End: 2024-07-18

## 2024-07-18 RX ADMIN — REGADENOSON 0.4 MG: 0.08 INJECTION, SOLUTION INTRAVENOUS at 14:00

## 2024-07-18 RX ADMIN — TECHNETIUM TC-99M SESTAMIBI 8.2 MILLICURIE: 1 INJECTION INTRAVENOUS at 12:50

## 2024-07-18 RX ADMIN — TECHNETIUM TC-99M SESTAMIBI 23.4 MILLICURIE: 1 INJECTION INTRAVENOUS at 14:00

## 2024-07-19 LAB
ECHO BSA: 2.4 M2
NUC STRESS EJECTION FRACTION: 62 %
STRESS BASELINE DIAS BP: 72 MMHG
STRESS BASELINE HR: 90 BPM
STRESS BASELINE SYS BP: 118 MMHG
STRESS O2 SAT PEAK: 90 %
STRESS O2 SAT REST: 97 %
STRESS PEAK DIAS BP: 70 MMHG
STRESS PEAK SYS BP: 110 MMHG
STRESS PERCENT HR ACHIEVED: 65 %
STRESS POST PEAK HR: 108 BPM
STRESS RATE PRESSURE PRODUCT: NORMAL BPM*MMHG
STRESS TARGET HR: 167 BPM
TID: 1.09

## 2024-07-19 PROCEDURE — 93018 CV STRESS TEST I&R ONLY: CPT | Performed by: SPECIALIST

## 2024-07-19 PROCEDURE — 93016 CV STRESS TEST SUPVJ ONLY: CPT | Performed by: SPECIALIST

## 2024-07-19 PROCEDURE — 78452 HT MUSCLE IMAGE SPECT MULT: CPT | Performed by: SPECIALIST

## 2024-07-19 PROCEDURE — PBSHW PBB SHADOW CHARGE: Performed by: SPECIALIST

## 2024-07-22 ENCOUNTER — TELEPHONE (OUTPATIENT)
Age: 54
End: 2024-07-22

## 2024-07-22 NOTE — TELEPHONE ENCOUNTER
Telephone call made to patient. Two patient identifiers verified.   Went over results with patient. Verified understanding. All questions answered.     Future Appointments   Date Time Provider Department Center   7/25/2024  8:00 AM Juan José Rodriguez MD [facility] AMB   9/26/2024 10:40 AM Mitzi Polk ANP [facility] AMB   10/14/2024  9:50 AM José Franco MD [facility] [address] [facility] AMB

## 2024-07-22 NOTE — TELEPHONE ENCOUNTER
----- Message from Juan José Rodriguez MD sent at 7/22/2024 12:16 AM EDT -----  Thx!    Jocelynn, can you make appt with pt on Thurs/Fri (double book or 8am) to discuss the results? thx  ----- Message -----  From: Harlan Merino MD  Sent: 7/19/2024   4:55 PM EDT  To: Juan José Rodriguez MD; Jocelynn Salcedo RN    Mildly abnormal nuc distal ischemia may need cath if symptomatic

## 2024-07-25 ENCOUNTER — TELEPHONE (OUTPATIENT)
Age: 54
End: 2024-07-25

## 2024-07-25 ENCOUNTER — OFFICE VISIT (OUTPATIENT)
Age: 54
End: 2024-07-25
Payer: MEDICAID

## 2024-07-25 VITALS
DIASTOLIC BLOOD PRESSURE: 78 MMHG | SYSTOLIC BLOOD PRESSURE: 122 MMHG | HEIGHT: 70 IN | HEART RATE: 96 BPM | WEIGHT: 264.8 LBS | BODY MASS INDEX: 37.91 KG/M2 | OXYGEN SATURATION: 90 %

## 2024-07-25 DIAGNOSIS — I10 BENIGN ESSENTIAL HTN: ICD-10-CM

## 2024-07-25 DIAGNOSIS — E10.9 TYPE 1 DIABETES MELLITUS WITHOUT COMPLICATION (HCC): ICD-10-CM

## 2024-07-25 DIAGNOSIS — R94.39 ABNORMAL STRESS TEST: ICD-10-CM

## 2024-07-25 DIAGNOSIS — I20.0 UNSTABLE ANGINA (HCC): Primary | ICD-10-CM

## 2024-07-25 PROCEDURE — 99214 OFFICE O/P EST MOD 30 MIN: CPT | Performed by: INTERNAL MEDICINE

## 2024-07-25 RX ORDER — METOPROLOL SUCCINATE 25 MG/1
12.5 TABLET, EXTENDED RELEASE ORAL DAILY
Qty: 45 TABLET | Refills: 3 | Status: SHIPPED | OUTPATIENT
Start: 2024-07-25

## 2024-07-25 NOTE — PROGRESS NOTES
Please schedule a [facility]  CPT:  88452  Dx codes: R94.31  Meds to hold: Metoprolol 24 hours prior to procedure.  Lantus, half night before and Humalog-none day of procedure.  Lab orders: CBC. CMP, Lipids and PT & INR  Follow up: in a month  With either Dr. NOGUEIRA or Manish      THANK YOU!!      
IRRITATION    albuterol sulfate HFA (PROVENTIL;VENTOLIN;PROAIR) 108 (90 Base) MCG/ACT inhaler Inhale 2 puffs into the lungs every 4 hours as needed for Wheezing    albuterol (PROVENTIL) (2.5 MG/3ML) 0.083% nebulizer solution Take 3 mLs by nebulization every 6 hours as needed for Shortness of Breath or Wheezing     No current facility-administered medications for this visit.       Juan José Rodriguez MD  [facility]  [address] [place] [address], [address]  [place] [address]

## 2024-07-25 NOTE — TELEPHONE ENCOUNTER
procedure: 8/8 w/ Dr. De Leon  Arrival Time: 8:45 am    Post procedure instructions  No driving for 24 hours  No heavy lifting (over 10lbs) or strenuous activity for 48 hours  No baths, swimming, hot tubs, or spas for a week  The band aid over the cath site may be removed the day after procedure and washed gently with soap and water.  The site may be bruised or discolored for a couple of weeks; a small knot may also be present.  You may have tenderness/soreness in groin or wrist area, you may take Tylenol for relief.    When to call the office  You notice any tingling, numbness, coldness, or loss of feeling, or you have a fever for 2-3 days after the procedure, if there is visible blood at the site, hold pressure for 20 minutes and then call.    Contact  [address] [address]                                                        Cath lab: [phone]                                                    [place] office: [phone]                                     ----- Message from Anai Geller LPN sent at 7/25/2024  8:41 AM EDT -----  Please schedule a [facility]  CPT:  13780  Dx codes: R94.31  Meds to hold: Metoprolol 24 hours prior to procedure.  Lantus, half night before. Humalog-none day of procedure.  Lab orders: CBC. CMP, Lipids and PT & INR  Follow up: in a month  With either Dr. De Leon or Manish      THANK YOU!!

## 2024-07-30 ENCOUNTER — PREP FOR PROCEDURE (OUTPATIENT)
Age: 54
End: 2024-07-30

## 2024-08-03 LAB
ALBUMIN SERPL-MCNC: 3.8 G/DL (ref 3.8–4.9)
ALP SERPL-CCNC: 104 IU/L (ref 44–121)
ALT SERPL-CCNC: 14 IU/L (ref 0–44)
AST SERPL-CCNC: 18 IU/L (ref 0–40)
BASOPHILS # BLD AUTO: 0.1 X10E3/UL (ref 0–0.2)
BASOPHILS NFR BLD AUTO: 1 %
BILIRUB SERPL-MCNC: 0.2 MG/DL (ref 0–1.2)
BUN SERPL-MCNC: 44 MG/DL (ref 6–24)
BUN/CREAT SERPL: 24 (ref 9–20)
CALCIUM SERPL-MCNC: 9.3 MG/DL (ref 8.7–10.2)
CHLORIDE SERPL-SCNC: 108 MMOL/L (ref 96–106)
CHOLEST SERPL-MCNC: 167 MG/DL (ref 100–199)
CO2 SERPL-SCNC: 23 MMOL/L (ref 20–29)
CREAT SERPL-MCNC: 1.85 MG/DL (ref 0.76–1.27)
EGFRCR SERPLBLD CKD-EPI 2021: 43 ML/MIN/1.73
EOSINOPHIL # BLD AUTO: 0.4 X10E3/UL (ref 0–0.4)
EOSINOPHIL NFR BLD AUTO: 3 %
ERYTHROCYTE [DISTWIDTH] IN BLOOD BY AUTOMATED COUNT: 14.9 % (ref 11.6–15.4)
GLUCOSE SERPL-MCNC: 127 MG/DL (ref 70–99)
HCT VFR BLD AUTO: 34.8 % (ref 37.5–51)
HDLC SERPL-MCNC: 38 MG/DL
HGB BLD-MCNC: 11.2 G/DL (ref 13–17.7)
IMM GRANULOCYTES # BLD AUTO: 0.2 X10E3/UL (ref 0–0.1)
IMM GRANULOCYTES NFR BLD AUTO: 2 %
IMP & REVIEW OF LAB RESULTS: NORMAL
INR PPP: 1 (ref 0.9–1.2)
LDLC SERPL CALC-MCNC: 106 MG/DL (ref 0–99)
LYMPHOCYTES # BLD AUTO: 2 X10E3/UL (ref 0.7–3.1)
LYMPHOCYTES NFR BLD AUTO: 16 %
MCH RBC QN AUTO: 27 PG (ref 26.6–33)
MCHC RBC AUTO-ENTMCNC: 32.2 G/DL (ref 31.5–35.7)
MCV RBC AUTO: 84 FL (ref 79–97)
MONOCYTES # BLD AUTO: 1.1 X10E3/UL (ref 0.1–0.9)
NEUTROPHILS # BLD AUTO: 9.1 X10E3/UL (ref 1.4–7)
NEUTROPHILS NFR BLD AUTO: 69 %
PLATELET # BLD AUTO: 314 X10E3/UL (ref 150–450)
PROT SERPL-MCNC: 7.2 G/DL (ref 6–8.5)
PROTHROMBIN TIME: 11.4 SEC (ref 9.1–12)
RBC # BLD AUTO: 4.15 X10E6/UL (ref 4.14–5.8)
REPORT: NORMAL
REPORT: NORMAL
SODIUM SERPL-SCNC: 146 MMOL/L (ref 134–144)
TRIGL SERPL-MCNC: 129 MG/DL (ref 0–149)
VLDLC SERPL CALC-MCNC: 23 MG/DL (ref 5–40)

## 2024-08-06 ENCOUNTER — TELEPHONE (OUTPATIENT)
Age: 54
End: 2024-08-06

## 2024-08-06 NOTE — TELEPHONE ENCOUNTER
----- Message from Juan José Rodriguez MD sent at 8/5/2024  4:18 PM EDT -----  Please let pt know labs were overall unchanged/stable. thx

## 2024-08-07 DIAGNOSIS — E10.42 TYPE 1 DIABETES MELLITUS WITH DIABETIC POLYNEUROPATHY (HCC): ICD-10-CM

## 2024-08-08 ENCOUNTER — HOSPITAL ENCOUNTER (OUTPATIENT)
Facility: HOSPITAL | Age: 54
Discharge: HOME | End: 2024-08-08
Attending: INTERNAL MEDICINE | Admitting: INTERNAL MEDICINE
Payer: MEDICARE

## 2024-08-08 VITALS
RESPIRATION RATE: 13 BRPM | WEIGHT: 259 LBS | BODY MASS INDEX: 37.08 KG/M2 | DIASTOLIC BLOOD PRESSURE: 84 MMHG | SYSTOLIC BLOOD PRESSURE: 152 MMHG | TEMPERATURE: 98.5 F | HEART RATE: 90 BPM | HEIGHT: 70 IN | OXYGEN SATURATION: 92 %

## 2024-08-08 DIAGNOSIS — R94.39 ABNORMAL STRESS TEST: ICD-10-CM

## 2024-08-08 DIAGNOSIS — I20.0 UNSTABLE ANGINA (HCC): ICD-10-CM

## 2024-08-08 LAB
ECHO BSA: 2.41 M2
GLUCOSE BLD STRIP.AUTO-MCNC: 222 MG/DL (ref 65–117)
SERVICE CMNT-IMP: ABNORMAL

## 2024-08-08 PROCEDURE — 99153 MOD SED SAME PHYS/QHP EA: CPT | Performed by: INTERNAL MEDICINE

## 2024-08-08 PROCEDURE — C1713 ANCHOR/SCREW BN/BN,TIS/BN: HCPCS | Performed by: INTERNAL MEDICINE

## 2024-08-08 PROCEDURE — 93458 L HRT ARTERY/VENTRICLE ANGIO: CPT | Performed by: INTERNAL MEDICINE

## 2024-08-08 PROCEDURE — 6360000002 HC RX W HCPCS: Performed by: INTERNAL MEDICINE

## 2024-08-08 PROCEDURE — 2500000003 HC RX 250 WO HCPCS: Performed by: INTERNAL MEDICINE

## 2024-08-08 PROCEDURE — 99152 MOD SED SAME PHYS/QHP 5/>YRS: CPT | Performed by: INTERNAL MEDICINE

## 2024-08-08 PROCEDURE — 76937 US GUIDE VASCULAR ACCESS: CPT | Performed by: INTERNAL MEDICINE

## 2024-08-08 PROCEDURE — 6360000004 HC RX CONTRAST MEDICATION: Performed by: INTERNAL MEDICINE

## 2024-08-08 PROCEDURE — 82962 GLUCOSE BLOOD TEST: CPT

## 2024-08-08 PROCEDURE — 2580000003 HC RX 258: Performed by: INTERNAL MEDICINE

## 2024-08-08 PROCEDURE — C1894 INTRO/SHEATH, NON-LASER: HCPCS | Performed by: INTERNAL MEDICINE

## 2024-08-08 PROCEDURE — 2709999900 HC NON-CHARGEABLE SUPPLY: Performed by: INTERNAL MEDICINE

## 2024-08-08 RX ORDER — SODIUM CHLORIDE 9 MG/ML
INJECTION, SOLUTION INTRAVENOUS CONTINUOUS
Status: CANCELLED | OUTPATIENT
Start: 2024-08-08 | End: 2024-08-08

## 2024-08-08 RX ORDER — MIDAZOLAM HYDROCHLORIDE 1 MG/ML
INJECTION INTRAMUSCULAR; INTRAVENOUS PRN
Status: DISCONTINUED | OUTPATIENT
Start: 2024-08-08 | End: 2024-08-08 | Stop reason: HOSPADM

## 2024-08-08 RX ORDER — HEPARIN SODIUM 1000 [USP'U]/ML
INJECTION, SOLUTION INTRAVENOUS; SUBCUTANEOUS PRN
Status: DISCONTINUED | OUTPATIENT
Start: 2024-08-08 | End: 2024-08-08 | Stop reason: HOSPADM

## 2024-08-08 RX ORDER — LIDOCAINE HYDROCHLORIDE 10 MG/ML
INJECTION, SOLUTION INFILTRATION; PERINEURAL PRN
Status: DISCONTINUED | OUTPATIENT
Start: 2024-08-08 | End: 2024-08-08 | Stop reason: HOSPADM

## 2024-08-08 RX ORDER — SODIUM CHLORIDE 0.9 % (FLUSH) 0.9 %
5-40 SYRINGE (ML) INJECTION PRN
Status: DISCONTINUED | OUTPATIENT
Start: 2024-08-08 | End: 2024-08-08 | Stop reason: HOSPADM

## 2024-08-08 RX ORDER — 0.9 % SODIUM CHLORIDE 0.9 %
250 INTRAVENOUS SOLUTION INTRAVENOUS ONCE
Status: COMPLETED | OUTPATIENT
Start: 2024-08-08 | End: 2024-08-08

## 2024-08-08 RX ORDER — SODIUM CHLORIDE 0.9 % (FLUSH) 0.9 %
5-40 SYRINGE (ML) INJECTION EVERY 12 HOURS SCHEDULED
Status: DISCONTINUED | OUTPATIENT
Start: 2024-08-08 | End: 2024-08-08 | Stop reason: HOSPADM

## 2024-08-08 RX ORDER — SODIUM CHLORIDE 0.9 % (FLUSH) 0.9 %
5-40 SYRINGE (ML) INJECTION PRN
Status: CANCELLED | OUTPATIENT
Start: 2024-08-08

## 2024-08-08 RX ORDER — FENTANYL CITRATE 50 UG/ML
INJECTION, SOLUTION INTRAMUSCULAR; INTRAVENOUS PRN
Status: DISCONTINUED | OUTPATIENT
Start: 2024-08-08 | End: 2024-08-08 | Stop reason: HOSPADM

## 2024-08-08 RX ORDER — VERAPAMIL HYDROCHLORIDE 2.5 MG/ML
INJECTION, SOLUTION INTRAVENOUS PRN
Status: DISCONTINUED | OUTPATIENT
Start: 2024-08-08 | End: 2024-08-08 | Stop reason: HOSPADM

## 2024-08-08 RX ORDER — ACETAMINOPHEN 325 MG/1
650 TABLET ORAL EVERY 4 HOURS PRN
Status: DISCONTINUED | OUTPATIENT
Start: 2024-08-08 | End: 2024-08-08 | Stop reason: HOSPADM

## 2024-08-08 RX ORDER — SODIUM CHLORIDE 9 MG/ML
INJECTION, SOLUTION INTRAVENOUS PRN
Status: DISCONTINUED | OUTPATIENT
Start: 2024-08-08 | End: 2024-08-08 | Stop reason: HOSPADM

## 2024-08-08 RX ORDER — HEPARIN SODIUM 200 [USP'U]/100ML
INJECTION, SOLUTION INTRAVENOUS CONTINUOUS PRN
Status: COMPLETED | OUTPATIENT
Start: 2024-08-08 | End: 2024-08-08

## 2024-08-08 RX ORDER — SODIUM CHLORIDE 9 MG/ML
INJECTION, SOLUTION INTRAVENOUS CONTINUOUS
Status: DISCONTINUED | OUTPATIENT
Start: 2024-08-08 | End: 2024-08-08 | Stop reason: HOSPADM

## 2024-08-08 RX ADMIN — SODIUM CHLORIDE 250 ML: 9 INJECTION, SOLUTION INTRAVENOUS at 09:40

## 2024-08-08 ASSESSMENT — PAIN - FUNCTIONAL ASSESSMENT: PAIN_FUNCTIONAL_ASSESSMENT: NONE - DENIES PAIN

## 2024-08-08 NOTE — PROGRESS NOTES
CATH LAB to RECOVERY ROOM REPORT    Procedure: [facility]    MD: YANNA De Leon MD    The procedure was diagnostic only.    Verbal Report given to Recovery Nurse on patient being transferred to Cardiac Cath Lab  for routine post-op. Patient stable upon transfer to .  Vitals, mental status, MAR, procedural summary discussed with recovery RN.    Medication given during procedure:    Contrast:15mL                          Heparin:2500units     Versed:3mg                                                               Fentanyl:50mcg                                                                 Sheaths:    Right radial sheath pulled at 1106 am, band at 13mL of air

## 2024-08-08 NOTE — DISCHARGE INSTRUCTIONS
[facility] Note  Chief Complaint   Patient presents with    Follow - Up     LT KNEE PAIN; MRI RESULTS       History: The patient is a 50 year old male returning to review recent MRI with complaints of persistent and progressive medial left knee pain and instability.  Symptoms began perhaps after a vacation to [place].  He is now experiencing subjective weakness and instability particularly with stair use.  Pain is perhaps more medial and anterior than lateral.  He is concerned about his ongoing left knee symptoms as he prepares for a planned 80 mile hiking a vacation in June.    Past Medical History:   Diagnosis Date    Allergic rhinitis     Carpal tunnel syndrome     CPAP (continuous positive airway pressure) dependence     ETD (eustachian tube dysfunction)     Migraine     Neuropathy     numbness and tingling right hand, ulnar issues    MATT (obstructive sleep apnea)     cpap 10?    MATT (obstructive sleep apnea)     Otalgia of left ear 11/2011    Persistent disorder of initiating or maintaining wakefulness     Ulnar nerve abnormality     Unspecified sleep apnea     URI (upper respiratory infection) 11/2011    Visual impairment     wears glasses for distance     Past Surgical History:   Procedure Laterality Date    ADENOIDECTOMY      CHOLECYSTECTOMY  2015    COLONOSCOPY      COLONOSCOPY      FEMUR/KNEE SURG UNLISTED      right knee    LAPAROSCOPIC CHOLECYSTECTOMY N/A 3/4/2015    Procedure: LAPAROSCOPIC CHOLECYSTECTOMY;  Surgeon: Torres Alegre MD;  Location:  MAIN OR    SINUS SURGERY         Current Outpatient Medications   Medication Sig Dispense Refill    cefuroxime 250 MG Oral Tab Take 1 tablet (250 mg total) by mouth 2 (two) times daily. 20 tablet 0    methylPREDNISolone (MEDROL) 4 MG Oral Tablet Therapy Pack As directed. 1 each 0    ergocalciferol 1.25 MG (11654 UT) Oral Cap Take 1 capsule (50,000 Units total) by mouth once a week. 12 capsule 0    Fluticasone Propionate 50 MCG/ACT Nasal  Future Appointments   Date Time Provider Department Center   9/10/2024  9:40 AM Juan José Rodriguez MD [facility] AMB   9/26/2024 10:40 AM Mitzi Polk ANP [facility]   10/14/2024  9:50 AM José Franco MD [facility] AM[facility]     Site Care Discharge Instructions    Do not drive, operate any machinery, or sign any legal documents for 24 hours after your procedure.  You must have someone to drive you home.    You may take a shower 24 hours after your procedure.  Be sure to get the dressing wet and then remove it; gently wash the area with warm soapy water.  Pat dry and leave open to air. If you decide to place a band aid over the site, change it daily. To help prevent infections, be sure to keep the access site clean and dry.  No lotions, creams, powders, ointments, etc. in the cath site for approximately 1 week.    Do not soak site with such activities as: for radial artery access: no hand washing dishes, for all access sites: do not take a tub bath, get in a hot tub or swimming pool for approximately 5 days or until theaccess site is completely healed.      No strenuous activity or heavy lifting over 10 lbs. for 7 days.    Drink plenty of fluids for 24-48 hours after your procedure to flush the contrast dye and/or sedation from your kidneys. No alcoholic beverages for 24 hours.  You may resume your previous diet (low fat, low cholesterol) after your procedure.      After your procedure, some bruising or discomfort is common during the healing process.  An ice pack and Tylenol, 1-2 tablets every 6 hours as needed, is recommended if you experience any discomfort.  If you experience any signs or symptoms of infection such as fever, chills, or poorly healing incision, persistent tenderness or swelling around the access site, redness and/or warmth to the touch, numbness, significant tingling or pain at the access site or affected extremity, rash, drainage from the access site, or if the affected arm or leg feels tight  Suspension 2 sprays by Each Nare route daily. 16 g 0    loratadine 10 MG Oral Tab Take 1 tablet (10 mg total) by mouth as needed.       No Known Allergies  Family History   Problem Relation Age of Onset    No Known Problems Father     Other (ulcerative colitis) Mother     Other (colon ca) Maternal Grandmother 40    Cancer Maternal Grandfather         breast cancer    Other (MI) Maternal Grandfather     Breast Cancer Paternal Grandmother     Other (lymphoma) Paternal Grandfather     No Known Problems Sister      Social History     Occupational History    Occupation: [employer]   Tobacco Use    Smoking status: Never    Smokeless tobacco: Never   Vaping Use    Vaping Use: Never used   Substance and Sexual Activity    Alcohol use: Yes     Comment: 1 beer per month    Drug use: No    Sexual activity: Not on file        ROS:  Complete ROS reviewed by me and non-contributory to the chief complaint except as mentioned above.    Physical Exam:    Ht 5' 8\" (1.727 m)   Wt 187 lb (84.8 kg)   BMI 28.43 kg/m²   Constitutional: Well developed, well nourished, pleasant 50 year old male  Psychological: NAD, alert and appropriate  Respiratory: Breathing comfortably on room air with RR of 10-14  Cardiac: Palpable distal pulses with pink warm extremities distally  On examination there is no apparent swelling or discoloration about the left knee.  There is significant medial joint line tenderness to palpation with a newly painful Aurelio's and Steinmann's test.  Knee ligaments are stable on varus valgus stress with solid endpoints.  Lachman and posterior drawer are normal.  Range of motion testing demonstrates adequate full extension but discomfort on passive hyperflexion.  There is no popliteal tenderness, pain on Eneida's test or distal edema about the foot and ankle.  Distal sensory to light touch, motor function and perfusion are intact.       Imaging Results: Multiple images right and left knee personally viewed,  demonstrating no acute fracture or dislocation.  Joint spaces are relatively preserved with no late degenerative changes.      MRI KNEE, LEFT (WLH=24711)    Addendum Date: 1/12/2024    ADDENDUM:  Upon further review subtle signal in the medial meniscus extending to the inferior articular surface may represent a subtle longitudinal oblique tear (series 501, image 21).  Mild increased T2 signal in the suprapatellar fat pad may represent patellar fat pad impingement.  Correlate clinically.  Dictated by (CST): Amol Awad MD on 1/12/2024 at 2:18 PM     Finalized by (CST): Amol Awad MD on 1/12/2024 at 2:19 PM             Result Date: 1/12/2024  CONCLUSION:  1. Chondral defect involving the central/lateral femoral trochlea with underlying marrow edema and subchondral cyst formation.   LOCATION:  [facility]          Dictated by (CST): Amol Awad MD on 1/12/2024 at 10:32 AM     Finalized by (CST): Amol Awad MD on 1/12/2024 at 1:48 PM       XR KNEE (3 VIEWS), RIGHT (CPT=73562)    Result Date: 12/6/2023  CONCLUSION:  No acute osseous findings.   LOCATION:  [facility]   Dictated by (CST): Nabil Langley MD on 12/06/2023 at 10:53 AM     Finalized by (CST): Nabil Langley MD on 12/06/2023 at 10:54 AM       XR KNEE, COMPLETE (4 OR MORE VIEWS), LEFT (CPT=73564)    Result Date: 12/6/2023  CONCLUSION:  No acute osseous findings.   LOCATION:  [facility]   Dictated by (CST): Nabil Langley MD on 12/06/2023 at 10:52 AM     Finalized by (CST): Nabil Langley MD on 12/06/2023 at 10:53 AM         Assessment: Diagnoses and all orders for this visit:    Diagnoses and all orders for this visit:    Chondromalacia of left patellofemoral joint    Chondromalacia of left knee      Plan: We reviewed imaging and exam findings with the patient.  MRI shows chondral lesion at the lateral femoral trochlea.  There is also an addendum to the initial report suggestive of possible increased signal at the posterior horn of the medial meniscus.  His  physical exam suggests more symptoms anterior medially and upon my review the MRI obvious macro tearing or displacement is not seen.  We therefore discussed treatment options including continued conservative care, possible injections or arthroscopic surgery.  Given the intermittent nature of his symptoms the patient would like to continue with conservative observation.  He was planning on some conditioning in preparation for his long vacation but I advised that he avoid heavy load, impact, incline or stair workouts, squatting, lunging or knee extension exercises.  Swimming and cycling may be preferred.  The benefits of weight loss were also reviewed.  Unfortunately he is planning on carrying a fairly heavy pack during his hiking.  If his symptoms do not improve significantly prior to his planned vacation in June he can certainly follow-up for reassessment.  I see no immediate indication for arthroscopic intervention given only subtle findings in his posterior medial meniscus.  All questions were answered and he verbalized understanding and agreement with this conservative treatment plan.        Vanessa Sutton MD, FAAOS  Sports Medicine/Knee and Shoulder  [facility]  Phone [phone]  Fax [phone]      This document was partially prepared using Dragon Medical voice recognition software.  Although every attempt is made to correct errors during dictation, discrepancies may still exist.

## 2024-08-08 NOTE — PROGRESS NOTES
1115  Pt arrived to recovery room post procedure.  Arm immobilizer placed on affected wrist.     1130  Pt eating and tolerating PO diet well.    TR BANDS:  1200  Began removal of air from TR BAND. No bleeding and no hematoma present at sites.    1300  Removal of air from TR Band complete. No bleeding and no hematoma.    1305  Educated patient about their sedation precautions such as not driving, operating any machinery, or signing legal documents 24 hours post procedure.  Reviewed discharge instructions, including medications and site care using the teach back method.  Answered all questions. Verbalized understanding. Pt had an opportunity to ask questions. Pt is also aware of how to handle a site if it starts bleeding or develops a hematoma.    1315  TR Band removed. No bleeding and no hematoma present. Tegaderm and gauze dressing applied.     1337  Pt walked to the restroom and voided successfully. Site(s) have no bleeding and no hematoma present    1340  RN called pt ride home.  Ride needs some time to get to hospital.    1400  RN removed pt IV.  Pt getting dressed    1415  Arm immobilizer placed on affected wrist.  Pt discharged to ride at main entrance of hospital via wheel chair by RN.  Pt discharged withDischarge Paperwork, Extra Band Aids, Clothing, Wrist Immobilizer, Cell Phone, \"Who Cared For You\" Card, Glasses, and belongings from home

## 2024-08-08 NOTE — PROGRESS NOTES
0855  Cardiac Cath Lab Recovery Arrival Note:      Tom Birch arrived to Cardiac Cath Lab, Recovery Area. Staff introduced to patient. Patient identifiers verified with NAME and DATE OF BIRTH. Procedure verified with patient. Consent forms reviewed and signed by patient or authorized representative and verified. Allergies verified.     Patient and family oriented to department. Patient and family informed of procedure and plan of care.     Questions answered with review. Patient prepped for procedure, per orders from physician, prior to arrival.    Patient on cardiac monitor, non-invasive blood pressure, SPO2 monitor. On room air. Patient is A&Ox 4. Patient reports no complaints.     Patient in stretcher, in low position, with side rails up, call bell within reach, patient instructed to call if assistance as needed.    Patient prep in: [facility] Recovery Area, [place] FT1.   Patient family has pager # Kenneth [phone]    Family in: not in hospital.   Prep by: THOR Torres

## 2024-08-08 NOTE — PROGRESS NOTES
1115  [facility] Procedural to Recovery REPORT:    Verbal report received from THOR Young on Tom Birch  being received from [facility] Procedural Area for routine progression of care. Report consisted of patient’s Situation, Background, Assessment and Recommendations(SBAR). Information from the following report(s) Procedure, Findings, Medications, and Site Assessment; was reviewed with the receiving clinician. Opportunity for questions and clarification was provided. Assessment completed upon patient’s arrival to Cardiac Cath Lab RECOVERY AREA and care assumed.       Cardiac Cath Lab Recovery Arrival Note:    Tom Birch arrived to [facility] recovery area.  Patient procedure= LHC. Patient on cardiac monitor, non-invasive blood pressure, SPO2 monitor. On O2 @ 3 lpm via NC.  IV  of NS on pump at 125 ml/hr. Patient status doing well without problems. Patient is A&Ox 4. Patient reports No complaints.    PROCEDURE SITE CHECK:    Procedure site:without any bleeding and no hematoma, no pain/discomfort reported at procedure site.     No change in patient status. Continue to monitor patient and status.     Called to update pt contact; unable to reach; left call back number.

## 2024-08-08 NOTE — PROGRESS NOTES
Cardiac Cath Lab Procedure Area Arrival Note:    Tom Birch arrived to Cardiac Cath Lab, Procedure Area. Patient identifiers verified with NAME and DATE OF BIRTH. Procedure verified with patient. Consent forms verified. Allergies verified. Patient informed of procedure and plan of care. Questions answered with review. Patient voiced understanding of procedure and plan of care.    Patient on cardiac monitor, non-invasive blood pressure, SPO2 monitor. On RA.  IV of NS on pump at 50 ml/hr. Patient status doing well without problems. Patient is A&Ox 4. Patient reports no complaints.     Patient medicated during procedure with orders obtained and verified by Dr. De Leon.    Refer to patients Cardiac Cath Lab PROCEDURE REPORT for vital signs, assessment, status, and response during procedure, printed at end of case. Printed report on chart or scanned into chart.

## 2024-09-10 ENCOUNTER — OFFICE VISIT (OUTPATIENT)
Age: 54
End: 2024-09-10
Payer: MEDICARE

## 2024-09-10 VITALS
WEIGHT: 265 LBS | DIASTOLIC BLOOD PRESSURE: 76 MMHG | HEIGHT: 70 IN | HEART RATE: 103 BPM | SYSTOLIC BLOOD PRESSURE: 134 MMHG | OXYGEN SATURATION: 96 % | BODY MASS INDEX: 37.94 KG/M2

## 2024-09-10 DIAGNOSIS — R06.02 SHORT OF BREATH ON EXERTION: Primary | ICD-10-CM

## 2024-09-10 DIAGNOSIS — R07.89 NON-CARDIAC CHEST PAIN: ICD-10-CM

## 2024-09-10 DIAGNOSIS — E10.9 TYPE 1 DIABETES MELLITUS WITHOUT COMPLICATION (HCC): ICD-10-CM

## 2024-09-10 DIAGNOSIS — I10 BENIGN ESSENTIAL HTN: ICD-10-CM

## 2024-09-10 PROCEDURE — 3017F COLORECTAL CA SCREEN DOC REV: CPT | Performed by: INTERNAL MEDICINE

## 2024-09-10 PROCEDURE — 99214 OFFICE O/P EST MOD 30 MIN: CPT | Performed by: INTERNAL MEDICINE

## 2024-09-10 PROCEDURE — 3046F HEMOGLOBIN A1C LEVEL >9.0%: CPT | Performed by: INTERNAL MEDICINE

## 2024-09-10 PROCEDURE — G8427 DOCREV CUR MEDS BY ELIG CLIN: HCPCS | Performed by: INTERNAL MEDICINE

## 2024-09-10 PROCEDURE — 1036F TOBACCO NON-USER: CPT | Performed by: INTERNAL MEDICINE

## 2024-09-10 PROCEDURE — G8417 CALC BMI ABV UP PARAM F/U: HCPCS | Performed by: INTERNAL MEDICINE

## 2024-09-10 PROCEDURE — 2022F DILAT RTA XM EVC RTNOPTHY: CPT | Performed by: INTERNAL MEDICINE

## 2024-09-10 PROCEDURE — 3075F SYST BP GE 130 - 139MM HG: CPT | Performed by: INTERNAL MEDICINE

## 2024-09-10 PROCEDURE — 3078F DIAST BP <80 MM HG: CPT | Performed by: INTERNAL MEDICINE

## 2024-09-10 ASSESSMENT — PATIENT HEALTH QUESTIONNAIRE - PHQ9
SUM OF ALL RESPONSES TO PHQ QUESTIONS 1-9: 0
1. LITTLE INTEREST OR PLEASURE IN DOING THINGS: NOT AT ALL
SUM OF ALL RESPONSES TO PHQ QUESTIONS 1-9: 0
2. FEELING DOWN, DEPRESSED OR HOPELESS: NOT AT ALL
SUM OF ALL RESPONSES TO PHQ QUESTIONS 1-9: 0
SUM OF ALL RESPONSES TO PHQ QUESTIONS 1-9: 0
SUM OF ALL RESPONSES TO PHQ9 QUESTIONS 1 & 2: 0

## 2024-09-21 DIAGNOSIS — I10 ESSENTIAL HYPERTENSION: ICD-10-CM

## 2024-09-21 DIAGNOSIS — E10.42 TYPE 1 DIABETES MELLITUS WITH DIABETIC POLYNEUROPATHY (HCC): Primary | ICD-10-CM

## 2024-09-22 RX ORDER — AMLODIPINE BESYLATE 10 MG/1
10 TABLET ORAL DAILY
Qty: 90 TABLET | Refills: 0 | Status: SHIPPED | OUTPATIENT
Start: 2024-09-22

## 2024-09-22 RX ORDER — HYDROXYZINE PAMOATE 25 MG/1
25 CAPSULE ORAL 4 TIMES DAILY PRN
Qty: 60 CAPSULE | Refills: 0 | Status: SHIPPED | OUTPATIENT
Start: 2024-09-22

## 2024-09-24 RX ORDER — PRAVASTATIN SODIUM 40 MG
40 TABLET ORAL NIGHTLY
Qty: 90 TABLET | Refills: 3 | Status: SHIPPED | OUTPATIENT
Start: 2024-09-24

## 2024-09-24 RX ORDER — INSULIN LISPRO 100 [IU]/ML
INJECTION, SOLUTION INTRAVENOUS; SUBCUTANEOUS
Qty: 30 ML | Refills: 5 | Status: SHIPPED | OUTPATIENT
Start: 2024-09-24

## 2024-09-24 RX ORDER — AMLODIPINE BESYLATE 10 MG/1
10 TABLET ORAL DAILY
Qty: 90 TABLET | Refills: 0 | OUTPATIENT
Start: 2024-09-24

## 2024-09-26 ENCOUNTER — OFFICE VISIT (OUTPATIENT)
Age: 54
End: 2024-09-26
Payer: MEDICARE

## 2024-09-26 VITALS
BODY MASS INDEX: 37.71 KG/M2 | WEIGHT: 263.4 LBS | DIASTOLIC BLOOD PRESSURE: 64 MMHG | OXYGEN SATURATION: 93 % | HEART RATE: 94 BPM | SYSTOLIC BLOOD PRESSURE: 112 MMHG | HEIGHT: 70 IN | RESPIRATION RATE: 16 BRPM | TEMPERATURE: 97.7 F

## 2024-09-26 DIAGNOSIS — G37.9 DEMYELINATING DISEASE OF CENTRAL NERVOUS SYSTEM, UNSPECIFIED (HCC): ICD-10-CM

## 2024-09-26 DIAGNOSIS — G90.9 AUTONOMIC NEUROPATHY: ICD-10-CM

## 2024-09-26 DIAGNOSIS — E10.42 DIABETIC POLYNEUROPATHY ASSOCIATED WITH TYPE 1 DIABETES MELLITUS (HCC): Primary | ICD-10-CM

## 2024-09-26 DIAGNOSIS — G81.10 SPASTIC HEMIPARESIS AFFECTING DOMINANT SIDE (HCC): ICD-10-CM

## 2024-09-26 PROCEDURE — 3017F COLORECTAL CA SCREEN DOC REV: CPT | Performed by: PSYCHIATRY & NEUROLOGY

## 2024-09-26 PROCEDURE — G8427 DOCREV CUR MEDS BY ELIG CLIN: HCPCS | Performed by: PSYCHIATRY & NEUROLOGY

## 2024-09-26 PROCEDURE — 99215 OFFICE O/P EST HI 40 MIN: CPT | Performed by: PSYCHIATRY & NEUROLOGY

## 2024-09-26 PROCEDURE — 3074F SYST BP LT 130 MM HG: CPT | Performed by: PSYCHIATRY & NEUROLOGY

## 2024-09-26 PROCEDURE — 3046F HEMOGLOBIN A1C LEVEL >9.0%: CPT | Performed by: PSYCHIATRY & NEUROLOGY

## 2024-09-26 PROCEDURE — 3078F DIAST BP <80 MM HG: CPT | Performed by: PSYCHIATRY & NEUROLOGY

## 2024-09-26 PROCEDURE — G8417 CALC BMI ABV UP PARAM F/U: HCPCS | Performed by: PSYCHIATRY & NEUROLOGY

## 2024-09-26 PROCEDURE — 2022F DILAT RTA XM EVC RTNOPTHY: CPT | Performed by: PSYCHIATRY & NEUROLOGY

## 2024-09-26 PROCEDURE — 1036F TOBACCO NON-USER: CPT | Performed by: PSYCHIATRY & NEUROLOGY

## 2024-09-26 ASSESSMENT — PATIENT HEALTH QUESTIONNAIRE - PHQ9
SUM OF ALL RESPONSES TO PHQ QUESTIONS 1-9: 0
SUM OF ALL RESPONSES TO PHQ QUESTIONS 1-9: 0
SUM OF ALL RESPONSES TO PHQ9 QUESTIONS 1 & 2: 0
2. FEELING DOWN, DEPRESSED OR HOPELESS: NOT AT ALL
SUM OF ALL RESPONSES TO PHQ QUESTIONS 1-9: 0
1. LITTLE INTEREST OR PLEASURE IN DOING THINGS: NOT AT ALL
SUM OF ALL RESPONSES TO PHQ QUESTIONS 1-9: 0

## 2024-10-10 LAB
ALBUMIN SERPL-MCNC: 4 G/DL (ref 3.8–4.9)
ALBUMIN/CREAT UR: 28 MG/G CREAT (ref 0–29)
ALP SERPL-CCNC: 145 IU/L (ref 44–121)
ALT SERPL-CCNC: 19 IU/L (ref 0–44)
AST SERPL-CCNC: 17 IU/L (ref 0–40)
BILIRUB SERPL-MCNC: <0.2 MG/DL (ref 0–1.2)
BUN SERPL-MCNC: 33 MG/DL (ref 6–24)
BUN/CREAT SERPL: 19 (ref 9–20)
CALCIUM SERPL-MCNC: 9.2 MG/DL (ref 8.7–10.2)
CHLORIDE SERPL-SCNC: 112 MMOL/L (ref 96–106)
CHOLEST SERPL-MCNC: 149 MG/DL (ref 100–199)
CO2 SERPL-SCNC: 19 MMOL/L (ref 20–29)
CREAT SERPL-MCNC: 1.72 MG/DL (ref 0.76–1.27)
CREAT UR-MCNC: 69 MG/DL
EGFRCR SERPLBLD CKD-EPI 2021: 47 ML/MIN/1.73
GLOBULIN SER CALC-MCNC: 3.3 G/DL (ref 1.5–4.5)
GLUCOSE SERPL-MCNC: 122 MG/DL (ref 70–99)
HBA1C MFR BLD: 8.2 % (ref 4.8–5.6)
HDLC SERPL-MCNC: 42 MG/DL
IMP & REVIEW OF LAB RESULTS: NORMAL
LDLC SERPL CALC-MCNC: 78 MG/DL (ref 0–99)
Lab: NORMAL
MICROALBUMIN UR-MCNC: 19.4 UG/ML
POTASSIUM SERPL-SCNC: 4.9 MMOL/L (ref 3.5–5.2)
PROT SERPL-MCNC: 7.3 G/DL (ref 6–8.5)
REPORT: NORMAL
REPORT: NORMAL
SODIUM SERPL-SCNC: 149 MMOL/L (ref 134–144)
TRIGL SERPL-MCNC: 168 MG/DL (ref 0–149)
TSH SERPL DL<=0.005 MIU/L-ACNC: 2.91 UIU/ML (ref 0.45–4.5)
VLDLC SERPL CALC-MCNC: 29 MG/DL (ref 5–40)

## 2024-10-14 ENCOUNTER — OFFICE VISIT (OUTPATIENT)
Age: 54
End: 2024-10-14
Payer: MEDICARE

## 2024-10-14 ENCOUNTER — TELEPHONE (OUTPATIENT)
Age: 54
End: 2024-10-14

## 2024-10-14 VITALS
SYSTOLIC BLOOD PRESSURE: 139 MMHG | HEIGHT: 70 IN | HEART RATE: 97 BPM | DIASTOLIC BLOOD PRESSURE: 70 MMHG | BODY MASS INDEX: 39.6 KG/M2 | WEIGHT: 276.6 LBS

## 2024-10-14 DIAGNOSIS — E66.9 OBESITY (BMI 30-39.9): ICD-10-CM

## 2024-10-14 DIAGNOSIS — E04.1 THYROID NODULE: ICD-10-CM

## 2024-10-14 DIAGNOSIS — E10.42 TYPE 1 DIABETES MELLITUS WITH DIABETIC POLYNEUROPATHY (HCC): Primary | ICD-10-CM

## 2024-10-14 DIAGNOSIS — E78.2 MIXED HYPERLIPIDEMIA: ICD-10-CM

## 2024-10-14 DIAGNOSIS — N18.32 STAGE 3B CHRONIC KIDNEY DISEASE (HCC): ICD-10-CM

## 2024-10-14 PROCEDURE — 3017F COLORECTAL CA SCREEN DOC REV: CPT | Performed by: GENERAL ACUTE CARE HOSPITAL

## 2024-10-14 PROCEDURE — 3075F SYST BP GE 130 - 139MM HG: CPT | Performed by: GENERAL ACUTE CARE HOSPITAL

## 2024-10-14 PROCEDURE — 99214 OFFICE O/P EST MOD 30 MIN: CPT | Performed by: GENERAL ACUTE CARE HOSPITAL

## 2024-10-14 PROCEDURE — 3078F DIAST BP <80 MM HG: CPT | Performed by: GENERAL ACUTE CARE HOSPITAL

## 2024-10-14 PROCEDURE — G8417 CALC BMI ABV UP PARAM F/U: HCPCS | Performed by: GENERAL ACUTE CARE HOSPITAL

## 2024-10-14 PROCEDURE — 2022F DILAT RTA XM EVC RTNOPTHY: CPT | Performed by: GENERAL ACUTE CARE HOSPITAL

## 2024-10-14 PROCEDURE — G8484 FLU IMMUNIZE NO ADMIN: HCPCS | Performed by: GENERAL ACUTE CARE HOSPITAL

## 2024-10-14 PROCEDURE — G2211 COMPLEX E/M VISIT ADD ON: HCPCS | Performed by: GENERAL ACUTE CARE HOSPITAL

## 2024-10-14 PROCEDURE — 1036F TOBACCO NON-USER: CPT | Performed by: GENERAL ACUTE CARE HOSPITAL

## 2024-10-14 PROCEDURE — G8427 DOCREV CUR MEDS BY ELIG CLIN: HCPCS | Performed by: GENERAL ACUTE CARE HOSPITAL

## 2024-10-14 PROCEDURE — 3052F HG A1C>EQUAL 8.0%<EQUAL 9.0%: CPT | Performed by: GENERAL ACUTE CARE HOSPITAL

## 2024-10-14 NOTE — PROGRESS NOTES
MG tablet Take 2 tablets by mouth every 6 hours as needed for Pain (mild pain)    albuterol (PROVENTIL) (2.5 MG/3ML) 0.083% nebulizer solution Take 3 mLs by nebulization every 6 hours as needed for Shortness of Breath or Wheezing    LANTUS SOLOSTAR 100 UNIT/ML injection pen Take 34 units at bedtime    Continuous Blood Gluc  (FREESTYLE ELY 2 READER) FRANNY by Does not apply route    Insulin Pen Needle 32G X 4 MM MISC Use pen needle with insulin pens for up to 4 times per day E10.65    Lancets MISC 1 each by Does not apply route daily    cyanocobalamin 1000 MCG tablet Take 1 tablet by mouth daily    selenium sulfide (SELSUN BLUE) 1 % shampoo Apply to head, mustache area and left ear at least several times daily.    triamcinolone (KENALOG) 0.1 % cream APPLY THIN LAYER OF CREAM EXTERNALLY TO AFFECTED AREA ON NECK RASH AND EXTERNAL EAR TWICE DAILY FOR  SKIN  IRRITATION    Continuous Blood Gluc  (DEXCOM G7 ) FRANNY For checking blood sugar 4 to 5 times per day E11.65 (Patient not taking: Reported on 10/14/2024)    Continuous Blood Gluc Sensor (DEXCOM G7 SENSOR) MISC Used to measure blood sugar 4 to 5 times per day, E11.65 (Patient not taking: Reported on 10/14/2024)     No current facility-administered medications for this visit.     No Known Allergies  Family History   Problem Relation Age of Onset    No Known Problems Brother     Thyroid Disease Sister     Hypertension Maternal Grandmother     Heart Disease Mother     Other Mother         Crohn's disease    Hypertension Mother     Diabetes Father     Cancer Father         prostate     Social History     Socioeconomic History    Marital status: Single     Spouse name: Not on file    Number of children: Not on file    Years of education: Not on file    Highest education level: Not on file   Occupational History    Not on file   Tobacco Use    Smoking status: Never    Smokeless tobacco: Never   Vaping Use    Vaping status: Never Used   Substance and

## 2024-10-14 NOTE — PATIENT INSTRUCTIONS
yourself at home?  Learn your signs of low blood sugar. They are different for everyone. Some common early signs include:  Nausea.  Hunger.  Feeling nervous, irritable, or shaky.  Cold, clammy skin.  Sweating (when you're not exercising).  Use the \"rule of 15\" to treat low blood sugar. This includes eating 15 grams of carbohydrate from a quick-sugar food, such as 3 or 4 glucose tablets or ½ cup of juice. Wait 15 minutes and check your blood sugar. If it is still below 70 mg/dL, eat another 15 grams of carbohydrate. Repeat this every 15 minutes until your blood sugar is in a safe target range.  Once your blood sugar is in a safe range, eat a snack or meal to prevent recurrent low blood sugar.  Make sure family, friends, and coworkers know the symptoms of low blood sugar and know how to get your sugar level up.  If you were prescribed glucagon, always have it with you. Make sure friends and family know how to use it.  When should you call for help?     Call 911 anytime you think you may need emergency care. For example, call if:    You passed out (lost consciousness).     You are confused or cannot think clearly.     Your blood sugar is very high or very low.     Watch closely for changes in your health, and be sure to contact your doctor if:    Your blood sugar stays outside the level your doctor set for you.     You have any problems.           For more food/recipe information:    American Diabetes Association website: https://www.diabetesfoodhub.org  DiaTribe : https://diatribe.org/diabetes-recipes

## 2024-10-21 NOTE — HOME HEALTH
Subjective: Pt states his leg itches since being wrapped on 1/16/24  Falls since last visit No(if yes complete the Fall Tracking Form and include bsrifallreport):   Caregiver involvement changes: Uncle is primary caregiver   Home health supplies by type and quantity ordered/delivered this visit include: none     Clinician asked if patient has had any physician contact since last home care visit and patient states: YES  Clinician asked if patient has any new or changed medications and patient states:  NO   If Yes, were medications reconciled? NO   Was the certifying physician notified of changes in medications? NO     Clinical assessment (what this visit means for the patient overall and need for ongoing skilled care) and progress or lack of progress towards SPECIFIC goals: Pt continues to require SN for wound care. Pt had first visit with wound care center on 1/16/24. Pt saw MD Dr. Gill who gave new orders which includes wrapping the left leg with 2 layer calamine wrap. SN ordered supplies which have not been delivered as of yet. Wound care done per MD order.     Written Teaching Material Utilized: N/A    Interdisciplinary communication with: N/A for the purpose of NA    Discharge planning as follows: When wound is 100% healed    Specific plan for next visit: Wound care none

## 2024-10-31 ENCOUNTER — TELEPHONE (OUTPATIENT)
Age: 54
End: 2024-10-31

## 2024-10-31 DIAGNOSIS — E10.42 TYPE 1 DIABETES MELLITUS WITH DIABETIC POLYNEUROPATHY (HCC): Primary | ICD-10-CM

## 2024-10-31 NOTE — TELEPHONE ENCOUNTER
[facility] need [facility] for medicine authorization.  Please call [phone] and fax#[phone].  Reference#[id number].

## 2024-11-14 ENCOUNTER — TELEPHONE (OUTPATIENT)
Age: 54
End: 2024-11-14

## 2024-11-17 DIAGNOSIS — E10.42 DIABETIC POLYNEUROPATHY ASSOCIATED WITH TYPE 1 DIABETES MELLITUS (HCC): ICD-10-CM

## 2024-11-18 ENCOUNTER — TELEPHONE (OUTPATIENT)
Age: 54
End: 2024-11-18

## 2024-11-18 RX ORDER — HYDROXYZINE PAMOATE 25 MG/1
25 CAPSULE ORAL 4 TIMES DAILY PRN
Qty: 60 CAPSULE | Refills: 0 | Status: SHIPPED | OUTPATIENT
Start: 2024-11-18

## 2024-11-18 RX ORDER — PREGABALIN 200 MG/1
CAPSULE ORAL
Qty: 60 CAPSULE | Refills: 0 | Status: SHIPPED | OUTPATIENT
Start: 2024-11-18 | End: 2024-12-18

## 2024-11-18 NOTE — TELEPHONE ENCOUNTER
PCP: Paco Bray PA-C     Last appt:  3/27/2024      Future Appointments   Date Time Provider Department Center   4/14/2025 11:50 AM José Franco MD RDE [facility] BS AMB   6/10/2025  3:00 PM Mitzi Polk ANP [facility] AMB   9/10/2025  9:20 AM Juan José Rodriguez MD [facility] AMB          Requested Prescriptions     Pending Prescriptions Disp Refills    pregabalin (LYRICA) 200 MG capsule [Pharmacy Med Name: Pregabalin 200 MG Oral Capsule] 60 capsule 0     Sig: TAKE 1 CAPSULE BY MOUTH TWICE DAILY . DO NOT EXCEED 2 PER 24 HOURS

## 2024-11-18 NOTE — TELEPHONE ENCOUNTER
PCP: Paco Bray PA-C     Last appt:  3/27/2024      Future Appointments   Date Time Provider Department Center   4/14/2025 11:50 AM José Franco MD RDE [facility] BS AMB   6/10/2025  3:00 PM Mitzi Polk ANP NE[facility] BS AMB   9/10/2025  9:20 AM Juan José Rodriguez MD [facility] BS AMB          Requested Prescriptions     Pending Prescriptions Disp Refills    hydrOXYzine pamoate (VISTARIL) 25 MG capsule [Pharmacy Med Name: hydrOXYzine Pamoate 25 MG Oral Capsule] 60 capsule 0     Sig: TAKE 1 CAPSULE BY MOUTH 4 TIMES DAILY AS NEEDED FOR ITCHING

## 2024-11-18 NOTE — TELEPHONE ENCOUNTER
[facility]  Diabetes Self-Management Education & Support Program  Insulin Pump Intake Note    SUMMARY    Spoke with Tom Birch today regarding insulin pump training.     Insulin pump brand: Tandem with Control IQ    After assessment, patient will require additional DSMES training prior to starting insulin pump therapy.  They are scheduled to start training on 12-2-2024 with certified insulin pump  Sammie Oneill RN, [facility], at [facility].         When was the last time you received DSMES? 1-5 years    Did you receive an email from [employer]? Not sure    Have you used an insulin pump in the past? No    Are you currently wearing an insulin pump? No      Do you have the new insulin pump in your possession? Yes Tandem tSlim with [employer] XC infusion set    Are you currently using CGMS? Yes Anna 2 CGM    Have you verified that your phone is compatible with both the insulin pump & CGMS? No    Are you currently...  Counting carbohydrates? No    Using an insulin to carbohydrate ratio to dose insulin at mealtime (1 unit of insulin for every ___ carbohydrate)? No    Using a sensitivity factor to correct out-of-range blood glucoses (1 unit of insulin will lower BG ___ mg/dl)? No    Do you have insulin in vials? No    What specifics issues or concerns do you have regarding insulin pump therapy? \"I didn't want to do it but my endocrinologist said I need it so I want to try it out\" and \"not sure about carb counting\".     Sammie Oneill RN, [facility] on 11/18/2024 at 1:59 PM

## 2024-11-19 ENCOUNTER — TELEPHONE (OUTPATIENT)
Facility: CLINIC | Age: 54
End: 2024-11-19

## 2024-11-22 ENCOUNTER — HOSPITAL ENCOUNTER (OUTPATIENT)
Facility: HOSPITAL | Age: 54
Discharge: HOME | End: 2024-11-22
Payer: MEDICARE

## 2024-11-22 DIAGNOSIS — E10.42 TYPE 1 DIABETES MELLITUS WITH DIABETIC POLYNEUROPATHY (HCC): ICD-10-CM

## 2024-11-22 DIAGNOSIS — E04.1 THYROID NODULE: ICD-10-CM

## 2024-11-22 PROCEDURE — 76536 US EXAM OF HEAD AND NECK: CPT

## 2024-12-02 ENCOUNTER — TELEMEDICINE (OUTPATIENT)
Age: 54
End: 2024-12-02
Payer: MEDICARE

## 2024-12-02 ENCOUNTER — OFFICE VISIT (OUTPATIENT)
Facility: CLINIC | Age: 54
End: 2024-12-02

## 2024-12-02 VITALS
RESPIRATION RATE: 14 BRPM | HEIGHT: 70 IN | BODY MASS INDEX: 38.85 KG/M2 | SYSTOLIC BLOOD PRESSURE: 124 MMHG | HEART RATE: 101 BPM | WEIGHT: 271.4 LBS | DIASTOLIC BLOOD PRESSURE: 75 MMHG | OXYGEN SATURATION: 93 % | TEMPERATURE: 98.9 F

## 2024-12-02 DIAGNOSIS — R45.89 ANXIETY ABOUT HEALTH: ICD-10-CM

## 2024-12-02 DIAGNOSIS — E10.42 DIABETIC POLYNEUROPATHY ASSOCIATED WITH TYPE 1 DIABETES MELLITUS (HCC): Primary | ICD-10-CM

## 2024-12-02 DIAGNOSIS — E78.00 HYPERCHOLESTEROLEMIA: ICD-10-CM

## 2024-12-02 DIAGNOSIS — F32.4 MAJOR DEPRESSIVE DISORDER WITH SINGLE EPISODE, IN PARTIAL REMISSION (HCC): ICD-10-CM

## 2024-12-02 DIAGNOSIS — E10.42 TYPE 1 DIABETES MELLITUS WITH DIABETIC POLYNEUROPATHY (HCC): ICD-10-CM

## 2024-12-02 DIAGNOSIS — E10.3593 PROLIFERATIVE DIABETIC RETINOPATHY OF BOTH EYES ASSOCIATED WITH TYPE 1 DIABETES MELLITUS, UNSPECIFIED PROLIFERATIVE RETINOPATHY TYPE (HCC): ICD-10-CM

## 2024-12-02 DIAGNOSIS — G37.9 DEMYELINATING DISEASE OF CENTRAL NERVOUS SYSTEM, UNSPECIFIED (HCC): ICD-10-CM

## 2024-12-02 DIAGNOSIS — I10 ESSENTIAL HYPERTENSION: ICD-10-CM

## 2024-12-02 DIAGNOSIS — N18.32 STAGE 3B CHRONIC KIDNEY DISEASE (HCC): ICD-10-CM

## 2024-12-02 DIAGNOSIS — F32.9 TREATMENT-RESISTANT DEPRESSION: ICD-10-CM

## 2024-12-02 DIAGNOSIS — Z00.00 ENCOUNTER FOR SUBSEQUENT ANNUAL WELLNESS VISIT (AWV) IN MEDICARE PATIENT: Primary | ICD-10-CM

## 2024-12-02 DIAGNOSIS — J96.11 CHRONIC RESPIRATORY FAILURE WITH HYPOXIA: ICD-10-CM

## 2024-12-02 DIAGNOSIS — G81.10 SPASTIC HEMIPARESIS AFFECTING DOMINANT SIDE (HCC): ICD-10-CM

## 2024-12-02 DIAGNOSIS — E10.42 TYPE 1 DIABETES MELLITUS WITH DIABETIC POLYNEUROPATHY (HCC): Primary | ICD-10-CM

## 2024-12-02 DIAGNOSIS — E04.1 THYROID NODULE: ICD-10-CM

## 2024-12-02 PROCEDURE — G0108 DIAB MANAGE TRN  PER INDIV: HCPCS

## 2024-12-02 RX ORDER — ARIPIPRAZOLE 2 MG/1
2 TABLET ORAL DAILY
Qty: 30 TABLET | Refills: 3 | Status: SHIPPED | OUTPATIENT
Start: 2024-12-02

## 2024-12-02 SDOH — ECONOMIC STABILITY: INCOME INSECURITY: HOW HARD IS IT FOR YOU TO PAY FOR THE VERY BASICS LIKE FOOD, HOUSING, MEDICAL CARE, AND HEATING?: SOMEWHAT HARD

## 2024-12-02 SDOH — ECONOMIC STABILITY: FOOD INSECURITY: WITHIN THE PAST 12 MONTHS, YOU WORRIED THAT YOUR FOOD WOULD RUN OUT BEFORE YOU GOT MONEY TO BUY MORE.: SOMETIMES TRUE

## 2024-12-02 SDOH — ECONOMIC STABILITY: FOOD INSECURITY: WITHIN THE PAST 12 MONTHS, THE FOOD YOU BOUGHT JUST DIDN'T LAST AND YOU DIDN'T HAVE MONEY TO GET MORE.: SOMETIMES TRUE

## 2024-12-02 ASSESSMENT — PATIENT HEALTH QUESTIONNAIRE - PHQ9
10. IF YOU CHECKED OFF ANY PROBLEMS, HOW DIFFICULT HAVE THESE PROBLEMS MADE IT FOR YOU TO DO YOUR WORK, TAKE CARE OF THINGS AT HOME, OR GET ALONG WITH OTHER PEOPLE: NOT DIFFICULT AT ALL
SUM OF ALL RESPONSES TO PHQ QUESTIONS 1-9: 0
4. FEELING TIRED OR HAVING LITTLE ENERGY: NOT AT ALL
6. FEELING BAD ABOUT YOURSELF - OR THAT YOU ARE A FAILURE OR HAVE LET YOURSELF OR YOUR FAMILY DOWN: NOT AT ALL
SUM OF ALL RESPONSES TO PHQ QUESTIONS 1-9: 0
2. FEELING DOWN, DEPRESSED OR HOPELESS: NOT AT ALL
SUM OF ALL RESPONSES TO PHQ9 QUESTIONS 1 & 2: 0
5. POOR APPETITE OR OVEREATING: NOT AT ALL
7. TROUBLE CONCENTRATING ON THINGS, SUCH AS READING THE NEWSPAPER OR WATCHING TELEVISION: NOT AT ALL
SUM OF ALL RESPONSES TO PHQ QUESTIONS 1-9: 0
9. THOUGHTS THAT YOU WOULD BE BETTER OFF DEAD, OR OF HURTING YOURSELF: NOT AT ALL
SUM OF ALL RESPONSES TO PHQ QUESTIONS 1-9: 0
3. TROUBLE FALLING OR STAYING ASLEEP: NOT AT ALL
8. MOVING OR SPEAKING SO SLOWLY THAT OTHER PEOPLE COULD HAVE NOTICED. OR THE OPPOSITE, BEING SO FIGETY OR RESTLESS THAT YOU HAVE BEEN MOVING AROUND A LOT MORE THAN USUAL: NOT AT ALL
1. LITTLE INTEREST OR PLEASURE IN DOING THINGS: NOT AT ALL

## 2024-12-02 ASSESSMENT — LIFESTYLE VARIABLES
HOW MANY STANDARD DRINKS CONTAINING ALCOHOL DO YOU HAVE ON A TYPICAL DAY: PATIENT DOES NOT DRINK
HOW OFTEN DO YOU HAVE A DRINK CONTAINING ALCOHOL: NEVER

## 2024-12-02 NOTE — PROGRESS NOTES
[facility]  Diabetes Self-Management Education & Support Program  Encounter Note      SUMMARY  Diabetes self-care management training was completed related to healthy eating. The participant will return on December 20 to continue DSMES related to healthy eating. The participant did identify SMART Goal(s) and will practice knowledge and skills related to healthy eating to improve diabetes self-management.      EVALUATION:  Mr. Birch can recognize carbohydrates, proteins, and fats and can verbalize reading a food label. He verbalized understanding of the  healthy plate meal planning method. He understands and can verbalize basic carbohydrate counting. He shared that he only eats one meal daily around 4:00 pm and he often feels nauseated and confused after meals. He reported experiencing several hypoglycemia events with SG 60-69 mg/dL. Discussed hypoglycemia protocols r/t CGM and when to use BG meter/backup. He shared that he also often experiences SOB on exertion and uses home oxygen when needed. He reported taking insulin as prescribed. Encouraged participant to communicate with provider if he experiences more than 2-3 hypoglycemia events this week and to communicate with provider regarding medical concerns r/t SOB/oxygen usage. He verbalized understanding of instructions. During this visit he was able to communicate without difficulty, no s/s of SOB visualized; he was unloading delivered groceries during this appointment. He stated that he \"felt fine but that he would be using oxygen after our appointment today\".      RECOMMENDATIONS:  Communicate medical concerns r/t SOB/oxygen use with provider  Communicate with provider if you experience more than 2-3 hypoglycemia events/week  Work on SMART goal and healthy eating recommendations  Continue to monitor glucose using Anna 2 CGM device-Use BG meter as backup when needed     TOPICS DISCUSSED TODAY:  WHAT CAN I EAT? 60    Next provider

## 2024-12-02 NOTE — PROGRESS NOTES
Tom Birch  Identified pt with two pt identifiers(name and ).     Chief Complaint   Patient presents with    Medicare AW     Room 4B //        Reviewed record In preparation for visit and have obtained necessary documentation.     1. Have you been to the ER, urgent care clinic or hospitalized since your last visit? No     2. Have you seen or consulted any other health care providers outside of the [facility] since your last visit? Include any pap smears or colon screening. No    Patient does not have an advance directive.     Vitals reviewed with provider.    Health Maintenance reviewed:     Health Maintenance Due   Topic    HIV screen     Diabetic retinal exam     Diabetic foot exam     Annual Wellness Visit (Medicare Advantage)     Flu vaccine (1)    COVID-19 Vaccine (3 - 2023-24 season)          Wt Readings from Last 3 Encounters:   24 123.1 kg (271 lb 6.4 oz)   10/14/24 125.5 kg (276 lb 9.6 oz)   24 119.5 kg (263 lb 6.4 oz)        Temp Readings from Last 3 Encounters:   24 98.9 °F (37.2 °C) (Oral)   24 97.7 °F (36.5 °C) (Temporal)   24 98.5 °F (36.9 °C) (Oral)        BP Readings from Last 3 Encounters:   24 124/75   10/14/24 139/70   24 112/64        Pulse Readings from Last 3 Encounters:   24 (!) 101   10/14/24 97   24 94             No data to display                  Learning Assessment:         2023    10:00 AM   [facility] AMB LEARNING ASSESSMENT   Primary Learner Patient   Primary Language ENGLISH   Learning Preference OTHER (COMMENT)   Answered By Patient   Relationship to Learner SELF         Fall Risk Assessment:   :         2024    10:51 AM 2024    10:43 AM 9/10/2024     9:42 AM 2024     8:09 AM 2024    10:49 AM 3/21/2024    11:29 AM 2023     1:16 PM   Amb Fall Risk Assessment and TUG Test   Do you feel unsteady or are you worried about falling?  yes yes no no no yes no   2 or more falls in past year? 
  Breath sounds: Normal breath sounds. No wheezing, rhonchi or rales.   Abdominal:      General: Abdomen is flat. Bowel sounds are normal. There is no distension.   Musculoskeletal:         General: Normal range of motion.      Cervical back: Normal range of motion and neck supple. No rigidity.      Right lower leg: No edema.      Left lower leg: No edema.   Lymphadenopathy:      Cervical: No cervical adenopathy.   Skin:     General: Skin is warm and dry.      Capillary Refill: Capillary refill takes less than 2 seconds.      Findings: No rash.   Neurological:      General: No focal deficit present.      Mental Status: He is alert and oriented to person, place, and time. Mental status is at baseline.      Sensory: No sensory deficit.      Motor: No weakness.      Gait: Gait normal.   Psychiatric:         Mood and Affect: Mood normal.         Behavior: Behavior normal.         Thought Content: Thought content normal.               No Known Allergies  Prior to Visit Medications    Medication Sig Taking? Authorizing Provider   pregabalin (LYRICA) 200 MG capsule TAKE 1 CAPSULE BY MOUTH TWICE DAILY . DO NOT EXCEED 2 PER 24 HOURS Yes Paco Bray PA-C   hydrOXYzine pamoate (VISTARIL) 25 MG capsule TAKE 1 CAPSULE BY MOUTH 4 TIMES DAILY AS NEEDED FOR ITCHING Yes Paco Bray PA-C   pravastatin (PRAVACHOL) 40 MG tablet Take 1 tablet by mouth nightly Yes José Franco MD   HUMALOG KWIKPEN 100 UNIT/ML SOPN USE AS DIRECTED FOR MAX DOSE OF 60 UNITS PER DAY Yes José Franco MD   amLODIPine (NORVASC) 10 MG tablet Take 1 tablet by mouth once daily Yes Skylar Post MD   metoprolol succinate (TOPROL XL) 25 MG extended release tablet Take 0.5 tablets by mouth daily Yes Juan José Rodriguez MD   bumetanide (BUMEX) 1 MG tablet Take one tab daily, may take second tab in AM if weight gain >3 pounds Yes Paco Bray PA-C   oxyCODONE-acetaminophen (PERCOCET)  MG per tablet Take 1 tablet by mouth every 8 hours as needed

## 2024-12-02 NOTE — PATIENT INSTRUCTIONS
Instructions  Injuries and health problems such as trouble walking or poor eyesight can increase your risk of falling. So can some medicines. But there are things you can do to help prevent falls. You can exercise to get stronger. You can also arrange your home to make it safer.    Talk to your doctor about the medicines you take. Ask if any of them increase the risk of falls and whether they can be changed or stopped.   Try to exercise regularly. It can help improve your strength and balance. This can help lower your risk of falling.         Practice fall safety and prevention.   Wear low-heeled shoes that fit well and give your feet good support. Talk to your doctor if you have foot problems that make this hard.  Carry a cellphone or wear a medical alert device that you can use to call for help.  Use stepladders instead of chairs to reach high objects. Don't climb if you're at risk for falls. Ask for help, if needed.  Wear the correct eyeglasses, if you need them.        Make your home safer.   Remove rugs, cords, clutter, and furniture from walkways.  Keep your house well lit. Use night-lights in hallways and bathrooms.  Install and use sturdy handrails on stairways.  Wear nonskid footwear, even inside. Don't walk barefoot or in socks without shoes.        Be safe outside.   Use handrails, curb cuts, and ramps whenever possible.  Keep your hands free by using a shoulder bag or backpack.  Try to walk in well-lit areas. Watch out for uneven ground, changes in pavement, and debris.  Be careful in the winter. Walk on the grass or gravel when sidewalks are slippery. Use de-icer on steps and walkways. Add non-slip devices to shoes.    Put grab bars and nonskid mats in your shower or tub and near the toilet. Try to use a shower chair or bath bench when bathing.   Get into a tub or shower by putting in your weaker leg first. Get out with your strong side first. Have a phone or medical alert device in the bathroom with

## 2024-12-20 ENCOUNTER — TELEMEDICINE (OUTPATIENT)
Age: 54
End: 2024-12-20
Payer: MEDICARE

## 2024-12-20 DIAGNOSIS — E10.42 DIABETIC POLYNEUROPATHY ASSOCIATED WITH TYPE 1 DIABETES MELLITUS (HCC): Primary | ICD-10-CM

## 2024-12-20 PROCEDURE — G0108 DIAB MANAGE TRN  PER INDIV: HCPCS

## 2024-12-20 NOTE — PROGRESS NOTES
[facility]  Diabetes Self-Management Education & Support Program  Encounter Note      SUMMARY  Diabetes self-care management training was completed related to healthy eating: carb counting. The participant will return on  to continue DSMES related to taking medications. The participant will practice knowledge and skills related to healthy eating to improve diabetes self-management.      EVALUATION:  Mr. Birch is using  Anna 2 CGM with reader to monitor glucose. Reported data per CGM reader: TIR 53 %, Low 3 %, High 44 %. Mr. Birch shared that his CGM sensor  today and his uncle will assist him to replace it this afternoon. He is able to re-demonstrate carb counting and is keeping a food diary including carbs for his meals. He is prepared to switch from Anna 2 CGM to Dexcom G7 and is scheduled for CGM start on 2025 with support person. Contact information including phone number provided to participant.     RECOMMENDATIONS:  Continue to practice reading food labels and counting carbs for every meal daily  Continue to take insulin as prescribed     TOPICS DISCUSSED TODAY:  WHAT CAN I EAT? 30  Next provider visit is scheduled for   Future Appointments         Provider Specialty Dept Phone    2025 3:45 PM Sammie Oneill RN Diabetes Services [phone]    3/4/2025 10:30 AM Paco Bray, PA-C Internal Medicine [phone]    2025 11:50 AM José Franco MD Endocrinology [phone]    6/10/2025 3:00 PM Mitzi Polk ANP Neurology [phone]    9/10/2025 9:20 AM Juan José Rodriguez MD Cardiology [phone]              SMART GOAL(S)  Practice healthy eating self-care behavior by counting carbs for all meals daily,  over the next week.  ACHIEVEMENT OF GOAL(S) : %       DATE DSMES TOPIC EVALUATION     2024 WHAT CAN I EAT?     Nutritional terms & tools    Carbohydrate Counting   Reading food labels   Free apps   ADA alcohol recommendations

## 2024-12-30 ENCOUNTER — TELEPHONE (OUTPATIENT)
Age: 54
End: 2024-12-30

## 2024-12-30 NOTE — TELEPHONE ENCOUNTER
Received a request from [facility] for O2 order for patient to continue oxygen. Sent back that patient's O2 need is not cardiac-related and will need to be ordered by pcp or pulmonary.

## 2025-01-13 DIAGNOSIS — E10.42 DIABETIC POLYNEUROPATHY ASSOCIATED WITH TYPE 1 DIABETES MELLITUS (HCC): ICD-10-CM

## 2025-01-13 DIAGNOSIS — I10 ESSENTIAL HYPERTENSION: ICD-10-CM

## 2025-01-13 RX ORDER — AMLODIPINE BESYLATE 10 MG/1
10 TABLET ORAL DAILY
Qty: 90 TABLET | Refills: 1 | Status: SHIPPED | OUTPATIENT
Start: 2025-01-13

## 2025-01-13 RX ORDER — PREGABALIN 200 MG/1
CAPSULE ORAL
Qty: 60 CAPSULE | Refills: 2 | Status: SHIPPED | OUTPATIENT
Start: 2025-01-13 | End: 2025-02-12

## 2025-01-13 RX ORDER — HYDROXYZINE PAMOATE 25 MG/1
25 CAPSULE ORAL 4 TIMES DAILY PRN
Qty: 60 CAPSULE | Refills: 5 | Status: SHIPPED | OUTPATIENT
Start: 2025-01-13

## 2025-01-13 RX ORDER — OMEPRAZOLE 40 MG/1
40 CAPSULE, DELAYED RELEASE ORAL DAILY
Qty: 90 CAPSULE | Refills: 3 | Status: SHIPPED | OUTPATIENT
Start: 2025-01-13

## 2025-01-13 RX ORDER — LOSARTAN POTASSIUM 100 MG/1
100 TABLET ORAL DAILY
Qty: 90 TABLET | Refills: 3 | Status: SHIPPED | OUTPATIENT
Start: 2025-01-13

## 2025-01-13 NOTE — TELEPHONE ENCOUNTER
PCP: Paco Bray PA-C     Last appt:  12/2/2024      Future Appointments   Date Time Provider Department Center   1/16/2025  3:45 PM Sammie Oneill RN PDHAT BS [facility]   3/4/2025 10:30 AM Paco Bray PA-C [facility]   4/14/2025 11:50 AM José Franco MD RDE [facility] BS [facility]   6/10/2025  3:00 PM Mitzi Polk, JORGE NEUMRSPBPBB BS [facility]   9/10/2025  9:20 AM Juan José Rodriguez MD CAVREY BS [facility]          Requested Prescriptions     Pending Prescriptions Disp Refills    hydrOXYzine pamoate (VISTARIL) 25 MG capsule [Pharmacy Med Name: hydrOXYzine Pamoate 25 MG Oral Capsule] 60 capsule 0     Sig: TAKE 1 CAPSULE BY MOUTH 4 TIMES DAILY AS NEEDED FOR ITCHING    losartan (COZAAR) 100 MG tablet [Pharmacy Med Name: Losartan Potassium 100 MG Oral Tablet] 90 tablet 0     Sig: Take 1 tablet by mouth once daily    omeprazole (PRILOSEC) 40 MG delayed release capsule [Pharmacy Med Name: OMEPRAZOLE DR 40MG  CAP] 90 capsule 0     Sig: Take 1 capsule by mouth once daily    pregabalin (LYRICA) 200 MG capsule [Pharmacy Med Name: Pregabalin 200 MG Oral Capsule] 60 capsule 0     Sig: TAKE 1 CAPSULE BY MOUTH TWICE DAILY . DO NOT EXCEED 2 PER 24 HOURS

## 2025-01-13 NOTE — TELEPHONE ENCOUNTER
PCP: Paco Bray PA-C     Last appt:  12/2/2024      Future Appointments   Date Time Provider Department Center   1/16/2025  3:45 PM Sammie Oneill RN PDHAT [facility]   3/4/2025 10:30 AM Paco Bray PA-C [facility]   4/14/2025 11:50 AM José Franco MD RDE [facility] PBB [facility]   6/10/2025  3:00 PM Mitzi Polk, ANP NEUMRSPBPBB [facility]   9/10/2025  9:20 AM Juan José Rodriguez MD CAVREY BS [facility]          Requested Prescriptions     Pending Prescriptions Disp Refills    amLODIPine (NORVASC) 10 MG tablet [Pharmacy Med Name: amLODIPine Besylate 10 MG Oral Tablet] 90 tablet 0     Sig: Take 1 tablet by mouth once daily

## 2025-01-16 ENCOUNTER — OFFICE VISIT (OUTPATIENT)
Age: 55
End: 2025-01-16
Payer: MEDICARE

## 2025-01-16 DIAGNOSIS — E10.42 DIABETIC POLYNEUROPATHY ASSOCIATED WITH TYPE 1 DIABETES MELLITUS (HCC): Primary | ICD-10-CM

## 2025-01-16 PROCEDURE — G0108 DIAB MANAGE TRN  PER INDIV: HCPCS

## 2025-01-17 NOTE — PROGRESS NOTES
[facility]  Diabetes Self-Management Education & Support Program  Encounter note      SUMMARY  Continuous Glucose Monitor (CGM) Training Note    Diabetes self-care management training was completed related to continuous glucose monitoring. The participant presents for training on Dexcom G7 with Registered Nurse and Certified Diabetes Care and .    The following areas were addressed:  Purpose of CGM  Linking of CGM to smart devices, as applicable  Appropriate locations for CGM wear  Site selection and skin preparation  Application of sensor  CGM ‘warm-up’ phase  Scanning Tips, Understanding Sensor Readings/Data  When to utilize BG meter  Customize Alarms  Discrepancies between blood glucose meter and CGM data  Charging of devices, as applicable  Troubleshooting for site failure, loss of connectivity/signal  Linking to insulin pump, as applicable  Calibrations, as applicable  CGM ‘lifespan’ and when to change site/sensor removal and replacement  Proper disposal of CGM and     EVALUATION:  The Participant demonstrated ability to place CGM sensor and verbalized understanding on training information. Participant is aware to contact our office, or the CGM company with any issues related to their device. Educational materials and contact information including phone numbers provided to participant.     RECOMMENDATIONS:  The Participant is willing to wear CGM sensor as prescribed to monitor glucose daily and will share data with provider.       DUANE CarmenN, RN, [facility] on 1/17/2025 at 9:55 AM    I have personally reviewed the health record, including provider notes, laboratory data and current medications before making these care and education recommendations. The time spent in this effort is included in the total time.    Total minutes: 60 minutes

## 2025-01-22 RX ORDER — INSULIN GLARGINE 100 [IU]/ML
INJECTION, SOLUTION SUBCUTANEOUS
Qty: 45 ML | Refills: 0 | Status: SHIPPED | OUTPATIENT
Start: 2025-01-22 | End: 2025-01-22

## 2025-01-22 RX ORDER — INSULIN GLARGINE 100 [IU]/ML
INJECTION, SOLUTION SUBCUTANEOUS
Qty: 30 ML | Refills: 5 | Status: SHIPPED | OUTPATIENT
Start: 2025-01-22

## 2025-01-31 ENCOUNTER — OFFICE VISIT (OUTPATIENT)
Age: 55
End: 2025-01-31
Payer: MEDICARE

## 2025-01-31 DIAGNOSIS — E10.42 DIABETIC POLYNEUROPATHY ASSOCIATED WITH TYPE 1 DIABETES MELLITUS (HCC): Primary | ICD-10-CM

## 2025-01-31 PROCEDURE — G0108 DIAB MANAGE TRN  PER INDIV: HCPCS

## 2025-01-31 NOTE — PROGRESS NOTES
[facility]  Diabetes Self-Management Education & Support Program  Encounter Note      SUMMARY  Diabetes self-care management training was completed related to monitoring and taking medications: insulin therapy training. The participant will return on February 20: Insulin Pump Start: Tandem t:slim. The participant will practice knowledge and skills related to monitoring and medications to improve diabetes self-management.      EVALUATION:  Mr. Birch is monitoring glucose using Dexcom G7 CGM. SG during this appointment: 89 mg/dL. Data per CGM adelita: TIR: 53%, Vlow: Less than 1%, Low: 2%, High 23%, Vhigh 21%, Avg glucose 189 mg/dL. GMI 7.8. He reported taking Lantus insulin 36 units daily and Humalog insulin 26-28 units plus sliding scale before meals as prescribed. He understands and is able to verbalize carb counting. Insulin pump start scheduled: 2/20/25. Participant verbalized understanding of supplies and instructions to be completed before scheduled appointment for pump start. He was accompanied during this appointment by support person.     RECOMMENDATIONS:  Complete online pump learning modules as instructed  Continue to utilize CGM to monitor glucose and share results with provider  Continue to follow healthy eating recommendations   Bring all pump/CGM supplies including insulin in vial, water/snacks to scheduled pump start appointment    TOPICS DISCUSSED TODAY:  HOW CAN BLOOD GLUCOSE MONITORING HELP ME? 30  HOW DO MY DIABETES MEDICATIONS WORK? 30    Next provider visit is scheduled for   Future Appointments         Provider Specialty Dept Phone    2/20/2025 1:00 PM Sammie Oneill RN Diabetes Services [phone]    3/4/2025 10:30 AM Paco Bray PA-PHYLLIS Internal Medicine [phone]    4/14/2025 11:50 AM José Franco MD Endocrinology [phone]    6/10/2025 3:00 PM Mitzi Polk ANP Neurology [phone]    9/10/2025 9:20 AM Juan José Rodriguez MD Cardiology [phone]

## 2025-02-05 RX ORDER — GLUCAGON 3 MG/1
POWDER NASAL
Qty: 1 EACH | Refills: 1 | Status: SHIPPED | OUTPATIENT
Start: 2025-02-05

## 2025-02-05 RX ORDER — INSULIN LISPRO 100 [IU]/ML
INJECTION, SOLUTION INTRAVENOUS; SUBCUTANEOUS
Qty: 90 ML | Refills: 5 | Status: SHIPPED | OUTPATIENT
Start: 2025-02-05

## 2025-02-12 DIAGNOSIS — E10.42 TYPE 1 DIABETES MELLITUS WITH DIABETIC POLYNEUROPATHY (HCC): Primary | ICD-10-CM

## 2025-02-12 RX ORDER — BUMETANIDE 1 MG/1
TABLET ORAL
Qty: 60 TABLET | Refills: 5 | Status: SHIPPED | OUTPATIENT
Start: 2025-02-12

## 2025-02-12 NOTE — TELEPHONE ENCOUNTER
PCP: Paco Bray PA-C     Last appt:  12/2/2024      Future Appointments   Date Time Provider Department Center   2/20/2025  1:00 PM Sammie Oneill RN PDHAT [facility]   3/4/2025 10:30 AM Paco Bray PA-C [facility]   4/14/2025 11:50 AM José Franco MD RDE [facility] PBB [facility]   6/10/2025  3:00 PM Mitzi Polk, ANP NEUMRSPBPBB [facility]   9/10/2025  9:20 AM Juan José Rodriguez MD CAVREY [facility]          Requested Prescriptions     Pending Prescriptions Disp Refills    bumetanide (BUMEX) 1 MG tablet [Pharmacy Med Name: Bumetanide 1 MG Oral Tablet] 60 tablet 0     Sig: TAKE 1 TABLET BY MOUTH ONCE DAILY MAY  TAKE  2ND  TABLET  IN  THE  MORNING  IF  WEIGHT  GAIN  IS  GREATER  THAN  3  POUNDS

## 2025-02-13 ENCOUNTER — TELEPHONE (OUTPATIENT)
Age: 55
End: 2025-02-13

## 2025-02-13 RX ORDER — INSULIN LISPRO 100 [IU]/ML
INJECTION, SOLUTION INTRAVENOUS; SUBCUTANEOUS
Qty: 90 ML | Refills: 5 | Status: SHIPPED | OUTPATIENT
Start: 2025-02-13

## 2025-02-19 ENCOUNTER — TELEPHONE (OUTPATIENT)
Age: 55
End: 2025-02-19

## 2025-02-25 ENCOUNTER — TELEPHONE (OUTPATIENT)
Age: 55
End: 2025-02-25

## 2025-02-25 NOTE — TELEPHONE ENCOUNTER
No answer. LVM asking participant to call for questions/concerns regarding insulin pump start process, online learning modules or adelita/connection issues. Noted Insulin pump start now scheduled for 3/20/2025 per pt request. Contact phone number provided to participant.

## 2025-03-19 ENCOUNTER — TELEPHONE (OUTPATIENT)
Age: 55
End: 2025-03-19

## 2025-03-19 NOTE — TELEPHONE ENCOUNTER
No answer. LVM reminder of insulin pump start scheduled for 3/20/2025 at 1:00 pm. Pt to have completed Tandem online learning, bring all pump and CGM supplies including insulin in vial to appointment. Contact name and phone number provided.

## 2025-03-20 ENCOUNTER — OFFICE VISIT (OUTPATIENT)
Age: 55
End: 2025-03-20

## 2025-03-20 DIAGNOSIS — E10.42 DIABETIC POLYNEUROPATHY ASSOCIATED WITH TYPE 1 DIABETES MELLITUS: Primary | ICD-10-CM

## 2025-03-20 NOTE — PROGRESS NOTES
Participant verbalized understanding using typical hypoglycemia treatment protocol can precipitate more aggressive response from pump, resulting in correctional bolus delivery due to changes in the SG trends. Participant understands to monitor trend arrow changes as additional indicator for treatment effectiveness.     DATE DSMES TOPIC EVALUATION   3/20/2025 HOW DOES ACTIVITY AFFECT MY DIABETES?   Utilizing insulin pump technology for optimal glucose control during sleeping   Utilizing insulin pump technology for optimal glucose control while performing physical activity       The participant may benefit from utilizing sleep activity schedule on pump.    Sleep Schedule Set on Pump  Sleep Schedule 1:                                 Selected days: Mon-Fri                                Start time: 11:00 pm                                Stop time: 8:00 am      The participant may benefit from utilizing activity>exercise feature on pump for 30 minutes to 1 hour pre/post activity.       DUANE CarmenN, RN, [facility] on 3/20/2025 at 4:35 PM    I have personally reviewed the health record, including provider notes, laboratory data and current medications before making these care and education recommendations. The time spent in this effort is included in the total time.    Total minutes: 180 minutes billed to insulin pump company

## 2025-03-24 ENCOUNTER — TELEMEDICINE (OUTPATIENT)
Age: 55
End: 2025-03-24

## 2025-03-24 DIAGNOSIS — E10.42 DIABETIC POLYNEUROPATHY ASSOCIATED WITH TYPE 1 DIABETES MELLITUS: Primary | ICD-10-CM

## 2025-03-24 NOTE — PROGRESS NOTES
[facility]  Diabetes Self-Management Education & Support Program  Insulin Pump Follow Up Note     SUMMARY  Diabetes self-care management training was completed for insulin pump follow up for Tandem t:slim X2 insulin pump with Control IQ and Dexcom G7 CGM technology.     EVALUATION:  Mr. Birch has completed follow up insulin pump training including: infusion set troubleshooting/changing cartridge, importance of back up plan and supplies, addressing alarms/alerts, tips on bolus technique/using extended bolus feature, activity mode: exercise, reviewed Control-IQ functionality, and reminders for online educational resources, guides, supplies, and technical support. SG during this appointment: 162 mg/dL post prandial. Reported infusion set change: 3/23/2025 with Humalog insulin. CIQ turned On. Reviewed hypoglycemia protocols, Rule of 15. Contact information including phone numbers provided to participant.     RECOMMENDATIONS:  1) Establish back up plan/supplies. May benefit from Rx: Gvoke  2) Respond to all alarms/alerts  3) Contact pump company with any concerns regarding the functionality of the pump  4) Contact CGM company with any concerns regarding the functionality of the CGM  4) Contact Provider with any concerns regarding insulin dosages, medications, and/or BG results.      Next provider visit is scheduled for   Future Appointments   Date Time Provider Department Center   4[facility]14[facility]2025 11:50 AM [facility]   5/29/2025  3:00 PM Mitzi Polk ANP NEUMRSPBPBB [facility]   9/10/2025  9:20 AM Juan José Rodriguez MD CAVREY BS [facility]            TRUDY Carmen, RN, [facility] on 3/24/2025    Total minutes billed to insulin pump company: 38

## 2025-04-14 ENCOUNTER — OFFICE VISIT (OUTPATIENT)
Age: 55
End: 2025-04-14
Payer: MEDICARE

## 2025-04-14 VITALS
HEIGHT: 70 IN | HEART RATE: 98 BPM | WEIGHT: 266.9 LBS | DIASTOLIC BLOOD PRESSURE: 85 MMHG | SYSTOLIC BLOOD PRESSURE: 137 MMHG | BODY MASS INDEX: 38.21 KG/M2

## 2025-04-14 DIAGNOSIS — E04.1 THYROID NODULE: ICD-10-CM

## 2025-04-14 DIAGNOSIS — E66.9 OBESITY (BMI 30-39.9): ICD-10-CM

## 2025-04-14 DIAGNOSIS — Z46.81 FITTING AND ADJUSTMENT OF INSULIN PUMP: ICD-10-CM

## 2025-04-14 DIAGNOSIS — H53.9 VISUAL DISTURBANCE: ICD-10-CM

## 2025-04-14 DIAGNOSIS — E10.42 TYPE 1 DIABETES MELLITUS WITH DIABETIC POLYNEUROPATHY (HCC): Primary | ICD-10-CM

## 2025-04-14 PROCEDURE — 3079F DIAST BP 80-89 MM HG: CPT | Performed by: GENERAL ACUTE CARE HOSPITAL

## 2025-04-14 PROCEDURE — 3075F SYST BP GE 130 - 139MM HG: CPT | Performed by: GENERAL ACUTE CARE HOSPITAL

## 2025-04-14 PROCEDURE — 1036F TOBACCO NON-USER: CPT | Performed by: GENERAL ACUTE CARE HOSPITAL

## 2025-04-14 PROCEDURE — 99215 OFFICE O/P EST HI 40 MIN: CPT | Performed by: GENERAL ACUTE CARE HOSPITAL

## 2025-04-14 PROCEDURE — 95251 CONT GLUC MNTR ANALYSIS I&R: CPT | Performed by: GENERAL ACUTE CARE HOSPITAL

## 2025-04-14 PROCEDURE — G2211 COMPLEX E/M VISIT ADD ON: HCPCS | Performed by: GENERAL ACUTE CARE HOSPITAL

## 2025-04-14 PROCEDURE — 2022F DILAT RTA XM EVC RTNOPTHY: CPT | Performed by: GENERAL ACUTE CARE HOSPITAL

## 2025-04-14 PROCEDURE — G8417 CALC BMI ABV UP PARAM F/U: HCPCS | Performed by: GENERAL ACUTE CARE HOSPITAL

## 2025-04-14 PROCEDURE — G8427 DOCREV CUR MEDS BY ELIG CLIN: HCPCS | Performed by: GENERAL ACUTE CARE HOSPITAL

## 2025-04-14 PROCEDURE — 3046F HEMOGLOBIN A1C LEVEL >9.0%: CPT | Performed by: GENERAL ACUTE CARE HOSPITAL

## 2025-04-14 PROCEDURE — 3017F COLORECTAL CA SCREEN DOC REV: CPT | Performed by: GENERAL ACUTE CARE HOSPITAL

## 2025-04-14 NOTE — PROGRESS NOTES
[facility]  DR THERESA BECKER       The patient (or guardian, if applicable) and other individuals in attendance with the patient were advised that Artificial Intelligence will be utilized during this visit to record, process the conversation to generate a clinical note, and support improvement of the AI technology. The patient (or guardian, if applicable) and other individuals in attendance at the appointment consented to the use of AI, including the recording.        ASSESSMENT AND PLAN:     Type 1 diabetes Uncontrolled  Complications: CKD 3b, periph neuropathy  Hyperlipidemia  Hypertension    Indicates he wants to continue current insulins he is taking and he is interested in insulin pump, and opting for Tandem Mobi insulin pump , given ms Saranya Mendosa number he had trouble getting in touch so we will call her    Discussed importance of monitoring his BS and adjusting his Humalog accordingly, he indicates understanding    - Average A1c over the past two weeks: approximately 7.5  - Average blood glucose readin (target <154)  - Utilization of CGM and Control-IQ: 87% of the time, 13% offline usage  - Time active with Control-IQ: 86% (desired 90%)  - Advised to increase bolusing frequency and reduce carbohydrate intake to approximately 160 g per day  - Encouraged to learn how to adjust pump settings independently  - Order for A1c test placed for upcoming nephrologist appointment  - Instructed to continue using the pump and make necessary adjustments as discussed    PLAN  Type 1 Diabetes  Goal <7%  Medications:   Currently on Tandem Tslim 2x insulin pump and Dexcom G7     To work on bolusing more regularly, advised about and shown how to complete the quick bolus    Previously on:  Lantus 34 units daily   Humalog  -Brunch: 24 to 28 units + correction scale   -Dinner: 18 units + correction scale   Correction Scale:   1 unit for every 30 above 150    Advised to check glucose 3 times

## 2025-04-14 NOTE — PATIENT INSTRUCTIONS
PLAN FOR TODAY    Please repeat thyroid ultrasound now    We will plan to make the following changes to your diabetes medications:    Ms. Saranya Mendosa for help with the insulin pump:  Mobile: [phone]  Fax: [phone]    See Ophthalmologist    It will be important to continue checking your glucose just as you did previously.   I would like you at the very least to check you glucose during:     + AM fasting before breakfast   + Before Lunch   + Dinner time   + Bedtime   + Any other time that you are not feeling well.     Always provide a glucose log that is completed at every visit so that we can review the results of your home glucose together. Without this, it is not possible to make accurate changes to your diabetes regimen.    José Franco MD  [facility]     Hypoglycemia:    Hypoglycemia means that your blood sugar is low and your body is not getting enough fuel. Some people get low blood sugar from not eating often enough. Some medicines to treat diabetes can cause low blood sugar. People who have had surgery on their stomachs or intestines may get hypoglycemia. Problems with the pancreas, kidneys, or liver also can cause low blood sugar.  A snack or drink with sugar in it will raise your blood sugar and should ease your symptoms right away.  How can you care for yourself at home?  Learn your signs of low blood sugar. They are different for everyone. Some common early signs include:  Nausea.  Hunger.  Feeling nervous, irritable, or shaky.  Cold, clammy skin.  Sweating (when you're not exercising).  Use the \"rule of 15\" to treat low blood sugar. This includes eating 15 grams of carbohydrate from a quick-sugar food, such as 3 or 4 glucose tablets or ½ cup of juice. Wait 15 minutes and check your blood sugar. If it is still below 70 mg/dL, eat another 15 grams of carbohydrate. Repeat this every 15 minutes until your blood sugar is in a safe target range.  Once your blood sugar is in a

## 2025-04-21 PROBLEM — Z46.81 FITTING AND ADJUSTMENT OF INSULIN PUMP: Status: ACTIVE | Noted: 2025-04-21

## 2025-04-23 ENCOUNTER — TELEPHONE (OUTPATIENT)
Facility: CLINIC | Age: 55
End: 2025-04-23

## 2025-04-23 NOTE — TELEPHONE ENCOUNTER
gJ from [facility] called to f/u with Certificate of medical necessity fax that was sent 4/16. Can call back at [phone]

## 2025-04-28 ENCOUNTER — HOSPITAL ENCOUNTER (OUTPATIENT)
Facility: HOSPITAL | Age: 55
Discharge: HOME | End: 2025-05-01
Attending: GENERAL ACUTE CARE HOSPITAL
Payer: MEDICARE

## 2025-04-28 DIAGNOSIS — E10.42 TYPE 1 DIABETES MELLITUS WITH DIABETIC POLYNEUROPATHY (HCC): ICD-10-CM

## 2025-04-28 DIAGNOSIS — E04.1 THYROID NODULE: ICD-10-CM

## 2025-04-28 PROCEDURE — 76536 US EXAM OF HEAD AND NECK: CPT

## 2025-05-29 ENCOUNTER — OFFICE VISIT (OUTPATIENT)
Age: 55
End: 2025-05-29
Payer: MEDICARE

## 2025-05-29 VITALS
BODY MASS INDEX: 37.65 KG/M2 | HEIGHT: 70 IN | WEIGHT: 263 LBS | HEART RATE: 69 BPM | TEMPERATURE: 97.5 F | DIASTOLIC BLOOD PRESSURE: 84 MMHG | SYSTOLIC BLOOD PRESSURE: 132 MMHG | RESPIRATION RATE: 18 BRPM | OXYGEN SATURATION: 97 %

## 2025-05-29 DIAGNOSIS — E10.42 DIABETIC POLYNEUROPATHY ASSOCIATED WITH TYPE 1 DIABETES MELLITUS (HCC): ICD-10-CM

## 2025-05-29 DIAGNOSIS — G81.10 SPASTIC HEMIPARESIS AFFECTING DOMINANT SIDE (HCC): Primary | ICD-10-CM

## 2025-05-29 DIAGNOSIS — G90.9 AUTONOMIC NEUROPATHY: ICD-10-CM

## 2025-05-29 DIAGNOSIS — R53.83 OTHER FATIGUE: ICD-10-CM

## 2025-05-29 DIAGNOSIS — F33.1 MODERATE RECURRENT MAJOR DEPRESSION (HCC): ICD-10-CM

## 2025-05-29 DIAGNOSIS — G37.3 IDIOPATHIC TRANSVERSE MYELITIS (HCC): ICD-10-CM

## 2025-05-29 PROCEDURE — 1036F TOBACCO NON-USER: CPT | Performed by: PSYCHIATRY & NEUROLOGY

## 2025-05-29 PROCEDURE — G8417 CALC BMI ABV UP PARAM F/U: HCPCS | Performed by: PSYCHIATRY & NEUROLOGY

## 2025-05-29 PROCEDURE — 3017F COLORECTAL CA SCREEN DOC REV: CPT | Performed by: PSYCHIATRY & NEUROLOGY

## 2025-05-29 PROCEDURE — G8427 DOCREV CUR MEDS BY ELIG CLIN: HCPCS | Performed by: PSYCHIATRY & NEUROLOGY

## 2025-05-29 PROCEDURE — 3079F DIAST BP 80-89 MM HG: CPT | Performed by: PSYCHIATRY & NEUROLOGY

## 2025-05-29 PROCEDURE — 3046F HEMOGLOBIN A1C LEVEL >9.0%: CPT | Performed by: PSYCHIATRY & NEUROLOGY

## 2025-05-29 PROCEDURE — 2022F DILAT RTA XM EVC RTNOPTHY: CPT | Performed by: PSYCHIATRY & NEUROLOGY

## 2025-05-29 PROCEDURE — 3075F SYST BP GE 130 - 139MM HG: CPT | Performed by: PSYCHIATRY & NEUROLOGY

## 2025-05-29 PROCEDURE — 99215 OFFICE O/P EST HI 40 MIN: CPT | Performed by: PSYCHIATRY & NEUROLOGY

## 2025-05-29 RX ORDER — PREGABALIN 200 MG/1
200 CAPSULE ORAL 2 TIMES DAILY
Qty: 60 CAPSULE | Refills: 5 | Status: SHIPPED | OUTPATIENT
Start: 2025-05-29 | End: 2025-11-25

## 2025-05-29 NOTE — ASSESSMENT & PLAN NOTE
Multifactorial in nature to include mobility issues chronic lung disease blood sugar issues [the positive side of things patient tells me that presently his heart failure has resolved] patient is on O2 at home but has been having difficulty getting his portable O2 I have asked him to reach out to his pulmonologist regarding this.    He is to continue to ambulate with his rollator to help reduce fatigue levels and to work to keep his other comorbid conditions under best control possible

## 2025-05-29 NOTE — ASSESSMENT & PLAN NOTE
After discussion patient has agreed to be referred to behavioral health.  That referral has been made.

## 2025-05-29 NOTE — PATIENT INSTRUCTIONS
As per discussion  From a neurologic perspective you are doing really well I do not recommend any changes in treatment or intervention at this time    I would reach out to your pulmonologist and let them know that your portable oxygen order has  and you need a new prescription    I have made a referral to behavioral health here on the [facility] campus.  Because we take your insurance they will take your insurance because are all part of the same system.    In the meantime keep being awesome and I will see you back next year            As a reminder:   Please come to your appointment 15 minutes before your office appointment.  This way, you can get checked in at the  and checked in by the nursing staff so you have the full allotment of time with your provider for your visit.  Please bring an up-to-date and accurate list of all your medications.  Or bring all your active prescription bottles with you at the time of your office visit and this includes over-the-counter medications so we can make sure that your medication list is up-to-date.  If you are scheduled for a virtual visit, please be aware that the  will need to check you in and usually the day before to verify insurance and collect co-pays as appropriate.  Please be prepared for the second call which will be from the nurse to go over your medications and any other vital information.  This will probably be done 30 minutes prior to your visit.  The reason why we do this early is that you can get the full benefit of your appointment time with your provider.  Finally you will be given the link for your virtual visit please click into your link 10 minutes prior to your appointment and please wait patiently for the provider to join you            Office Policies    Phone calls/patient messages:  Please allow up to 72 hours  for someone in the office to contact you about your call or message. Be mindful your

## 2025-05-29 NOTE — ASSESSMENT & PLAN NOTE
Stable and unchanged finds that most best to see occurs after he is fatigued.  He does use his rollator but does not have it with him today   Patient would prefer not to be on any additional medication at this time for the symptoms

## 2025-05-29 NOTE — PROGRESS NOTES
Chief Complaint   Patient presents with    Follow-up    Extremity Weakness    Fatigue     \"Have you been to the ER, urgent care clinic since your last visit?  Hospitalized since your last visit?\"    NO    “Have you seen or consulted any other health care providers outside of [facility] since your last visit?”    YES, Dr. Karl Rogers(Pulmonary)

## 2025-05-29 NOTE — ASSESSMENT & PLAN NOTE
Stable and unchanged without recurrence or without evidence of new focal symptoms.  Continues with right-sided hemiparesis

## 2025-05-29 NOTE — PROGRESS NOTES
[facility]  [facility]  [address]  [place] [address]  Phone: [phone] fax: [phone]  LIZ Birch is a 54 y.o.  male who presents today for the following:  Chief Complaint   Patient presents with    Follow-up    Extremity Weakness    Fatigue         ASSESSMENT AND PLAN  1. Spastic hemiparesis affecting dominant side (HCC)  Assessment & Plan:  Stable and unchanged finds that most best to see occurs after he is fatigued.  He does use his rollator but does not have it with him today   Patient would prefer not to be on any additional medication at this time for the symptoms  Orders:  -     [facility]  2. Idiopathic transverse myelitis (HCC)  Assessment & Plan:  Stable and unchanged without recurrence or without evidence of new focal symptoms.  Continues with right-sided hemiparesis   Orders:  -     [facility]  3. Diabetic polyneuropathy associated with type 1 diabetes mellitus (HCC)  Assessment & Plan:  Certainly contributory to patient's gait and ambulatory difficulties.  He has been encouraged to continue to keep his comorbid conditions under best control possible.  He is to follow-up with his endocrinologist regarding diabetic management   He is on Lyrica twice daily 200 mg   Orders:  -     [facility]  -     pregabalin (LYRICA) 200 MG capsule; Take 1 capsule by mouth 2 times daily for 180 days. Max Daily Amount: 400 mg, Disp-60 capsule, R-5Normal  4. Moderate recurrent major depression (HCC)  Assessment & Plan:  After discussion patient has agreed to be referred to behavioral health.  That referral has been made.    5. Other fatigue  Assessment & Plan:  Multifactorial in nature to include mobility issues chronic lung disease blood sugar issues [the positive side of things patient tells me that

## 2025-05-29 NOTE — ASSESSMENT & PLAN NOTE
Certainly contributory to patient's gait and ambulatory difficulties.  He has been encouraged to continue to keep his comorbid conditions under best control possible.  He is to follow-up with his endocrinologist regarding diabetic management   He is on Lyrica twice daily 200 mg

## 2025-05-29 NOTE — ASSESSMENT & PLAN NOTE
Being managed through cardiology; patient reports does not believe he is having any symptoms at this time

## 2025-05-31 ENCOUNTER — RESULTS FOLLOW-UP (OUTPATIENT)
Age: 55
End: 2025-05-31

## 2025-06-08 DIAGNOSIS — I10 ESSENTIAL HYPERTENSION: ICD-10-CM

## 2025-06-09 RX ORDER — AMLODIPINE BESYLATE 10 MG/1
10 TABLET ORAL DAILY
Qty: 90 TABLET | Refills: 0 | Status: SHIPPED | OUTPATIENT
Start: 2025-06-09

## 2025-06-09 RX ORDER — METOPROLOL SUCCINATE 25 MG/1
TABLET, EXTENDED RELEASE ORAL
Qty: 45 TABLET | Refills: 1 | Status: SHIPPED | OUTPATIENT
Start: 2025-06-09

## 2025-06-09 NOTE — TELEPHONE ENCOUNTER
Requested Prescriptions     Signed Prescriptions Disp Refills    metoprolol succinate (TOPROL XL) 25 MG extended release tablet 45 tablet 1     Sig: Take 1/2 (one-half) tablet by mouth once daily     Authorizing Provider: APOORVA MALDONADO     Ordering User: MISBAH DUARTE MD    Future Appointments   Date Time Provider Department Center   7/23/2025  1:30 PM Paco Bray PA-C [facility]   9/10/2025  9:20 AM Apoorva Maldonado MD [facility]   1/29/2026 12:30 PM Mitzi Polk, JORGE [facility]

## 2025-06-12 RX ORDER — INSULIN GLARGINE 100 [IU]/ML
INJECTION, SOLUTION SUBCUTANEOUS
Qty: 30 ML | Refills: 1 | Status: SHIPPED | OUTPATIENT
Start: 2025-06-12

## 2025-07-23 ENCOUNTER — OFFICE VISIT (OUTPATIENT)
Facility: CLINIC | Age: 55
End: 2025-07-23
Payer: MEDICARE

## 2025-07-23 VITALS
BODY MASS INDEX: 39.28 KG/M2 | DIASTOLIC BLOOD PRESSURE: 84 MMHG | SYSTOLIC BLOOD PRESSURE: 132 MMHG | TEMPERATURE: 97.8 F | OXYGEN SATURATION: 86 % | HEIGHT: 70 IN | HEART RATE: 84 BPM | WEIGHT: 274.4 LBS | RESPIRATION RATE: 16 BRPM

## 2025-07-23 DIAGNOSIS — F32.4 MAJOR DEPRESSIVE DISORDER WITH SINGLE EPISODE, IN PARTIAL REMISSION: ICD-10-CM

## 2025-07-23 DIAGNOSIS — E10.42 DIABETIC POLYNEUROPATHY ASSOCIATED WITH TYPE 1 DIABETES MELLITUS (HCC): ICD-10-CM

## 2025-07-23 DIAGNOSIS — G37.9 DEMYELINATING DISEASE OF CENTRAL NERVOUS SYSTEM, UNSPECIFIED (HCC): ICD-10-CM

## 2025-07-23 DIAGNOSIS — E10.3319: ICD-10-CM

## 2025-07-23 DIAGNOSIS — G81.10 SPASTIC HEMIPARESIS AFFECTING DOMINANT SIDE (HCC): ICD-10-CM

## 2025-07-23 DIAGNOSIS — Z00.00 ENCOUNTER FOR SUBSEQUENT ANNUAL WELLNESS VISIT (AWV) IN MEDICARE PATIENT: Primary | ICD-10-CM

## 2025-07-23 DIAGNOSIS — N18.32 STAGE 3B CHRONIC KIDNEY DISEASE (HCC): ICD-10-CM

## 2025-07-23 DIAGNOSIS — E78.00 HYPERCHOLESTEROLEMIA: ICD-10-CM

## 2025-07-23 DIAGNOSIS — R45.89 ANXIETY ABOUT HEALTH: ICD-10-CM

## 2025-07-23 DIAGNOSIS — I10 ESSENTIAL HYPERTENSION: ICD-10-CM

## 2025-07-23 DIAGNOSIS — J96.11 CHRONIC RESPIRATORY FAILURE WITH HYPOXIA (HCC): ICD-10-CM

## 2025-07-23 PROCEDURE — 3075F SYST BP GE 130 - 139MM HG: CPT | Performed by: PHYSICIAN ASSISTANT

## 2025-07-23 PROCEDURE — G8417 CALC BMI ABV UP PARAM F/U: HCPCS | Performed by: PHYSICIAN ASSISTANT

## 2025-07-23 PROCEDURE — 99214 OFFICE O/P EST MOD 30 MIN: CPT | Performed by: PHYSICIAN ASSISTANT

## 2025-07-23 PROCEDURE — 1036F TOBACCO NON-USER: CPT | Performed by: PHYSICIAN ASSISTANT

## 2025-07-23 PROCEDURE — 3017F COLORECTAL CA SCREEN DOC REV: CPT | Performed by: PHYSICIAN ASSISTANT

## 2025-07-23 PROCEDURE — G8428 CUR MEDS NOT DOCUMENT: HCPCS | Performed by: PHYSICIAN ASSISTANT

## 2025-07-23 PROCEDURE — 3046F HEMOGLOBIN A1C LEVEL >9.0%: CPT | Performed by: PHYSICIAN ASSISTANT

## 2025-07-23 PROCEDURE — 2022F DILAT RTA XM EVC RTNOPTHY: CPT | Performed by: PHYSICIAN ASSISTANT

## 2025-07-23 PROCEDURE — 3079F DIAST BP 80-89 MM HG: CPT | Performed by: PHYSICIAN ASSISTANT

## 2025-07-23 PROCEDURE — G0439 PPPS, SUBSEQ VISIT: HCPCS | Performed by: PHYSICIAN ASSISTANT

## 2025-07-23 RX ORDER — ARIPIPRAZOLE 5 MG/1
5 TABLET ORAL DAILY
Qty: 30 TABLET | Refills: 5 | Status: SHIPPED | OUTPATIENT
Start: 2025-07-23

## 2025-07-23 SDOH — ECONOMIC STABILITY: FOOD INSECURITY: WITHIN THE PAST 12 MONTHS, YOU WORRIED THAT YOUR FOOD WOULD RUN OUT BEFORE YOU GOT MONEY TO BUY MORE.: NEVER TRUE

## 2025-07-23 SDOH — ECONOMIC STABILITY: FOOD INSECURITY: WITHIN THE PAST 12 MONTHS, THE FOOD YOU BOUGHT JUST DIDN'T LAST AND YOU DIDN'T HAVE MONEY TO GET MORE.: NEVER TRUE

## 2025-07-23 ASSESSMENT — PATIENT HEALTH QUESTIONNAIRE - PHQ9
5. POOR APPETITE OR OVEREATING: NOT AT ALL
7. TROUBLE CONCENTRATING ON THINGS, SUCH AS READING THE NEWSPAPER OR WATCHING TELEVISION: NOT AT ALL
9. THOUGHTS THAT YOU WOULD BE BETTER OFF DEAD, OR OF HURTING YOURSELF: NOT AT ALL
6. FEELING BAD ABOUT YOURSELF - OR THAT YOU ARE A FAILURE OR HAVE LET YOURSELF OR YOUR FAMILY DOWN: NOT AT ALL
1. LITTLE INTEREST OR PLEASURE IN DOING THINGS: NOT AT ALL
SUM OF ALL RESPONSES TO PHQ QUESTIONS 1-9: 0
4. FEELING TIRED OR HAVING LITTLE ENERGY: NOT AT ALL
10. IF YOU CHECKED OFF ANY PROBLEMS, HOW DIFFICULT HAVE THESE PROBLEMS MADE IT FOR YOU TO DO YOUR WORK, TAKE CARE OF THINGS AT HOME, OR GET ALONG WITH OTHER PEOPLE: NOT DIFFICULT AT ALL
3. TROUBLE FALLING OR STAYING ASLEEP: NOT AT ALL
8. MOVING OR SPEAKING SO SLOWLY THAT OTHER PEOPLE COULD HAVE NOTICED. OR THE OPPOSITE, BEING SO FIGETY OR RESTLESS THAT YOU HAVE BEEN MOVING AROUND A LOT MORE THAN USUAL: NOT AT ALL
2. FEELING DOWN, DEPRESSED OR HOPELESS: NOT AT ALL
SUM OF ALL RESPONSES TO PHQ QUESTIONS 1-9: 0

## 2025-07-23 ASSESSMENT — LIFESTYLE VARIABLES
HOW OFTEN DO YOU HAVE A DRINK CONTAINING ALCOHOL: NEVER
HOW MANY STANDARD DRINKS CONTAINING ALCOHOL DO YOU HAVE ON A TYPICAL DAY: PATIENT DOES NOT DRINK

## 2025-07-23 NOTE — PATIENT INSTRUCTIONS
[phone]   [facility] Transportation Resources*  (Call 211 for additional resources)  [facility] Transit System  What they offer: Provides public transportation to the [facility] and parts of [place] and [facility].  Website: http://www.ridegrtc.com/  Phone Number: 826.891.5122  [facility] Specialized Services (CARE & CARE Plus)  What they offer: Provides public transportation access to individuals with disabilities who may not reasonably be able to use [facility] fixed route bus service. Provides ussdlx-ts-mmyhknkzhkh services under the guidelines of the ADA.  Website: http://[employer]/services/specialized-transportation/care  Phone Number: 776.750.3887  [facility] Ride Connection  What they offer: Ride Connection provides 2 round trip rides/month to non-emergency medical appointments (must meet eligibility requirements)  Website: https://[employer]/service/friendship-cafes/   Phone Number: 386.524.4445  Eligibility Requirements: Individuals with disabilities (18+) or older adults (60+) and live in the [facility] or the [facility] [place] [facility] and [place].  Sliding scale fee system. You must give 7 days' notice to schedules rides, which are subject to availability.  [employer]  What they offer: Donation based transportation services for veterans, families in need, the elderly, and those with disabilities.  Website: https://www.[employer].isocket/  Phone Number: 283.948.9703. Please allow two weeks' notice in scheduling rides, which are subject to availability.  Additional Information: Offers transport to appointments, grocery store, airport, etc. in the areas of [facility], [facility], and [place].          Revised 5/2025 - *Eligibility and availability may vary.    [facility]  What they offer: Transportation for older adults.  Website:

## 2025-07-23 NOTE — PROGRESS NOTES
Tom Birch  Identified pt with two pt identifiers(name and ).     Chief Complaint   Patient presents with    Medicare AWV     Room 4A //        Reviewed record In preparation for visit and have obtained necessary documentation.     1. Have you been to the ER, urgent care clinic or hospitalized since your last visit? No     2. Have you seen or consulted any other health care providers outside of the [facility] since your last visit? Include any pap smears or colon screening. Yes. Eye Exam - [facility]     Patient does not have an advance directive.     Vitals reviewed with provider.    Health Maintenance reviewed:     Health Maintenance Due   Topic    HIV screen     DTaP/Tdap/Td vaccine (1 - Tdap)    Pneumococcal 50+ years Vaccine (1 of 2 - PCV)    Shingles vaccine (1 of 2)    Diabetic retinal exam     Diabetic foot exam     Annual Wellness Visit (Medicare Advantage)     A1C test (Diabetic or Prediabetic)           Wt Readings from Last 3 Encounters:   25 124.5 kg (274 lb 6.4 oz)   25 119.3 kg (263 lb)   25 121.1 kg (266 lb 14.4 oz)        Temp Readings from Last 3 Encounters:   25 97.5 °F (36.4 °C) (Temporal)   24 98.9 °F (37.2 °C) (Oral)        BP Readings from Last 3 Encounters:   25 132/84   25 137/85   24 124/75        Pulse Readings from Last 3 Encounters:   25 69   25 98   24 (!) 101             No data to display                  Learning Assessment:         2023    10:00 AM   [facility]B LEARNING ASSESSMENT   Primary Learner Patient   Primary Language ENGLISH   Learning Preference OTHER (COMMENT)   Answered By Patient   Relationship to Learner SELF         Fall Risk Assessment:   :         2025     1:36 PM 2024    10:51 AM 2024    10:43 AM 9/10/2024     9:42 AM 2024     8:09 AM 2024    10:49 AM 3/21/2024    11:29 AM   Amb Fall Risk Assessment and TUG Test   Do you feel unsteady or are you worried about

## (undated) DEVICE — KENDALL DL ECG CABLE AND LEAD WIRE SYSTEM, 3-LEAD, SINGLE PATIENT USE: Brand: KENDALL

## (undated) DEVICE — ANGIOGRAPHIC CATHETER: Brand: IMPULSE™

## (undated) DEVICE — WASTE KIT - ST MARY: Brand: MEDLINE INDUSTRIES, INC.

## (undated) DEVICE — THE MONARCH® "D" CARTRIDGE IS A SINGLE-USE POLYPROPYLENE CARTRIDGE FOR POSTERIOR CHAMBER IOL DELIVERY: Brand: MONARCH® III

## (undated) DEVICE — TR BAND RADIAL ARTERY COMPRESSION DEVICE: Brand: TR BAND

## (undated) DEVICE — KIT MFLD ISOLATN NACL CNTRST PRT TBNG SPIK W/ PRSS TRNSDUC

## (undated) DEVICE — Device

## (undated) DEVICE — GLIDESHEATH SLENDER ACCESS KIT: Brand: GLIDESHEATH SLENDER

## (undated) DEVICE — GLOVE ORANGE PI 7   MSG9070

## (undated) DEVICE — SOLUTION IRRIG 500ML STRL H2O NONPYROGENIC

## (undated) DEVICE — SURGICAL PROCEDURE PACK CATRCT CUST

## (undated) DEVICE — CATHETER DIAG 5FR L100CM LUMN ID0.047IN JL3.5 CRV 0 SIDE H

## (undated) DEVICE — KIT MED IMAG CNTRST AGNT W/ IOPAMIDOL REUSE

## (undated) DEVICE — PROVE COVER: Brand: UNBRANDED

## (undated) DEVICE — KIT AT-X65 ANGIOTOUCH HAND CONTROLLER

## (undated) DEVICE — SOLUTION IV 250ML 0.9% SOD CHL CLR INJ FLX BG CONT PRT CLSR

## (undated) DEVICE — HIGH FLOW RATE EXTENSION SET, LUER LOCK ADAPTERS

## (undated) DEVICE — ANGIOGRAPHY KIT

## (undated) DEVICE — 3M™ TEGADERM™ TRANSPARENT FILM DRESSING FRAME STYLE, 1624W, 2-3/8 IN X 2-3/4 IN (6 CM X 7 CM), 100/CT 4CT/CASE: Brand: 3M™ TEGADERM™

## (undated) DEVICE — PADPRO DEFIBRILLATION/PACING/CARDIOVERSION/MONITORING ELECTRODES, ADULT/CHILD GREATER THAN 10 KG RADIOTRANSPARENT ELECTRODE, PHYSIO-CONTROL QUIK-COMBO (M) 60" (152 CM): Brand: PADPRO

## (undated) DEVICE — SPECIAL PROCEDURE DRAPE 32" X 34": Brand: SPECIAL PROCEDURE DRAPE

## (undated) DEVICE — SOL INJ D 5% 50ML MINI BG LF --

## (undated) DEVICE — KIT HND CTRL 3 W STPCOCK ROT END 54IN PREM HI PRSS TBNG AT

## (undated) DEVICE — PACK PROCEDURE SURG HRT CATH